# Patient Record
Sex: MALE | Race: WHITE | NOT HISPANIC OR LATINO | Employment: UNEMPLOYED | URBAN - METROPOLITAN AREA
[De-identification: names, ages, dates, MRNs, and addresses within clinical notes are randomized per-mention and may not be internally consistent; named-entity substitution may affect disease eponyms.]

---

## 2019-07-10 ENCOUNTER — HOSPITAL ENCOUNTER (OUTPATIENT)
Dept: OTHER | Facility: HOSPITAL | Age: 59
Discharge: HOME | End: 2019-07-10
Attending: INTERNAL MEDICINE

## 2019-07-12 LAB — HCV AB SER DONR QL: >11 S/CO RATIO (ref 0–0.9)

## 2019-07-22 ENCOUNTER — OFFICE VISIT CONVERTED (OUTPATIENT)
Dept: PODIATRY | Facility: CLINIC | Age: 59
End: 2019-07-22
Attending: PODIATRIST

## 2019-08-06 ENCOUNTER — HOSPITAL ENCOUNTER (OUTPATIENT)
Dept: OTHER | Facility: HOSPITAL | Age: 59
Discharge: HOME | End: 2019-08-06
Attending: NURSE PRACTITIONER

## 2019-08-09 LAB
CONV HEPATITIS C TEST INFO: NORMAL
CONV HEPATITIS C VIRUS RNA (VIRAL LOAD): NORMAL IU/ML
HCV RNA BDNA LOG IU/ML: NORMAL LOG10 IU/ML

## 2019-08-13 ENCOUNTER — OFFICE VISIT CONVERTED (OUTPATIENT)
Dept: SURGERY | Facility: CLINIC | Age: 59
End: 2019-08-13
Attending: UROLOGY

## 2019-09-11 ENCOUNTER — HOSPITAL ENCOUNTER (OUTPATIENT)
Dept: OTHER | Facility: HOSPITAL | Age: 59
Discharge: HOME | End: 2019-09-11
Attending: NURSE PRACTITIONER

## 2019-09-11 LAB
C DIFF TOX B STL QL CT TISS CULT: NEGATIVE
CONV 027 TOXIN: NEGATIVE

## 2019-09-16 LAB
O+P SPEC MICRO: NORMAL
O+P SPEC MICRO: NORMAL

## 2019-10-07 ENCOUNTER — HOSPITAL ENCOUNTER (OUTPATIENT)
Dept: SURGERY | Facility: CLINIC | Age: 59
Discharge: HOME | End: 2019-10-07

## 2019-10-07 LAB — PSA SERPL-MCNC: 5.64 NG/ML (ref 0–4)

## 2019-10-09 ENCOUNTER — HOSPITAL ENCOUNTER (OUTPATIENT)
Dept: SURGERY | Facility: HOSPITAL | Age: 59
Setting detail: HOSPITAL OUTPATIENT SURGERY
Discharge: HOME | End: 2019-10-09
Attending: OPHTHALMOLOGY

## 2019-10-09 LAB — GLUCOSE BLD-MCNC: 193 MG/DL (ref 70–99)

## 2019-10-14 ENCOUNTER — CONVERSION ENCOUNTER (OUTPATIENT)
Dept: SURGERY | Facility: CLINIC | Age: 59
End: 2019-10-14

## 2019-10-14 ENCOUNTER — OFFICE VISIT CONVERTED (OUTPATIENT)
Dept: SURGERY | Facility: CLINIC | Age: 59
End: 2019-10-14
Attending: UROLOGY

## 2019-10-24 ENCOUNTER — HOSPITAL ENCOUNTER (OUTPATIENT)
Dept: PERIOP | Facility: HOSPITAL | Age: 59
Setting detail: HOSPITAL OUTPATIENT SURGERY
Discharge: HOME | End: 2019-10-24

## 2019-10-24 LAB
GLUCOSE BLD-MCNC: 106 MG/DL (ref 70–99)
GLUCOSE BLD-MCNC: 152 MG/DL (ref 70–99)

## 2019-11-08 ENCOUNTER — OFFICE VISIT CONVERTED (OUTPATIENT)
Dept: SURGERY | Facility: CLINIC | Age: 59
End: 2019-11-08
Attending: UROLOGY

## 2019-11-15 ENCOUNTER — OFFICE VISIT CONVERTED (OUTPATIENT)
Dept: SURGERY | Facility: CLINIC | Age: 59
End: 2019-11-15
Attending: UROLOGY

## 2019-11-18 ENCOUNTER — HOSPITAL ENCOUNTER (OUTPATIENT)
Dept: SURGERY | Facility: HOSPITAL | Age: 59
Setting detail: HOSPITAL OUTPATIENT SURGERY
Discharge: HOME | End: 2019-11-18
Attending: OPHTHALMOLOGY

## 2019-11-18 LAB — GLUCOSE BLD-MCNC: 164 MG/DL (ref 70–99)

## 2019-11-22 ENCOUNTER — HOSPITAL ENCOUNTER (OUTPATIENT)
Dept: RADIATION ONCOLOGY | Facility: HOSPITAL | Age: 59
Setting detail: RECURRING SERIES
Discharge: STILL ADMITTED OR ADMINISTRATIVE | End: 2019-11-25
Attending: RADIOLOGY

## 2019-11-27 ENCOUNTER — HOSPITAL ENCOUNTER (OUTPATIENT)
Dept: OTHER | Facility: HOSPITAL | Age: 59
Discharge: HOME | End: 2019-11-27
Attending: INTERNAL MEDICINE

## 2019-11-27 LAB
AMPHETAMINES UR QL SCN: NEGATIVE
BARBITURATES UR QL SCN: NEGATIVE
BENZODIAZ UR QL SCN: NEGATIVE
CONV COCAINE, UR: NEGATIVE
METHADONE UR QL SCN: NEGATIVE
OPIATES TESTED UR SCN: NEGATIVE
OXYCODONE UR QL SCN: NEGATIVE
PCP UR QL: NEGATIVE
THC SERPLBLD CFM-MCNC: NEGATIVE NG/ML

## 2019-12-02 ENCOUNTER — HOSPITAL ENCOUNTER (OUTPATIENT)
Dept: OTHER | Facility: HOSPITAL | Age: 59
Discharge: HOME | End: 2019-12-02
Attending: NURSE PRACTITIONER

## 2019-12-03 ENCOUNTER — HOSPITAL ENCOUNTER (OUTPATIENT)
Dept: RADIATION ONCOLOGY | Facility: HOSPITAL | Age: 59
Setting detail: RECURRING SERIES
Discharge: HOME | End: 2019-12-31
Attending: RADIOLOGY

## 2020-01-07 ENCOUNTER — HOSPITAL ENCOUNTER (OUTPATIENT)
Dept: RADIATION ONCOLOGY | Facility: HOSPITAL | Age: 60
Setting detail: RECURRING SERIES
Discharge: HOME | End: 2020-01-31
Attending: RADIOLOGY

## 2020-01-10 ENCOUNTER — HOSPITAL ENCOUNTER (OUTPATIENT)
Dept: MRI IMAGING | Facility: HOSPITAL | Age: 60
Discharge: HOME | End: 2020-01-10
Attending: RADIOLOGY

## 2020-02-03 ENCOUNTER — HOSPITAL ENCOUNTER (OUTPATIENT)
Dept: RADIATION ONCOLOGY | Facility: HOSPITAL | Age: 60
Setting detail: RECURRING SERIES
Discharge: HOME | End: 2020-05-03
Attending: RADIOLOGY

## 2020-03-26 LAB
ALBUMIN SERPL-MCNC: 4.4 G/DL (ref 3.5–5)
ALBUMIN/GLOB SERPL: 1.3 {RATIO} (ref 1.4–2.6)
ALP SERPL-CCNC: 85 U/L (ref 56–119)
ALT SERPL-CCNC: 47 U/L (ref 10–40)
ANION GAP SERPL CALC-SCNC: 16 MMOL/L (ref 8–19)
AST SERPL-CCNC: 35 U/L (ref 15–50)
BASOPHILS # BLD AUTO: 0.03 10*3/UL (ref 0–0.2)
BASOPHILS NFR BLD AUTO: 0.4 % (ref 0–3)
BILIRUB SERPL-MCNC: 0.48 MG/DL (ref 0.2–1.3)
BUN SERPL-MCNC: 12 MG/DL (ref 5–25)
BUN/CREAT SERPL: 9 {RATIO} (ref 6–20)
CALCIUM SERPL-MCNC: 9.6 MG/DL (ref 8.7–10.4)
CHLORIDE SERPL-SCNC: 101 MMOL/L (ref 99–111)
CONV ABS IMM GRAN: 0.05 10*3/UL (ref 0–0.2)
CONV CO2: 26 MMOL/L (ref 22–32)
CONV IMMATURE GRAN: 0.7 % (ref 0–1.8)
CONV TOTAL PROTEIN: 7.8 G/DL (ref 6.3–8.2)
CREAT UR-MCNC: 1.3 MG/DL (ref 0.7–1.2)
DEPRECATED RDW RBC AUTO: 40.8 FL (ref 35.1–43.9)
EOSINOPHIL # BLD AUTO: 0.12 10*3/UL (ref 0–0.7)
EOSINOPHIL # BLD AUTO: 1.7 % (ref 0–7)
ERYTHROCYTE [DISTWIDTH] IN BLOOD BY AUTOMATED COUNT: 13.2 % (ref 11.6–14.4)
GFR SERPLBLD BASED ON 1.73 SQ M-ARVRAT: 59 ML/MIN/{1.73_M2}
GLOBULIN UR ELPH-MCNC: 3.4 G/DL (ref 2–3.5)
GLUCOSE SERPL-MCNC: 212 MG/DL (ref 70–99)
HCT VFR BLD AUTO: 44.1 % (ref 42–52)
HGB BLD-MCNC: 14.5 G/DL (ref 14–18)
LYMPHOCYTES # BLD AUTO: 1.02 10*3/UL (ref 1–5)
LYMPHOCYTES NFR BLD AUTO: 14.7 % (ref 20–45)
MCH RBC QN AUTO: 28.5 PG (ref 27–31)
MCHC RBC AUTO-ENTMCNC: 32.9 G/DL (ref 33–37)
MCV RBC AUTO: 86.6 FL (ref 80–96)
MONOCYTES # BLD AUTO: 0.64 10*3/UL (ref 0.2–1.2)
MONOCYTES NFR BLD AUTO: 9.2 % (ref 3–10)
NEUTROPHILS # BLD AUTO: 5.06 10*3/UL (ref 2–8)
NEUTROPHILS NFR BLD AUTO: 73.3 % (ref 30–85)
NRBC CBCN: 0 % (ref 0–0.7)
OSMOLALITY SERPL CALC.SUM OF ELEC: 292 MOSM/KG (ref 273–304)
PLATELET # BLD AUTO: 273 10*3/UL (ref 130–400)
PMV BLD AUTO: 9.6 FL (ref 9.4–12.4)
POTASSIUM SERPL-SCNC: 4.6 MMOL/L (ref 3.5–5.3)
RBC # BLD AUTO: 5.09 10*6/UL (ref 4.7–6.1)
SODIUM SERPL-SCNC: 138 MMOL/L (ref 135–147)
TESTOST SERPL-MCNC: 216 NG/DL (ref 193–740)
TSH SERPL-ACNC: 1.46 M[IU]/L (ref 0.27–4.2)
VIT B12 SERPL-MCNC: 682 PG/ML (ref 211–911)
WBC # BLD AUTO: 6.92 10*3/UL (ref 4.8–10.8)

## 2020-03-27 LAB — PSA SERPL-MCNC: 1.16 NG/ML (ref 0–4)

## 2020-03-28 LAB
CONV THYROXINE TOTAL: 8.8 UG/DL (ref 4.5–12)
TESTOSTERONE, FREE: 7 PG/ML (ref 7.2–24)

## 2020-06-17 ENCOUNTER — OFFICE VISIT CONVERTED (OUTPATIENT)
Dept: PODIATRY | Facility: CLINIC | Age: 60
End: 2020-06-17
Attending: PODIATRIST

## 2020-07-14 ENCOUNTER — CONVERSION ENCOUNTER (OUTPATIENT)
Dept: OTHER | Facility: HOSPITAL | Age: 60
End: 2020-07-14

## 2020-08-10 ENCOUNTER — OFFICE VISIT CONVERTED (OUTPATIENT)
Dept: NEUROSURGERY | Facility: CLINIC | Age: 60
End: 2020-08-10
Attending: PHYSICIAN ASSISTANT

## 2020-08-12 ENCOUNTER — OFFICE VISIT CONVERTED (OUTPATIENT)
Dept: RADIATION ONCOLOGY | Facility: HOSPITAL | Age: 60
End: 2020-08-12
Attending: NURSE PRACTITIONER

## 2020-08-12 ENCOUNTER — HOSPITAL ENCOUNTER (OUTPATIENT)
Dept: RADIATION ONCOLOGY | Facility: HOSPITAL | Age: 60
Discharge: HOME | End: 2020-08-12
Attending: NURSE PRACTITIONER

## 2020-08-12 LAB — PSA SERPL-MCNC: 0.71 NG/ML (ref 0–4)

## 2020-08-13 ENCOUNTER — OFFICE VISIT CONVERTED (OUTPATIENT)
Dept: NEUROLOGY | Facility: CLINIC | Age: 60
End: 2020-08-13
Attending: PSYCHIATRY & NEUROLOGY

## 2020-08-13 ENCOUNTER — CONVERSION ENCOUNTER (OUTPATIENT)
Dept: NEUROLOGY | Facility: CLINIC | Age: 60
End: 2020-08-13

## 2020-08-18 ENCOUNTER — HOSPITAL ENCOUNTER (OUTPATIENT)
Dept: PHYSICAL THERAPY | Facility: CLINIC | Age: 60
Setting detail: RECURRING SERIES
Discharge: HOME | End: 2020-09-21
Attending: NEUROLOGICAL SURGERY

## 2020-08-24 ENCOUNTER — HOSPITAL ENCOUNTER (OUTPATIENT)
Dept: PREADMISSION TESTING | Facility: HOSPITAL | Age: 60
Discharge: HOME | End: 2020-08-24
Attending: INTERNAL MEDICINE

## 2020-08-24 LAB — SARS-COV-2 RNA SPEC QL NAA+PROBE: NOT DETECTED

## 2020-08-26 ENCOUNTER — HOSPITAL ENCOUNTER (OUTPATIENT)
Dept: GASTROENTEROLOGY | Facility: HOSPITAL | Age: 60
Setting detail: HOSPITAL OUTPATIENT SURGERY
Discharge: HOME | End: 2020-08-26
Attending: INTERNAL MEDICINE

## 2020-08-26 LAB — GLUCOSE BLD-MCNC: 152 MG/DL (ref 70–99)

## 2020-08-27 ENCOUNTER — HOSPITAL ENCOUNTER (OUTPATIENT)
Dept: GASTROENTEROLOGY | Facility: HOSPITAL | Age: 60
Setting detail: HOSPITAL OUTPATIENT SURGERY
Discharge: HOME | End: 2020-08-27
Attending: INTERNAL MEDICINE

## 2020-08-27 LAB — GLUCOSE BLD-MCNC: 130 MG/DL (ref 70–99)

## 2020-09-02 ENCOUNTER — OFFICE VISIT CONVERTED (OUTPATIENT)
Dept: NEUROSURGERY | Facility: CLINIC | Age: 60
End: 2020-09-02
Attending: PHYSICIAN ASSISTANT

## 2020-09-22 ENCOUNTER — PROCEDURE VISIT CONVERTED (OUTPATIENT)
Dept: PODIATRY | Facility: CLINIC | Age: 60
End: 2020-09-22
Attending: PODIATRIST

## 2020-09-22 ENCOUNTER — CONVERSION ENCOUNTER (OUTPATIENT)
Dept: PODIATRY | Facility: CLINIC | Age: 60
End: 2020-09-22

## 2020-09-30 ENCOUNTER — HOSPITAL ENCOUNTER (OUTPATIENT)
Dept: CARDIOLOGY | Facility: HOSPITAL | Age: 60
Discharge: HOME | End: 2020-09-30
Attending: PHYSICIAN ASSISTANT

## 2020-10-01 ENCOUNTER — HOSPITAL ENCOUNTER (OUTPATIENT)
Dept: GENERAL RADIOLOGY | Facility: HOSPITAL | Age: 60
Discharge: HOME | End: 2020-10-01
Attending: PHYSICIAN ASSISTANT

## 2020-10-05 ENCOUNTER — OFFICE VISIT CONVERTED (OUTPATIENT)
Dept: NEUROSURGERY | Facility: CLINIC | Age: 60
End: 2020-10-05
Attending: PHYSICIAN ASSISTANT

## 2020-10-16 ENCOUNTER — HOSPITAL ENCOUNTER (OUTPATIENT)
Dept: GENERAL RADIOLOGY | Facility: HOSPITAL | Age: 60
Discharge: HOME | End: 2020-10-16
Attending: PHYSICIAN ASSISTANT

## 2020-11-17 ENCOUNTER — HOSPITAL ENCOUNTER (OUTPATIENT)
Dept: RADIATION ONCOLOGY | Facility: HOSPITAL | Age: 60
Setting detail: RECURRING SERIES
Discharge: STILL ADMITTED OR ADMINISTRATIVE | End: 2020-12-30
Attending: RADIOLOGY

## 2020-11-17 LAB — PSA SERPL-MCNC: 0.28 NG/ML (ref 0–4)

## 2020-11-25 ENCOUNTER — OFFICE VISIT CONVERTED (OUTPATIENT)
Dept: NEUROSURGERY | Facility: CLINIC | Age: 60
End: 2020-11-25
Attending: PHYSICIAN ASSISTANT

## 2020-12-15 ENCOUNTER — CONVERSION ENCOUNTER (OUTPATIENT)
Dept: PODIATRY | Facility: CLINIC | Age: 60
End: 2020-12-15

## 2020-12-17 ENCOUNTER — TELEMEDICINE CONVERTED (OUTPATIENT)
Dept: NEUROSURGERY | Facility: CLINIC | Age: 60
End: 2020-12-17
Attending: PHYSICIAN ASSISTANT

## 2021-01-14 ENCOUNTER — OFFICE VISIT CONVERTED (OUTPATIENT)
Dept: NEUROLOGY | Facility: CLINIC | Age: 61
End: 2021-01-14
Attending: PSYCHIATRY & NEUROLOGY

## 2021-02-22 ENCOUNTER — HOSPITAL ENCOUNTER (OUTPATIENT)
Dept: OTHER | Facility: HOSPITAL | Age: 61
Discharge: HOME | End: 2021-02-22
Attending: NURSE PRACTITIONER

## 2021-02-22 LAB — PSA SERPL-MCNC: 0.2 NG/ML (ref 0–4)

## 2021-02-23 ENCOUNTER — HOSPITAL ENCOUNTER (OUTPATIENT)
Dept: RADIATION ONCOLOGY | Facility: HOSPITAL | Age: 61
Discharge: HOME | End: 2021-02-23
Attending: NURSE PRACTITIONER

## 2021-03-09 ENCOUNTER — PROCEDURE VISIT CONVERTED (OUTPATIENT)
Dept: PODIATRY | Facility: CLINIC | Age: 61
End: 2021-03-09
Attending: PODIATRIST

## 2021-03-19 ENCOUNTER — HOSPITAL ENCOUNTER (OUTPATIENT)
Dept: VACCINE CLINIC | Facility: HOSPITAL | Age: 61
Discharge: HOME | End: 2021-03-19
Attending: INTERNAL MEDICINE

## 2021-04-16 ENCOUNTER — HOSPITAL ENCOUNTER (OUTPATIENT)
Dept: VACCINE CLINIC | Facility: HOSPITAL | Age: 61
Discharge: HOME | End: 2021-04-16
Attending: INTERNAL MEDICINE

## 2021-05-10 NOTE — H&P
History and Physical      Patient Name: Wlilard Lange   Patient ID: [id number]   Sex: Male   YOB: 1960    Primary Care Provider: Elizabeth CAUSEY   Referring Provider: Elizabeth CAUSEY    Visit Date: August 10, 2020    Provider: Stacey Smith PA-C   Location: [facility]   Location Address: [address] [place] [address]  [id number]   Location Phone: [phone]          Chief Complaint     Patient is here with complaints of low back pain. MRI at [facility]       History Of Present Illness  The patient is a 60 year old /White male, who presents on referral from Elizabeth CAUSEY, for a neurosurgical evaluation of low back pain, right leg pain, and left leg pain.   The right leg pain involves the right lower leg in a nonspecific distribution. The left leg pain involves the left lower leg in a nonspecific distribution. The pain developed gradually several years ago. Pt notes that his pain has worsened significantly recently in the past few months. It is 8/10 in severity has a pressure-like and a boring quality and radiates into the bilateral posterior leg in a nonspecific distribution. The pain has been constant. The pain tends to be maximal at no specific time, but waxes and wanes in severity throughout the day. The patient states the pain is aggravated by prolonged sitting, prolonged standing, walking long distances, bending, lifting, and driving. It is alleviated by norco.   He denies bowel or bladder dysfunction. The patient's past medical history is notable for prior lumbar spine surgery. He underwent back surgery approximately 4 years ago by Dr. Ford. Pt is unsure of surgery.   RECENT INTERVENTIONS:  He has been previously treated with epidural steroids, pain medication, and Norco, diclofenac. The epidural steroids were ineffective.   INFORMATION REVIEWED:  The following information was reviewed: old imaging studies. Images and reports from an MRI of the  lumbar spine that was obtained 1 year ago revealed degenerative disk disease. The degenerative disc disease is present at multiple levels. There is also foraminal stenosis at L4/5 and L5/S1.      Pt notes that he is unable to do his job because he cannot do heavy lifting. 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 He also complains of neck pain and hand numbness for several years.       Past Medical History  Arthritis; Asthma; Cancer; Degenerative Disc Disease ; Diabetes mellitus type 2, noninsulin dependent; Diabetes type 2, controlled; GERD; Hepatitis C; Hepatitis C; HTN (hypertension); Hypertension, Benign Essential; Leg pain; Lumbago; Lumbago/low back pain; Lumbar disc herniation, L4-5; Lumbar Spinal Stenosis; Muscle cramps; Neuropathy; Prostate cancer         Past Surgical History  Appendectomy; Back surgery; Colonoscopy; Laminectomy; Lithotripsy for kidney stones; Prostate biopsy, transrectal; Tonsilectomy         Medication List  diclofenac sodium 75 mg oral tablet,delayed release (DR/EC); Flomax 0.4 mg oral capsule; Janumet  mg oral tablet; losartan 100 mg oral tablet; Norco 7.5-325 mg oral tablet; NuLYTELY with Flavor Packs 420 gram oral recon soln; simvastatin 10 mg oral tablet         Allergy List  No known history of drug allergy       Allergies Reconciled  Family Medical History  Family history of lung cancer; Family history of heart disease; Family history of diabetes mellitus type I         Social History  Alcohol (Current some day); lives alone; Tobacco (Former); Unemployed;          Review of Systems  · Constitutional  o Denies  o : chills, excessive sweating, fatigue, fever, sycope/passing out, weight gain, weight loss  · Eyes  o Denies  o : changes in vision, blurry vision, double vision  · HENT  o Denies  o : loss of hearing, ringing in the ears, ear aches, sore throat, nasal congestion, sinus pain, nose bleeds, seasonal  allergies  · Cardiovascular  o Denies  o : blood clots, swollen legs, anemia, easy burising or bleeding, transfusions  · Respiratory  o Denies  o : shortness of breath, dry cough, productive cough, pneumonia, COPD  · Gastrointestinal  o Denies  o : difficulty swallowing, reflux  · Genitourinary  o Denies  o : incontinence  · Neurologic  o Denies  o : headache, seizure, stroke, tremor, loss of balance, falls, dizziness/vertigo, difficulty with sleep, numbness/tingling/paresthesia , difficulty with coordination, difficulty with dexterity, weakness  · Musculoskeletal  o Admits  o : joint pain, sciatica, pain radiating in leg, low back pain  o Denies  o : neck stiffness/pain, swollen lymph nodes, muscle aches, weakness, spasms, pain radiating in arm  · Endocrine  o Denies  o : diabetes, thyroid disorder  · Psychiatric  o Denies  o : anxiety, depression      Vitals  Date Time BP Position Site L\R Cuff Size HR RR TEMP (F) WT  HT  BMI kg/m2 BSA m2 O2 Sat        08/10/2020 10:43 AM        97.1 240lbs 0oz 6'   32.55 2.35           Physical Examination  · Constitutional  o Appearance  o : well-nourished, well developed, alert, in no acute distress  · Respiratory  o Respiratory Effort  o : breathing unlabored  · Cardiovascular  o Peripheral Vascular System  o :   § Extremities  § : no edema or cyanosis  · Musculoskeletal  o Spine  o :   § Inspection/Palpation  § : tenderness to palpation present in low back and neck.   o Right Lower Extremity  o :   § Inspection/Palpation  § : no joint or limb tenderness to palpation, no edema present, no ecchymosis  § Joint Stability  § : joint stability within normal limits  § Range of Motion  § : range of motion normal, no joint crepitations present, no pain on motion, Narendra's test negative  o Left Lower Extremity  o :   § Inspection/Palpation  § : no joint or limb tenderness to palpation, no edema present, no ecchymosis  § Joint Stability  § : joint stability within normal  limits  § Range of Motion  § : range of motion normal, no joint crepitations present, no pain on motion, Narendra's test negative  · Skin and Subcutaneous Tissue  o Extremities  o :   § Right Lower Extremity  § : no lesions or areas of discoloration  § Left Lower Extremity  § : no lesions or areas of discoloration  o Back  o : no lesions or areas of discoloration  · Neurologic  o Mental Status Examination  o :   § Orientation  § : alert and oriented to time, person, place and events  o Motor Examination  o :   § RLE Strength  § : strength normal  § RLE Motor Function  § : tone normal, no atrophy, no abnormal movements noted  § LLE Strength  § : strength normal  § LLE Motor Function  § : tone normal, no atrophy, no abnormal movements noted  o Reflexes  o :   § RLE  § : knee and ankle reflexes 2/4, SLR negative  § LLE  § : knee and ankle reflexes 2/4, SLR negative  o Sensation  o :   § Light Touch  § : mild decreased sensation in hands and feet.   o Gait and Station  o :   § Gait Screening  § : slow gait.   · Psychiatric  o Mood and Affect  o : mood normal, affect appropriate          Assessment  · Degeneration of lumbar intervertebral disc     722.52/M51.36  · Lumbago/low back pain     724.3/M54.40  · Paresthesia     782.0/R20.2  arms and legs  · Sciatica     724.3/M54.30  · Cervicalgia     723.1/M54.2    Problems Reconciled  Plan  · Orders  o PHYSICAL THERAPY CONSULTATION (PHYTH) - 724.3/M54.30, 724.3/M54.40, 782.0/R20.2, 723.1/M54.2 - 08/10/2020  o EMG/NCV of Lower Extremities Bilaterally (36693) - 724.3/M54.30, 782.0/R20.2 - 08/10/2020  o EMG/NCV of Upper Extremities Bilaterally (02186) - 782.0/R20.2 - 08/10/2020  · Medications  o Medications have been Reconciled  o Transition of Care or Provider Policy  · Instructions  o Encouraged to follow-up with Primary Care Provider for preventative care.  o The ROS and the PFSH were reviewed at today's visit.  o I have discussed the risks and benefits of surgery versus  physical therapy, epidural steroids, and other conservative forms of treatment.  o Given these options, the patient wishes to exhaust all forms of non-operative management.  o The patient wishes a referral to physical therapy.  o Handouts provided: Non-Surgical Treatments for Back Pain/Degenerative Disc Disease.  o We have discussed the benefits of core strengthening. Patient will work on improving the core muscle strength with low impact activities such as walking, swimming, Pilates, etc.   o Call or return to office if symptoms worsen or persist.  o Will order EMGs of arms and legs and send for PT of neck and lower back. He will return to us in 6 weeks. Will plan to order MRI of lumbar spine and Cspine at next apt.   o Electronically Identified Patient Education Materials Provided Electronically  · Disposition  o Call or Return if symptoms worsen or persist.            Electronically Signed by: Stacey Smith PA-C -Author on August 10, 2020 11:21:00 AM

## 2021-05-10 NOTE — H&P
History and Physical      Patient Name: Willard Lange   Patient ID: [id number]   Sex: Male   YOB: 1960    Primary Care Provider: Elizabeth CAUSEY   Referring Provider: Elizabeth CAUSEY    Visit Date: August 13, 2020    Provider: Kade Cook MD   Location: [facility]   Location Address: [address] [place] [address]  [id number]   Location Phone: [phone]          Chief Complaint     BUE and BLE pain numbness tingling and weakness       History Of Present Illness  Willard Lange is a 60 year old /White male who presents today to [facility] today referred from Elizabeth CAUSEY.      60-year-old man evaluated for nerve conduction EMG study.  He states that he had back surgery about 4 years ago.  He does not know which part of his back was operated on.  He states that he also has neuropathy.  He is complaining of cramping in both of his legs the calf muscles especially at night.  He is taking Lyrica 3 times a day.  He states that he gets numbness in his feet as well.  Is complaining of back pain occasionally radiating to his legs.  Most of the numbness and tingling is in his feet as well as his hands.       Past Medical History  Arthritis; Asthma; Cancer; Degenerative Disc Disease ; Diabetes mellitus type 2, noninsulin dependent; Diabetes type 2, controlled; GERD; Hepatitis C; Hepatitis C; HTN (hypertension); Hypertension, Benign Essential; Leg pain; Lumbago; Lumbago/low back pain; Lumbar disc herniation, L4-5; Lumbar Spinal Stenosis; Muscle cramps; Neuropathy; Prostate cancer         Past Surgical History  Appendectomy; Back surgery; Colonoscopy; Laminectomy; Lithotripsy for kidney stones; Prostate biopsy, transrectal; Tonsilectomy         Medication List  diclofenac sodium 75 mg oral tablet,delayed release (DR/EC); Flomax 0.4 mg oral capsule; Janumet  mg oral tablet; losartan 100 mg oral tablet; Norco 7.5-325 mg oral tablet;  NuLYTELY with Flavor Packs 420 gram oral recon soln; simvastatin 10 mg oral tablet         Allergy List  No known history of drug allergy         Family Medical History  Family history of lung cancer; Family history of heart disease; Family history of diabetes mellitus type I         Social History  Alcohol (Current some day); lives alone; Tobacco (Former); Unemployed;          Review of Systems  · Constitutional  o Denies  o : chills, excessive sweating, fatigue, fever, sycope/passing out, weight gain, weight loss  · Eyes  o Denies  o : changes in vision, blurry vision, double vision  · HENT  o Denies  o : loss of hearing, ringing in the ears, ear aches, sore throat, nasal congestion, sinus pain, nose bleeds, seasonal allergies  · Cardiovascular  o Denies  o : blood clots, swollen legs, anemia, easy burising or bleeding, transfusions  · Respiratory  o Denies  o : shortness of breath, dry cough, productive cough, pneumonia, COPD  · Gastrointestinal  o Denies  o : difficulty swallowing, reflux  · Genitourinary  o Denies  o : incontinence  · Neurologic  o Admits  o : loss of balance, falls, difficulty with sleep, numbness/tingling/paresthesia , weakness  o Denies  o : headache, seizure, stroke, tremor, dizziness/vertigo, difficulty with coordination, difficulty with dexterity  · Musculoskeletal  o Admits  o : neck stiffness/pain, weakness, sciatica, pain radiating in arm, low back pain  o Denies  o : swollen lymph nodes, muscle aches, joint pain, spasms, pain radiating in leg  · Endocrine  o Admits  o : diabetes  o Denies  o : thyroid disorder  · Psychiatric  o Admits  o : anxiety  o Denies  o : depression      Vitals  Date Time BP Position Site L\R Cuff Size HR RR TEMP (F) WT  HT  BMI kg/m2 BSA m2 O2 Sat HC       08/13/2020 03:11 /62 Sitting    72 - R 18  240lbs 0oz 6'   32.55 2.35           Physical Examination     He is alert, fluent, phasic, follows commands well.  There is no weakness proximally of  his upper or lower extremities.  Specifically there is no weakness of the deltoids, biceps, triceps, pronators, supinators extensors the wrist and flexors of the fingers.  There is 4/ 5 strength on the abduction of the fingers including first dorsal interossei muscle as well as abductor pollicis brevis muscles bilaterally.  In the lower extremity there is no weakness proximally but there is 4/5 strength of the dorsiflexion, eversion, inversion.  There is atrophy of leg muscles especially atrophy of the foot muscles.  Reflexes are absent.  Sensation is decreased to pinprick in a stocking distribution all the way to the upper part of his thighs.  He can feel a sensation of pinprick in his hands and arms.    He has difficulty turning around and lying down in the examining table because of his back pain.           Assessment  · Diabetic polyneuropathy       Type 2 diabetes mellitus with diabetic polyneuropathy     250.60/E11.42  · Numbness and tingling       Anesthesia of skin     782.0/R20.0  Paresthesia of skin     782.0/R20.2  The study is abnormal and shows electrophysiologic evidence for severe axonal and demyelinating sensorimotor polyneuropathy.    The EMG study was done on the right lower extremity and shows electrophysiologic evidence for chronic L5/S1 lumbar radiculopathy. There is also mild denervation in the S1 innervated muscles.  · Radiculopathy of lumbar region     724.4/M54.16  He is to follow-up with neurosurgery and obtain an MRI of his lumbar spine.  I told him that he is cramping in his calves probably secondary to chronic radiculopathy and if he is not a surgical candidate he may be referred to pain management to discuss options for management of his pain.      25 minutes was spent for this moderate complexity clinic visit more than half the time was spent face-to-face with the patient for examination, counseling, planning and recommendations.    Problems Reconciled  Plan  · Orders  o Muscle test  done with Nerve test Complete (63366) - 724.4/M54.16, 250.60/E11.42, 782.0/R20.0, 782.0/R20.2 - 08/14/2020  o Nerve conduction studies; 9-10 studies (83571) - 724.4/M54.16, 250.60/E11.42, 782.0/R20.0, 782.0/R20.2 - 08/13/2020  · Medications  o Medications have been Reconciled  o Transition of Care or Provider Policy  · Instructions  o Encouraged to follow-up with Primary Care Provider for preventative care.            Electronically Signed by: Kade Cook MD -Author on August 14, 2020 05:48:49 AM

## 2021-05-13 NOTE — PROGRESS NOTES
Progress Note      Patient Name: Willard Lange   Patient ID: [id number]   Sex: Male   YOB: 1960    Primary Care Provider: Elizabeth CAUSEY   Referring Provider: Elizabeth CAUSEY    Visit Date: September 2, 2020    Provider: Stacey Smith PA-C   Location: [facility]   Location Address: [address] [place] [address]  [id number]   Location Phone: [phone]          Chief Complaint     PT HERE FOR FOLLOW UP AFTER PT       History Of Present Illness     Pt notes that therapy has not been helping with neck symptoms or back symptoms. He notes that he has been having worsening weakness in legs and can specifically see changing in strength with swimming. He also complains of worsening neck pain with weakness in neck. He has numbness in hands with weakness. He also complains of numbness in legs. Pt has been taking diclofenac and Lyrica.       Past Medical History  Disease Name Date Onset Notes   Arthritis --  --    Asthma --  --    Cancer --  --    Degenerative Disc Disease  --  --    Diabetes mellitus type 2, noninsulin dependent --  --    Diabetes type 2, controlled --  --    GERD --  --    Hepatitis C --  --    Hepatitis C --  --    HTN (hypertension) --  --    Hypertension, Benign Essential --  --    Leg pain --  --    Lumbago --  --    Lumbago/low back pain 02/15/2017 --    Lumbar disc herniation, L4-5 02/15/2017 --    Lumbar Spinal Stenosis 02/15/2017 L4/5   Muscle cramps --  --    Neuropathy --  --    Prostate cancer --  --          Past Surgical History  Procedure Name Date Notes   Appendectomy --  --    Back surgery 2017 --    Colonoscopy 2017 Dr. Dorsey-polyps   Laminectomy 2/17/2017 L4-5   Lithotripsy for kidney stones --  --    Prostate biopsy, transrectal --  --    Tonsilectomy --  --          Medication List  Name Date Started Instructions   atorvastatin 20 mg oral tablet  take 1 tablet (20 mg) by oral route once daily at bedtime   diclofenac  sodium 75 mg oral tablet,delayed release (DR/EC)  take 1 tablet (75 mg) by oral route 2 times per day   dicyclomine 20 mg oral tablet  take 1 tablet (20 mg) by oral route 4 times per day   Flomax 0.4 mg oral capsule  take 1 capsule (0.4 mg) by oral route once daily 1/2 hour following the same meal each day   Janumet  mg oral tablet  take 1 tablet by oral route 2 times per day with meals   losartan 100 mg oral tablet  take 1 tablet (100 mg) by oral route once daily   Lyrica 200 mg oral capsule  take 1 capsule (200 mg) by oral route 3 times per day   Ozempic 0.25 mg or 0.5 mg(2 mg/1.5 mL) subcutaneous pen injector  inject 0.2 milliliter (0.25 mg) by subcutaneous route once weekly on the same day of each week, in the abdomen, thighs, or upper arm rotating injection sites   Vitamin D2 1,250 mcg (50,000 unit) oral capsule  take 1 capsule by oral route         Allergy List  Allergen Name Date Reaction Notes   formaldehyde --  --  --          Family Medical History  Disease Name Relative/Age Notes   Family history of lung cancer Sister/   --    Family history of heart disease Father/   --    Family history of diabetes mellitus type I Grandmother (maternal)/   --          Social History  Finding Status Start/Stop Quantity Notes   Alcohol Current some day --/-- --  rarely drinks   lives alone --  --/-- --  --    Tobacco Former --/-- --  former smoker   Unemployed --  --/-- --  --     --  --/-- --  --          Review of Systems  · Constitutional  o Admits  o : weight gain  o Denies  o : chills, excessive sweating, fatigue, fever, sycope/passing out, weight loss  · Eyes  o Denies  o : changes in vision, blurry vision, double vision  · HENT  o Denies  o : loss of hearing, ringing in the ears, ear aches, sore throat, nasal congestion, sinus pain, nose bleeds, seasonal allergies  · Cardiovascular  o Admits  o : swollen legs  o Denies  o : blood clots, anemia, easy burising or bleeding,  transfusions  · Respiratory  o Denies  o : shortness of breath, dry cough, productive cough, pneumonia, COPD  · Gastrointestinal  o Denies  o : difficulty swallowing, reflux  · Genitourinary  o Admits  o : erectile dysfunction  o Denies  o : incontinence  · Neurologic  o Admits  o : loss of balance, difficulty with sleep, numbness/tingling/paresthesia , weakness  o Denies  o : headache, seizure, stroke, tremor, falls, dizziness/vertigo, difficulty with coordination, difficulty with dexterity  · Musculoskeletal  o Admits  o : neck stiffness/pain, sciatica, pain radiating in arm, pain radiating in leg, low back pain  o Denies  o : swollen lymph nodes, muscle aches, joint pain, weakness, spasms  · Endocrine  o Denies  o : diabetes, thyroid disorder  · Psychiatric  o Admits  o : anxiety  o Denies  o : depression      Vitals  Date Time BP Position Site L\R Cuff Size HR RR TEMP (F) WT  HT  BMI kg/m2 BSA m2 O2 Sat        09/02/2020 09:39 AM        97.1 241lbs 9oz 6'   32.76 2.36           Physical Examination  · Constitutional  o Appearance  o : well-nourished, well developed, alert, in no acute distress  · Respiratory  o Respiratory Effort  o : breathing unlabored  · Cardiovascular  o Peripheral Vascular System  o :   § Extremities  § : no edema or cyanosis  · Musculoskeletal  o Spine  o :   § Inspection/Palpation  § : tenderness to palpation present in low back and neck.   o Right Lower Extremity  o :   § Inspection/Palpation  § : no joint or limb tenderness to palpation, no edema present, no ecchymosis  § Joint Stability  § : joint stability within normal limits  § Range of Motion  § : range of motion normal, no joint crepitations present, no pain on motion, Narendra's test negative  o Left Lower Extremity  o :   § Inspection/Palpation  § : no joint or limb tenderness to palpation, no edema present, no ecchymosis  § Joint Stability  § : joint stability within normal limits  § Range of Motion  § : range of motion  normal, no joint crepitations present, no pain on motion, Narendra's test negative  · Skin and Subcutaneous Tissue  o Extremities  o :   § Right Lower Extremity  § : no lesions or areas of discoloration  § Left Lower Extremity  § : no lesions or areas of discoloration  o Back  o : no lesions or areas of discoloration  · Neurologic  o Mental Status Examination  o :   § Orientation  § : alert and oriented to time, person, place and events  o Motor Examination  o :   § RLE Strength  § : strength normal  § RLE Motor Function  § : tone normal, no atrophy, no abnormal movements noted  § LLE Strength  § : strength normal  § LLE Motor Function  § : tone normal, no atrophy, no abnormal movements noted  o Reflexes  o :   § RLE  § : knee and ankle reflexes 2/4, SLR negative  § LLE  § : knee and ankle reflexes 2/4, SLR negative  o Sensation  o :   § Light Touch  § : mild decreased sensation in hands and feet.   o Gait and Station  o :   § Gait Screening  § : slow gait.   · Psychiatric  o Mood and Affect  o : mood normal, affect appropriate          Assessment  · Claudication     443.9/I73.9  · Degeneration of lumbar intervertebral disc     722.52/M51.36  · Lumbago/low back pain     724.3/M54.40  · Paresthesia     782.0/R20.2  arms and legs  · Sciatica     724.3/M54.30  · Cervicalgia     723.1/M54.2  · Cervical radiculopathy     723.4/M54.12    Problems Reconciled  Plan  · Orders  o MRI lumbar spine w/w/o contrst (28330) - 724.3/M54.30, 724.3/M54.40, 782.0/R20.2, 722.52/M51.36 - 09/02/2020   JORGE  o MRI cervical spine w/o contrast (18599) - 723.1/M54.2, 723.4/M54.12, 782.0/R20.2 - 09/02/2020   JORGE  o JONAS (ankle brachial index) (13447) - 443.9/I73.9 - 09/02/2020  · Medications  o Medications have been Reconciled  o Transition of Care or Provider Policy  · Instructions  o Will order MRIs of Lspine and Cspine and will return afterwards. He has failed PT and NSAIDs/Lryica. Pt has high cholesterol with claudication symptoms. Will order  JONAS of legs.   o Electronically Identified Patient Education Materials Provided Electronically  · Disposition  o Call or Return if symptoms worsen or persist.            Electronically Signed by: Stacey Smith PA-C -Author on September 2, 2020 10:37:19 AM

## 2021-05-13 NOTE — PROGRESS NOTES
Progress Note      Patient Name: Willard Lange   Patient ID: [id number]   Sex: Male   YOB: 1960    Primary Care Provider: Elizabeth CAUSEY   Referring Provider: Elizabeth CAUSEY    Visit Date: November 25, 2020    Provider: Michelle Jenkins PA-C   Location: [facility]   Location Address: [address] [place] [address]  [id number]   Location Phone: [phone]          Chief Complaint     Patient is being seen today to discuss MRI Lumbar Spine that he had done at [facility].       History Of Present Illness     Having frequent falls.  He falls a few times a week.  Legs feel heavy and weak.  He has neck pain that is constant but worse neck flexion. He falls forward when he falls.  History of prostate cancer.  Walking distance is declining.   strength diminished and trouble opening jars/soda.       Past Medical History  Arthritis; Asthma; Cancer; Degenerative Disc Disease ; Diabetes mellitus type 2, noninsulin dependent; Diabetes type 2, controlled; GERD; Hepatitis C; Hepatitis C; HTN (hypertension); Hypertension, Benign Essential; Leg pain; Lumbago; Lumbago/low back pain; Lumbar disc herniation, L4-5; Lumbar Spinal Stenosis; Muscle cramps; Neuropathy; Prostate Cancer         Past Surgical History  Appendectomy; Back surgery; Colonoscopy; Laminectomy; Lithotripsy for kidney stones; Prostate biopsy, transrectal; Tonsilectomy         Medication List  atorvastatin 20 mg oral tablet; buspirone 7.5 mg oral tablet; diclofenac sodium 75 mg oral tablet,delayed release (DR/EC); dicyclomine 20 mg oral tablet; Flomax 0.4 mg oral capsule; Janumet  mg oral tablet; losartan 100 mg oral tablet; losartan-hydrochlorothiazide 100-25 mg oral tablet; Lyrica 200 mg oral capsule; metformin 500 mg oral tablet; Ozempic 0.25 mg or 0.5 mg(2 mg/1.5 mL) subcutaneous pen injector; Vitamin D2 1,250 mcg (50,000 unit) oral capsule         Allergy List  formaldehyde       Allergies  Reconciled  Family Medical History  Family history of lung cancer; Family history of heart disease; Family history of diabetes mellitus type I         Social History  Alcohol (Current some day); lives alone; Tobacco (Former); Unemployed;          Review of Systems  · Constitutional  o Admits  o : fatigue  o Denies  o : chills, excessive sweating, fever, sycope/passing out, weight gain, weight loss  · Eyes  o Denies  o : changes in vision, blurry vision, double vision  · HENT  o Denies  o : loss of hearing, ringing in the ears, ear aches, sore throat, nasal congestion, sinus pain, nose bleeds, seasonal allergies  · Cardiovascular  o Admits  o : swollen legs  o Denies  o : blood clots, anemia, easy burising or bleeding, transfusions  · Respiratory  o Admits  o : shortness of breath  o Denies  o : dry cough, productive cough, pneumonia, COPD  · Gastrointestinal  o Denies  o : difficulty swallowing, reflux  · Genitourinary  o Admits  o : erectile dysfunction  o Denies  o : incontinence  · Neurologic  o Admits  o : loss of balance, difficulty with sleep, difficulty with coordination  o Denies  o : headache, seizure, stroke, tremor, falls, dizziness/vertigo, numbness/tingling/paresthesia , difficulty with dexterity, weakness  · Musculoskeletal  o Admits  o : neck stiffness/pain, pain radiating in leg, low back pain  o Denies  o : swollen lymph nodes, muscle aches, joint pain, weakness, spasms, sciatica, pain radiating in arm  · Endocrine  o Admits  o : diabetes  o Denies  o : thyroid disorder  · Psychiatric  o Admits  o : anxiety  o Denies  o : depression  · All Others Negative      Vitals  Date Time BP Position Site L\R Cuff Size HR RR TEMP (F) WT  HT  BMI kg/m2 BSA m2 O2 Sat FR L/min FiO2 HC       11/25/2020 08:51 AM        96.9 239lbs 8oz 6'   32.48 2.35             Physical Examination     Diffuse upper and lower extremity weakness of 4+/5.  DTR are 1/4.  Negative Lawrence's and no clonus. No pronator drift.   Finger to nose is abnormal on the left. Gait is shuffled and slow.           Assessment  · Degeneration of lumbar intervertebral disc     722.52/M51.36  · Lumbago/low back pain     724.3/M54.40  · Paresthesia     782.0/R20.2  arms and legs  · Sciatica     724.3/M54.30  · Spondylolisthesis , lumbar     738.4/M43.16  · Cervicalgia     723.1/M54.2  · Weakness     780.79/R53.1  legs  · Falls frequently     V15.88/R29.6  · History of prostate cancer     V10.46/Z85.46  · Pre-procedural laboratory examinations     V72.63/Z01.812      Plan  · Orders  o MRI cervical spine w/o contrast (29821) - 723.1/M54.2, 780.79/R53.1, V15.88/R29.6 - 11/25/2020   [facility] Open MRI  o MRI brain w/w/o contrast (89077) - V15.88/R29.6, V10.46/Z85.46 - 11/25/2020   [facility]  o BUN (99948) - V72.63/Z01.812 - 11/25/2020  o Creatinine blood (55611) - V72.63/Z01.812 - 11/25/2020  · Medications  o Medications have been Reconciled  o Transition of Care or Provider Policy  · Instructions  o He has lumbar stenosis that is moderate to severe at L4/5 with prior MIL at this level in 2017 but he has frequent falls and cervicalgia with arm and leg weakness so will order MRI cervical spine to evaluate for cervical stenosis. I will order MRI brain due to history of prostate cancer and falls several times per weak. F/U to discuss imaging.             Electronically Signed by: Michelle Jenkins PA-C -Author on November 25, 2020 09:25:08 AM

## 2021-05-13 NOTE — PROGRESS NOTES
Progress Note      Patient Name: Willard Lange   Patient ID: [id number]   Sex: Male   YOB: 1960    Primary Care Provider: Elizabeth CAUSEY   Referring Provider: Elizabeth CAUSEY    Visit Date: September 22, 2020    Provider: Sarkis Ureña DPM   Location: [facility]   Location Address: [address] [place] [address]  [id number]   Location Phone: [phone]          Chief Complaint  · Routine Foot Care Visit  · Diabetic Foot Evaluation      History Of Present Illness  Willard Lange complains of painful, elongated toenails which are thickened, yellowed, chalky, and cause pain with shoe gear and ambulation.   Willard Lange presents to the office today as a Follow-Up for a diabetic foot evaluation.   Patient reports that he is a diabetic currently controlling diabetes with oral medication      New, Established, New Problem:  New problem  Location:  Toenails  Duration:  Greater than five years  Onset:  Gradual  Nature:  sore with palpation.  Stable, worsening, improving:   stable  Aggravating factors:  Pain with shoe gear and ambulation.  Previous Treatment:  unable to trim himself.    Patient denies any fevers, chills, nausea, vomiting, shortness of breathe, nor any other constitutional signs nor symptoms.      New, Established, New Problem: SECOND PROBLEM   Location: bilateral feet  Duration: 2012  Onset: gradual  Nature: NIDDM  Stable, worsening, improving: stable  Aggravating factors:  Previous Treatment: oral medication    Pt states their most recent blood glucose reading was 140.    Patient reports the following medical changes since their last visit:    - recent routine colonscopy       Past Medical History  Arthritis; Asthma; Cancer; Degenerative Disc Disease ; Diabetes mellitus type 2, noninsulin dependent; Diabetes type 2, controlled; GERD; Hepatitis C; Hepatitis C; HTN (hypertension); Hypertension, Benign Essential; Leg pain; Lumbago; Lumbago/low back pain;  Lumbar disc herniation, L4-5; Lumbar Spinal Stenosis; Muscle cramps; Neuropathy; Prostate cancer         Past Surgical History  Appendectomy; Back surgery; Colonoscopy; Laminectomy; Lithotripsy for kidney stones; Prostate biopsy, transrectal; Tonsilectomy         Medication List  atorvastatin 20 mg oral tablet; diclofenac sodium 75 mg oral tablet,delayed release (DR/EC); dicyclomine 20 mg oral tablet; Flomax 0.4 mg oral capsule; Janumet  mg oral tablet; losartan 100 mg oral tablet; Lyrica 200 mg oral capsule; Ozempic 0.25 mg or 0.5 mg(2 mg/1.5 mL) subcutaneous pen injector; Vitamin D2 1,250 mcg (50,000 unit) oral capsule         Allergy List  formaldehyde       Allergies Reconciled  Family Medical History  Family history of lung cancer; Family history of heart disease; Family history of diabetes mellitus type I         Social History  Alcohol (Current some day); lives alone; Tobacco (Former); Unemployed;          Review of Systems  · Constitutional  o Denies  o : fatigue, night sweats  · Eyes  o Denies  o : double vision, blurred vision  · HENT  o Denies  o : vertigo, recent head injury  · Cardiovascular  o Denies  o : chest pain, irregular heart beats  · Respiratory  o Denies  o : shortness of breath, productive cough  · Gastrointestinal  o Denies  o : nausea, vomiting  · Genitourinary  o Denies  o : dysuria, urinary retention  · Integument  o * See HPI  · Neurologic  o Admits  o : tingling or numbness  o Denies  o : altered mental status, seizures  · Musculoskeletal  o Denies  o : joint swelling, limitation of motion  · Endocrine  o Denies  o : cold intolerance, heat intolerance  · Heme-Lymph  o Denies  o : petechiae, lymph node enlargement or tenderness  · Allergic-Immunologic  o Denies  o : frequent illnesses      Vitals  Date Time BP Position Site L\R Cuff Size HR RR TEMP (F) WT  HT  BMI kg/m2 BSA m2 O2 Sat HC       09/22/2020 04:07 /79 Sitting    75 - R  97.8 245lbs 0oz 6'   33.23 2.38 98 %     09/22/2020 04:07 /77 Sitting                     Physical Examination  · Constitutional  o Appearance  o : overweight, well developed, no obvious deformities present, appears to be chronically ill   · Respiratory  o Respiratory Effort  o : No labored breathing. Good respiratory effort.   · Cardiovascular  o Peripheral Vascular System  o :   § Pedal Pulses  § : Pedal Pulses are 2+ and symmetrical  § Extremities  § : There is no edema of the lower extremities  · Musculoskeletal  o Extremeties/Joint  o : Lower extremity muscle strength and range of motion is equal and symmetrical bilaterally. The knees are noted to be in normal alignment. Ankle alignment and range of motion is normal and foot structure is normal.  · Left DM Foot Exam  o Sensation  o : Fairview-Oswaldo 5.07 monofilament absent to all assessed areas. Sharp/dull sensation is absent.  o Visual Inspection  o : Skin is noted to have normal texture and turgor, with no excrescences noted. The toenails are noted to be without disease  o Vascular  o : palpable dorsalis pedis and posterir tibialis pulses present, normal capillary refill  · Right DM Foot Exam  o Sensation  o : Fairview-Oswaldo 5.07 monofilament absent to all assessed areas. Sharp/dull sensation is absent.  o Visual Inspection  o : Skin is noted to have normal texture and turgor, with no excrescences noted. The toenails are noted to be without disease  o Vascular  o : palpable dorsalis pedis and posterir tibialis pulses present, normal capillary refill  · Toes  o Toes: Right Foot  o :   § Toenails  § : Toenails are hypertrophic, mycotic, dystrophic, brittle toenail(s) at nail 1, 2, 3, 4, 5 with onycholysis of the right foot. The 1st, 2nd, 3rd, 4th, 5th toenail(s) on the right have 2 mm in thickness with subungual detritus. There is an incurvated toenail at the distal border of the 1st, 2nd, 3rd, 4th, 5th toe  o Toes: Left Foot  o :   § Toenails  § : There are hypertrophic, mycotic,  dystrophic, brittle toenail(s) at the 1, 2, 3, 4, 5 with onycholysis of the left foot. The 1st, 2nd, 3rd, 4th, 5th toenail(s) on the left have 2 mm in thickness with subungual detritus. There is an incurvated toenail at the distal border of the 1st, 2nd, 3rd, 4th, 5th toe  · Procedures  o Nail Debridement  o : Nail debridement is indicated for the following toenails:, left hallux, left 2nd toe, left 3rd toe, left 4th toe, left 5th toe, right hallux, right 2nd toe, right 3rd toe, right 4th toe, right 5th toe. The nail was debrided of excessive thickness to appropriate levels of comfort and contour using, nail nippers. The procedure was without complications          Assessment  · Diabetes mellitus type 2, noninsulin dependent       Type 2 diabetes mellitus without complications     250.00/E11.9  · Diabetic neuropathy       Type 2 diabetes mellitus with diabetic neuropathy, unspecified     250.60/E11.40  · Foot pain, bilateral       Pain in right foot     729.5/M79.671  Pain in left foot     729.5/M79.672  · Ingrowing nail     703.0/L60.0  · Tinea unguium     110.1/B35.1      Plan  · Orders  o Debridement of six or more nails (26264) - - 09/22/2020  o Diabetic Foot (Motor and Sensory) Exam Completed [facility] (, , 2028F) - - 09/22/2020  · Medications  o Medications have been Reconciled  o Transition of Care or Provider Policy  · Instructions  o Follow Up in 9 weeks  o I have discussed the findings of this evaluation with the patient. The discussion included a complete verbal explanation of any changes in the examination results, diagnosis, and the current treatment plan. A schedule for future care needs was explained. If any questions should arise after returning home, I have encouraged the patient to feel free to contact Dr. Ureña. The patient states understanding and agreement with this plan.   o Pt to monitor for problems and to contact Dr. Ureña for follow-up should such signs occur. Patient states  understanding and agreement with this plan.   o Onychomycosis is present in 2-3% of the population with the most common source of contamination coming from the patient's own skin. Fifteen to 20 percent of people between the ages of 40 and 60 have onychomycosis, 32 percent of 60- to [id number] have nail fungus and approximately 50 percent of those over 70 are afflicted. Seventy-five percent of the people who have this infection exhibit psychosocial concerns. These people face the dilemma of not being able to go to the swimming pool, public shower areas or even wear open-toed sandals. More important is the fact that 48 percent of the people with onychomycosis have pain. The pain is intense enough to have these patients miss 1.8 days of work on average over a six-month period. Traditional topical therapies used alone are generally ineffective for clearing the primary infection, and even oral therapy is associated with a high rate of initial treatment failure or recurrence. In many patients combination oral and topical therapy is the treatment of choice.   o I have recommended debridement of the devitalized or contaminated tissue. Debridement will relieve the pressure of the necrotic presence of on the nail and provides for a better cosmetic appearance. Debulking the nail does help in combination with other treatments in that it can decrease the fungal load of the nail itself.   o Diabetic foot exam performed and documented this date, compliant with CQM required standards. Detail of findings as noted in physical exam.Lower extremity Neuro exam for diabetic patient performed and documented this date, compliant with PQRS required standards. Detail of findings as noted in physical exam.Advised patient importance of good routine lower extremity hygiene. Advised patient importance of evaluating for intact skin and pain free nail borders. Advised patient to use mirror to evaluate plantar/ soles of feet for better  visualization. Advised patient monitor and phone office to be seen if any cracking to skin, open lesions, painful nail borders or if nails become elongated prior to next visit. Advised patient importance of daily cleansing of lower extremities, followed by good skin cream to maintain normal hydration of skin. Also advised patient importance of close daily monitoring of blood sugar. Advised to regulate diet and medications to maintain control of blood sugar in optimal range. Contact primary care provider if difficulties maintaining blood sugar levels.Advised Patient of presence of Diabetes Mellitus condition. Advised Patient risk of progression and worsening or improvement, then return of condition. Will monitor condition for any change in future. Treat with most appropriate treatment pending status of condition.Counseled and advised patient extensively on nature and ramifications of diabetes. Standard instructions given to patient for good diabetic foot care and maintenance. Advised importance of careful monitoring to avoid break down and complications secondary to diabetes. Advised patient importance of strict maintenance of blood sugar control. Advised patient of possible ominous results from neglect of condition,i.e.: amputation/ loss of digits, feet and legs, or even death.Patient states understands counseling, will monitor closely, continue good hygiene and routine diabetic foot care. Patient will contact office is questions or problems.   o Electronically Identified Patient Education Materials Provided Electronically            Electronically Signed by: Sarkis Ureña DPM -Author on September 22, 2020 04:47:27 PM

## 2021-05-13 NOTE — PROGRESS NOTES
Progress Note      Patient Name: Willard Lange   Patient ID: [id number]   Sex: Male   YOB: 1960    Primary Care Provider: Elizabeth CAUSEY   Referring Provider: Elizabeth CAUSEY    Visit Date: October 5, 2020    Provider: Stacey Smith PA-C   Location: [facility]   Location Address: [address] [place] [address]  [id number]   Location Phone: [phone]          Chief Complaint  · Follow-up      History Of Present Illness  The patient is a 60 year old /White male who is in the office for followup appointment. JONAS of legs showed mildly abnormal posterior tibial waveform. Otherwise was normal. Xray of Lspine showed facet arthritis, DDD, and retrolisthesis of L3 on L4. Pt notes that his legs are becoming worse. He is having worsening weakness in bilateral legs. Pt notes that he is having worsening cramping in legs that wakes him up at night. Pt has failed PT for legs and lower back in the past 2 months.       Past Medical History  Arthritis; Asthma; Cancer; Degenerative Disc Disease ; Diabetes mellitus type 2, noninsulin dependent; Diabetes type 2, controlled; GERD; Hepatitis C; Hepatitis C; HTN (hypertension); Hypertension, Benign Essential; Leg pain; Lumbago; Lumbago/low back pain; Lumbar disc herniation, L4-5; Lumbar Spinal Stenosis; Muscle cramps; Neuropathy; Prostate cancer         Past Surgical History  Appendectomy; Back surgery; Colonoscopy; Laminectomy; Lithotripsy for kidney stones; Prostate biopsy, transrectal; Tonsilectomy         Medication List  atorvastatin 20 mg oral tablet; buspirone 7.5 mg oral tablet; diclofenac sodium 75 mg oral tablet,delayed release (DR/EC); dicyclomine 20 mg oral tablet; Flomax 0.4 mg oral capsule; Janumet  mg oral tablet; losartan 100 mg oral tablet; losartan-hydrochlorothiazide 100-25 mg oral tablet; Lyrica 200 mg oral capsule; Ozempic 0.25 mg or 0.5 mg(2 mg/1.5 mL) subcutaneous pen injector;  Vitamin D2 1,250 mcg (50,000 unit) oral capsule         Allergy List  formaldehyde       Allergies Reconciled  Family Medical History  Family history of lung cancer; Family history of heart disease; Family history of diabetes mellitus type I         Social History  Alcohol (Current some day); lives alone; Tobacco (Former); Unemployed;          Review of Systems  · Constitutional  o Admits  o : fatigue  o Denies  o : chills, excessive sweating, fever, sycope/passing out, weight gain, weight loss  · Eyes  o Denies  o : changes in vision, blurry vision, double vision  · HENT  o Denies  o : loss of hearing, ringing in the ears, ear aches, sore throat, nasal congestion, sinus pain, nose bleeds, seasonal allergies  · Cardiovascular  o Admits  o : swollen legs  o Denies  o : blood clots, anemia, easy burising or bleeding, transfusions  · Respiratory  o Denies  o : shortness of breath, dry cough, productive cough, pneumonia, COPD  · Gastrointestinal  o Admits  o : reflux  o Denies  o : difficulty swallowing  · Genitourinary  o Admits  o : erectile dysfunction  o Denies  o : incontinence  · Neurologic  o Admits  o : loss of balance, difficulty with sleep, numbness/tingling/paresthesia , difficulty with coordination, weakness  o Denies  o : headache, seizure, stroke, tremor, falls, dizziness/vertigo, difficulty with dexterity  · Musculoskeletal  o Admits  o : neck stiffness/pain, muscle aches, joint pain, pain radiating in leg, low back pain  o Denies  o : swollen lymph nodes, weakness, spasms, sciatica, pain radiating in arm  · Endocrine  o Admits  o : diabetes  o Denies  o : thyroid disorder  · Psychiatric  o Admits  o : anxiety  o Denies  o : depression      Vitals  Date Time BP Position Site L\R Cuff Size HR RR TEMP (F) WT  HT  BMI kg/m2 BSA m2 O2 Sat FR L/min FiO2        10/05/2020 09:43 AM        97.2 243lbs 2oz 6'   32.97 2.37             Physical Examination  · Constitutional  o Appearance  o :  well-nourished, well developed, alert, in no acute distress  · Respiratory  o Respiratory Effort  o : breathing unlabored  · Cardiovascular  o Peripheral Vascular System  o :   § Extremities  § : no cyanosis, clubbing or edema  · Neurologic  o Mental Status Examination  o :   § Orientation  § : grossly oriented to person, place and time  o Motor Examination  o :   § RLE Strength  § : strength 4/5   § RLE Motor Function  § : tone normal, no atrophy, no abnormal movements noted  § LLE Strength  § : strength 4/5   § LLE Motor Function  § : tone normal, no atrophy, no abnormal movements noted  o Reflexes  o :   § RLE  § : 2/4 knee and ankle reflex  § LLE  § : 2/4 knee and ankle reflex  o Sensation  o :   § Light Touch  § : altered sensation in legs  o Gait and Station  o :   § Gait Screening  § : limping gait.   · Psychiatric  o Mood and Affect  o : mood normal, affect appropriate     Tenderness of Lspine paraspinals. Negative Lawrence's.           Assessment  · Degeneration of lumbar intervertebral disc     722.52/M51.36  · Lumbago/low back pain     724.3/M54.40  · Paresthesia     782.0/R20.2  arms and legs  · Sciatica     724.3/M54.30  · Spondylolisthesis , lumbar     738.4/M43.16  · Cervicalgia     723.1/M54.2  · Weakness     780.79/R53.1  legs      Plan  · Orders  o MRI lumbar spine w/o contrast (47331) - 724.3/M54.30, 724.3/M54.40, 782.0/R20.2, 780.79/R53.1 - 10/05/2020  · Medications  o Medications have been Reconciled  o Transition of Care or Provider Policy  · Instructions  o The ROS and the PFSH were reviewed at today's visit.  o Call or return to office if symptoms worsen or persist.   o Pt is having worsening weakness. Will order MRI of Lspine to further assess. Xray of Lspine showed DDD, facet arthritis, and spondylolisthesis. Will plan to order MRI of neck at next apt. I do not feel that the mild left vascular insufficiency is causing the bilateral leg weakness.   o Electronically Identified Patient  Education Materials Provided Electronically  · Disposition  o Call or Return if symptoms worsen or persist.            Electronically Signed by: Stacey Smith PA-C -Author on October 5, 2020 10:28:21 AM

## 2021-05-13 NOTE — PROGRESS NOTES
Progress Note      Patient Name: Willard Lange   Patient ID: [id number]   Sex: Male   YOB: 1960    Primary Care Provider: Elizabeth CAUSEY   Referring Provider: Elizabeth CAUSEY    Visit Date: June 17, 2020    Provider: Sarkis Ureña DPM   Location: [facility]   Location Address: [address] [place] [address]  [id number]   Location Phone: [phone]          Chief Complaint  · Routine Foot Care Visit  · Diabetic Foot Evaluation      History Of Present Illness  Willard Lange complains of painful, elongated toenails which are thickened, yellowed, chalky, and cause pain with shoe gear and ambulation.   Willard Lange presents to the office today as a Follow-Up for a diabetic foot evaluation.   Patient reports that he is a diabetic currently controlling diabetes with oral medication      New, Established, New Problem:  New problem  Location:  Toenails  Duration:  Greater than five years  Onset:  Gradual  Nature:  sore with palpation.  Stable, worsening, improving:   Worsening  Aggravating factors:  Pain with shoe gear and ambulation.  Previous Treatment:  unable to trim himself.    Patient denies any fevers, chills, nausea, vomiting, shortness of breathe, nor any other constitutional signs nor symptoms.      New, Established, New Problem: SECOND PROBLEM   Location: bilateral feet  Duration: 2012  Onset: gradual  Nature: NIDDM  Stable, worsening, improving: stable  Aggravating factors:  Previous Treatment: oral medication    Pt states their most recent blood glucose reading was 123.    Patient reports the following medical changes since their last visit:    - prostate CA       Past Medical History  Arthritis; Asthma; Diabetes type 2, controlled; GERD; Hepatitis C; HTN (hypertension); Lumbago; Lumbago/low back pain; Lumbar disc herniation, L4-5; Lumbar Spinal Stenosis; Neuropathy; Prostate cancer         Past Surgical History  Appendectomy; Back  surgery; Laminectomy; Lithotripsy for kidney stones; Prostate biopsy, transrectal; Tonsilectomy         Medication List  diclofenac sodium 75 mg oral tablet,delayed release (DR/EC); Flomax 0.4 mg oral capsule; Janumet  mg oral tablet; losartan 100 mg oral tablet; nifedipine 30 mg oral tablet extended release; simvastatin 10 mg oral tablet; Viagra 100 mg oral tablet         Allergy List  No known history of drug allergy       Allergies Reconciled  Family Medical History  Heart Disease; Cancer, Unspecified; Lung cancer; Diabetes         Social History  Alcohol (Never); Tobacco (Former)         Review of Systems  · Constitutional  o Denies  o : fatigue, night sweats  · Eyes  o Denies  o : double vision, blurred vision  · HENT  o Denies  o : vertigo, recent head injury  · Cardiovascular  o Denies  o : chest pain, irregular heart beats  · Respiratory  o Denies  o : shortness of breath, productive cough  · Gastrointestinal  o Denies  o : nausea, vomiting  · Genitourinary  o Denies  o : dysuria, urinary retention  · Integument  o * See HPI  · Neurologic  o Admits  o : tingling or numbness  o Denies  o : altered mental status, seizures  · Musculoskeletal  o Denies  o : joint swelling, limitation of motion  · Endocrine  o Denies  o : cold intolerance, heat intolerance  · Heme-Lymph  o Denies  o : petechiae, lymph node enlargement or tenderness  · Allergic-Immunologic  o Denies  o : frequent illnesses      Vitals  Date Time BP Position Site L\R Cuff Size HR RR TEMP (F) WT  HT  BMI kg/m2 BSA m2 O2 Sat HC       06/17/2020 01:38 /81 Sitting    80 - R  97.6 243lbs 16oz 6'   33.09 2.37 97 %    06/17/2020 01:38 /75 Sitting                     Physical Examination  · Constitutional  o Appearance  o : overweight, well developed, no obvious deformities present, appears to be chronically ill   · Respiratory  o Respiratory Effort  o : No labored breathing. Good respiratory effort.   · Cardiovascular  o Peripheral  Vascular System  o :   § Pedal Pulses  § : Pedal Pulses are 2+ and symmetrical  § Extremities  § : There is no edema of the lower extremities  · Musculoskeletal  o Extremeties/Joint  o : Lower extremity muscle strength and range of motion is equal and symmetrical bilaterally. The knees are noted to be in normal alignment. Ankle alignment and range of motion is normal and foot structure is normal.  · Left DM Foot Exam  o Sensation  o : [place] 5.07 monofilament absent to all assessed areas. Sharp/dull sensation is absent.  o Visual Inspection  o : Skin is noted to have normal texture and turgor, with no excrescences noted. The toenails are noted to be without disease  o Vascular  o : palpable dorsalis pedis and posterir tibialis pulses present, normal capillary refill  · Right DM Foot Exam  o Sensation  o : [place] 5.07 monofilament absent to all assessed areas. Sharp/dull sensation is absent.  o Visual Inspection  o : Skin is noted to have normal texture and turgor, with no excrescences noted. The toenails are noted to be without disease  o Vascular  o : palpable dorsalis pedis and posterir tibialis pulses present, normal capillary refill  · Toes  o Toes: Right Foot  o :   § Toenails  § : Toenails are hypertrophic, mycotic, dystrophic, brittle toenail(s) at nail 1, 2, 3, 4, 5 with onycholysis of the right foot. The 1st, 2nd, 3rd, 4th, 5th toenail(s) on the right have 2 mm in thickness with subungual detritus. There is an incurvated toenail at the distal border of the 1st, 2nd, 3rd, 4th, 5th toe  o Toes: Left Foot  o :   § Toenails  § : There are hypertrophic, mycotic, dystrophic, brittle toenail(s) at the 1, 2, 3, 4, 5 with onycholysis of the left foot. The 1st, 2nd, 3rd, 4th, 5th toenail(s) on the left have 2 mm in thickness with subungual detritus. There is an incurvated toenail at the distal border of the 1st, 2nd, 3rd, 4th, 5th toe  · Procedures  o Nail Debridement  o : Nail debridement is  indicated for the following toenails:, left hallux, left 2nd toe, left 3rd toe, left 4th toe, left 5th toe, right hallux, right 2nd toe, right 3rd toe, right 4th toe, right 5th toe. The nail was debrided of excessive thickness to appropriate levels of comfort and contour using, nail nippers. The procedure was without complications          Assessment  · Diabetes mellitus type 2, noninsulin dependent       Type 2 diabetes mellitus without complications     250.00/E11.9  · Diabetic neuropathy       Type 2 diabetes mellitus with diabetic neuropathy, unspecified     250.60/E11.40  · Foot pain, bilateral       Pain in right foot     729.5/M79.671  Pain in left foot     729.5/M79.672  · Tinea unguium     110.1/B35.1      Plan  · Orders  o Debridement of six or more nails (08120) - - 06/17/2020  o Diabetic Foot (Motor and Sensory) Exam Completed [facility] (, , 2028F) - - 06/17/2020  · Medications  o Medications have been Reconciled  o Transition of Care or Provider Policy  · Instructions  o Follow Up in 9 weeks  o I have discussed the findings of this evaluation with the patient. The discussion included a complete verbal explanation of any changes in the examination results, diagnosis, and the current treatment plan. A schedule for future care needs was explained. If any questions should arise after returning home, I have encouraged the patient to feel free to contact Dr. Ureña. The patient states understanding and agreement with this plan.   o Pt to monitor for problems and to contact Dr. Ureña for follow-up should such signs occur. Patient states understanding and agreement with this plan.   o Onychomycosis is present in 2-3% of the population with the most common source of contamination coming from the patient's own skin. Fifteen to 20 percent of people between the ages of 40 and 60 have onychomycosis, 32 percent of 60- to [id number] have nail fungus and approximately 50 percent of those over 70 are  afflicted. Seventy-five percent of the people who have this infection exhibit psychosocial concerns. These people face the dilemma of not being able to go to the swimming pool, public shower areas or even wear open-toed sandals. More important is the fact that 48 percent of the people with onychomycosis have pain. The pain is intense enough to have these patients miss 1.8 days of work on average over a six-month period. Traditional topical therapies used alone are generally ineffective for clearing the primary infection, and even oral therapy is associated with a high rate of initial treatment failure or recurrence. In many patients combination oral and topical therapy is the treatment of choice.   o I have recommended debridement of the devitalized or contaminated tissue. Debridement will relieve the pressure of the necrotic presence of on the nail and provides for a better cosmetic appearance. Debulking the nail does help in combination with other treatments in that it can decrease the fungal load of the nail itself.   o Diabetic foot exam performed and documented this date, compliant with CQM required standards. Detail of findings as noted in physical exam.Lower extremity Neuro exam for diabetic patient performed and documented this date, compliant with PQRS required standards. Detail of findings as noted in physical exam.Advised patient importance of good routine lower extremity hygiene. Advised patient importance of evaluating for intact skin and pain free nail borders. Advised patient to use mirror to evaluate plantar/ soles of feet for better visualization. Advised patient monitor and phone office to be seen if any cracking to skin, open lesions, painful nail borders or if nails become elongated prior to next visit. Advised patient importance of daily cleansing of lower extremities, followed by good skin cream to maintain normal hydration of skin. Also advised patient importance of close daily monitoring of  blood sugar. Advised to regulate diet and medications to maintain control of blood sugar in optimal range. Contact primary care provider if difficulties maintaining blood sugar levels.Advised Patient of presence of Diabetes Mellitus condition. Advised Patient risk of progression and worsening or improvement, then return of condition. Will monitor condition for any change in future. Treat with most appropriate treatment pending status of condition.Counseled and advised patient extensively on nature and ramifications of diabetes. Standard instructions given to patient for good diabetic foot care and maintenance. Advised importance of careful monitoring to avoid break down and complications secondary to diabetes. Advised patient importance of strict maintenance of blood sugar control. Advised patient of possible ominous results from neglect of condition,i.e.: amputation/ loss of digits, feet and legs, or even death.Patient states understands counseling, will monitor closely, continue good hygiene and routine diabetic foot care. Patient will contact office is questions or problems.   o Electronically Identified Patient Education Materials Provided Electronically  · Disposition  o Call or Return if symptoms worsen or persist.            Electronically Signed by: Sarkis Ureña DPM -Author on June 17, 2020 02:07:31 PM

## 2021-05-14 VITALS — HEIGHT: 72 IN | BODY MASS INDEX: 32.44 KG/M2 | WEIGHT: 239.5 LBS | TEMPERATURE: 96.9 F

## 2021-05-14 VITALS — BODY MASS INDEX: 32.93 KG/M2 | WEIGHT: 243.12 LBS | TEMPERATURE: 97.2 F | HEIGHT: 72 IN

## 2021-05-14 VITALS
HEIGHT: 72 IN | DIASTOLIC BLOOD PRESSURE: 79 MMHG | SYSTOLIC BLOOD PRESSURE: 183 MMHG | TEMPERATURE: 97.8 F | BODY MASS INDEX: 33.18 KG/M2 | HEART RATE: 75 BPM | OXYGEN SATURATION: 98 % | WEIGHT: 245 LBS

## 2021-05-14 VITALS — TEMPERATURE: 97.1 F | BODY MASS INDEX: 32.72 KG/M2 | WEIGHT: 241.56 LBS | HEIGHT: 72 IN

## 2021-05-14 VITALS
OXYGEN SATURATION: 100 % | HEART RATE: 77 BPM | WEIGHT: 246.37 LBS | BODY MASS INDEX: 33.37 KG/M2 | SYSTOLIC BLOOD PRESSURE: 143 MMHG | HEIGHT: 72 IN | DIASTOLIC BLOOD PRESSURE: 79 MMHG | TEMPERATURE: 97.3 F

## 2021-05-14 VITALS
TEMPERATURE: 97.1 F | HEART RATE: 76 BPM | HEIGHT: 72 IN | DIASTOLIC BLOOD PRESSURE: 82 MMHG | OXYGEN SATURATION: 99 % | WEIGHT: 233 LBS | BODY MASS INDEX: 31.56 KG/M2 | SYSTOLIC BLOOD PRESSURE: 179 MMHG

## 2021-05-14 VITALS
BODY MASS INDEX: 32.68 KG/M2 | HEIGHT: 72 IN | HEART RATE: 90 BPM | SYSTOLIC BLOOD PRESSURE: 140 MMHG | WEIGHT: 241.25 LBS | TEMPERATURE: 97.1 F | DIASTOLIC BLOOD PRESSURE: 73 MMHG

## 2021-05-14 NOTE — PROGRESS NOTES
Progress Note      Patient Name: Willard Lange   Patient ID: [id number]   Sex: Male   YOB: 1960    Primary Care Provider: Elizabeth CASUEY   Referring Provider: Elizabeth CAUSEY    Visit Date: December 17, 2020    Provider: Michelle Jenkins PA-C   Location: [facility]   Location Address: [address] [place] [address]  [id number]   Location Phone: [phone]          Chief Complaint     Follow up with MRI Cervical Spine and MRI brain done at [place].       History Of Present Illness  Video Conferencing Visit  Willard Lange is a 60 year old /White male who is presenting for evaluation via video conferencing via zoom. Verbal consent obtained before beginning visit.   The following staff were present during this visit: Michelle Jenkins PA-C      MRI cervical spine showed multilevel degenerative changes no significant cervical stenosis.  MRI brain was abnormal. It showed a 6 mm enhancing lesion likely an extra-axial location adjacent to the undersurface of the right cerebellum.  Could represent a small meningioma.  A small cavernous angioma or other venous malformation could be considered.  There is a venous angioma involving the right medial temporal lobe with venous drainage towards the internal cerebral vein on that side. He continues to have loss of balance and frequent falls. 3 falls since last visit here. He has not seen a neurologist.       Past Medical History  Arthritis; Asthma; Cancer; Degenerative Disc Disease ; Diabetes mellitus type 2, noninsulin dependent; Diabetes type 2, controlled; GERD; Hepatitis C; Hepatitis C; HTN (hypertension); Hypertension, Benign Essential; Leg pain; Lumbago; Lumbago/low back pain; Lumbar disc herniation, L4-5; Lumbar Spinal Stenosis; Muscle cramps; Neuropathy; Prostate cancer         Past Surgical History  Appendectomy; Back surgery; Colonoscopy; Laminectomy; Lithotripsy for kidney stones; Prostate  biopsy, transrectal; Tonsilectomy         Medication List  atorvastatin 20 mg oral tablet; buspirone 7.5 mg oral tablet; diclofenac sodium 75 mg oral tablet,delayed release (DR/EC); dicyclomine 20 mg oral tablet; Flomax 0.4 mg oral capsule; losartan 100 mg oral tablet; losartan-hydrochlorothiazide 100-25 mg oral tablet; Lyrica 200 mg oral capsule; metformin 500 mg oral tablet; Ozempic 0.25 mg or 0.5 mg(2 mg/1.5 mL) subcutaneous pen injector; Vitamin D2 1,250 mcg (50,000 unit) oral capsule         Allergy List  formaldehyde       Allergies Reconciled  Family Medical History  Family history of lung cancer; Family history of heart disease; Family history of diabetes mellitus type I         Social History  Alcohol (Current some day); lives alone; Tobacco (Former); Unemployed;          Review of Systems  · Constitutional  o Denies  o : chills, excessive sweating, fatigue, fever, sycope/passing out, weight gain, weight loss  · Eyes  o Denies  o : changes in vision, blurry vision, double vision  · HENT  o Denies  o : loss of hearing, ringing in the ears, ear aches, sore throat, nasal congestion, sinus pain, nose bleeds, seasonal allergies  · Cardiovascular  o Denies  o : blood clots, swollen legs, anemia, easy burising or bleeding, transfusions  · Respiratory  o Denies  o : shortness of breath, dry cough, productive cough, pneumonia, COPD  · Gastrointestinal  o Denies  o : difficulty swallowing, reflux  · Genitourinary  o Denies  o : incontinence  · Neurologic  o Admits  o : loss of balance and falls  · Musculoskeletal  o Admits  o : neck stiffness/pain  o Denies  o : swollen lymph nodes, muscle aches, joint pain, weakness, spasms, sciatica, pain radiating in arm, pain radiating in leg, low back pain  · Endocrine  o Denies  o : diabetes, thyroid disorder  · Psychiatric  o Denies  o : anxiety, depression  · All Others Negative          Assessment  · Degeneration of lumbar intervertebral  disc     722.52/M51.36  · Lumbago/low back pain     724.3/M54.40  · Paresthesia     782.0/R20.2  arms and legs  · Sciatica     724.3/M54.30  · Spondylolisthesis , lumbar     738.4/M43.16  · Cervicalgia     723.1/M54.2  · Weakness     780.79/R53.1  legs  · Falls frequently     V15.88/R29.6  · History of prostate cancer     V10.46/Z85.46  · Pre-procedural laboratory examinations     V72.63/Z01.812  · Abnormal MRI of head     793.0/R93.0      Plan  · Orders  o MRI brain w/w/o contrast (09113) - 793.0/R93.0 - 06/17/2021  o BUN (17998) - - 12/17/2020  o Creatinine blood (12161) - - 12/17/2020  o NEUROLOGY CONSULTATION. (NEURO) - 780.79/R53.1, V15.88/R29.6, 793.0/R93.0 - 12/17/2020  · Medications  o Medications have been Reconciled  o Transition of Care or Provider Policy  · Instructions  o Will repeat MRI brain in 6 months to ensure stability of the 6 mm area in the cerebellum. I will refer to neurology for frequent falls. I discussed his case with Dr. Ford prior to today's visit.   · Associate Tasks  o Task ID [id number] General Task: needs repeat MRI brain in 6 months and referral to neurology            Electronically Signed by: Michelle Jeknins PA-C -Author on December 17, 2020 08:44:29 AM

## 2021-05-14 NOTE — PROGRESS NOTES
Progress Note      Patient Name: Willard Lange   Patient ID: [id number]   Sex: Male   YOB: 1960    Primary Care Provider: Elizabeth CAUSEY   Referring Provider: Elizabeth CAUSEY    Visit Date: January 14, 2021    Provider: Kade Cook MD   Location: [facility]   Location Address: [address] [place] [address]  [id number]   Location Phone: [phone]          Chief Complaint     New patient here for frequent falls       History Of Present Illness  Willard Lange is a 60 year old /White male who presents today to [facility] today referred from Elizabeth CAUSEY.      60-year-old man evaluated for frequent falls. He states that his legs just buckled up on him and he falls down without much warning. He states that he is much more steady when he is holding onto a grocery cart. He is unable to keep his balance such as being in the shower or being around uneven surfaces. He states that he has to hold onto objects to keep his balance. I saw him in the past for nerve conduction EMG study showing severe axonal and demyelinating sensorimotor polyneuropathy. He has a history of diabetes. He has had an extensive work-up including MRI of the cervical spine, lumbar spine, brain. MRI of the brain shows a 6 mm enhancing lesion likely an extra-axial location adjacent to the undersurface of the right cerebellum. It has benign features. Also noted is a right-sided posterior medial temporal lobe venous angioma.       Past Medical History  Arthritis; Asthma; Cancer; Degenerative Disc Disease ; Diabetes mellitus type 2, noninsulin dependent; Diabetes type 2, controlled; GERD; Hepatitis C; Hepatitis C; HTN (hypertension); Hypertension, Benign Essential; Leg pain; Lumbago; Lumbago/low back pain; Lumbar disc herniation, L4-5; Lumbar Spinal Stenosis; Muscle cramps; Neuropathy; Prostate cancer         Past Surgical History  Appendectomy; Back surgery;  Colonoscopy; Laminectomy; Lithotripsy for kidney stones; Prostate biopsy, transrectal; Tonsilectomy         Medication List  atorvastatin 20 mg oral tablet; buspirone 7.5 mg oral tablet; diclofenac sodium 75 mg oral tablet,delayed release (DR/EC); dicyclomine 20 mg oral tablet; Flomax 0.4 mg oral capsule; hydrocodone-acetaminophen 7.5-325 mg/15 mL oral solution; losartan 100 mg oral tablet; losartan-hydrochlorothiazide 100-25 mg oral tablet; Lyrica 200 mg oral capsule; metformin 500 mg oral tablet; Ozempic 0.25 mg or 0.5 mg(2 mg/1.5 mL) subcutaneous pen injector; Vitamin D2 1,250 mcg (50,000 unit) oral capsule         Allergy List  formaldehyde         Family Medical History  Family history of lung cancer; Family history of heart disease; Family history of diabetes mellitus type I         Social History  Alcohol (Current some day); lives alone; Tobacco (Former); Unemployed;          Review of Systems  · Constitutional  o Admits  o : fatigue  o Denies  o : chills, excessive sweating, fever, sycope/passing out, weight gain, weight loss  · Eyes  o Denies  o : changes in vision, blurred vision, double vision  · HENT  o Denies  o : hearing loss, ringing in the ears, ear aches, sore throat, nasal congestion, sinus pain, nose bleeds, seasonal allergies  · Cardiovascular  o Denies  o : blood clots, swollen legs, anemia, easy burising or bleeding, transfusions  · Respiratory  o Denies  o : shortness of breath, dry cough, productive cough, pneumonia, COPD  · Gastrointestinal  o Denies  o : dysphagia, reflux  · Genitourinary  o Denies  o : incontinence  · Neurologic  o Admits  o : headache, loss of balance, falls, difficulty with sleep, numbness/tingling/paresthesia , difficulty with coordination, difficulty with dexterity, weakness  o Denies  o : seizure, stroke, tremor, dizziness/vertigo  · Musculoskeletal  o Admits  o : muscle aches, weakness  o Denies  o : neck stiffness/pain, swollen lymph nodes, joint pain,  spasms, sciatica, pain radiating in arm, pain radiating in leg, low back pain  · Endocrine  o Admits  o : diabetes  o Denies  o : thyroid disorder  · Psychiatric  o Admits  o : anxiety, depression      Vitals  Date Time BP Position Site L\R Cuff Size HR RR TEMP (F) WT  HT  BMI kg/m2 BSA m2 O2 Sat FR L/min FiO2 HC       01/14/2021 09:44 /73 Sitting    90 - R  97.1 241lbs 4oz 6'   32.72 2.36             Physical Examination     He is alert, fluent, phasic, follows commands well. There is no weakness of the upper or lower extremities on individual cell testing. Reflexes are absent in the biceps, triceps, patellar's and ankles. Station and gait he is able to tiptoe, heel walk and clarisa with me holding onto him otherwise he is unsteady. Is able to get up from a sitting to standing position with any difficulty. He cannot stand up on 1 foot longer than 1 second without holding on.           Assessment  · Diabetic polyneuropathy       Type 2 diabetes mellitus with diabetic polyneuropathy     250.60/E11.42  · Gait instability     781.2/R26.81  I discussed with him that he has gait instability secondary to severe peripheral neuropathy. I discussed with him that he needs to use a Rollator walker or a cane to help him with his ambulation so he does not fall down. He is agreeable to getting this devices and have given him a prescription.    Total time spent with patient according patient care was 40 minutes.  · MRI of brain abnormal     793.0/R90.89  I discussed with him that the MRI of the brain there is abnormal is of no clinical significance. There is a risk for him bleeding with the venous angioma however it is nonsurgical. He can discuss it with Dr. Ford if he wishes.      Plan  · Medications  o Medications have been Reconciled  o Transition of Care or Provider Policy  · Instructions  o Encouraged to follow-up with Primary Care Provider for preventative care.            Electronically Signed by: Kade Cook  MD -Author on January 14, 2021 12:50:33 PM

## 2021-05-14 NOTE — PROGRESS NOTES
Progress Note      Patient Name: Willard Lange   Patient ID: [id number]   Sex: Male   YOB: 1960    Primary Care Provider: Elizabeth CAUSEY   Referring Provider: Elizabeth CAUSEY    Visit Date: March 9, 2021    Provider: Sarkis Ureña DPM   Location: [facility]   Location Address: [address] [place] [address]  [id number]   Location Phone: [phone]          Chief Complaint  · Routine Foot Care Visit  · Diabetic Foot Evaluation      History Of Present Illness  Willard Lange complains of painful, elongated toenails which are thickened, yellowed, chalky, and cause pain with shoe gear and ambulation.   Willard Lange presents to the office today as a Follow-Up for a diabetic foot evaluation.   Patient reports that he is a diabetic currently controlling diabetes with oral medication      New, Established, New Problem:  est  Location:  Toenails  Duration:  Greater than five years  Onset:  Gradual  Nature:  sore with palpation.  Stable, worsening, improving:   stable  Aggravating factors:  Pain with shoe gear and ambulation.  Previous Treatment:  debridement    Patient denies any fevers, chills, nausea, vomiting, shortness of breathe, nor any other constitutional signs nor symptoms.      New, Established, New Problem: SECOND PROBLEM   Location: bilateral feet  Duration: 2012  Onset: gradual  Nature: NIDDM  Stable, worsening, improving: stable  Aggravating factors:  Previous Treatment: oral medication           Past Medical History  Arthritis; Asthma; Cancer; Degenerative Disc Disease ; Diabetes mellitus type 2, noninsulin dependent; Diabetes type 2, controlled; GERD; Hepatitis C; Hepatitis C; HTN (hypertension); Hypertension, Benign Essential; Leg pain; Lumbago; Lumbago/low back pain; Lumbar disc herniation, L4-5; Lumbar Spinal Stenosis; Muscle cramps; Neuropathy; Prostate cancer         Past Surgical History  Appendectomy; Back surgery; Colonoscopy; Laminectomy;  Lithotripsy for kidney stones; Prostate biopsy, transrectal; Tonsilectomy         Medication List  atorvastatin 20 mg oral tablet; buspirone 7.5 mg oral tablet; diclofenac sodium 75 mg oral tablet,delayed release (DR/EC); dicyclomine 20 mg oral tablet; Flomax 0.4 mg oral capsule; hydrocodone-acetaminophen 7.5-325 mg/15 mL oral solution; losartan 100 mg oral tablet; losartan-hydrochlorothiazide 100-25 mg oral tablet; Lyrica 200 mg oral capsule; metformin 500 mg oral tablet; Ozempic 0.25 mg or 0.5 mg(2 mg/1.5 mL) subcutaneous pen injector; Vitamin D2 1,250 mcg (50,000 unit) oral capsule         Allergy List  formaldehyde       Allergies Reconciled  Family Medical History  Family history of lung cancer; Family history of heart disease; Family history of diabetes mellitus type I         Social History  Alcohol (Current some day); lives alone; Tobacco (Former); Unemployed;          Review of Systems  · Constitutional  o Denies  o : fatigue, night sweats  · Eyes  o Denies  o : double vision, blurred vision  · HENT  o Denies  o : vertigo, recent head injury  · Cardiovascular  o Denies  o : chest pain, irregular heart beats  · Respiratory  o Denies  o : shortness of breath, productive cough  · Gastrointestinal  o Denies  o : nausea, vomiting  · Genitourinary  o Denies  o : dysuria, urinary retention  · Integument  o * See HPI  · Neurologic  o Admits  o : tingling or numbness  o Denies  o : altered mental status, seizures  · Musculoskeletal  o Denies  o : joint swelling, limitation of motion  · Endocrine  o Denies  o : cold intolerance, heat intolerance  · Heme-Lymph  o Denies  o : petechiae, lymph node enlargement or tenderness  · Allergic-Immunologic  o Denies  o : frequent illnesses      Vitals  Date Time BP Position Site L\R Cuff Size HR RR TEMP (F) WT  HT  BMI kg/m2 BSA m2 O2 Sat FR L/min FiO2 HC       03/09/2021 02:42 /82 Sitting    76 - R  97.1 232lbs 16oz 6'   31.6 2.32 99 %      03/09/2021 02:42 PM  152/86 Sitting                       Physical Examination  · Constitutional  o Appearance  o : overweight, well developed, no obvious deformities present, appears to be chronically ill   · Respiratory  o Respiratory Effort  o : No labored breathing. Good respiratory effort.   · Cardiovascular  o Peripheral Vascular System  o :   § Pedal Pulses  § : Pedal Pulses are 2+ and symmetrical  § Extremities  § : There is no edema of the lower extremities  · Musculoskeletal  o Extremeties/Joint  o : Lower extremity muscle strength and range of motion is equal and symmetrical bilaterally. The knees are noted to be in normal alignment. Ankle alignment and range of motion is normal and foot structure is normal.  · Left DM Foot Exam  o Sensation  o : Carpenter-Oswaldo 5.07 monofilament absent to all assessed areas. Sharp/dull sensation is absent.  o Visual Inspection  o : Skin is noted to have normal texture and turgor, with no excrescences noted. The toenails are noted to be without disease  o Vascular  o : palpable dorsalis pedis and posterir tibialis pulses present, normal capillary refill  · Right DM Foot Exam  o Sensation  o : Carpenter-Oswaldo 5.07 monofilament absent to all assessed areas. Sharp/dull sensation is absent.  o Visual Inspection  o : Skin is noted to have normal texture and turgor, with no excrescences noted. The toenails are noted to be without disease  o Vascular  o : palpable dorsalis pedis and posterir tibialis pulses present, normal capillary refill  · Toes  o Toes: Right Foot  o :   § Toenails  § : Toenails are hypertrophic, mycotic, dystrophic, brittle toenail(s) at nail 1, 2, 3, 4, 5 with onycholysis of the right foot. The 1st, 2nd, 3rd, 4th, 5th toenail(s) on the right have 2 mm in thickness with subungual detritus. There is an incurvated toenail at the distal border of the 1st, 2nd, 3rd, 4th, 5th toe  o Toes: Left Foot  o :   § Toenails  § : There are hypertrophic, mycotic, dystrophic, brittle  toenail(s) at the 1, 2, 3, 4, 5 with onycholysis of the left foot. The 1st, 2nd, 3rd, 4th, 5th toenail(s) on the left have 2 mm in thickness with subungual detritus. There is an incurvated toenail at the distal border of the 1st, 2nd, 3rd, 4th, 5th toe  · Procedures  o Nail Debridement  o : Nail debridement is indicated for the following toenails:, left hallux, left 2nd toe, left 3rd toe, left 4th toe, left 5th toe, right hallux, right 2nd toe, right 3rd toe, right 4th toe, right 5th toe. The nail was debrided of excessive thickness to appropriate levels of comfort and contour using, nail nippers. The procedure was without complications          Assessment  · Diabetes mellitus type 2, noninsulin dependent       Type 2 diabetes mellitus without complications     250.00/E11.9  · Diabetic neuropathy       Type 2 diabetes mellitus with diabetic neuropathy, unspecified     250.60/E11.40  · Foot pain, bilateral       Pain in right foot     729.5/M79.671  Pain in left foot     729.5/M79.672  · Ingrowing nail     703.0/L60.0  · Tinea unguium     110.1/B35.1      Plan  · Orders  o Debridement of six or more nails (59223) - - 03/09/2021  o Diabetic Foot (Motor and Sensory) Exam Completed [facility] (, , 2028F) - - 03/09/2021  · Medications  o Medications have been Reconciled  o Transition of Care or Provider Policy  · Instructions  o Follow Up in 9 weeks  o I have discussed the findings of this evaluation with the patient. The discussion included a complete verbal explanation of any changes in the examination results, diagnosis, and the current treatment plan. A schedule for future care needs was explained. If any questions should arise after returning home, I have encouraged the patient to feel free to contact Dr. Ureña. The patient states understanding and agreement with this plan.   o Pt to monitor for problems and to contact Dr. Ureña for follow-up should such signs occur. Patient states understanding and  agreement with this plan.   o Onychomycosis is present in 2-3% of the population with the most common source of contamination coming from the patient's own skin. Fifteen to 20 percent of people between the ages of 40 and 60 have onychomycosis, 32 percent of 60- to [id number] have nail fungus and approximately 50 percent of those over 70 are afflicted. Seventy-five percent of the people who have this infection exhibit psychosocial concerns. These people face the dilemma of not being able to go to the swimming pool, public shower areas or even wear open-toed sandals. More important is the fact that 48 percent of the people with onychomycosis have pain. The pain is intense enough to have these patients miss 1.8 days of work on average over a six-month period. Traditional topical therapies used alone are generally ineffective for clearing the primary infection, and even oral therapy is associated with a high rate of initial treatment failure or recurrence. In many patients combination oral and topical therapy is the treatment of choice.   o I have recommended debridement of the devitalized or contaminated tissue. Debridement will relieve the pressure of the necrotic presence of on the nail and provides for a better cosmetic appearance. Debulking the nail does help in combination with other treatments in that it can decrease the fungal load of the nail itself.   o Diabetic foot exam performed and documented this date, compliant with CQM required standards. Detail of findings as noted in physical exam.Lower extremity Neuro exam for diabetic patient performed and documented this date, compliant with PQRS required standards. Detail of findings as noted in physical exam.Advised patient importance of good routine lower extremity hygiene. Advised patient importance of evaluating for intact skin and pain free nail borders. Advised patient to use mirror to evaluate plantar/ soles of feet for better visualization. Advised  patient monitor and phone office to be seen if any cracking to skin, open lesions, painful nail borders or if nails become elongated prior to next visit. Advised patient importance of daily cleansing of lower extremities, followed by good skin cream to maintain normal hydration of skin. Also advised patient importance of close daily monitoring of blood sugar. Advised to regulate diet and medications to maintain control of blood sugar in optimal range. Contact primary care provider if difficulties maintaining blood sugar levels.Advised Patient of presence of Diabetes Mellitus condition. Advised Patient risk of progression and worsening or improvement, then return of condition. Will monitor condition for any change in future. Treat with most appropriate treatment pending status of condition.Counseled and advised patient extensively on nature and ramifications of diabetes. Standard instructions given to patient for good diabetic foot care and maintenance. Advised importance of careful monitoring to avoid break down and complications secondary to diabetes. Advised patient importance of strict maintenance of blood sugar control. Advised patient of possible ominous results from neglect of condition,i.e.: amputation/ loss of digits, feet and legs, or even death.Patient states understands counseling, will monitor closely, continue good hygiene and routine diabetic foot care. Patient will contact office is questions or problems.   o Electronically Identified Patient Education Materials Provided Electronically  · Disposition  o Call or Return if symptoms worsen or persist.            Electronically Signed by: Sarkis Ureña DPM -Author on March 9, 2021 03:11:28 PM

## 2021-05-15 VITALS
HEART RATE: 80 BPM | TEMPERATURE: 97.6 F | OXYGEN SATURATION: 98 % | HEIGHT: 72 IN | DIASTOLIC BLOOD PRESSURE: 70 MMHG | WEIGHT: 241 LBS | SYSTOLIC BLOOD PRESSURE: 158 MMHG | BODY MASS INDEX: 32.64 KG/M2

## 2021-05-15 VITALS — WEIGHT: 239 LBS | HEIGHT: 72 IN | RESPIRATION RATE: 16 BRPM | BODY MASS INDEX: 32.37 KG/M2

## 2021-05-15 VITALS
BODY MASS INDEX: 32.51 KG/M2 | HEART RATE: 72 BPM | DIASTOLIC BLOOD PRESSURE: 62 MMHG | HEIGHT: 72 IN | RESPIRATION RATE: 18 BRPM | SYSTOLIC BLOOD PRESSURE: 148 MMHG | WEIGHT: 240 LBS

## 2021-05-15 VITALS
HEART RATE: 61 BPM | DIASTOLIC BLOOD PRESSURE: 77 MMHG | HEIGHT: 71 IN | WEIGHT: 293 LBS | BODY MASS INDEX: 41.02 KG/M2 | SYSTOLIC BLOOD PRESSURE: 183 MMHG | OXYGEN SATURATION: 98 %

## 2021-05-15 VITALS — HEIGHT: 72 IN | RESPIRATION RATE: 16 BRPM | WEIGHT: 239 LBS | BODY MASS INDEX: 32.37 KG/M2

## 2021-05-15 VITALS — TEMPERATURE: 97.1 F | WEIGHT: 240 LBS | BODY MASS INDEX: 32.51 KG/M2 | HEIGHT: 72 IN

## 2021-05-15 VITALS
DIASTOLIC BLOOD PRESSURE: 81 MMHG | WEIGHT: 244 LBS | BODY MASS INDEX: 33.05 KG/M2 | HEIGHT: 72 IN | TEMPERATURE: 97.6 F | HEART RATE: 80 BPM | SYSTOLIC BLOOD PRESSURE: 169 MMHG | OXYGEN SATURATION: 97 %

## 2021-05-15 VITALS — HEIGHT: 72 IN | WEIGHT: 240 LBS | RESPIRATION RATE: 16 BRPM | BODY MASS INDEX: 32.51 KG/M2

## 2021-05-28 VITALS
RESPIRATION RATE: 24 BRPM | OXYGEN SATURATION: 98 % | SYSTOLIC BLOOD PRESSURE: 145 MMHG | DIASTOLIC BLOOD PRESSURE: 89 MMHG | HEART RATE: 83 BPM | TEMPERATURE: 98.8 F

## 2021-05-28 NOTE — PROGRESS NOTES
Patient: WILLARD LANGE     Acct: [id number]     Report: #[id number]  UNIT #: [id number]     : 1960    Encounter Date:2020  PRIMARY CARE:   ***Signed***  --------------------------------------------------------------------------------------------------------------------  Chief Complaint      PROSTATE CANCER            Referring Provider/Copies To      Primary Care Provider:  ZAY VALLE      Copies To:   WILLARD PICHARDO; Alberto Gan                                        [facility]                                   [address] [place], [place] [id number] [phone]            ,            Dear Dr. Zay Valle,            Willard Lange was seen in our office on    20. The purpose of his visit was    to evaluate for wellness and any ongoing side effects related to his cancer     diagnosis and treatment as well as discuss the plan for recommended follow up     care.            The attached report is a summary of the treatment received by Mr. Lange to date.      You will find the cancer SURVIVORSHIP TREATMENT SUMMARY AND CARE Plan attached     to this communication and it may also be found as part of the electronic medical    records in [facility].   This is in compliance with standards of quality cancer     care outlined by the Commission on Cancer (Zaid), the American Society of     Clinical Oncology (ASCO), and the National Comprehensive Cancer Network (NCCN).            Today, I discussed the treatment summary and plan of care in detail with Willard Lange  and have provided a written copy of the plan.   He  was advised to     continue with regular follow-up care with Zay Valle treating PCP in     accordance with the national guidelines while transitioning back to their PCP     for general healthcare needs.   If you have any  questions regarding your     patient's survivorship care please feel free to contact me.   Thank you for     allowing me to participate in the care of your patient.               Warm Regards,            Kisha Patel, FNP, APRN, OCN      Oncology Survivorship Nurse Practitioner      O: [phone]      F: [phone]            Allergies      Coded Allergies:             FORMALDEHYDE (Verified  Allergy, Unknown, RASH, 11/14/19)            Medications            SITagliptin (Januvia) 100 Mg Tablet      100 MG PO QDAY, #30 TAB 0 Refills         Reported         10/21/19       Semaglutide (Ozempic) 0.25 Mg/0.2 Ml Pen.injctr      0.5 MG SUBQ Q7DAY, PEN.INJCTR         Reported         10/9/19       Simvastatin (Simvastatin*) 10 Mg Tablet      10 MG PO HS, #30 TAB 0 Refills         Reported         10/9/19       Tamsulosin HCL (Tamsulosin HCL) 0.4 Mg Capsule      0.4 MG PO QDAY, #30 CAP 0 Refills         Reported         10/9/19       Diclofenac Sodium (Diclofenac Sodium) 75 Mg Tablet.dr      75 MG PO BID, #60 TAB 0 Refills         Reported         10/23/17       HYDROcodone-Acetaminophen 7.5-325 Mg (HYDROcodone-Acetaminophen 7.5-325 Mg) 1     Each Tablet      1 TAB PO Q4H PRN for BREAKTHROUGH PAIN, TAB         Reported         10/23/17       Omeprazole (Omeprazole*) 40 Mg Capsule      40 MG PO QDAY, #30 CAP 0 Refills         Reported         2/16/17       Pregabalin (Lyrica *) 200 Mg Cap      200 MG PO TID, #90 CAP         Reported         2/16/17       Losartan Potassium (Losartan*) 100 Mg Tablet      100 MG PO QDAY, #30 TAB 0 Refills         Reported         2/16/17       Mometasone/Formoterol (Dulera 200 Mcg/5 Mcg) 13 Gm Hfa.aer.ad      2 PUFFS INH RTBID, #1 MDI 0 Refills         Reported         2/16/17      Medications Reviewed:  No Changes made to meds            PAST, FAMILY   Past Medical History      Diabetes Type 2, Hepatitis (history of Hep C), High Cholesterol (being treated),    Hypertension (being  treated), Mental Disease (anxiety), Short of Air (asthma,     with activity controlled w/ meds)      Hematology/oncology:  REPORTS HX OF: Prostate cancer            Past Surgical History      REPORTS HX OF: Cataract extraction (2019), Appendectomy (chilhood), Spinal     surgery, Other Past Surgical Hx (tonsillectomy)            Social History      Lives independently:  Yes      Occupation:  unemployed            Tobacco Use      Tobacco status:  Former smoker (quit in 2005)            Alcohol Use      Alcohol intake:  occasionally            Substance Use      Substance use:  Denies use            HISTORY OF PRESENT ILLNESS      Interim History      Treatment History Summary      Mr. Lange is a pleasant 60-year-old male with history of prostate cancer.  The     following summarizes his treatment history.            On September 8, 2017, PSA level was 3.19.            On July 11, 2019, PSA level was 6.6.  He was subsequently sent to Dr. Nieves for     evaluation.            On October 9, 2019, his PSA was obtained once more and the level was 5.4.  He     was scheduled for prostate biopsy.            On October 24, 2019, he underwent a prostate biopsy per Dr. Nieves.  Pathology     ([id number]) indicated adenocarcinoma, [place]ill 3+3 = 6 in 9 of 12 cores.      Pathological staging cT1c.            He was referred on 11/22/2019 to radiation oncology, Dr. Willard Buchanan.  He had    several lower urinary tract symptoms with an AUA score of 27.  He was placed on     Flomax at that time.            On December 3, 2019 he returned for reevaluation of his lower urinary tract     symptoms.  His AUA score was down to 3.            On January 22, 2022 February 28, 2020 he underwent external beam radiotherapy     via IMRT.  He received 28 of 28 fractions. (7,000 cGy).             On March 26, 2020, one month post radiation, his PSA was 1.16.            Due to COVID he missed several appointments.            REVIEW OF SYSTEMS       General:  Complains of: Night sweats (at times w/ worry), Weight gain (10 pounds    in 4 months)      Eyes:  Complains of: Corrective lenses (reading glasses); Denies: Eye     irritation, Eye pain      Ears, nose, mouth, throat:  Denies: Headache, Seizures, Visual Changes      Cardiovascular:  Complains of: Swollen ankles/legs (bilateral)      Respiratory:  Denies: Shortness of Air, Coughing blood      Gastrointestinal:  Complains of: Frequent heartburn (takes omeprazole), Diarrhea    (2-3 times a day)      Kidney/Bladder:  Complains of: Frequent Urination, Decreased Urine Stream (at     times)      Musculoskeletal:  Complains of: Leg Cramps; Denies: Painful Joints, Muscle wea    kness      Skin:  DENIES: Bruises Easily, Hair changes      Neurological:  Denies: Fainting, Seizures      Psychiatric:  Complains of: Anxiety, AAO X 3      Endocrine:  Denies DiabetesOsteoporosisEndocrine Other      Hematologic/lymphatic:  Denies: Bruising, Bleeding, Recurrent infections            VITAL SIGNS AND SCORES      Vitals      Temperature 98.8 F / 37.11 C      Pulse 83      Respirations 24      Blood Pressure 145/89 Sitting, Left Arm      Pulse Oximetry 98%            Pain Score      Experiencing any pain?:  Yes      Pain Scale Used:  Numerical      Pain Intensity:  8      Pain Comment:        chronic back pain            Fatigue Score      Experiencing any fatigue?:  Yes      Fatigue (0-10 scale):  5            EXAM      General Appearance:  Alert, Oriented X3, Cooperative, No acute distress      Eyes:  Anicteric Sclerae, Moist Conjunctiva, PERRLA      HEENT:  Orophraynx clear      Neck:  Supple, Full ROM, No Carotid Bruits      Respiratory:  CTAB      Abdomen:  Bowel Sounds, Soft;          No Tenderness      Cardio:  RRR, No Murmur, No, Peripheral Edema, Normal PMI      Skin:  Normal Texture and Turgor      Psychiatric:  Appropriate Affect, Intact Judgement, AAO x3      Neuro:  Cranial Ner II-XII Intact       Muscularskeletal:  Normal Gait and Station, Full ROM of extremeties      Extremities:  Normal Gait and station      Lymphatic:  No Cervical, No Infraclavicular, No Supraclavicular            PREVENTION      Hx Influenza Vaccination:  Yes      Date Influenza Vaccine Given:  Sep 1, 2019      2 or More Falls in Past Year?:  Yes (fell in shower)      Hx Pneumococcal Vaccination:  Yes      Chart initiated by      MANNY Richard RN            IMPRESSION/PLAN      Impression      1. Survivorship Appointment      2.  Mr. Lange is a 60-year-old male with cT1c adenocarcinoma the prostate,     [place] 3+3 = 6, prior PSA 5.64, status post external beam radiotherapy with     post PSA 1.16.      3. Prostate cancer - PSA obtained today, will call with results.            Plan - Survivorship Discussion      Survivorship Discussion      SURVEILLANCE:      -Survivorship care plan and treatment history will be provided to the patient.     Summary will be sent to each provider.             -Will continue active surveillance as he is a low risk prostate cancer with life    expectancy greater than 10 years. History and physical and PSA will be obtained     every 3 months for 12 months. After the first year to 18 months, PSA will be     obtained no greater than 6 months per NCCN guidelines unless indicated.  If PSA     rises this may prompt prostate biospies. Evidence of progression will prompt     conversion to potentially curative treatment. Further imaging as needed.             MONITORING FOR LATE EFFECTS:      -Long term effects of radiation to prostate include potential for rectal inflamm    ation or ulcer of rectum. The risk of long term complications after radiation     therapy is low, less than 5 percent.  These may include proctitis, cystitis,     urinary or rectal bleeding, narrowing of the rectum or urethra, chronic diarrhea    or urinary frequency or urgency, or development of an ulcer in the rectum.  All     can be treated.  He  does describe diarrhea, with stool incontinence with very     loose stool watery consistency.  He is to have colonoscopy at end of month.      Will await results.             -Fatigue: Has resolved. Back to baseline.             -Urological: Denies dysuira, urgency, hesitance, frequency and dribbling.      Initially nocturia up to 3-4 x per night, now maybe 1 time at night.             -Erectile Dysfunction: Similar to surgery, damage to the blood vessels and     nerves after radiation therapy can result in decreased erectile function over     time.  In general, radiation therapy has less of an impact on erectile function     in the first 5 to 10 years after treatment compared to surgery and approximately    70% of men who have baseline erectile function before treatment will keep     erectile function after treatment.  However, radiation therapy has a slower     delay in erectile function decline than surgery, 15 years after treatment, the     rates are similar to those who underwent surgery. Ms. Lange had ED predating XRT.     He has used Cialis and Viagra prior and has gone to [facility].  Nothing     has worked per Mr. Lange.  He does also have diabetes which complicates his ED.            RISK REDUCTION:      --Lifestyle risk assessment: Discussed with patient cardiovascular disease     continues to be number one cause of mortality.  Recommended blood pressure and     lipid monitoring.  Recommended a plant-based diet with fruits and vegetables,     whole grains and legumes rich in fiber (25 g daily) and nutrients.  Limit r    efined grains and sugary foods, proteins high in saturated and avoidance of     processed meats.  Encourage fats rich in Omega 3s and limiting Omega 6s. He does    endorse that he could eat better. We walks 10,000 steps per day.              -Quit smoking 20 years prior.             -Cancer screening: Colonoscopy performed in 2018, will have repeat at end of     month.                -Vaccinations: Reports unsure if up to date on Tdap..  He has not had herpes     zoster vaccination.  Influenza vaccine in fall. Recommended pneumovax and     prevnar.  Encourage discussion with PCP.            -Alcohol Use: Denies.            -Falls: Denies mobility problems or falls.  Discussed safety proofing her home,     good lighting, remove loose rugs, hand rails on steps and tub.             PSYCHOSOCIAL:      -Discussed Distress thermometer (score 7).  Stressors revolve around losing job,    finanical difficulty.  Encouraged her to call Shirley Mathews LCSW if any arise.     Her phone number and contact information will be in packet.              -PITER (score 12). Recommended counseling.  He declined.  Encouraged discussion     with PCP.             -Depression (PHQ-9 - 8). Will discuss with PCP.             -Social support discussed and he has a strong family and friend support network.     Information given for local mental health providers and [employer].             -Does not have a living will. Packet from [employer] given to     patient.  Also can be located at [email or web address].            Mgmt of Disease and Treatment      Recommend:  Weight Bearing Exercises, Proof home, Removal of  loose rugs, Good     lighting, Handle bars in tub            Advanced Care Plan Discussion      Time Spent      11:15-12:10            Electronically signed by LAYNE BRIAN onc  08/12/2020 16:14       Disclaimer: Converted document may not contain table formatting or lab diagrams. Please see [employer] for the authenticated document.

## 2021-06-01 ENCOUNTER — PROCEDURE VISIT CONVERTED (OUTPATIENT)
Dept: PODIATRY | Facility: CLINIC | Age: 61
End: 2021-06-01
Attending: PODIATRIST

## 2021-06-26 ENCOUNTER — TRANSCRIBE ORDERS (OUTPATIENT)
Dept: MRI IMAGING | Facility: HOSPITAL | Age: 61
End: 2021-06-26

## 2021-06-26 DIAGNOSIS — M54.40 LOW BACK PAIN WITH SCIATICA, SCIATICA LATERALITY UNSPECIFIED, UNSPECIFIED BACK PAIN LATERALITY, UNSPECIFIED CHRONICITY: ICD-10-CM

## 2021-06-26 DIAGNOSIS — M51.36 DEGENERATION OF LUMBAR INTERVERTEBRAL DISC: Primary | ICD-10-CM

## 2021-06-26 DIAGNOSIS — M54.30 SCIATICA, UNSPECIFIED LATERALITY: ICD-10-CM

## 2021-06-26 DIAGNOSIS — R20.2 PARESTHESIA: ICD-10-CM

## 2021-07-15 VITALS
HEIGHT: 72 IN | TEMPERATURE: 96.9 F | SYSTOLIC BLOOD PRESSURE: 140 MMHG | HEART RATE: 73 BPM | OXYGEN SATURATION: 98 % | BODY MASS INDEX: 33.05 KG/M2 | WEIGHT: 244 LBS | DIASTOLIC BLOOD PRESSURE: 70 MMHG

## 2021-07-23 ENCOUNTER — HOSPITAL ENCOUNTER (OUTPATIENT)
Dept: MRI IMAGING | Facility: HOSPITAL | Age: 61
Discharge: HOME | End: 2021-07-23
Admitting: PHYSICIAN ASSISTANT

## 2021-07-23 DIAGNOSIS — M54.40 LOW BACK PAIN WITH SCIATICA, SCIATICA LATERALITY UNSPECIFIED, UNSPECIFIED BACK PAIN LATERALITY, UNSPECIFIED CHRONICITY: ICD-10-CM

## 2021-07-23 DIAGNOSIS — M51.36 DEGENERATION OF LUMBAR INTERVERTEBRAL DISC: ICD-10-CM

## 2021-07-23 DIAGNOSIS — M54.30 SCIATICA, UNSPECIFIED LATERALITY: ICD-10-CM

## 2021-07-23 DIAGNOSIS — R20.2 PARESTHESIA: ICD-10-CM

## 2021-07-23 LAB — CREAT BLDA-MCNC: 0.9 MG/DL

## 2021-07-23 PROCEDURE — 0 GADOBENATE DIMEGLUMINE 529 MG/ML SOLUTION: Performed by: PHYSICIAN ASSISTANT

## 2021-07-23 PROCEDURE — 70553 MRI BRAIN STEM W/O & W/DYE: CPT

## 2021-07-23 PROCEDURE — A9577 INJ MULTIHANCE: HCPCS | Performed by: PHYSICIAN ASSISTANT

## 2021-07-23 PROCEDURE — 82565 ASSAY OF CREATININE: CPT

## 2021-07-23 RX ADMIN — GADOBENATE DIMEGLUMINE 20 ML: 529 INJECTION, SOLUTION INTRAVENOUS at 17:13

## 2021-08-09 ENCOUNTER — TELEPHONE (OUTPATIENT)
Dept: NEUROSURGERY | Facility: CLINIC | Age: 61
End: 2021-08-09

## 2021-08-09 NOTE — TELEPHONE ENCOUNTER
Pt called wanting to know the results of his recent MRI.     Pt also wanted to know if he needed a F/U apt with Neurosurgery?    In Michelle's last office note it stated he needed to be seen by neurology.  Please advise

## 2021-08-11 NOTE — TELEPHONE ENCOUNTER
This patient was previously seen by Michelle, who ordered this MRI Brain for evaluation of meningioma. See below.

## 2021-08-13 ENCOUNTER — TELEPHONE (OUTPATIENT)
Dept: NEUROSURGERY | Facility: CLINIC | Age: 61
End: 2021-08-13

## 2021-08-13 NOTE — TELEPHONE ENCOUNTER
Patient left voicemail stating no one has called him regarding MRI results. I left patient voicemail yesterday, 8-12-21 to notify of results. Attempted to call patient back today, again no answer, and left patient another detailed voicemail to notify.

## 2021-08-24 ENCOUNTER — APPOINTMENT (OUTPATIENT)
Dept: RADIATION ONCOLOGY | Facility: HOSPITAL | Age: 61
End: 2021-08-24

## 2021-08-24 ENCOUNTER — OFFICE VISIT (OUTPATIENT)
Dept: PODIATRY | Facility: CLINIC | Age: 61
End: 2021-08-24

## 2021-08-24 VITALS
TEMPERATURE: 96.9 F | BODY MASS INDEX: 33.46 KG/M2 | WEIGHT: 247 LBS | HEIGHT: 72 IN | HEART RATE: 75 BPM | DIASTOLIC BLOOD PRESSURE: 79 MMHG | SYSTOLIC BLOOD PRESSURE: 155 MMHG | OXYGEN SATURATION: 96 %

## 2021-08-24 DIAGNOSIS — B35.1 ONYCHOMYCOSIS: ICD-10-CM

## 2021-08-24 DIAGNOSIS — M79.672 FOOT PAIN, BILATERAL: Primary | ICD-10-CM

## 2021-08-24 DIAGNOSIS — M20.42 HAMMER TOES OF BOTH FEET: ICD-10-CM

## 2021-08-24 DIAGNOSIS — G62.9 NEUROPATHY: ICD-10-CM

## 2021-08-24 DIAGNOSIS — M79.671 FOOT PAIN, BILATERAL: Primary | ICD-10-CM

## 2021-08-24 DIAGNOSIS — E11.9 NON-INSULIN DEPENDENT TYPE 2 DIABETES MELLITUS (HCC): ICD-10-CM

## 2021-08-24 DIAGNOSIS — E11.8 DIABETIC FOOT (HCC): ICD-10-CM

## 2021-08-24 DIAGNOSIS — M20.41 HAMMER TOES OF BOTH FEET: ICD-10-CM

## 2021-08-24 DIAGNOSIS — L60.0 ONYCHOCRYPTOSIS: ICD-10-CM

## 2021-08-24 PROCEDURE — G8404 LOW EXTEMITY NEUR EXAM DOCUM: HCPCS | Performed by: PODIATRIST

## 2021-08-24 PROCEDURE — 11721 DEBRIDE NAIL 6 OR MORE: CPT | Performed by: PODIATRIST

## 2021-08-24 RX ORDER — LOSARTAN POTASSIUM AND HYDROCHLOROTHIAZIDE 25; 100 MG/1; MG/1
TABLET ORAL
COMMUNITY

## 2021-08-24 RX ORDER — DULAGLUTIDE 1.5 MG/.5ML
1.5 INJECTION, SOLUTION SUBCUTANEOUS
COMMUNITY
Start: 2021-08-04

## 2021-08-24 RX ORDER — HYDROCODONE BITARTRATE AND ACETAMINOPHEN 7.5; 325 MG/1; MG/1
1 TABLET ORAL EVERY 8 HOURS PRN
COMMUNITY
Start: 2021-08-01

## 2021-08-24 RX ORDER — OMEPRAZOLE 40 MG/1
40 CAPSULE, DELAYED RELEASE ORAL DAILY
COMMUNITY
Start: 2021-07-22

## 2021-08-24 RX ORDER — LANOLIN ALCOHOL/MO/W.PET/CERES
1000 CREAM (GRAM) TOPICAL DAILY
COMMUNITY
Start: 2021-07-24

## 2021-08-24 RX ORDER — GLIPIZIDE 5 MG/1
5 TABLET, FILM COATED, EXTENDED RELEASE ORAL DAILY
COMMUNITY
Start: 2021-06-07

## 2021-08-24 RX ORDER — BUSPIRONE HYDROCHLORIDE 7.5 MG/1
7.5 TABLET ORAL 2 TIMES DAILY
COMMUNITY
Start: 2021-08-18

## 2021-08-24 RX ORDER — ERTUGLIFLOZIN 5 MG/1
5 TABLET, FILM COATED ORAL EVERY MORNING
COMMUNITY
Start: 2021-06-07

## 2021-08-24 RX ORDER — TAMSULOSIN HYDROCHLORIDE 0.4 MG/1
1 CAPSULE ORAL DAILY
COMMUNITY
Start: 2021-07-22 | End: 2022-11-18 | Stop reason: DRUGHIGH

## 2021-08-24 RX ORDER — INDOMETHACIN 25 MG/1
CAPSULE ORAL
COMMUNITY
Start: 2021-08-18 | End: 2021-11-16 | Stop reason: ALTCHOICE

## 2021-08-24 RX ORDER — ERGOCALCIFEROL 1.25 MG/1
50000 CAPSULE ORAL WEEKLY
COMMUNITY
Start: 2021-08-18

## 2021-08-24 RX ORDER — PREGABALIN 200 MG/1
CAPSULE ORAL
COMMUNITY
Start: 2021-08-01

## 2021-08-24 RX ORDER — MONTELUKAST SODIUM 4 MG/1
TABLET, CHEWABLE ORAL
COMMUNITY
Start: 2021-06-07 | End: 2021-08-25

## 2021-08-24 RX ORDER — ATORVASTATIN CALCIUM 20 MG/1
20 TABLET, FILM COATED ORAL
COMMUNITY
Start: 2021-06-07 | End: 2022-09-15 | Stop reason: SDUPTHER

## 2021-08-24 RX ORDER — LORATADINE 10 MG/1
TABLET ORAL
COMMUNITY

## 2021-08-24 RX ORDER — DICLOFENAC SODIUM 75 MG/1
TABLET, DELAYED RELEASE ORAL
COMMUNITY
End: 2021-08-25

## 2021-08-24 RX ORDER — DICYCLOMINE HCL 20 MG
TABLET ORAL
COMMUNITY
Start: 2021-08-04

## 2021-08-24 NOTE — PROGRESS NOTES
[facility] - PODIATRY    Today's Date: 08/24/21    Patient Name: Willard Lange  MRN: [id number]  CSN: [id number]  PCP: Elizabeth Valle APRN  Referring Provider: No ref. provider found    SUBJECTIVE     Chief Complaint   Patient presents with   • Left Foot - Nail Problem   • Right Foot - Nail Problem     HPI: Willard Lange, a 61 y.o.male, comes to clinic.    New, Established, New Problem:  established     Location:  Toenails    Duration:   Greater than five years    Onset:  Gradual    Nature:  sore with palpation.    Stable, worsening, improving:   Stable    Aggravating factors:  Pain with shoe gear and ambulation.    Previous Treatment:  debridement  __________________________________    New, Established, New Problem:  Established    Location:  bilateral feet    Duration:    Onset:  gradual    Nature:   NIDDM     Stable, worsening, improving:  stable    Patient reported last blood glucose: 103  __________________________________    Patient reports the following medical changes since their last visit: None.    No other pedal complaints at this time.    Patient denies any fevers, chills, nausea, vomiting, shortness of breathe, nor any other constitutional signs nor symptoms.       Last PCP Visit:  Elizabeth Valle APRN, mid August 2021    Past Medical History:   Diagnosis Date   • Arthritis    • Asthma    • Benign essential hypertension    • Brain tumor (CMS/HCC) 01/2021   • Cancer (CMS/[facility])    • DDD (degenerative disc disease), lumbar    • Diabetes mellitus type 2, noninsulin dependent (CMS/[facility])    • Diabetes type 2, controlled (CMS/[facility])    • GERD (gastroesophageal reflux disease)    • Hepatitis C    • HTN (hypertension)    • Leg pain    • Lumbago 02/15/2017    with low back pain   • Lumbar disc herniation 02/15/2017    L4-4   • Lumbar spinal stenosis 02/15/2017    L4/5   • Muscle cramps    • Neuropathy    • Prostate cancer (CMS/HCC)      Past Surgical History:   Procedure Laterality Date    • APPENDECTOMY     • BACK SURGERY  2017   • COLONOSCOPY  2017    Dr. Dorsey-Polyps   • CYSTOSCOPY W/ LASER LITHOTRIPSY      for kidney stones   • LAMINECTOMY  2017    L4-5   • PROSTATE BIOPSY      transrectal   • TONSILLECTOMY       Family History   Problem Relation Age of Onset   • Heart disease Father    • Lung cancer Sister    • Diabetes type I Maternal Grandmother      Social History     Socioeconomic History   • Marital status:      Spouse name: Not on file   • Number of children: Not on file   • Years of education: Not on file   • Highest education level: Not on file   Tobacco Use   • Smoking status: Former Smoker     Quit date:      Years since quittin.6   • Smokeless tobacco: Never Used   Vaping Use   • Vaping Use: Never used   Substance and Sexual Activity   • Alcohol use: Yes     Comment: rarely   • Drug use: Never   • Sexual activity: Defer     Allergies   Allergen Reactions   • Formaldehyde Rash     Current Outpatient Medications   Medication Sig Dispense Refill   • Advair Diskus 250-50 MCG/DOSE DISKUS Inhale 1 puff 2 (Two) Times a Day.     • atorvastatin (LIPITOR) 20 MG tablet Take 20 mg by mouth every night at bedtime.     • busPIRone (BUSPAR) 7.5 MG tablet Take 7.5 mg by mouth 2 (Two) Times a Day.     • colestipol (COLESTID) 1 g tablet TAKE 2 TABLETS BY MOUTH EVERY DAY FOR DIARRHEA     • diclofenac (VOLTAREN) 75 MG EC tablet diclofenac sodium 75 mg oral tablet,delayed release (DR/EC) take 1 tablet (75 mg) by oral route 2 times per day   Active     • dicyclomine (BENTYL) 20 MG tablet TAKE 1 TABLET BY MOUTH 4 TIMES DAILY AS NEEDED FOR GI UPSET     • glipizide (GLUCOTROL XL) 5 MG ER tablet Take 5 mg by mouth Daily.     • HYDROcodone-acetaminophen (NORCO) 7.5-325 MG per tablet Take 1 tablet by mouth Every 8 (Eight) Hours As Needed.     • indomethacin (INDOCIN) 25 MG capsule TAKE 1 CAPSULE BY MOUTH TWICE DAILY AS NEEDED FOR ARTHRITIS PAIN     • loratadine (CLARITIN) 10 MG tablet  loratadine 10 mg oral tablet take 1 tablet (10 mg) by oral route once daily   Suspended     • losartan-hydrochlorothiazide (HYZAAR) 100-25 MG per tablet losartan-hydrochlorothiazide 100-25 mg oral tablet take 1 tablet by oral route once daily   Active     • omeprazole (priLOSEC) 40 MG capsule Take 40 mg by mouth Daily.     • pregabalin (LYRICA) 200 MG capsule TAKE 1 CAPSULE BY MOUTH THREE TIMES DAILY AS DIRECTED     • Steglatro 5 MG tablet Take 5 mg by mouth Every Morning.     • tamsulosin (FLOMAX) 0.4 MG capsule 24 hr capsule Take 1 capsule by mouth Daily.     • Trulicity 1.5 MG/0.5ML solution pen-injector Inject 1.5 mg under the skin into the appropriate area as directed Every 7 (Seven) Days.     • vitamin B-12 (CYANOCOBALAMIN) 1000 MCG tablet Take 1,000 mcg by mouth Daily.     • vitamin D (ERGOCALCIFEROL) 1.25 MG (96894 UT) capsule capsule Take 50,000 Units by mouth 1 (One) Time Per Week.       No current facility-administered medications for this visit.     Review of Systems   Constitutional: Negative.    Skin:        Painful toenails   Neurological: Positive for numbness.   All other systems reviewed and are negative.      OBJECTIVE     Vitals:    08/24/21 1338   BP: 155/79   Pulse: 75   Temp: 96.9 °F (36.1 °C)   SpO2: 96%       No results found for: HGBA1C    Lab Results   Component Value Date    CALCIUM 9.6 03/26/2020     03/26/2020    K 4.6 03/26/2020    CO2 26 03/26/2020     03/26/2020    BUN 12 03/26/2020    CREATININE 0.90 07/23/2021    BCR 9 03/26/2020    ANIONGAP 16 03/26/2020       Patient seen in no apparent distress.      PHYSICAL EXAM:     Foot/Ankle Exam:       General:   Diabetic Foot Exam Performed    Appearance: obesity    Orientation: AAOx3    Affect: appropriate    Gait: unimpaired    Shoe Gear:  Casual shoes    VASCULAR      Right Foot Vascularity   Normal vascular exam    Dorsalis pedis:  2+  Posterior tibial:  2+  Skin Temperature: warm    Edema Grading:  None  CFT:  < 3  seconds  Pedal Hair Growth:  Present  Varicosities: none       Left Foot Vascularity   Normal vascular exam    Dorsalis pedis:  2+  Posterior tibial:  2+  Skin Temperature: warm    Edema Grading:  None  CFT:  < 3 seconds  Pedal Hair Growth:  Present  Varicosities: none        NEUROLOGIC     Right Foot Neurologic   Light touch sensation:  Absent  Vibratory sensation:  Absent  Hot/Cold sensation: absent    Protective Sensation using Colp-Oswaldo Monofilament:  0     Left Foot Neurologic   Light touch sensation:  Absent  Vibratory sensation:  Absent  Hot/cold sensation: absent    Protective Sensation using Colp-Oswaldo Monofilament:  0     MUSCULOSKELETAL      Right Foot Musculoskeletal   Hammertoe:  Second toe, third toe, fourth toe and fifth toe     Left Foot Musculoskeletal   Hammertoe:  Second toe, third toe, fourth toe and fifth toe     MUSCLE STRENGTH     Right Foot Muscle Strength   Normal strength    Foot dorsiflexion:  5  Foot plantar flexion:  5  Foot inversion:  5  Foot eversion:  5     Left Foot Muscle Strength   Normal strength    Foot dorsiflexion:  5  Foot plantar flexion:  5  Foot inversion:  5  Foot eversion:  5     RANGE OF MOTION      Right Foot Range of Motion   Foot and ankle ROM within normal limits       Left Foot Range of Motion   Foot and ankle ROM within normal limits       DERMATOLOGIC     Right Foot Dermatologic   Skin: skin intact    Nails: onychomycosis, abnormally thick, subungual debris and dystrophic nails    Nails comment:  Toenails 1, 2, 3, 4, and 5     Left Foot Dermatologic   Skin: skin intact    Nails: onychomycosis, abnormally thick, subungual debris, dystrophic nails and ingrown toenail    Nails comment:  Toenails 1, 2, 3, 4, and 5      Diabetic Foot Exam Performed    ASSESSMENT/PLAN     Diagnoses and all orders for this visit:    1. Foot pain, bilateral (Primary)    2. Onychomycosis    3. Onychocryptosis    4. Diabetic foot (CMS/[facility])    5. Non-insulin dependent type 2  diabetes mellitus (CMS/[facility])    6. Hammer toes of both feet    7. Neuropathy        Comprehensive lower extremity examination and evaluation was performed.    Discussed findings and treatment plan including risks, benefits, and treatment options with patient in detail. Patient agreed with treatment plan.    Toenails 1 through 5 bilaterally were debrided in thickness and length and then smoothed with a Dremel Tool.  Tolerated the procedure well without complications.    Diabetic foot exam performed and documented this date, compliant with CQM required standards. Detail of findings as noted in physical exam.  Lower extremity Neurologic exam for diabetic patient performed and documented this date, compliant with PQRS required standards. Detail of findings as noted in physical exam.  Advised patient importance of good routine lower extremity hygiene. Advised patient importance of evaluating for intact skin and pain free nail borders.  Advised patient to use mirror to evaluate plantar/ soles of feet for better visualization. Advised patient monitor and phone office to be seen if any cracking to skin, open lesions, painful nail borders or if nails become elongated prior to next visit. Advised patient importance of daily cleansing of lower extremities, followed by good skin cream to maintain normal hydration of skin. Also advised patient importance of close daily monitoring of blood sugar. Advised to regulate diet and medications to maintain control of blood sugar in optimal range. Contact primary care provider if difficulties maintaining blood sugar levels.  Advised Patient of presence of Diabetes Mellitus condition.  Advised Patient risk of progression and worsening or improvement, then return of condition.  Will monitor condition for any change in future. Treat with most appropriate treatment pending status of condition.  Counseled and advised patient extensively on nature and ramifications of diabetes. Standard  instructions given to patient for good diabetic foot care and maintenance. Advised importance of careful monitoring to avoid break down and complications secondary to diabetes. Advised patient importance of strict maintenance of blood sugar control. Advised patient of possible ominous results from neglect of condition, i.e.: amputation/ loss of digits, feet and legs, or even death.  Patient states understands counseling, will monitor closely, continue good hygiene and routine diabetic foot care. Patient will contact office is questions or problems.      An After Visit Summary was printed and given to the patient at discharge, including (if requested) any available informative/educational handouts regarding diagnosis, treatment, or medications. All questions were answered to patient/family satisfaction. Should symptoms fail to improve or worsen they agree to call or return to clinic or to go to the Emergency Department. Discussed the importance of following up with any needed screening tests/labs/specialist appointments and any requested follow-up recommended by me today. Importance of maintaining follow-up discussed and patient accepts that missed appointments can delay diagnosis and potentially lead to worsening of conditions.    Return in about 9 weeks (around 10/26/2021) for Toenail Care., or sooner if acute issues arise.    This document has been electronically signed by Sarkis Ureña DPM on August 24, 2021 14:05 EDT

## 2021-08-25 ENCOUNTER — OFFICE VISIT (OUTPATIENT)
Dept: RADIATION ONCOLOGY | Facility: HOSPITAL | Age: 61
End: 2021-08-25

## 2021-08-25 VITALS
HEART RATE: 77 BPM | BODY MASS INDEX: 33.77 KG/M2 | HEIGHT: 72 IN | SYSTOLIC BLOOD PRESSURE: 142 MMHG | TEMPERATURE: 97.6 F | DIASTOLIC BLOOD PRESSURE: 65 MMHG | OXYGEN SATURATION: 96 % | RESPIRATION RATE: 16 BRPM | WEIGHT: 249.34 LBS

## 2021-08-25 DIAGNOSIS — C61 PROSTATE CANCER (HCC): Primary | ICD-10-CM

## 2021-08-25 PROBLEM — C80.1 CANCER: Status: ACTIVE | Noted: 2021-08-25

## 2021-08-25 PROBLEM — E11.9 DIABETES TYPE 2, CONTROLLED (HCC): Status: ACTIVE | Noted: 2021-08-25

## 2021-08-25 PROBLEM — R25.2 MUSCLE CRAMPS: Status: ACTIVE | Noted: 2021-08-25

## 2021-08-25 PROBLEM — M79.606 LEG PAIN: Status: ACTIVE | Noted: 2021-08-25

## 2021-08-25 PROBLEM — C80.1 CANCER: Status: RESOLVED | Noted: 2021-08-25 | Resolved: 2021-08-25

## 2021-08-25 PROBLEM — G62.9 NEUROPATHY: Status: ACTIVE | Noted: 2021-08-25

## 2021-08-25 PROBLEM — M54.30 SCIATICA: Status: ACTIVE | Noted: 2017-02-15

## 2021-08-25 PROBLEM — M51.26 DISPLACEMENT OF LUMBAR INTERVERTEBRAL DISC WITHOUT MYELOPATHY: Status: ACTIVE | Noted: 2017-02-15

## 2021-08-25 PROBLEM — J45.909 ASTHMA: Status: ACTIVE | Noted: 2021-08-25

## 2021-08-25 PROBLEM — M54.50 LOW BACK PAIN: Status: ACTIVE | Noted: 2021-08-25

## 2021-08-25 PROBLEM — I10 HTN (HYPERTENSION): Status: ACTIVE | Noted: 2021-08-25

## 2021-08-25 PROBLEM — E11.9 DIABETES MELLITUS: Status: ACTIVE | Noted: 2021-08-25

## 2021-08-25 PROBLEM — M48.061 LUMBAR SPINAL STENOSIS: Status: ACTIVE | Noted: 2017-02-15

## 2021-08-25 PROBLEM — M19.90 ARTHRITIS: Status: ACTIVE | Noted: 2021-08-25

## 2021-08-25 PROBLEM — B19.20 HEPATITIS C: Status: ACTIVE | Noted: 2021-08-25

## 2021-08-25 PROBLEM — K21.9 ESOPHAGEAL REFLUX: Status: ACTIVE | Noted: 2021-08-25

## 2021-08-25 LAB — PSA SERPL-MCNC: 0.18 NG/ML (ref 0–4)

## 2021-08-25 PROCEDURE — 36415 COLL VENOUS BLD VENIPUNCTURE: CPT | Performed by: NURSE PRACTITIONER

## 2021-08-25 PROCEDURE — 99212 OFFICE O/P EST SF 10 MIN: CPT | Performed by: NURSE PRACTITIONER

## 2021-08-25 PROCEDURE — 84153 ASSAY OF PSA TOTAL: CPT | Performed by: NURSE PRACTITIONER

## 2021-08-25 NOTE — PROGRESS NOTES
Follow Up Office Visit      Encounter Date: 08/25/2021   Patient Name: Willard Lange  YOB: 1960   Medical Record Number: [id number]   Primary Diagnosis: Prostate cancer (CMS/HCC) [C61]   Cancer Staging: cT1c    Completion Date:  1/22/20 - 2/28/20 28 fractions 7000 cGy    Chief Complaint:    Chief Complaint   Patient presents with   • Follow-up   • Prostate Cancer       History of Present Illness: Willard Lange is a 61 y.o. male who is here today to be seen for follow up of Radiation Therapy. Presents for routine follow up.  He has urgency and one episode of urination during the night.  He denies bowel habit changes, hematuria, dysuria and bone pain. Recently with MRI of brain and with a 6 mm meningioma inferiorly right cerebellum.  Followed by neurosurgeon.        Subjective      Review of Systems: Review of Systems   Constitutional: Positive for fatigue.   Respiratory: Negative for cough and shortness of breath.    Gastrointestinal: Positive for diarrhea (intermittent). Negative for constipation and nausea.   Genitourinary: Positive for urgency. Negative for difficulty urinating, dysuria and frequency.        Noc x 1     Neurological: Positive for headaches (occasional). Negative for dizziness and light-headedness.   Psychiatric/Behavioral: Positive for sleep disturbance.       The following portions of the patient's history were reviewed and updated as appropriate: allergies, current medications, past family history, past medical history, past social history, past surgical history and problem list.    Medications:     Current Outpatient Medications:   •  atorvastatin (LIPITOR) 20 MG tablet, Take 20 mg by mouth every night at bedtime., Disp: , Rfl:   •  busPIRone (BUSPAR) 7.5 MG tablet, Take 7.5 mg by mouth 2 (Two) Times a Day., Disp: , Rfl:   •  dicyclomine (BENTYL) 20 MG tablet, TAKE 1 TABLET BY MOUTH 4 TIMES DAILY AS NEEDED FOR GI UPSET, Disp: , Rfl:   •  glipizide (GLUCOTROL XL) 5 MG ER  "tablet, Take 5 mg by mouth Daily., Disp: , Rfl:   •  HYDROcodone-acetaminophen (NORCO) 7.5-325 MG per tablet, Take 1 tablet by mouth Every 8 (Eight) Hours As Needed., Disp: , Rfl:   •  indomethacin (INDOCIN) 25 MG capsule, TAKE 1 CAPSULE BY MOUTH TWICE DAILY AS NEEDED FOR ARTHRITIS PAIN, Disp: , Rfl:   •  losartan-hydrochlorothiazide (HYZAAR) 100-25 MG per tablet, losartan-hydrochlorothiazide 100-25 mg oral tablet take 1 tablet by oral route once daily   Active, Disp: , Rfl:   •  omeprazole (priLOSEC) 40 MG capsule, Take 40 mg by mouth Daily., Disp: , Rfl:   •  pregabalin (LYRICA) 200 MG capsule, TAKE 1 CAPSULE BY MOUTH THREE TIMES DAILY AS DIRECTED, Disp: , Rfl:   •  Steglatro 5 MG tablet, Take 5 mg by mouth Every Morning., Disp: , Rfl:   •  tamsulosin (FLOMAX) 0.4 MG capsule 24 hr capsule, Take 1 capsule by mouth Daily., Disp: , Rfl:   •  Trulicity 1.5 MG/0.5ML solution pen-injector, Inject 1.5 mg under the skin into the appropriate area as directed Every 7 (Seven) Days., Disp: , Rfl:   •  vitamin B-12 (CYANOCOBALAMIN) 1000 MCG tablet, Take 1,000 mcg by mouth Daily., Disp: , Rfl:   •  vitamin D (ERGOCALCIFEROL) 1.25 MG (89204 UT) capsule capsule, Take 50,000 Units by mouth 1 (One) Time Per Week., Disp: , Rfl:   •  Advair Diskus 250-50 MCG/DOSE DISKUS, Inhale 1 puff 2 (Two) Times a Day., Disp: , Rfl:   •  loratadine (CLARITIN) 10 MG tablet, loratadine 10 mg oral tablet take 1 tablet (10 mg) by oral route once daily   Suspended, Disp: , Rfl:     Allergies:   Allergies   Allergen Reactions   • Formaldehyde Rash       ECOG (1) Restricted in physically strenuous activity, ambulatory and able to do work of light nature    Objective     Physical Exam:   Vital Signs:   Vitals:    08/25/21 0906   BP: 142/65   Pulse: 77   Resp: 16   Temp: 97.6 °F (36.4 °C)   TempSrc: Temporal   SpO2: 96%   Weight: 113 kg (249 lb 5.4 oz)   Height: 182.9 cm (72\")   PainSc:   8     Body mass index is 33.82 kg/m².     Physical Exam  Vitals and " nursing note reviewed.   Constitutional:       General: He is not in acute distress.     Appearance: Normal appearance.   HENT:      Head: Normocephalic.      Mouth/Throat:      Mouth: Mucous membranes are moist.      Pharynx: Oropharynx is clear.   Eyes:      Extraocular Movements: Extraocular movements intact.      Conjunctiva/sclera: Conjunctivae normal.      Pupils: Pupils are equal, round, and reactive to light.   Cardiovascular:      Rate and Rhythm: Normal rate and regular rhythm.      Heart sounds: Normal heart sounds.   Pulmonary:      Effort: Pulmonary effort is normal.      Breath sounds: Normal breath sounds.   Musculoskeletal:         General: Normal range of motion.      Cervical back: Normal range of motion and neck supple.   Skin:     General: Skin is warm and dry.   Neurological:      General: No focal deficit present.      Mental Status: He is alert and oriented to person, place, and time.   Psychiatric:         Mood and Affect: Mood normal.         Radiographs: No radiology results for the last 90 days.   Pathology:   Labs:     Assessment / Plan      Impressions: Willard Lange is a pleasant 61 y.o. male with cT1c prostate cancer, [place] 3+3=6, ipsa 5.64.  Status post external beam radiotherapy completing 2/28/2020, 28 fractions, 7000 cGy.     Assessment/Plan:   Diagnoses and all orders for this visit:    1. Prostate cancer (CMS/[facility]) (Primary)  -     PSA Diagnostic  -     PSA DIAGNOSTIC; Future        Follow Up:   1. PSA today, and will call with results.   2. Follow up in 6 months with PSA prior.   Return in about 6 months (around 2/25/2022) for Office Visit.      Sincerely,        MARIUM Martin  [facility] Radiation Oncology   This document has been signed by MARIUM Alvarado on August 25, 2021 09:28 EDT    Chronic systolic CHF (congestive heart failure), NYHA class 2

## 2021-09-21 ENCOUNTER — OFFICE VISIT (OUTPATIENT)
Dept: NEUROSURGERY | Facility: CLINIC | Age: 61
End: 2021-09-21

## 2021-09-21 VITALS
WEIGHT: 255 LBS | HEIGHT: 72 IN | SYSTOLIC BLOOD PRESSURE: 162 MMHG | HEART RATE: 107 BPM | BODY MASS INDEX: 34.54 KG/M2 | DIASTOLIC BLOOD PRESSURE: 98 MMHG

## 2021-09-21 DIAGNOSIS — M48.061 SPINAL STENOSIS, LUMBAR REGION, WITHOUT NEUROGENIC CLAUDICATION: ICD-10-CM

## 2021-09-21 DIAGNOSIS — M47.812 CERVICAL SPONDYLOSIS WITHOUT MYELOPATHY: Primary | ICD-10-CM

## 2021-09-21 DIAGNOSIS — M47.817 SPONDYLOSIS OF LUMBOSACRAL REGION WITHOUT MYELOPATHY OR RADICULOPATHY: ICD-10-CM

## 2021-09-21 PROCEDURE — 99213 OFFICE O/P EST LOW 20 MIN: CPT | Performed by: NURSE PRACTITIONER

## 2021-09-21 NOTE — PROGRESS NOTES
Chief Complaint  Back Pain and Neck Pain    Subjective          Willard Lange who is a 61 y.o. year old male who presents to [facility] for follow up of his neck and low back pain.    Pt last seen in our office in December of 2020 by GHADA Steele. He was referred to neurology for abnormal MRI Brain and frequent falls. Dr. Cook saw him in January of this year and felt that small meningioma of the brain was a benign finding. He diagnosed patient with a diabetic peripheral polyneuropathy, which he attributed to his imbalance and frequent falls.     He is followed by pain management, primarily for his low back pain. He has received several injections, including TFESI and LESB. These provide modest benefit. Has also had MBB without significant benefit.     Pt with chronic neck pain as well denies any radiating pain into the arms. MRI Cervical spine showed degenerative changes without high grade canal or foraminal stenosis. He has not had any interventions for his neck.      Interval History per GHADA Steele 12/17/2020  Video Conferencing Visit  Willard Lange is a 60 year old /White male who is presenting for evaluation via video conferencing via zoom. Verbal consent obtained before beginning visit.   The following staff were present during this visit: Michelle Jenkins PA-C       MRI cervical spine showed multilevel degenerative changes no significant cervical stenosis.  MRI brain was abnormal. It showed a 6 mm enhancing lesion likely an extra-axial location adjacent to the undersurface of the right cerebellum.  Could represent a small meningioma.  A small cavernous angioma or other venous malformation could be considered.  There is a venous angioma involving the right medial temporal lobe with venous drainage towards the internal cerebral vein on that side. He continues to have loss of balance and frequent falls. 3 falls since last visit here. He  "has not seen a neurologist.       Recent Interventions: See above      Review of Systems   Musculoskeletal: Positive for arthralgias, back pain, neck pain and neck stiffness.   Neurological: Positive for numbness.   All other systems reviewed and are negative.       Objective   Vital Signs:   /98   Pulse 107   Ht 182.9 cm (72\")   Wt 116 kg (255 lb)   BMI 34.58 kg/m²       Physical Exam  Vitals reviewed.   Constitutional:       Appearance: Normal appearance.   Musculoskeletal:      Cervical back: Tenderness present. Decreased range of motion.   Neurological:      Mental Status: He is alert and oriented to person, place, and time.      Gait: Gait is intact.      Deep Tendon Reflexes: Strength normal.      Reflex Scores:       Tricep reflexes are 1+ on the right side and 1+ on the left side.       Bicep reflexes are 1+ on the right side and 1+ on the left side.       Brachioradialis reflexes are 1+ on the right side and 1+ on the left side.       Patellar reflexes are 1+ on the right side and 1+ on the left side.       Achilles reflexes are 1+ on the right side and 1+ on the left side.       Neurologic Exam     Mental Status   Oriented to person, place, and time.   Level of consciousness: alert    Motor Exam   Muscle bulk: normal  Overall muscle tone: normal    Strength   Strength 5/5 throughout.     Sensory Exam   Light touch normal.     Gait, Coordination, and Reflexes     Gait  Gait: normal    Reflexes   Right brachioradialis: 1+  Left brachioradialis: 1+  Right biceps: 1+  Left biceps: 1+  Right triceps: 1+  Left triceps: 1+  Right patellar: 1+  Left patellar: 1+  Right achilles: 1+  Left achilles: 1+  Right Lawrence: absent  Left Lawrence: absent       Result Review :                 Assessment and Plan    Diagnoses and all orders for this visit:    1. Cervical spondylosis without myelopathy (Primary)  -     Ambulatory Referral to Physical Therapy Evaluate and treat; Heat; Moist heat; Cross Fiber; " Stretching (Traction), ROM, Strengthening    2. Spondylosis of lumbosacral region without myelopathy or radiculopathy  -     Ambulatory Referral to Physical Therapy Evaluate and treat; Heat; Moist heat; Cross Fiber; Stretching (Traction), ROM, Strengthening    3. Spinal stenosis, lumbar region, without neurogenic claudication  -     Ambulatory Referral to Physical Therapy Evaluate and treat; Heat; Moist heat; Cross Fiber; Stretching (Traction), ROM, Strengthening    Pt with chronic neck and low back pain. No surgical recommendations at this time. Will refer to physical therapy. He can follow up on an as needed basis.       Follow Up   Return if symptoms worsen or fail to improve.  Patient was given instructions and counseling regarding his condition or for health maintenance advice.     -Physical therapy for neck and low back pain  -Follow up as needed

## 2021-09-30 ENCOUNTER — TREATMENT (OUTPATIENT)
Dept: PHYSICAL THERAPY | Facility: CLINIC | Age: 61
End: 2021-09-30

## 2021-09-30 DIAGNOSIS — M47.817 SPONDYLOSIS OF LUMBOSACRAL REGION WITHOUT MYELOPATHY OR RADICULOPATHY: ICD-10-CM

## 2021-09-30 DIAGNOSIS — M47.812 CERVICAL SPONDYLOSIS: Primary | ICD-10-CM

## 2021-09-30 DIAGNOSIS — M48.061 SPINAL STENOSIS OF LUMBAR REGION, UNSPECIFIED WHETHER NEUROGENIC CLAUDICATION PRESENT: ICD-10-CM

## 2021-09-30 PROCEDURE — 97110 THERAPEUTIC EXERCISES: CPT | Performed by: PHYSICAL THERAPIST

## 2021-09-30 PROCEDURE — 97162 PT EVAL MOD COMPLEX 30 MIN: CPT | Performed by: PHYSICAL THERAPIST

## 2021-10-04 ENCOUNTER — TREATMENT (OUTPATIENT)
Dept: PHYSICAL THERAPY | Facility: CLINIC | Age: 61
End: 2021-10-04

## 2021-10-04 DIAGNOSIS — M47.817 SPONDYLOSIS OF LUMBOSACRAL REGION WITHOUT MYELOPATHY OR RADICULOPATHY: ICD-10-CM

## 2021-10-04 DIAGNOSIS — M48.061 SPINAL STENOSIS OF LUMBAR REGION, UNSPECIFIED WHETHER NEUROGENIC CLAUDICATION PRESENT: ICD-10-CM

## 2021-10-04 DIAGNOSIS — M47.812 CERVICAL SPONDYLOSIS: Primary | ICD-10-CM

## 2021-10-04 PROCEDURE — 97140 MANUAL THERAPY 1/> REGIONS: CPT | Performed by: PHYSICAL THERAPIST

## 2021-10-04 PROCEDURE — 97110 THERAPEUTIC EXERCISES: CPT | Performed by: PHYSICAL THERAPIST

## 2021-10-04 NOTE — PROGRESS NOTES
"Physical Therapy Daily Treatment Note      Patient: Willard Lange   : 1960  Referring practitioner: Em Parra*  Date of Initial Visit: Type: THERAPY  Noted: 2021  Today's Date: 10/4/2021  Patient seen for 2 sessions           Subjective  Willard Lange reports:  His pain is bad as start of care. \"usiually 8/10 but with the muscle spasms and cramping it is 10/10. Had a video call with the doctor this morning and she is calling me in muscle relaxrs. Setting me up for CT scan for the hernia and checking nerves in my feet/legs. Already set up for CT scan.\"    Objective   See Exercise, Manual, and Modality Logs for complete treatment.       Assessment/Plan  Pt remarked that his pain reduced to 8/10 after treatment today. He was noted to stand more erect following session also. This is his second visit. Skilled care remains needed to address deficits outlined in evaluation.  Visit Diagnoses:    ICD-10-CM ICD-9-CM   1. Cervical spondylosis  M47.812 721.0   2. Spondylosis of lumbosacral region without myelopathy or radiculopathy  M47.817 721.3   3. Spinal stenosis of lumbar region, unspecified whether neurogenic claudication present  M48.061 724.02       Progress per Plan of Care and Progress strengthening /stabilization /functional activity           Timed:  Manual Therapy:    14     mins  01771;  Therapeutic Exercise:    15     mins  78173;     Neuromuscular Felisa:        mins  88674;    Therapeutic Activity:          mins  41517;     Gait Training:           mins  95073;     Ultrasound:          mins  14217;    Electrical Stimulation:         mins  96202 ( );  Aquatics  __   mins   87937    Untimed:  Electrical Stimulation:         mins  52581 ( );  Mechanical Traction:         mins  08081;     Timed Treatment:   29   mins   Total Treatment:     29   mins  Sakshi Bowers PTA  Physical Therapist            "

## 2021-10-13 ENCOUNTER — TREATMENT (OUTPATIENT)
Dept: PHYSICAL THERAPY | Facility: CLINIC | Age: 61
End: 2021-10-13

## 2021-10-13 DIAGNOSIS — M47.812 CERVICAL SPONDYLOSIS: Primary | ICD-10-CM

## 2021-10-13 DIAGNOSIS — M47.817 SPONDYLOSIS OF LUMBOSACRAL REGION WITHOUT MYELOPATHY OR RADICULOPATHY: ICD-10-CM

## 2021-10-13 DIAGNOSIS — M48.061 SPINAL STENOSIS OF LUMBAR REGION, UNSPECIFIED WHETHER NEUROGENIC CLAUDICATION PRESENT: ICD-10-CM

## 2021-10-13 PROCEDURE — 97110 THERAPEUTIC EXERCISES: CPT | Performed by: PHYSICAL THERAPIST

## 2021-10-13 PROCEDURE — 97140 MANUAL THERAPY 1/> REGIONS: CPT | Performed by: PHYSICAL THERAPIST

## 2021-10-13 NOTE — PROGRESS NOTES
Physical Therapy Daily Progress Note        Patient: Willard Lange   : 1960  Diagnosis/ICD-10 Code:  Cervical spondylosis [M47.812]  Referring practitioner: Em Parra*  Date of Initial Visit: Type: THERAPY  Noted: 2021  Today's Date: 10/13/2021  Patient seen for 3 sessions             Subjective   Willard Lange reports: pain in low back, usually vacuuming bothers him the most.     Objective   Increased decreased with MFR to R QL.     See Exercise, Manual, and Modality Logs for complete treatment.       Assessment/Plan  Willard still experiencing increased low back  pain, especially when vacuuming. Pt had some increased discomfort with MFR to R paraspinals. Pt would benefit from skilled PT to address Range of Motion  and Strength deficits, pain management and any concerns with ADLs.     Progress per Plan of Care           Timed:  Manual Therapy:    20     mins  99628;  Therapeutic Exercise:    10     mins  36471;     Neuromuscular Felisa:        mins  00437;    Therapeutic Activity:          mins  57153;     Gait Training:           mins  21185;    Aquatic Therapy:          mins  45001;       Untimed:  Electrical Stimulation:         mins  32656 ( );  Mechanical Traction:         mins  92368;       Timed Treatment:   30   mins   Total Treatment:     30   mins        Dona Edwards PTA  Physical Therapist Assistant

## 2021-10-20 ENCOUNTER — TREATMENT (OUTPATIENT)
Dept: PHYSICAL THERAPY | Facility: CLINIC | Age: 61
End: 2021-10-20

## 2021-10-20 DIAGNOSIS — M47.817 SPONDYLOSIS OF LUMBOSACRAL REGION WITHOUT MYELOPATHY OR RADICULOPATHY: Primary | ICD-10-CM

## 2021-10-20 DIAGNOSIS — M47.812 CERVICAL SPONDYLOSIS: ICD-10-CM

## 2021-10-20 DIAGNOSIS — M48.061 SPINAL STENOSIS OF LUMBAR REGION, UNSPECIFIED WHETHER NEUROGENIC CLAUDICATION PRESENT: ICD-10-CM

## 2021-10-20 PROCEDURE — 97110 THERAPEUTIC EXERCISES: CPT | Performed by: PHYSICAL THERAPIST

## 2021-10-20 PROCEDURE — 97140 MANUAL THERAPY 1/> REGIONS: CPT | Performed by: PHYSICAL THERAPIST

## 2021-10-20 NOTE — PROGRESS NOTES
Re-Assessment / Re-Certification      Patient: Willard Lange   : 1960  Diagnosis/ICD-10 Code:  Spondylosis of lumbosacral region without myelopathy or radiculopathy [M47.817]  Referring practitioner: Em Parra*  Date of Initial Visit: Type: THERAPY  Noted: 2021  Today's Date: 10/20/2021  Patient seen for 4 sessions      Subjective:   Subjective Questionnaire: Oswestry: 64%  Clinical Progress: improved  Home Program Compliance: Yes  Treatment has included: therapeutic exercise and manual therapy    Subjective Evaluation    History of Present Illness  Mechanism of injury: Pt reporting he is still having a lot of pain and tightness in his lower back and feels it running down his right leg in particular. He is interested in doing aquatic therapy and says he had a doctor several years ago recommend this for him.     Pain  Current pain ratin  Location: lower back, right hip       Objective:       Strength/Myotome Testing     Left Shoulder      Planes of Motion   Flexion: 4-   Extension: 4-   Abduction: 3+     Right Shoulder      Planes of Motion   Flexion: 4-   Extension: 4-   Abduction: 3+      Left Hip   Planes of Motion   Flexion: 4-  Extension: 3+  Abduction: 3+  Adduction: 4-     Right Hip   Planes of Motion   Flexion: 4-  Extension: 3+  Abduction: 3+  Adduction: 4-     Left Knee   Flexion: 3+  Extension: 4-     Right Knee   Flexion: 3+  Extension: 4-      Palpation:   Muscle spasm and tenderness noted right side lumbar paraspinals         See Exercise, Manual, and Modality Logs for complete treatment.     Assessment & Plan     Assessment  Impairments: abnormal muscle firing, abnormal or restricted ROM, activity intolerance, impaired physical strength and pain with function  Assessment details: The patient presents to physical therapy with complaints of low back pain and neck pain. He is also very limited with sensation, diagnosed with diabetic neuropathy. Patient has been slow to  progress secondary to the nature of his chronic conditions and limited visits to therapy at this time. Upon further discussion with the patient and assessment of symptoms, planning to move appointments to aquatic therapy environment for strengthening and functional ROM progression. The patient presents with associated upper and lower extremity weakness, lumbar and cervical stiffness, and functional deficits (OSWESTRY). The patient would benefit from skilled PT intervention to address the above mentioned functional limitations.     Prognosis: good  Functional Limitations: carrying objects, lifting, walking, uncomfortable because of pain, sitting and standing  Goals  Plan Goals: LOW BACK PROBLEMS:    1. The patient complains of low back pain.  LTG 1: 12 weeks:  The patient will report a pain rating of 5/10 or better in order to improve  tolerance to activities of daily living and improve sleep quality.  STATUS:  Not met  STG 1a: 6 weeks:  The patient will report a pain rating of 6/10 or better.  STATUS:  Not met  TREATMENT:  Therapeutic exercises, manual therapy, aquatic therapy, home exercise   instruction, and modalities as needed for pain to include:  electrical stimulation, moist heat, ice,   ultrasound, and diathermy.      2. The patient demonstrates weakness of the bilateral hip/knees.  LTG 2: 12 weeks:  The patient will demonstrate 4+ /5 strength for hip flexion, abduction,  and extension, knee flexion/extension in order to improve hip stability.  STATUS:  Not met  STG 2a: 6 weeks:  The patient will demonstrate 4 /5 strength for hip flexion, abduction,  and extension.  STATUS:  Progressing  TREATMENT: Therapeutic exercises, manual therapy, aquatic therapy, home exercise instruction,  and modalities as needed for pain to include:  electrical stimulation, moist heat, ice, ultrasound, and   diathermy.          3. Mobility: Walking/Moving Around Functional Limitation    LTG 3: 12 weeks:  The patient will  demonstrate 20-39 % limitation by achieving a score of 10-19 on the MANUEL.  STATUS:  Not met  STG 3 a: 6 weeks:  The patient will demonstrate 50 % limitation by achieving a score of 25 on the MANUEL.    STATUS:  Not met  TREATMENT:  Manual therapy, therapeutic exercise, home exercise instruction, and modalities as needed to include: moist heat, electrical stimulation, and ultrasound.       CERVICAL PROBLEMS    1. The patient has limited ROM.    LTG 1: 12 weeks:  The patient will demonstrate 65° of bilateral cervical spine rotation, in order to adequately monitor blind spots while driving and improve ability to perform activities of daily living.    STATUS:  Not met  STG 1a: 6 weeks:  The patient will demonstrate 55° of bilateral cervical spine rotation in order to adequately monitor blind spots while driving and improve ability to perform activities of daily living.       STATUS:  Not met   TREATMENT:  Manual therapy, therapeutic exercise, home exercise instruction, cervical traction, and modalities as needed to include: moist heat, electrical stimulation, and ultrasound.     2. The patient has complaints of pain.   LTG 2: 12 weeks:  The patient will report 6/10 pain in order to more easily tolerate activities of daily living and improve sleep quality.    STATUS:  Not met   STG 2a: 6 weeks:  The patient will report 5/10 pain.    STATUS:  Not met   TREATMENT: Manual therapy, therapeutic exercise, home exercise instruction, cervical traction, and modalities as needed to include: moist heat, electrical stimulation, and ultrasound.          Plan  Therapy options: will be seen for skilled physical therapy services  Planned modality interventions: TENS, cryotherapy, thermotherapy (hydrocollator packs) and traction  Planned therapy interventions: manual therapy, stretching, strengthening, therapeutic activities, neuromuscular re-education, home exercise program, joint mobilization, functional ROM exercises, soft tissue  mobilization, spinal/joint mobilization, flexibility and gait training  Other planned therapy interventions: Aquatic therapy  Frequency: 3x week  Duration in weeks: 12  Treatment plan discussed with: patient      Progress toward previous goals: Partially Met        Recommendations: Continue as planned  Timeframe: 3 months  Prognosis to achieve goals: good    PT Signature: Miguel Edwards PT    Electronically signed 10/20/2021    [place] License: PT - [id number]     Based upon review of the patient's progress and continued therapy plan, it is my medical opinion that Willard Lange should continue physical therapy treatment at [facility]  [address] [place] [address] [phone].    Signature: __________________________________  Em Kaplan APRN    Initial Certification  Certification Period: 10/20/2021 thru 2022  I certify that the therapy services are furnished while this patient is under my care.  The services outlined above are required by this patient, and will be reviewed every 90 days.      Please sign and return via fax to [phone]. Thank you, [facility].    Manual Therapy:    10     mins  53432;  Therapeutic Exercise:    15     mins  91465;     Neuromuscular Felisa:    0    mins  30249;    Therapeutic Activity:     0     mins  08511;     Gait Trainin     mins  07734;     Ultrasound:     0     mins  26619;    Electrical Stimulation:    0     mins  72018 ( );  Dry Needling     0     mins self-pay  Moist Heat     0     mins No charge  Aquatic Therapy    0     mins  14766  Re-Eval                           0    mins  98488    Timed Treatment:   25   mins   Total Treatment:     25   mins

## 2021-10-29 ENCOUNTER — TREATMENT (OUTPATIENT)
Dept: PHYSICAL THERAPY | Facility: CLINIC | Age: 61
End: 2021-10-29

## 2021-10-29 DIAGNOSIS — M47.817 SPONDYLOSIS OF LUMBOSACRAL REGION WITHOUT MYELOPATHY OR RADICULOPATHY: Primary | ICD-10-CM

## 2021-10-29 DIAGNOSIS — M48.061 SPINAL STENOSIS OF LUMBAR REGION, UNSPECIFIED WHETHER NEUROGENIC CLAUDICATION PRESENT: ICD-10-CM

## 2021-10-29 DIAGNOSIS — M47.812 CERVICAL SPONDYLOSIS: ICD-10-CM

## 2021-10-29 PROCEDURE — 97113 AQUATIC THERAPY/EXERCISES: CPT | Performed by: PHYSICAL THERAPIST

## 2021-10-29 NOTE — PROGRESS NOTES
Physical Therapy Daily Progress Note    Patient: Willard Lange   : 1960  Diagnosis/ICD-10 Code:  Spondylosis of lumbosacral region without myelopathy or radiculopathy [M47.817]  Referring practitioner: Em Parra*  Date of Initial Visit: Type: THERAPY  Noted: 2021  Today's Date: 10/29/2021  Patient seen for 5 sessions           Subjective Evaluation    History of Present Illness    Subjective comment: Pt reporting he had a spell with low blood pressure yesterday, but feeling better today. He reports he notified his doctor.Pain  Current pain ratin           Objective   See Exercise, Manual, and Modality Logs for complete treatment.       Assessment & Plan     Assessment  Assessment details: Pt tolerated today's session well, assessed blood pressure prior to exercise and found to be 145/70mmHg. Continued with therapy without issue. Pt would benefit from continued skilled therapy to address deficits. Progress per plan of care.                 Manual Therapy:    0     mins  40917;  Therapeutic Exercise:    0     mins  66060;     Neuromuscular Felisa:    0    mins  96861;    Therapeutic Activity:     0     mins  96326;     Gait Trainin     mins  88236;     Ultrasound:     0     mins  15273;    Electrical Stimulation:    0     mins  79465 ( );  Dry Needling     0     mins self-pay;  Aquatic Therapy    24     mins  07557;  Mechanical Traction    0     mins  73982  Moist Heat     0     mins  No charge    Timed Treatment:   24   mins   Total Treatment:     24   mins    Miguel Edwards PT  Physical Therapist    Electronically signed 10/29/2021    [place] License: PT - [id number]

## 2021-11-01 ENCOUNTER — TREATMENT (OUTPATIENT)
Dept: PHYSICAL THERAPY | Facility: CLINIC | Age: 61
End: 2021-11-01

## 2021-11-01 DIAGNOSIS — M48.061 SPINAL STENOSIS OF LUMBAR REGION, UNSPECIFIED WHETHER NEUROGENIC CLAUDICATION PRESENT: ICD-10-CM

## 2021-11-01 DIAGNOSIS — M47.812 CERVICAL SPONDYLOSIS: ICD-10-CM

## 2021-11-01 DIAGNOSIS — M47.817 SPONDYLOSIS OF LUMBOSACRAL REGION WITHOUT MYELOPATHY OR RADICULOPATHY: Primary | ICD-10-CM

## 2021-11-01 PROCEDURE — 97113 AQUATIC THERAPY/EXERCISES: CPT | Performed by: PHYSICAL THERAPIST

## 2021-11-01 NOTE — PROGRESS NOTES
"Physical Therapy Daily Treatment Note      Patient: Willard Lange   : 1960  Referring practitioner: Em Parra*  Date of Initial Visit: Type: THERAPY  Noted: 2021  Today's Date: 2021  Patient seen for 6 sessions           Subjective  Willard Lange reports: pain at 8/10 at arrival. \"No problem with blood pressure.\"  Willard related the pool is helping him, helps hold me up and I can do more.\"   \"The doctor told me I need pool therapy.\"    Objective   See Exercise, Manual, and Modality Logs for complete treatment.       Assessment/Plan   Willard noted that when he is in the pool he is not thinking about his pain. Aquatics remains needed to allow for advancement in flexibility, strength, and postural awareness to aid pain control.  Visit Diagnoses:    ICD-10-CM ICD-9-CM   1. Spondylosis of lumbosacral region without myelopathy or radiculopathy  M47.817 721.3   2. Spinal stenosis of lumbar region, unspecified whether neurogenic claudication present  M48.061 724.02   3. Cervical spondylosis  M47.812 721.0       Progress per Plan of Care and Progress strengthening /stabilization /functional activity           Timed:  Manual Therapy:        mins  69910;  Therapeutic Exercise:         mins  77597;     Neuromuscular Felisa:        mins  86956;    Therapeutic Activity:          mins  91882;     Gait Training:           mins  08269;     Ultrasound:          mins  31364;    Electrical Stimulation:         mins  25977 ( );  Aquatics  _26_   mins   60353    Untimed:  Electrical Stimulation:         mins  73538 ( );  Mechanical Traction:         mins  21596;     Timed Treatment:   26   mins   Total Treatment:     26   mins    Electronically Signed:  Sakshi Bowers PTA  Physical Therapist Assistant    [place] PTA license [id number]            "

## 2021-11-04 ENCOUNTER — TREATMENT (OUTPATIENT)
Dept: PHYSICAL THERAPY | Facility: CLINIC | Age: 61
End: 2021-11-04

## 2021-11-04 DIAGNOSIS — M47.817 SPONDYLOSIS OF LUMBOSACRAL REGION WITHOUT MYELOPATHY OR RADICULOPATHY: Primary | ICD-10-CM

## 2021-11-04 DIAGNOSIS — M47.812 CERVICAL SPONDYLOSIS: ICD-10-CM

## 2021-11-04 DIAGNOSIS — M48.061 SPINAL STENOSIS OF LUMBAR REGION, UNSPECIFIED WHETHER NEUROGENIC CLAUDICATION PRESENT: ICD-10-CM

## 2021-11-04 PROCEDURE — 97113 AQUATIC THERAPY/EXERCISES: CPT | Performed by: PHYSICAL THERAPIST

## 2021-11-04 NOTE — PROGRESS NOTES
Physical Therapy Daily Progress Note    Patient: Willard Lange   : 1960  Diagnosis/ICD-10 Code:  Spondylosis of lumbosacral region without myelopathy or radiculopathy [M47.817]  Referring practitioner: Em Parra*  Date of Initial Visit: Type: THERAPY  Noted: 2021  Today's Date: 2021  Patient seen for 7 sessions           Subjective Evaluation    History of Present Illness    Subjective comment: Pt reporting his blood pressure has been better under control, spoke with his doctor about it and made changes. Pain  Current pain rating: 3           Objective   See Exercise, Manual, and Modality Logs for complete treatment.       Assessment & Plan     Assessment  Assessment details: Pt tolerated today's session well. Pt would benefit from continued skilled therapy to address deficits. Progress per plan of care.                 Manual Therapy:    0     mins  34560;  Therapeutic Exercise:    0     mins  79691;     Neuromuscular Felisa:    0    mins  13571;    Therapeutic Activity:     0     mins  97246;     Gait Trainin     mins  84853;     Ultrasound:     0     mins  69622;    Electrical Stimulation:    0     mins  45788 ( );  Dry Needling     0     mins self-pay;  Aquatic Therapy    25     mins  17131;  Mechanical Traction    0     mins  47681  Moist Heat     0     mins  No charge    Timed Treatment:   25   mins   Total Treatment:     25   mins    Miguel Edwards PT  Physical Therapist    Electronically signed 2021    [place] License: PT - [id number]

## 2021-11-10 ENCOUNTER — TREATMENT (OUTPATIENT)
Dept: PHYSICAL THERAPY | Facility: CLINIC | Age: 61
End: 2021-11-10

## 2021-11-10 DIAGNOSIS — M47.817 SPONDYLOSIS OF LUMBOSACRAL REGION WITHOUT MYELOPATHY OR RADICULOPATHY: Primary | ICD-10-CM

## 2021-11-10 DIAGNOSIS — M47.812 CERVICAL SPONDYLOSIS: ICD-10-CM

## 2021-11-10 DIAGNOSIS — M48.061 SPINAL STENOSIS OF LUMBAR REGION, UNSPECIFIED WHETHER NEUROGENIC CLAUDICATION PRESENT: ICD-10-CM

## 2021-11-10 PROCEDURE — 97113 AQUATIC THERAPY/EXERCISES: CPT | Performed by: PHYSICAL THERAPIST

## 2021-11-10 NOTE — PROGRESS NOTES
Physical Therapy Daily Progress Note    Patient: Willard Lange   : 1960  Diagnosis/ICD-10 Code:  Spondylosis of lumbosacral region without myelopathy or radiculopathy [M47.817]  Referring practitioner: Em Parra*  Date of Initial Visit: Type: THERAPY  Noted: 2021  Today's Date: 11/10/2021  Patient seen for 8 sessions           Subjective Evaluation    History of Present Illness    Subjective comment: Pt reporting he is feeling very stiff today.Pain  Current pain ratin           Objective   See Exercise, Manual, and Modality Logs for complete treatment.       Assessment & Plan     Assessment  Assessment details: Pt tolerated today's session well, continued with endurance and strengthening in the aquatic environment. Pt would benefit from continued skilled therapy to address deficits. Progress per plan of care.                 Manual Therapy:    0     mins  86585;  Therapeutic Exercise:    0     mins  19733;     Neuromuscular Felisa:    0    mins  87895;    Therapeutic Activity:     0     mins  72607;     Gait Trainin     mins  51681;     Ultrasound:     0     mins  89117;    Electrical Stimulation:    0     mins  50869 ( );  Dry Needling     0     mins self-pay;  Aquatic Therapy    25     mins  57151;  Mechanical Traction    0     mins  38348  Moist Heat     0     mins  No charge    Timed Treatment:   25   mins   Total Treatment:     25   mins    Miguel Edwards PT  Physical Therapist    Electronically signed 11/10/2021    [place] License: PT - [id number]

## 2021-11-12 ENCOUNTER — TELEPHONE (OUTPATIENT)
Dept: PHYSICAL THERAPY | Facility: CLINIC | Age: 61
End: 2021-11-12

## 2021-11-15 ENCOUNTER — TREATMENT (OUTPATIENT)
Dept: PHYSICAL THERAPY | Facility: CLINIC | Age: 61
End: 2021-11-15

## 2021-11-15 DIAGNOSIS — M47.817 SPONDYLOSIS OF LUMBOSACRAL REGION WITHOUT MYELOPATHY OR RADICULOPATHY: Primary | ICD-10-CM

## 2021-11-15 DIAGNOSIS — M47.812 CERVICAL SPONDYLOSIS: ICD-10-CM

## 2021-11-15 DIAGNOSIS — M48.061 SPINAL STENOSIS OF LUMBAR REGION, UNSPECIFIED WHETHER NEUROGENIC CLAUDICATION PRESENT: ICD-10-CM

## 2021-11-15 PROCEDURE — 97113 AQUATIC THERAPY/EXERCISES: CPT | Performed by: PHYSICAL THERAPIST

## 2021-11-15 NOTE — PROGRESS NOTES
Physical Therapy Daily Progress Note    Patient: Willard Lange   : 1960  Diagnosis/ICD-10 Code:  Spondylosis of lumbosacral region without myelopathy or radiculopathy [M47.817]  Referring practitioner: Em Parra*  Date of Initial Visit: Type: THERAPY  Noted: 2021  Today's Date: 11/15/2021  Patient seen for 9 sessions           Subjective Evaluation    History of Present Illness    Subjective comment: Pt reporting he is feeling the aquatic therapy is improving his endurance so far. Although, still having pain in his legs, though a lot is from neuropathy.Pain  Current pain ratin           Objective   See Exercise, Manual, and Modality Logs for complete treatment.       Assessment & Plan     Assessment  Assessment details: Pt tolerated today's session well. Pt would benefit from continued skilled therapy to address deficits. Progress per plan of care. Continued progression of endurance, activity tolerance, and strengthening in the aquatic environment.                 Manual Therapy:    0     mins  91575;  Therapeutic Exercise:    0     mins  47936;     Neuromuscular Felisa:    0    mins  48523;    Therapeutic Activity:     0     mins  47322;     Gait Trainin     mins  06464;     Ultrasound:     0     mins  48071;    Electrical Stimulation:    0     mins  45231 ( );  Dry Needling     0     mins self-pay;  Aquatic Therapy    25     mins  36802;  Mechanical Traction    0     mins  23584  Moist Heat     0     mins  No charge    Timed Treatment:   25   mins   Total Treatment:     25   mins    Miguel Edwards PT  Physical Therapist    Electronically signed 11/15/2021    [place] License: PT - [id number]

## 2021-11-16 ENCOUNTER — OFFICE VISIT (OUTPATIENT)
Dept: PODIATRY | Facility: CLINIC | Age: 61
End: 2021-11-16

## 2021-11-16 VITALS
BODY MASS INDEX: 34.54 KG/M2 | OXYGEN SATURATION: 97 % | TEMPERATURE: 96.7 F | HEIGHT: 72 IN | HEART RATE: 87 BPM | WEIGHT: 255 LBS | SYSTOLIC BLOOD PRESSURE: 153 MMHG | DIASTOLIC BLOOD PRESSURE: 81 MMHG

## 2021-11-16 DIAGNOSIS — L60.0 ONYCHOCRYPTOSIS: ICD-10-CM

## 2021-11-16 DIAGNOSIS — G62.9 NEUROPATHY: ICD-10-CM

## 2021-11-16 DIAGNOSIS — M20.42 HAMMER TOES OF BOTH FEET: ICD-10-CM

## 2021-11-16 DIAGNOSIS — M20.41 HAMMER TOES OF BOTH FEET: ICD-10-CM

## 2021-11-16 DIAGNOSIS — E11.9 NON-INSULIN DEPENDENT TYPE 2 DIABETES MELLITUS (HCC): ICD-10-CM

## 2021-11-16 DIAGNOSIS — M79.672 FOOT PAIN, BILATERAL: Primary | ICD-10-CM

## 2021-11-16 DIAGNOSIS — B35.1 ONYCHOMYCOSIS: ICD-10-CM

## 2021-11-16 DIAGNOSIS — E11.8 DIABETIC FOOT (HCC): ICD-10-CM

## 2021-11-16 DIAGNOSIS — M79.671 FOOT PAIN, BILATERAL: Primary | ICD-10-CM

## 2021-11-16 DIAGNOSIS — I73.9 PVD (PERIPHERAL VASCULAR DISEASE) (HCC): ICD-10-CM

## 2021-11-16 PROCEDURE — 11721 DEBRIDE NAIL 6 OR MORE: CPT | Performed by: PODIATRIST

## 2021-11-16 PROCEDURE — G8404 LOW EXTEMITY NEUR EXAM DOCUM: HCPCS | Performed by: PODIATRIST

## 2021-11-16 PROCEDURE — 99213 OFFICE O/P EST LOW 20 MIN: CPT | Performed by: PODIATRIST

## 2021-11-16 RX ORDER — INDOMETHACIN 50 MG/1
50 CAPSULE ORAL 2 TIMES DAILY WITH MEALS
COMMUNITY
Start: 2021-11-04

## 2021-11-16 RX ORDER — BLOOD-GLUCOSE METER
KIT MISCELLANEOUS
COMMUNITY
Start: 2021-11-04

## 2021-11-16 RX ORDER — BACLOFEN 10 MG/1
TABLET ORAL
COMMUNITY
Start: 2021-10-05

## 2021-11-16 RX ORDER — LANCETS 28 GAUGE
EACH MISCELLANEOUS
COMMUNITY
Start: 2021-11-04

## 2021-11-16 RX ORDER — AMITRIPTYLINE HYDROCHLORIDE 25 MG/1
25 TABLET, FILM COATED ORAL
COMMUNITY
Start: 2021-10-05

## 2021-11-16 NOTE — PROGRESS NOTES
[facility] - PODIATRY    Today's Date: 11/16/21    Patient Name: Willard Lange  MRN: [id number]  CSN: [id number]  PCP: Elizabeth Valle APRN  Referring Provider: No ref. provider found    SUBJECTIVE     Chief Complaint   Patient presents with   • Left Foot - Nail Problem   • Right Foot - Nail Problem     HPI: Willard Lange, a 61 y.o.male, comes to clinic.    New, Established, New Problem:  established     Location:  Toenails    Duration:   Greater than five years    Onset:  Gradual    Nature:  sore with palpation.    Stable, worsening, improving:   Stable    Aggravating factors:  Pain with shoe gear and ambulation.    Previous Treatment:  debridement  __________________________________    New, Established, New Problem:  Established    Location:  bilateral feet    Duration:    Onset:  gradual    Nature:   NIDDM     Stable, worsening, improving:  stable    Patient reported last blood glucose: 119  __________________________________    Patient reports the following medical changes since their last visit: Recent lower extremity vascular studies which he states showed PVD.    No other pedal complaints at this time.    Patient denies any fevers, chills, nausea, vomiting, shortness of breathe, nor any other constitutional signs nor symptoms.       Last PCP Visit:  Elizabeth Valle APRN, 1 November 2021    Past Medical History:   Diagnosis Date   • Arthritis    • Asthma    • Benign essential hypertension    • Brain tumor (HCC) 01/2021   • Cancer (HCC)    • DDD (degenerative disc disease), lumbar    • Diabetes mellitus type 2, noninsulin dependent (HCC)    • Diabetes type 2, controlled (HCC)    • GERD (gastroesophageal reflux disease)    • Hepatitis C    • HTN (hypertension)    • Leg pain    • Lumbago 02/15/2017    with low back pain   • Lumbar disc herniation 02/15/2017    L4-4   • Lumbar spinal stenosis 02/15/2017    L4/5   • Muscle cramps    • Neuropathy    • Prostate cancer (HCC)      Past  Surgical History:   Procedure Laterality Date   • APPENDECTOMY     • BACK SURGERY  2017   • COLONOSCOPY  2017    Dr. Dorsey-Polyps   • CYSTOSCOPY W/ LASER LITHOTRIPSY      for kidney stones   • LAMINECTOMY  2017    L4-5   • PROSTATE BIOPSY      transrectal   • TONSILLECTOMY       Family History   Problem Relation Age of Onset   • Heart disease Father    • Lung cancer Sister    • Diabetes type I Maternal Grandmother      Social History     Socioeconomic History   • Marital status:    Tobacco Use   • Smoking status: Former Smoker     Start date: 1976     Quit date:      Years since quittin.8   • Smokeless tobacco: Never Used   Vaping Use   • Vaping Use: Never used   Substance and Sexual Activity   • Alcohol use: Yes     Comment: rarely   • Drug use: Never   • Sexual activity: Defer     Allergies   Allergen Reactions   • Formaldehyde Rash     Current Outpatient Medications   Medication Sig Dispense Refill   • Advair Diskus 250-50 MCG/DOSE DISKUS Inhale 1 puff 2 (Two) Times a Day.     • amitriptyline (ELAVIL) 25 MG tablet Take 25 mg by mouth every night at bedtime.     • atorvastatin (LIPITOR) 20 MG tablet Take 20 mg by mouth every night at bedtime.     • baclofen (LIORESAL) 10 MG tablet TAKE 1 TABLET BY MOUTH THREE TIMES DAILY AS NEEDED FOR MUSCLE CRAMPS     • busPIRone (BUSPAR) 7.5 MG tablet Take 7.5 mg by mouth 2 (Two) Times a Day.     • dicyclomine (BENTYL) 20 MG tablet TAKE 1 TABLET BY MOUTH 4 TIMES DAILY AS NEEDED FOR GI UPSET     • FREESTYLE LITE test strip      • glipizide (GLUCOTROL XL) 5 MG ER tablet Take 5 mg by mouth Daily.     • HYDROcodone-acetaminophen (NORCO) 7.5-325 MG per tablet Take 1 tablet by mouth Every 8 (Eight) Hours As Needed.     • indomethacin (INDOCIN) 50 MG capsule Take 50 mg by mouth 2 (Two) Times a Day With Meals. As needed     • Lancets (freestyle) lancets      • loratadine (CLARITIN) 10 MG tablet loratadine 10 mg oral tablet take 1 tablet (10 mg) by oral  route once daily   Suspended     • losartan-hydrochlorothiazide (HYZAAR) 100-25 MG per tablet losartan-hydrochlorothiazide 100-25 mg oral tablet take 1 tablet by oral route once daily   Active     • omeprazole (priLOSEC) 40 MG capsule Take 40 mg by mouth Daily.     • pregabalin (LYRICA) 200 MG capsule TAKE 1 CAPSULE BY MOUTH THREE TIMES DAILY AS DIRECTED     • Steglatro 5 MG tablet Take 5 mg by mouth Every Morning.     • tamsulosin (FLOMAX) 0.4 MG capsule 24 hr capsule Take 1 capsule by mouth Daily.     • Trulicity 1.5 MG/0.5ML solution pen-injector Inject 1.5 mg under the skin into the appropriate area as directed Every 7 (Seven) Days.     • vitamin B-12 (CYANOCOBALAMIN) 1000 MCG tablet Take 1,000 mcg by mouth Daily.     • vitamin D (ERGOCALCIFEROL) 1.25 MG (01219 UT) capsule capsule Take 50,000 Units by mouth 1 (One) Time Per Week.       No current facility-administered medications for this visit.     Review of Systems   Constitutional: Negative.    Skin:        Painful toenails   Neurological: Positive for numbness.   All other systems reviewed and are negative.      OBJECTIVE     Vitals:    11/16/21 1347   BP: 153/81   Pulse: 87   Temp: 96.7 °F (35.9 °C)   SpO2: 97%       WBC   Date Value Ref Range Status   03/26/2020 6.92 4.80 - 10.80 10*3/uL Final     RBC   Date Value Ref Range Status   03/26/2020 5.09 4.70 - 6.10 10*6/uL Final     Hemoglobin   Date Value Ref Range Status   03/26/2020 14.5 14.0 - 18.0 g/dL Final     Hematocrit   Date Value Ref Range Status   03/26/2020 44.1 42.0 - 52.0 % Final     MCV   Date Value Ref Range Status   03/26/2020 86.6 80.0 - 96.0 fL Final     MCH   Date Value Ref Range Status   03/26/2020 28.5 27.0 - 31.0 pg Final     MCHC   Date Value Ref Range Status   03/26/2020 32.9 (L) 33.0 - 37.0 Final     RDW   Date Value Ref Range Status   03/26/2020 13.2 11.6 - 14.4 % Final     RDW-SD   Date Value Ref Range Status   03/26/2020 40.8 35.1 - 43.9 fL Final     MPV   Date Value Ref Range  Status   03/26/2020 9.6 9.4 - 12.4 fL Final     Platelets   Date Value Ref Range Status   03/26/2020 273 130 - 400 10*3/uL Final     Neutrophil Rel %   Date Value Ref Range Status   03/26/2020 73.3 30.0 - 85.0 % Final     Lymphocyte Rel %   Date Value Ref Range Status   03/26/2020 14.7 (L) 20.0 - 45.0 % Final     Monocyte Rel %   Date Value Ref Range Status   03/26/2020 9.2 3.0 - 10.0 % Final     Eosinophil Rel %   Date Value Ref Range Status   03/26/2020 1.7 0.0 - 7.0 % Final     Basophil Rel %   Date Value Ref Range Status   03/26/2020 0.4 0.0 - 3.0 % Final     Neutrophils Absolute   Date Value Ref Range Status   03/26/2020 5.06 2.00 - 8.00 10*3/uL Final     Lymphocytes Absolute   Date Value Ref Range Status   03/26/2020 1.02 1.00 - 5.00 10*3/uL Final     Monocytes Absolute   Date Value Ref Range Status   03/26/2020 0.64 0.20 - 1.20 10*3/uL Final     Eosinophils Absolute   Date Value Ref Range Status   03/26/2020 0.12 0.00 - 0.70 10*3/uL Final     Basophils Absolute   Date Value Ref Range Status   03/26/2020 0.03 0.00 - 0.20 10*3/uL Final     NRBC   Date Value Ref Range Status   03/26/2020 0.00 0.00 - 0.70 % Final         Lab Results   Component Value Date    GLUCOSE 212 (H) 03/26/2020    CALCIUM 9.6 03/26/2020     03/26/2020    K 4.6 03/26/2020    CO2 26 03/26/2020     03/26/2020    BUN 12 03/26/2020    CREATININE 0.90 07/23/2021    BCR 9 03/26/2020    ANIONGAP 16 03/26/2020       Patient seen in no apparent distress.      PHYSICAL EXAM:     Foot/Ankle Exam:       General:   Appearance: obesity    Orientation: AAOx3    Affect: appropriate    Gait: unimpaired    Shoe Gear:  Casual shoes    VASCULAR      Right Foot Vascularity   Normal vascular exam    Dorsalis pedis:  Absent  Posterior tibial:  Absent  Skin Temperature: warm    Edema Grading:  None  CFT:  4  Pedal Hair Growth:  Absent  Varicosities: none       Left Foot Vascularity   Normal vascular exam    Dorsalis pedis:  Absent  Posterior tibial:   Absent  Skin Temperature: warm    Edema Grading:  None  CFT:  4  Pedal Hair Growth:  Absent  Varicosities: none        NEUROLOGIC     Right Foot Neurologic   Light touch sensation:  Absent  Vibratory sensation:  Absent  Hot/Cold sensation: absent    Protective Sensation using [place] Monofilament:  0     Left Foot Neurologic   Light touch sensation:  Absent  Vibratory sensation:  Absent  Hot/cold sensation: absent    Protective Sensation using [place] Monofilament:  0     MUSCULOSKELETAL      Right Foot Musculoskeletal   Hammertoe:  Second toe, third toe, fourth toe and fifth toe     Left Foot Musculoskeletal   Hammertoe:  Second toe, third toe, fourth toe and fifth toe     MUSCLE STRENGTH     Right Foot Muscle Strength   Normal strength    Foot dorsiflexion:  5  Foot plantar flexion:  5  Foot inversion:  5  Foot eversion:  5     Left Foot Muscle Strength   Normal strength    Foot dorsiflexion:  5  Foot plantar flexion:  5  Foot inversion:  5  Foot eversion:  5     RANGE OF MOTION      Right Foot Range of Motion   Foot and ankle ROM within normal limits       Left Foot Range of Motion   Foot and ankle ROM within normal limits       DERMATOLOGIC     Right Foot Dermatologic   Skin: skin intact    Nails: onychomycosis, abnormally thick, subungual debris and dystrophic nails    Nails comment:  Toenails 1, 2, 3, 4, and 5     Left Foot Dermatologic   Skin: skin intact    Nails: onychomycosis, abnormally thick, subungual debris, dystrophic nails and ingrown toenail    Nails comment:  Toenails 1, 2, 3, 4, and 5      Diabetic Foot Exam Performed    ASSESSMENT/PLAN     Diagnoses and all orders for this visit:    1. Foot pain, bilateral (Primary)    2. Onychomycosis    3. Onychocryptosis    4. Diabetic foot ([facility])    5. Non-insulin dependent type 2 diabetes mellitus ([facility])    6. Neuropathy    7. Hammer toes of both feet    8. PVD (peripheral vascular disease) ([facility])        Comprehensive lower extremity  examination and evaluation was performed.    Discussed findings and treatment plan including risks, benefits, and treatment options with patient in detail. Patient agreed with treatment plan.    Toenails 1 through 5 bilaterally were debrided in thickness and length and then smoothed with a Dremel Tool.  Tolerated the procedure well without complications.    Prescription written for diabetic shoes with custom inserts.    Diabetic foot exam performed and documented this date, compliant with CQM required standards. Detail of findings as noted in physical exam.  Lower extremity Neurologic exam for diabetic patient performed and documented this date, compliant with PQRS required standards. Detail of findings as noted in physical exam.  Advised patient importance of good routine lower extremity hygiene. Advised patient importance of evaluating for intact skin and pain free nail borders.  Advised patient to use mirror to evaluate plantar/ soles of feet for better visualization. Advised patient monitor and phone office to be seen if any cracking to skin, open lesions, painful nail borders or if nails become elongated prior to next visit. Advised patient importance of daily cleansing of lower extremities, followed by good skin cream to maintain normal hydration of skin. Also advised patient importance of close daily monitoring of blood sugar. Advised to regulate diet and medications to maintain control of blood sugar in optimal range. Contact primary care provider if difficulties maintaining blood sugar levels.  Advised Patient of presence of Diabetes Mellitus condition.  Advised Patient risk of progression and worsening or improvement, then return of condition.  Will monitor condition for any change in future. Treat with most appropriate treatment pending status of condition.  Counseled and advised patient extensively on nature and ramifications of diabetes. Standard instructions given to patient for good diabetic foot care  and maintenance. Advised importance of careful monitoring to avoid break down and complications secondary to diabetes. Advised patient importance of strict maintenance of blood sugar control. Advised patient of possible ominous results from neglect of condition, i.e.: amputation/ loss of digits, feet and legs, or even death.  Patient states understands counseling, will monitor closely, continue good hygiene and routine diabetic foot care. Patient will contact office is questions or problems.      An After Visit Summary was printed and given to the patient at discharge, including (if requested) any available informative/educational handouts regarding diagnosis, treatment, or medications. All questions were answered to patient/family satisfaction. Should symptoms fail to improve or worsen they agree to call or return to clinic or to go to the Emergency Department. Discussed the importance of following up with any needed screening tests/labs/specialist appointments and any requested follow-up recommended by me today. Importance of maintaining follow-up discussed and patient accepts that missed appointments can delay diagnosis and potentially lead to worsening of conditions.    Return in about 9 weeks (around 1/18/2022) for Toenail Care., or sooner if acute issues arise.    This document has been electronically signed by Sarkis Ureña DPM on November 16, 2021 14:15 EST

## 2021-11-18 ENCOUNTER — TREATMENT (OUTPATIENT)
Dept: PHYSICAL THERAPY | Facility: CLINIC | Age: 61
End: 2021-11-18

## 2021-11-18 DIAGNOSIS — M48.061 SPINAL STENOSIS OF LUMBAR REGION, UNSPECIFIED WHETHER NEUROGENIC CLAUDICATION PRESENT: ICD-10-CM

## 2021-11-18 DIAGNOSIS — M47.812 CERVICAL SPONDYLOSIS: ICD-10-CM

## 2021-11-18 DIAGNOSIS — M47.817 SPONDYLOSIS OF LUMBOSACRAL REGION WITHOUT MYELOPATHY OR RADICULOPATHY: Primary | ICD-10-CM

## 2021-11-18 PROCEDURE — 97113 AQUATIC THERAPY/EXERCISES: CPT | Performed by: PHYSICAL THERAPIST

## 2021-11-18 NOTE — PROGRESS NOTES
Physical Therapy Daily Progress Note    Patient: Willard Lange   : 1960  Diagnosis/ICD-10 Code:  Spondylosis of lumbosacral region without myelopathy or radiculopathy [M47.817]  Referring practitioner: Em Parra*  Date of Initial Visit: Type: THERAPY  Noted: 2021  Today's Date: 2021  Patient seen for 10 sessions           Subjective Evaluation    History of Present Illness    Subjective comment: Pt reporting he is stiff again today, but feels better with exercising in therapy.       Objective   See Exercise, Manual, and Modality Logs for complete treatment.       Assessment & Plan     Assessment  Assessment details: Pt tolerated today's session well. Pt would benefit from continued skilled therapy to address deficits. Progress per plan of care.                 Manual Therapy:    0     mins  55232;  Therapeutic Exercise:    0     mins  39507;     Neuromuscular Felisa:    0    mins  04835;    Therapeutic Activity:     0     mins  18484;     Gait Trainin     mins  83640;     Ultrasound:     0     mins  76726;    Electrical Stimulation:    0     mins  80815 ( );  Dry Needling     0     mins self-pay;  Aquatic Therapy    24     mins  70132;  Mechanical Traction    0     mins  01574  Moist Heat     0     mins  No charge    Timed Treatment:   24   mins   Total Treatment:     24   mins    Miguel Edwards PT  Physical Therapist    Electronically signed 2021    [place] License: PT - [id number]

## 2021-11-22 ENCOUNTER — TREATMENT (OUTPATIENT)
Dept: PHYSICAL THERAPY | Facility: CLINIC | Age: 61
End: 2021-11-22

## 2021-11-22 DIAGNOSIS — M47.817 SPONDYLOSIS OF LUMBOSACRAL REGION WITHOUT MYELOPATHY OR RADICULOPATHY: Primary | ICD-10-CM

## 2021-11-22 DIAGNOSIS — M48.061 SPINAL STENOSIS OF LUMBAR REGION, UNSPECIFIED WHETHER NEUROGENIC CLAUDICATION PRESENT: ICD-10-CM

## 2021-11-22 DIAGNOSIS — M47.812 CERVICAL SPONDYLOSIS: ICD-10-CM

## 2021-11-22 PROCEDURE — 97113 AQUATIC THERAPY/EXERCISES: CPT | Performed by: PHYSICAL THERAPIST

## 2021-11-22 NOTE — PROGRESS NOTES
Progress Assessment        Patient: Willard Lange   : 1960  Diagnosis/ICD-10 Code:  Spondylosis of lumbosacral region without myelopathy or radiculopathy [M47.817]  Referring practitioner: Em Parra*  Date of Initial Visit: Type: THERAPY  Noted: 2021  Today's Date: 2021  Patient seen for 11 sessions      Subjective:     Subjective Questionnaire: Oswestry: 52%  Clinical Progress: improved  Home Program Compliance: Yes  Treatment has included: aquatic therapy    Subjective Evaluation    History of Present Illness    Subjective comment: Pt reporting he feels the aquatic therapy has helped him, he feels he can exercise in the pool without hurting himself. Pain  Current pain ratin         Objective   Strength/Myotome Testing     Left Shoulder      Planes of Motion   Flexion: 4   Extension: 4-   Abduction: 4-     Right Shoulder      Planes of Motion   Flexion: 4   Extension: 4-   Abduction: 4-      Left Hip   Planes of Motion   Flexion: 4  Extension: 4-  Abduction: 4-  Adduction: 4-     Right Hip   Planes of Motion   Flexion: 4  Extension: 4-  Abduction: 4-  Adduction: 4+     Left Knee   Flexion: 4-  Extension: 4     Right Knee   Flexion: 4-  Extension: 4        Assessment & Plan     Assessment  Impairments: abnormal muscle firing, abnormal or restricted ROM, activity intolerance, impaired physical strength and pain with function  Assessment details: Pt has met his short term pain goals with aquatic therapy treatment thus far. He is still having pain and dysfunction with his daily activities. His strength has also improved, although hasn't met these goals as of yet. He is expected to continue to progress with aquatic therapy treatments. The patient presents with associated upper and lower extremity weakness, lumbar and cervical stiffness, and functional deficits (OSWESTRY). The patient would benefit from skilled PT intervention to address the above mentioned functional limitations.      Prognosis: good  Functional Limitations: carrying objects, lifting, walking, uncomfortable because of pain, sitting and standing  Goals  Plan Goals: LOW BACK PROBLEMS:    1. The patient complains of low back pain.  LTG 1: 12 weeks:  The patient will report a pain rating of 5/10 or better in order to improve  tolerance to activities of daily living and improve sleep quality.  STATUS:  Not met  STG 1a: 6 weeks:  The patient will report a pain rating of 6/10 or better.  STATUS:  MET  TREATMENT:  Therapeutic exercises, manual therapy, aquatic therapy, home exercise   instruction, and modalities as needed for pain to include:  electrical stimulation, moist heat, ice,   ultrasound, and diathermy.      2. The patient demonstrates weakness of the bilateral hip/knees.  LTG 2: 12 weeks:  The patient will demonstrate 4+ /5 strength for hip flexion, abduction,  and extension, knee flexion/extension in order to improve hip stability.  STATUS:  Not met  STG 2a: 6 weeks:  The patient will demonstrate 4 /5 strength for hip flexion, abduction,  and extension.  STATUS:  Progressing  TREATMENT: Therapeutic exercises, manual therapy, aquatic therapy, home exercise instruction,  and modalities as needed for pain to include:  electrical stimulation, moist heat, ice, ultrasound, and   diathermy.          3. Mobility: Walking/Moving Around Functional Limitation    LTG 3: 12 weeks:  The patient will demonstrate 20-39 % limitation by achieving a score of 10-19 on the MANUEL.  STATUS:  Not met  STG 3 a: 6 weeks:  The patient will demonstrate 50 % limitation by achieving a score of 25 on the MANUEL.    STATUS:  Not met, progressing  TREATMENT:  Manual therapy, therapeutic exercise, home exercise instruction, and modalities as needed to include: moist heat, electrical stimulation, and ultrasound.       CERVICAL PROBLEMS    1. The patient has limited ROM.    LTG 1: 12 weeks:  The patient will demonstrate 65° of bilateral cervical spine rotation, in  order to adequately monitor blind spots while driving and improve ability to perform activities of daily living.    STATUS:  Not met  STG 1a: 6 weeks:  The patient will demonstrate 55° of bilateral cervical spine rotation in order to adequately monitor blind spots while driving and improve ability to perform activities of daily living.       STATUS:  Not met   TREATMENT:  Manual therapy, therapeutic exercise, home exercise instruction, cervical traction, and modalities as needed to include: moist heat, electrical stimulation, and ultrasound.     2. The patient has complaints of pain.   LTG 2: 12 weeks:  The patient will report 4/10 pain in order to more easily tolerate activities of daily living and improve sleep quality.    STATUS:  Not met   STG 2a: 6 weeks:  The patient will report 5/10 pain.    STATUS:  MET   TREATMENT: Manual therapy, therapeutic exercise, home exercise instruction, cervical traction, and modalities as needed to include: moist heat, electrical stimulation, and ultrasound.          Plan  Therapy options: will be seen for skilled physical therapy services  Planned modality interventions: TENS, cryotherapy, thermotherapy (hydrocollator packs) and traction  Planned therapy interventions: manual therapy, stretching, strengthening, therapeutic activities, neuromuscular re-education, home exercise program, joint mobilization, functional ROM exercises, soft tissue mobilization, spinal/joint mobilization, flexibility and gait training  Other planned therapy interventions: Aquatic therapy  Frequency: 3x week  Duration in weeks: 12  Treatment plan discussed with: patient      Progress toward previous goals: Partially Met    See Exercise, Manual, and Modality Logs for complete treatment.         Recommendations: Continue as planned  Timeframe: 1 month  Prognosis to achieve goals: good    PT Signature: Miguel Edwards PT    Electronically signed 11/22/2021    [place] License: PT - [id number]     Based upon review  of the patient's progress and continued therapy plan, it is my medical opinion that Willard Lange should continue physical therapy treatment at [facility]  [address] [place] [address] [phone].      Timed:         Manual Therapy:    0     mins  02047;     Therapeutic Exercise:    0     mins  66161;     Neuromuscular Felisa:    0    mins  44370;    Therapeutic Activity:     0     mins  09748;     Gait Trainin     mins  12562;     Ultrasound:     0     mins  00201;    Ionto                               0    mins   45951  Self Care                       0     mins   19571  Aquatic                          25     mins 29442            Timed Treatment:   25   mins   Total Treatment:     25   mins      I certify that the therapy services are furnished while this patient is under my care.  The services outlined above are required by this patient, and will be reviewed every 90 days.

## 2021-11-24 ENCOUNTER — TREATMENT (OUTPATIENT)
Dept: PHYSICAL THERAPY | Facility: CLINIC | Age: 61
End: 2021-11-24

## 2021-11-24 DIAGNOSIS — M47.817 SPONDYLOSIS OF LUMBOSACRAL REGION WITHOUT MYELOPATHY OR RADICULOPATHY: Primary | ICD-10-CM

## 2021-11-24 DIAGNOSIS — M48.061 SPINAL STENOSIS OF LUMBAR REGION, UNSPECIFIED WHETHER NEUROGENIC CLAUDICATION PRESENT: ICD-10-CM

## 2021-11-24 DIAGNOSIS — M47.812 CERVICAL SPONDYLOSIS: ICD-10-CM

## 2021-11-24 PROCEDURE — 97113 AQUATIC THERAPY/EXERCISES: CPT | Performed by: PHYSICAL THERAPIST

## 2021-11-24 NOTE — PROGRESS NOTES
Physical Therapy Daily Progress Note    Patient: Willard Lange   : 1960  Diagnosis/ICD-10 Code:  Spondylosis of lumbosacral region without myelopathy or radiculopathy [M47.817]  Referring practitioner: Em Parra*  Date of Initial Visit: Type: THERAPY  Noted: 2021  Today's Date: 2021  Patient seen for 12 sessions           Subjective Evaluation    History of Present Illness    Subjective comment: Pt reporting he is feeling a little better today. Pain  Current pain ratin           Objective   See Exercise, Manual, and Modality Logs for complete treatment.       Assessment & Plan     Assessment    Assessment details: Pt tolerated today's session well. Pt would benefit from continued skilled therapy to address deficits. Progress per plan of care.                 Manual Therapy:    0     mins  74538;  Therapeutic Exercise:    0     mins  53497;     Neuromuscular Felisa:    0    mins  83601;    Therapeutic Activity:     0     mins  13715;     Gait Trainin     mins  28388;     Ultrasound:     0     mins  04658;    Electrical Stimulation:    0     mins  53529 ( );  Dry Needling     0     mins self-pay;  Aquatic Therapy    25     mins  41930;  Mechanical Traction    0     mins  67515  Moist Heat     0     mins  No charge    Timed Treatment:   25   mins   Total Treatment:     25   mins    Miguel Edwards PT  Physical Therapist    Electronically signed 2021    [place] License: PT - [id number]

## 2021-11-30 ENCOUNTER — TREATMENT (OUTPATIENT)
Dept: PHYSICAL THERAPY | Facility: CLINIC | Age: 61
End: 2021-11-30

## 2021-11-30 DIAGNOSIS — M48.061 SPINAL STENOSIS OF LUMBAR REGION, UNSPECIFIED WHETHER NEUROGENIC CLAUDICATION PRESENT: ICD-10-CM

## 2021-11-30 DIAGNOSIS — M47.812 CERVICAL SPONDYLOSIS: ICD-10-CM

## 2021-11-30 DIAGNOSIS — M47.817 SPONDYLOSIS OF LUMBOSACRAL REGION WITHOUT MYELOPATHY OR RADICULOPATHY: Primary | ICD-10-CM

## 2021-11-30 PROCEDURE — 97113 AQUATIC THERAPY/EXERCISES: CPT | Performed by: PHYSICAL THERAPIST

## 2021-11-30 NOTE — PROGRESS NOTES
Physical Therapy Daily Progress Note    Patient: Willard Lange   : 1960  Diagnosis/ICD-10 Code:  Spondylosis of lumbosacral region without myelopathy or radiculopathy [M47.817]  Referring practitioner: No ref. provider found  Date of Initial Visit: Type: THERAPY  Noted: 2021  Today's Date: 2021  Patient seen for 13 sessions           Subjective Evaluation    History of Present Illness    Subjective comment: Pt reporting he is more fatigued than sore today.       Objective   See Exercise, Manual, and Modality Logs for complete treatment.       Assessment & Plan     Assessment    Assessment details: Pt tolerated today's session well, continues to improve with tolerance to activities in the aquatic environment. Pt would benefit from continued skilled therapy to address deficits. Progress per plan of care.                 Manual Therapy:    0     mins  86528;  Therapeutic Exercise:    0     mins  67073;     Neuromuscular Felisa:    0    mins  15372;    Therapeutic Activity:     0     mins  57515;     Gait Trainin     mins  40712;     Ultrasound:     0     mins  11887;    Electrical Stimulation:    0     mins  67611 ( );  Dry Needling     0     mins self-pay;  Aquatic Therapy    27     mins  95703;  Mechanical Traction    0     mins  04063  Moist Heat     0     mins  No charge    Timed Treatment:   27   mins   Total Treatment:     27   mins    Miguel Edwards PT  Physical Therapist    Electronically signed 2021    [place] License: PT - [id number]

## 2021-12-08 ENCOUNTER — TREATMENT (OUTPATIENT)
Dept: PHYSICAL THERAPY | Facility: CLINIC | Age: 61
End: 2021-12-08

## 2021-12-08 DIAGNOSIS — M47.812 CERVICAL SPONDYLOSIS: ICD-10-CM

## 2021-12-08 DIAGNOSIS — M48.061 SPINAL STENOSIS OF LUMBAR REGION, UNSPECIFIED WHETHER NEUROGENIC CLAUDICATION PRESENT: ICD-10-CM

## 2021-12-08 DIAGNOSIS — M47.817 SPONDYLOSIS OF LUMBOSACRAL REGION WITHOUT MYELOPATHY OR RADICULOPATHY: Primary | ICD-10-CM

## 2021-12-08 PROCEDURE — 97113 AQUATIC THERAPY/EXERCISES: CPT | Performed by: PHYSICAL THERAPIST

## 2021-12-08 NOTE — PROGRESS NOTES
Physical Therapy Daily Progress Note    Patient: Willard Lange   : 1960  Diagnosis/ICD-10 Code:  Spondylosis of lumbosacral region without myelopathy or radiculopathy [M47.817]  Referring practitioner: Em Parra*  Date of Initial Visit: Type: THERAPY  Noted: 2021  Today's Date: 2021  Patient seen for 14 sessions           Subjective Evaluation    History of Present Illness    Subjective comment: Pt reporting he is having less stiffness today. Pain  Current pain rating: 3           Objective   See Exercise, Manual, and Modality Logs for complete treatment.       Assessment & Plan     Assessment    Assessment details: Pt tolerated today's session well. Pt would benefit from continued skilled therapy to address deficits. Progress per plan of care.                 Manual Therapy:    0     mins  21560;  Therapeutic Exercise:    0     mins  15027;     Neuromuscular Felisa:    0    mins  49636;    Therapeutic Activity:     0     mins  89284;     Gait Trainin     mins  95791;     Ultrasound:     0     mins  33450;    Electrical Stimulation:    0     mins  91612 ( );  Dry Needling     0     mins self-pay;  Aquatic Therapy    26     mins  31369;  Mechanical Traction    0     mins  68581  Moist Heat     0     mins  No charge    Timed Treatment:   26   mins   Total Treatment:     26   mins    Miguel Edwards PT  Physical Therapist    Electronically signed 2021    [place] License: PT - [id number]

## 2021-12-09 ENCOUNTER — OFFICE VISIT (OUTPATIENT)
Dept: VASCULAR SURGERY | Facility: HOSPITAL | Age: 61
End: 2021-12-09

## 2021-12-09 ENCOUNTER — TELEPHONE (OUTPATIENT)
Dept: NEUROSURGERY | Facility: CLINIC | Age: 61
End: 2021-12-09

## 2021-12-09 VITALS
OXYGEN SATURATION: 96 % | HEART RATE: 79 BPM | DIASTOLIC BLOOD PRESSURE: 100 MMHG | SYSTOLIC BLOOD PRESSURE: 168 MMHG | TEMPERATURE: 98.5 F

## 2021-12-09 DIAGNOSIS — M79.671 FOOT PAIN, BILATERAL: ICD-10-CM

## 2021-12-09 DIAGNOSIS — I65.23 BILATERAL CAROTID ARTERY STENOSIS: Primary | ICD-10-CM

## 2021-12-09 DIAGNOSIS — I73.9 PVD (PERIPHERAL VASCULAR DISEASE) (HCC): ICD-10-CM

## 2021-12-09 DIAGNOSIS — M79.672 FOOT PAIN, BILATERAL: ICD-10-CM

## 2021-12-09 PROCEDURE — 99204 OFFICE O/P NEW MOD 45 MIN: CPT | Performed by: SURGERY

## 2021-12-09 PROCEDURE — G0463 HOSPITAL OUTPT CLINIC VISIT: HCPCS | Performed by: SURGERY

## 2021-12-09 NOTE — PROGRESS NOTES
[facility]   HISTORY AND PHYSICAL    Patient Name: Willard Lange  : 1960  MRN: [id number]  Primary Care Physician:  Elizabeth Valle APRN      Subjective   Subjective     Chief Complaint: Pain in feet    HPI:    Willard Lange is a 61 y.o. male presents with complaints of pain in the bilateral feet.  Pain has been going on for several years but has been getting worse over the last several months.  He also has numbness in his feet.  The pain involves the midfoot to the toes.  It is present at rest and with walking.  Patient states that his feet feel like cinderblocks when he walks.  Patient had lumbar disc surgery several years ago.  He has no pain in his legs.  No calf claudication.  No open wounds on his feet.  He currently does not smoke.        Personal History     Past Medical History:   Diagnosis Date   • Arthritis    • Asthma    • Benign essential hypertension    • Brain tumor (HCC) 2021   • Cancer (HCC)    • DDD (degenerative disc disease), lumbar    • Diabetes mellitus type 2, noninsulin dependent (HCC)    • Diabetes type 2, controlled (HCC)    • GERD (gastroesophageal reflux disease)    • Hepatitis C    • HTN (hypertension)    • Leg pain    • Lumbago 02/15/2017    with low back pain   • Lumbar disc herniation 02/15/2017    L4-4   • Lumbar spinal stenosis 02/15/2017    L4/5   • Muscle cramps    • Neuropathy    • Prostate cancer (HCC)        Past Surgical History:   Procedure Laterality Date   • APPENDECTOMY     • BACK SURGERY     • COLONOSCOPY      Dr. Dorsey-Polyps   • CYSTOSCOPY W/ LASER LITHOTRIPSY      for kidney stones   • LAMINECTOMY  2017    L4-5   • PROSTATE BIOPSY      transrectal   • TONSILLECTOMY         Family History: family history includes Diabetes type I in his maternal grandmother; Heart disease in his father; Lung cancer in his sister. Otherwise pertinent FHx was reviewed and not pertinent to current issue.    Social History:  reports that he quit  smoking about 16 years ago. He started smoking about 45 years ago. He has never used smokeless tobacco. He reports current alcohol use. He reports that he does not use drugs.    Home Medications:  Current Outpatient Medications on File Prior to Visit   Medication Sig   • Advair Diskus 250-50 MCG/DOSE DISKUS Inhale 1 puff 2 (Two) Times a Day.   • amitriptyline (ELAVIL) 25 MG tablet Take 25 mg by mouth every night at bedtime.   • atorvastatin (LIPITOR) 20 MG tablet Take 20 mg by mouth every night at bedtime.   • baclofen (LIORESAL) 10 MG tablet TAKE 1 TABLET BY MOUTH THREE TIMES DAILY AS NEEDED FOR MUSCLE CRAMPS   • busPIRone (BUSPAR) 7.5 MG tablet Take 7.5 mg by mouth 2 (Two) Times a Day.   • dicyclomine (BENTYL) 20 MG tablet TAKE 1 TABLET BY MOUTH 4 TIMES DAILY AS NEEDED FOR GI UPSET   • FREESTYLE LITE test strip    • glipizide (GLUCOTROL XL) 5 MG ER tablet Take 5 mg by mouth Daily.   • HYDROcodone-acetaminophen (NORCO) 7.5-325 MG per tablet Take 1 tablet by mouth Every 8 (Eight) Hours As Needed.   • indomethacin (INDOCIN) 50 MG capsule Take 50 mg by mouth 2 (Two) Times a Day With Meals. As needed   • Lancets (freestyle) lancets    • loratadine (CLARITIN) 10 MG tablet loratadine 10 mg oral tablet take 1 tablet (10 mg) by oral route once daily   Suspended   • losartan-hydrochlorothiazide (HYZAAR) 100-25 MG per tablet losartan-hydrochlorothiazide 100-25 mg oral tablet take 1 tablet by oral route once daily   Active   • omeprazole (priLOSEC) 40 MG capsule Take 40 mg by mouth Daily.   • pregabalin (LYRICA) 200 MG capsule TAKE 1 CAPSULE BY MOUTH THREE TIMES DAILY AS DIRECTED   • Steglatro 5 MG tablet Take 5 mg by mouth Every Morning.   • tamsulosin (FLOMAX) 0.4 MG capsule 24 hr capsule Take 1 capsule by mouth Daily.   • Trulicity 1.5 MG/0.5ML solution pen-injector Inject 1.5 mg under the skin into the appropriate area as directed Every 7 (Seven) Days.   • vitamin B-12 (CYANOCOBALAMIN) 1000 MCG tablet Take 1,000 mcg by  mouth Daily.   • vitamin D (ERGOCALCIFEROL) 1.25 MG (54534 UT) capsule capsule Take 50,000 Units by mouth 1 (One) Time Per Week.     No current facility-administered medications on file prior to visit.          Allergies:  Allergies   Allergen Reactions   • Formaldehyde Rash       Objective   Objective     Vitals:   Temp:  [98.5 °F (36.9 °C)] 98.5 °F (36.9 °C)  Heart Rate:  [79] 79  BP: (168)/(100) 168/100    Physical Exam  Constitutional:       Appearance: He is obese.   HENT:      Head: Normocephalic and atraumatic.   Eyes:      Extraocular Movements: Extraocular movements intact.      Pupils: Pupils are equal, round, and reactive to light.   Cardiovascular:      Rate and Rhythm: Normal rate and regular rhythm.      Pulses:           Femoral pulses are 2+ on the right side and 2+ on the left side.       Popliteal pulses are 2+ on the right side and 2+ on the left side.        Dorsalis pedis pulses are 1+ on the right side and 2+ on the left side.        Posterior tibial pulses are 1+ on the right side and 0 on the left side.   Pulmonary:      Effort: Pulmonary effort is normal.      Breath sounds: Normal breath sounds.   Abdominal:      General: Abdomen is flat. Bowel sounds are normal.      Palpations: Abdomen is soft.   Musculoskeletal:      Cervical back: Normal range of motion and neck supple.   Skin:     General: Skin is warm and dry.   Neurological:      General: No focal deficit present.      Mental Status: He is alert and oriented to person, place, and time.            Result Review    Most notable findings include: CT angiogram shows diffuse calcifications of the bilateral lower extremities.  However, the iliofemoral and femoral-popliteal vessels are patent with no significant stenosis.  Carotid ultrasound shows 50 to 69% stenosis of the internal carotid arteries bilaterally    Assessment/Plan   Assessment / Plan     Brief Patient Summary:  Willard Lange is a 61 y.o. male who has bilateral foot  pain    Diagnoses and all orders for this visit:    1. Bilateral carotid artery stenosis (Primary)  -     Duplex Carotid Ultrasound CAR; Future    2. Foot pain, bilateral    3. PVD (peripheral vascular disease) (HCC)  -     Doppler Arterial Multi Level Lower Extremity - Bilateral CAR; Future         Plan:   I reviewed the CT scan.  There is mild disease bilaterally.  This is certainly not the cause of the pain in his feet.  He has no claudication symptoms or rest pain symptoms.  Pain could be due to microvascular arterial disease in the foot or lumbar spine disease.  No vascular intervention is needed at this time.  Patient also has mild to moderate carotid stenosis bilaterally.  No intervention is needed at this time for this.  We will have him follow-up in 1 year with a carotid ultrasound.    Thank you for allowing me to see your patient.        Electronically signed by Lilian Bell MD, MD, 12/09/21, 1:48 PM EST.

## 2021-12-10 ENCOUNTER — TELEPHONE (OUTPATIENT)
Dept: PHYSICAL THERAPY | Facility: CLINIC | Age: 61
End: 2021-12-10

## 2021-12-13 ENCOUNTER — TREATMENT (OUTPATIENT)
Dept: PHYSICAL THERAPY | Facility: CLINIC | Age: 61
End: 2021-12-13

## 2021-12-13 DIAGNOSIS — M47.817 SPONDYLOSIS OF LUMBOSACRAL REGION WITHOUT MYELOPATHY OR RADICULOPATHY: Primary | ICD-10-CM

## 2021-12-13 DIAGNOSIS — M47.812 CERVICAL SPONDYLOSIS: ICD-10-CM

## 2021-12-13 DIAGNOSIS — M48.061 SPINAL STENOSIS OF LUMBAR REGION, UNSPECIFIED WHETHER NEUROGENIC CLAUDICATION PRESENT: ICD-10-CM

## 2021-12-13 PROCEDURE — 97113 AQUATIC THERAPY/EXERCISES: CPT | Performed by: PHYSICAL THERAPIST

## 2021-12-13 NOTE — PROGRESS NOTES
Physical Therapy Daily Progress Note    Patient: Willard Lange   : 1960  Diagnosis/ICD-10 Code:  Spondylosis of lumbosacral region without myelopathy or radiculopathy [M47.817]  Referring practitioner: Em Parra*  Date of Initial Visit: Type: THERAPY  Noted: 2021  Today's Date: 2021  Patient seen for 15 sessions           Subjective Evaluation    History of Present Illness    Subjective comment: Pt reporting stiffness today. Pain  Current pain rating: 3           Objective   See Exercise, Manual, and Modality Logs for complete treatment.       Assessment & Plan     Assessment    Assessment details: Pt tolerated today's session well. Pt would benefit from continued skilled therapy to address deficits. Progress per plan of care.                 Manual Therapy:    0     mins  57964;  Therapeutic Exercise:    0     mins  40054;     Neuromuscular Felisa:    0    mins  34531;    Therapeutic Activity:     0     mins  08871;     Gait Trainin     mins  80268;     Ultrasound:     0     mins  22598;    Electrical Stimulation:    0     mins  20423 ( );  Dry Needling     0     mins self-pay;  Aquatic Therapy    26     mins  09303;  Mechanical Traction    0     mins  75313  Moist Heat     0     mins  No charge    Timed Treatment:   26   mins   Total Treatment:     26   mins    Miguel Edwards PT  Physical Therapist    Electronically signed 2021    [place] License: PT - [id number]

## 2021-12-20 ENCOUNTER — TREATMENT (OUTPATIENT)
Dept: PHYSICAL THERAPY | Facility: CLINIC | Age: 61
End: 2021-12-20

## 2021-12-20 DIAGNOSIS — M48.061 SPINAL STENOSIS OF LUMBAR REGION, UNSPECIFIED WHETHER NEUROGENIC CLAUDICATION PRESENT: ICD-10-CM

## 2021-12-20 DIAGNOSIS — M47.812 CERVICAL SPONDYLOSIS: ICD-10-CM

## 2021-12-20 DIAGNOSIS — M47.817 SPONDYLOSIS OF LUMBOSACRAL REGION WITHOUT MYELOPATHY OR RADICULOPATHY: Primary | ICD-10-CM

## 2021-12-20 PROCEDURE — 97113 AQUATIC THERAPY/EXERCISES: CPT | Performed by: PHYSICAL THERAPIST

## 2021-12-20 NOTE — PROGRESS NOTES
Physical Therapy Daily Progress Note    Patient: Willard Lange   : 1960  Diagnosis/ICD-10 Code:  Spondylosis of lumbosacral region without myelopathy or radiculopathy [M47.817]  Referring practitioner: Em Parra*  Date of Initial Visit: Type: THERAPY  Noted: 2021  Today's Date: 2021  Patient seen for 16 sessions           Subjective Evaluation    History of Present Illness    Subjective comment: Pt reporting he is very stiff today, just seems to hurt all over. Pain  Current pain ratin           Objective   See Exercise, Manual, and Modality Logs for complete treatment.       Assessment & Plan     Assessment    Assessment details: Pt tolerated today's session well, reporting feeling better as he was moving more in the pool. Pt would benefit from continued skilled therapy to address deficits. Progress per plan of care. Plan for progress note next visit.                 Manual Therapy:    0     mins  78817;  Therapeutic Exercise:    0     mins  93055;     Neuromuscular Felisa:    0    mins  68098;    Therapeutic Activity:     0     mins  77678;     Gait Trainin     mins  35310;     Ultrasound:     0     mins  56346;    Electrical Stimulation:    0     mins  67819 ( );  Dry Needling     0     mins self-pay;  Aquatic Therapy    26     mins  19402;  Mechanical Traction    0     mins  94574  Moist Heat     0     mins  No charge    Timed Treatment:   26   mins   Total Treatment:     26   mins    Miguel Edwards PT  Physical Therapist    Electronically signed 2021    [place] License: PT - [id number]

## 2021-12-22 ENCOUNTER — TREATMENT (OUTPATIENT)
Dept: PHYSICAL THERAPY | Facility: CLINIC | Age: 61
End: 2021-12-22

## 2021-12-22 DIAGNOSIS — M47.812 CERVICAL SPONDYLOSIS: ICD-10-CM

## 2021-12-22 DIAGNOSIS — M47.817 SPONDYLOSIS OF LUMBOSACRAL REGION WITHOUT MYELOPATHY OR RADICULOPATHY: Primary | ICD-10-CM

## 2021-12-22 DIAGNOSIS — M48.061 SPINAL STENOSIS OF LUMBAR REGION, UNSPECIFIED WHETHER NEUROGENIC CLAUDICATION PRESENT: ICD-10-CM

## 2021-12-22 PROCEDURE — 97110 THERAPEUTIC EXERCISES: CPT | Performed by: PHYSICAL THERAPIST

## 2021-12-22 NOTE — PROGRESS NOTES
Progress Assessment        Patient: Willard Lange   : 1960  Diagnosis/ICD-10 Code:  Spondylosis of lumbosacral region without myelopathy or radiculopathy [M47.817]  Referring practitioner: Em Parra*  Date of Initial Visit: Type: THERAPY  Noted: 2021  Today's Date: 2021  Patient seen for 17 sessions      Subjective:     Subjective Questionnaire: Oswestry: 29/45 = 64%  Clinical Progress: improved  Home Program Compliance: Yes  Treatment has included: therapeutic exercise and aquatic therapy    Subjective Evaluation    History of Present Illness  Mechanism of injury: Pt reporting he feels therapy has been very helpful for his stiffness and pain, he feels relief for about two hours post therapy. He is unsure how much long term benefit he has been having. He is planning to contact his physician after finishing his last scheduled therapy appts for a follow up to determine progress vs. Need for further medical evaluation.    Pain  Current pain ratin         Objective   Strength/Myotome Testing     Left Shoulder      Planes of Motion   Flexion: 4   Extension: 4  Abduction: 4    Right Shoulder      Planes of Motion   Flexion: 4+  Extension: 4  Abduction: 4     Left Hip   Planes of Motion   Flexion: 4+  Extension: 4-  Abduction: 4  Adduction: 4     Right Hip   Planes of Motion   Flexion: 4+  Extension: 4  Abduction: 4  Adduction: 4+     Left Knee   Flexion: 4  Extension: 4     Right Knee   Flexion: 4  Extension: 4          Assessment & Plan     Assessment  Impairments: abnormal muscle firing, abnormal or restricted ROM, activity intolerance, impaired physical strength and pain with function  Functional Limitations: carrying objects, lifting, walking, uncomfortable because of pain, sitting and standing  Assessment details: Pt has met his short term goal for hip strength, however, still weaker with functional movements. He has not made much in the way of progress with his cervical problems  at this time. Addressed HEP today and will progress as appropriate. He is expected to continue to progress with aquatic therapy treatments. The patient presents with associated upper and lower extremity weakness, lumbar and cervical stiffness, and functional deficits (OSWESTRY). The patient would benefit from skilled PT intervention to address the above mentioned functional limitations.     Prognosis: good    Goals  Plan Goals: LOW BACK PROBLEMS:    1. The patient complains of low back pain.  LTG 1: 12 weeks:  The patient will report a pain rating of 5/10 or better in order to improve  tolerance to activities of daily living and improve sleep quality.  STATUS:  Not met  STG 1a: 6 weeks:  The patient will report a pain rating of 6/10 or better.  STATUS:  MET  TREATMENT:  Therapeutic exercises, manual therapy, aquatic therapy, home exercise   instruction, and modalities as needed for pain to include:  electrical stimulation, moist heat, ice,   ultrasound, and diathermy.      2. The patient demonstrates weakness of the bilateral hip/knees.  LTG 2: 12 weeks:  The patient will demonstrate 4+ /5 strength for hip flexion, abduction,  and extension, knee flexion/extension in order to improve hip stability.  STATUS:  Not met  STG 2a: 6 weeks:  The patient will demonstrate 4 /5 strength for hip flexion, abduction,  and extension.  STATUS:  MET  TREATMENT: Therapeutic exercises, manual therapy, aquatic therapy, home exercise instruction,  and modalities as needed for pain to include:  electrical stimulation, moist heat, ice, ultrasound, and   diathermy.          3. Mobility: Walking/Moving Around Functional Limitation    LTG 3: 12 weeks:  The patient will demonstrate 20-39 % limitation by achieving a score of 10-19 on the MANUEL.  STATUS:  Not met  STG 3 a: 6 weeks:  The patient will demonstrate 50 % limitation by achieving a score of 25 on the MANUEL.    STATUS:  Not met, progressing  TREATMENT:  Manual therapy, therapeutic  exercise, home exercise instruction, and modalities as needed to include: moist heat, electrical stimulation, and ultrasound.       CERVICAL PROBLEMS    1. The patient has limited ROM.    LTG 1: 12 weeks:  The patient will demonstrate 65° of bilateral cervical spine rotation, in order to adequately monitor blind spots while driving and improve ability to perform activities of daily living.    STATUS:  Not met  STG 1a: 6 weeks:  The patient will demonstrate 55° of bilateral cervical spine rotation in order to adequately monitor blind spots while driving and improve ability to perform activities of daily living.       STATUS:  Not met   TREATMENT:  Manual therapy, therapeutic exercise, home exercise instruction, cervical traction, and modalities as needed to include: moist heat, electrical stimulation, and ultrasound.     2. The patient has complaints of pain.   LTG 2: 12 weeks:  The patient will report 4/10 pain in order to more easily tolerate activities of daily living and improve sleep quality.    STATUS:  Not met   STG 2a: 6 weeks:  The patient will report 5/10 pain.    STATUS:  MET   TREATMENT: Manual therapy, therapeutic exercise, home exercise instruction, cervical traction, and modalities as needed to include: moist heat, electrical stimulation, and ultrasound.          Plan  Therapy options: will be seen for skilled therapy services  Planned modality interventions: TENS, cryotherapy, thermotherapy (hydrocollator packs) and traction  Planned therapy interventions: manual therapy, stretching, strengthening, therapeutic activities, neuromuscular re-education, home exercise program, joint mobilization, functional ROM exercises, soft tissue mobilization, spinal/joint mobilization, flexibility and gait training  Other planned therapy interventions: Aquatic therapy  Frequency: 3x week  Duration in weeks: 12  Treatment plan discussed with: patient      Progress toward previous goals: Partially Met    See Exercise,  Manual, and Modality Logs for complete treatment.         Recommendations: Continue as planned  Timeframe: 1 month  Prognosis to achieve goals: good    PT Signature: Miguel Edwards, PT    Electronically signed 2021    [place] License: PT - [id number]     Based upon review of the patient's progress and continued therapy plan, it is my medical opinion that Willard Lange should continue physical therapy treatment at [facility]  [address]  [place] [address] [phone].      Timed:         Manual Therapy:    0     mins  23692;     Therapeutic Exercise:    25     mins  93862;     Neuromuscular Felisa:    0    mins  01613;    Therapeutic Activity:     0     mins  06795;     Gait Trainin     mins  17352;     Ultrasound:     0     mins  57445;    Ionto                               0    mins   85916  Self Care                       0     mins   80924  Aquatic                          0     mins 59189        Timed Treatment:   25   mins   Total Treatment:     25   mins      I certify that the therapy services are furnished while this patient is under my care.  The services outlined above are required by this patient, and will be reviewed every 90 days.

## 2021-12-29 ENCOUNTER — TREATMENT (OUTPATIENT)
Dept: PHYSICAL THERAPY | Facility: CLINIC | Age: 61
End: 2021-12-29

## 2021-12-29 DIAGNOSIS — M47.817 SPONDYLOSIS OF LUMBOSACRAL REGION WITHOUT MYELOPATHY OR RADICULOPATHY: Primary | ICD-10-CM

## 2021-12-29 DIAGNOSIS — M48.061 SPINAL STENOSIS OF LUMBAR REGION, UNSPECIFIED WHETHER NEUROGENIC CLAUDICATION PRESENT: ICD-10-CM

## 2021-12-29 DIAGNOSIS — M47.812 CERVICAL SPONDYLOSIS: ICD-10-CM

## 2021-12-29 PROCEDURE — 97113 AQUATIC THERAPY/EXERCISES: CPT | Performed by: PHYSICAL THERAPIST

## 2021-12-29 NOTE — PROGRESS NOTES
Physical Therapy Daily Progress Note    Patient: Willard Lange   : 1960  Diagnosis/ICD-10 Code:  Spondylosis of lumbosacral region without myelopathy or radiculopathy [M47.817]  Referring practitioner: Em Parra*  Date of Initial Visit: Type: THERAPY  Noted: 2021  Today's Date: 2021  Patient seen for 18 sessions           Subjective Evaluation    History of Present Illness    Subjective comment: Pt reporting he is still very stiff. Pain  Current pain ratin           Objective   See Exercise, Manual, and Modality Logs for complete treatment.       Assessment & Plan     Assessment    Assessment details: Pt tolerated today's session well. Pt would benefit from continued skilled therapy to address deficits. Progress per plan of care.                 Manual Therapy:    0     mins  01171;  Therapeutic Exercise:    0     mins  51151;     Neuromuscular Felisa:    0    mins  13394;    Therapeutic Activity:     0     mins  19113;     Gait Trainin     mins  14909;     Ultrasound:     0     mins  68362;    Electrical Stimulation:    0     mins  63354 ( );  Dry Needling     0     mins self-pay;  Aquatic Therapy    25     mins  84845;  Mechanical Traction    0     mins  37843  Moist Heat     0     mins  No charge    Timed Treatment:   25   mins   Total Treatment:     25   mins    Miguel Edwards PT  Physical Therapist    Electronically signed 2021    [place] License: PT - [id number]

## 2022-01-04 ENCOUNTER — TREATMENT (OUTPATIENT)
Dept: PHYSICAL THERAPY | Facility: CLINIC | Age: 62
End: 2022-01-04

## 2022-01-04 DIAGNOSIS — M47.812 CERVICAL SPONDYLOSIS: ICD-10-CM

## 2022-01-04 DIAGNOSIS — M47.817 SPONDYLOSIS OF LUMBOSACRAL REGION WITHOUT MYELOPATHY OR RADICULOPATHY: Primary | ICD-10-CM

## 2022-01-04 DIAGNOSIS — M48.061 SPINAL STENOSIS OF LUMBAR REGION, UNSPECIFIED WHETHER NEUROGENIC CLAUDICATION PRESENT: ICD-10-CM

## 2022-01-04 PROCEDURE — 97113 AQUATIC THERAPY/EXERCISES: CPT | Performed by: PHYSICAL THERAPIST

## 2022-01-17 ENCOUNTER — OFFICE VISIT (OUTPATIENT)
Dept: NEUROSURGERY | Facility: CLINIC | Age: 62
End: 2022-01-17

## 2022-01-17 VITALS — BODY MASS INDEX: 35.35 KG/M2 | WEIGHT: 261 LBS | HEIGHT: 72 IN

## 2022-01-17 DIAGNOSIS — M48.062 SPINAL STENOSIS, LUMBAR REGION, WITH NEUROGENIC CLAUDICATION: Primary | ICD-10-CM

## 2022-01-17 DIAGNOSIS — M47.27 OSTEOARTHRITIS OF SPINE WITH RADICULOPATHY, LUMBOSACRAL REGION: ICD-10-CM

## 2022-01-17 DIAGNOSIS — Z98.890 HISTORY OF LUMBAR LAMINECTOMY: ICD-10-CM

## 2022-01-17 PROCEDURE — 99214 OFFICE O/P EST MOD 30 MIN: CPT | Performed by: NURSE PRACTITIONER

## 2022-01-17 NOTE — PROGRESS NOTES
Chief Complaint  Neck Pain    Subjective          Willard Lange who is a 61 y.o. year old male who presents to [facility]ERY for follow up of his low back pain.     Pt is concerned regarding worsening low back pain with leg pain and weakness. At his last visit in September he was referred to physical therapy. He has been participating in aqua therapy which provides him temporary relief. Unfortunately his pain and weakness returns. Pain is primarily in the low back, with radiating pain in an L5 distribution bilaterally worse on the left. With prolonged standing and walking he will have weakness in the legs. Forward flexion at the waist improves his symptoms. He is followed by pain management, taking Norco 7.5 mg which provides modest benefit. He has received LESB in the past. Failed lumbar MBB.    Prior lumbar spine surgery with Dr. Ford around 6 years ago.     He also has a diabetic peripheral polyneuropathy. Has concerns of numbness and tingling in the feet and ankles, radiating up into the calves. He reports his feet feel heavy, like cement. He will have muscle cramping which wakes him up at night. He is taking Lyrica 200 mg TID which provides modest benefit.       Interval History 9/21/21  Willard Lange who is a 61 y.o. year old male who presents to [facility]UR[facility]ERY for follow up of his neck and low back pain.     Pt last seen in our office in December of 2020 by GHADA Steele. He was referred to neurology for abnormal MRI Brain and frequent falls. Dr. Cook saw him in January of this year and felt that small meningioma of the brain was a benign finding. He diagnosed patient with a diabetic peripheral polyneuropathy, which he attributed to his imbalance and frequent falls.      He is followed by pain management, primarily for his low back pain. He has received several injections, including TFESI and LESB. These provide  "modest benefit. Has also had MBB without significant benefit.      Pt with chronic neck pain as well denies any radiating pain into the arms. MRI Cervical spine showed degenerative changes without high grade canal or foraminal stenosis. He has not had any interventions for his neck.    Recent Interventions: See above      Review of Systems   Musculoskeletal: Positive for arthralgias, back pain and gait problem.   Neurological: Positive for weakness and numbness.   All other systems reviewed and are negative.       Objective   Vital Signs:   Ht 182.9 cm (72\")   Wt 118 kg (261 lb)   BMI 35.40 kg/m²       Physical Exam  Vitals reviewed.   Constitutional:       Appearance: Normal appearance.   Musculoskeletal:      Lumbar back: Tenderness present. Positive left straight leg raise test. Negative right straight leg raise test.   Neurological:      Mental Status: He is alert and oriented to person, place, and time.      Deep Tendon Reflexes:      Reflex Scores:       Patellar reflexes are 2+ on the right side and 0 on the left side.       Achilles reflexes are 0 on the right side and 0 on the left side.       Neurologic Exam     Mental Status   Oriented to person, place, and time.   Level of consciousness: alert    Motor Exam   Muscle bulk: normal  Overall muscle tone: normal    Strength   Strength 5/5 except as noted.   Right peroneal: 4/5  Left peroneal: 4/5  Right gastroc: 4/5  Left gastroc: 4/5    Sensory Exam   Right leg light touch: decreased from knee  Left leg light touch: decreased from knee    Gait, Coordination, and Reflexes     Gait  Gait: wide-based    Reflexes   Right patellar: 2+  Left patellar: 0  Right achilles: 0  Left achilles: 0       Result Review :       Data reviewed: Radiologic studies MRI Lumbar Spine October 2020 wo contrast revealing multilevel degenerative changes with severe canal stenosis at L4/5.           Assessment and Plan    Diagnoses and all orders for this visit:    1. Spinal " stenosis, lumbar region, with neurogenic claudication (Primary)  -     Cancel: MRI Lumbar Spine With & Without Contrast; Future  -     MRI Lumbar Spine With & Without Contrast; Future    2. Osteoarthritis of spine with radiculopathy, lumbosacral region  -     Cancel: MRI Lumbar Spine With & Without Contrast; Future  -     MRI Lumbar Spine With & Without Contrast; Future    3. History of lumbar laminectomy  -     Cancel: MRI Lumbar Spine With & Without Contrast; Future  -     MRI Lumbar Spine With & Without Contrast; Future    Pt with chronic low back pain and prior lumbar spine surgery, presenting with concerns of worsening pain into the legs and weakness consistent with neurogenic claudication. He has failed conservative therapy, most recently physical therapy as well as pain management for medication and interventional management. Will order MRI Lumbar Spine w/wo to evaluate for surgical consideration. He will follow up in 6 weeks to review imaging.       Follow Up   Return in about 6 weeks (around 2/28/2022).  Patient was given instructions and counseling regarding his condition or for health maintenance advice.     -MRI Lumbar Spine w/wo  -Follow up in 6 weeks on Tuesday/Thursday

## 2022-02-08 ENCOUNTER — OFFICE VISIT (OUTPATIENT)
Dept: PODIATRY | Facility: CLINIC | Age: 62
End: 2022-02-08

## 2022-02-08 VITALS
BODY MASS INDEX: 35.51 KG/M2 | DIASTOLIC BLOOD PRESSURE: 74 MMHG | TEMPERATURE: 96.9 F | WEIGHT: 262.2 LBS | HEIGHT: 72 IN | HEART RATE: 82 BPM | SYSTOLIC BLOOD PRESSURE: 153 MMHG | OXYGEN SATURATION: 96 %

## 2022-02-08 DIAGNOSIS — L60.0 ONYCHOCRYPTOSIS: ICD-10-CM

## 2022-02-08 DIAGNOSIS — G62.9 NEUROPATHY: ICD-10-CM

## 2022-02-08 DIAGNOSIS — B35.1 ONYCHOMYCOSIS: ICD-10-CM

## 2022-02-08 DIAGNOSIS — E11.9 NON-INSULIN DEPENDENT TYPE 2 DIABETES MELLITUS: ICD-10-CM

## 2022-02-08 DIAGNOSIS — E11.8 DIABETIC FOOT: ICD-10-CM

## 2022-02-08 DIAGNOSIS — I73.9 PVD (PERIPHERAL VASCULAR DISEASE): ICD-10-CM

## 2022-02-08 DIAGNOSIS — M79.672 FOOT PAIN, BILATERAL: Primary | ICD-10-CM

## 2022-02-08 DIAGNOSIS — M20.41 HAMMER TOES OF BOTH FEET: ICD-10-CM

## 2022-02-08 DIAGNOSIS — M79.671 FOOT PAIN, BILATERAL: Primary | ICD-10-CM

## 2022-02-08 DIAGNOSIS — M20.42 HAMMER TOES OF BOTH FEET: ICD-10-CM

## 2022-02-08 PROCEDURE — 11721 DEBRIDE NAIL 6 OR MORE: CPT | Performed by: PODIATRIST

## 2022-02-08 PROCEDURE — G8404 LOW EXTEMITY NEUR EXAM DOCUM: HCPCS | Performed by: PODIATRIST

## 2022-02-08 NOTE — PROGRESS NOTES
[facility] - PODIATRY    Today's Date: 02/08/22    Patient Name: Willard Lange  MRN: [id number]  CSN: [id number]  PCP: Elizabeth Vlale APRN, Last PCP Visit: 29 December 2021  Referring Provider: No ref. provider found    SUBJECTIVE     Chief Complaint   Patient presents with   • Left Foot - Nail Problem   • Right Foot - Nail Problem     HPI: Willard Lange, a 61 y.o.male, comes to clinic.    New, Established, New Problem:  established     Location:  Toenails    Duration:   Greater than five years    Onset:  Gradual    Nature:  sore with palpation.    Stable, worsening, improving:   Stable    Aggravating factors:  Pain with shoe gear and ambulation.    Previous Treatment:  debridement    Patient reported last blood glucose: 130  __________________________________    Patient reports the following medical changes since their last visit: Flu vaccination, Covid booster    No other pedal complaints at this time.    Patient denies any fevers, chills, nausea, vomiting, shortness of breathe, nor any other constitutional signs nor symptoms.         Past Medical History:   Diagnosis Date   • Arthritis    • Asthma    • Benign essential hypertension    • Brain tumor (HCC) 01/2021   • Cancer (HCC)    • DDD (degenerative disc disease), lumbar    • Diabetes mellitus type 2, noninsulin dependent (HCC)    • Diabetes type 2, controlled (HCC)    • GERD (gastroesophageal reflux disease)    • Hepatitis C    • HTN (hypertension)    • Leg pain    • Lumbago 02/15/2017    with low back pain   • Lumbar disc herniation 02/15/2017    L4-4   • Lumbar spinal stenosis 02/15/2017    L4/5   • Muscle cramps    • Neuropathy    • Prostate cancer (HCC)      Past Surgical History:   Procedure Laterality Date   • APPENDECTOMY     • BACK SURGERY  2017   • COLONOSCOPY  2017    Dr. Dorsey-Polyps   • CYSTOSCOPY W/ LASER LITHOTRIPSY      for kidney stones   • LAMINECTOMY  02/17/2017    L4-5   • PROSTATE BIOPSY      transrectal   •  TONSILLECTOMY       Family History   Problem Relation Age of Onset   • Heart disease Father    • Lung cancer Sister    • Diabetes type I Maternal Grandmother      Social History     Socioeconomic History   • Marital status:    Tobacco Use   • Smoking status: Former Smoker     Start date: 1976     Quit date:      Years since quittin.1   • Smokeless tobacco: Never Used   Vaping Use   • Vaping Use: Never used   Substance and Sexual Activity   • Alcohol use: Yes     Comment: rarely   • Drug use: Never   • Sexual activity: Defer     Allergies   Allergen Reactions   • Formaldehyde Rash     Current Outpatient Medications   Medication Sig Dispense Refill   • Advair Diskus 250-50 MCG/DOSE DISKUS Inhale 1 puff 2 (Two) Times a Day.     • amitriptyline (ELAVIL) 25 MG tablet Take 25 mg by mouth every night at bedtime.     • atorvastatin (LIPITOR) 20 MG tablet Take 20 mg by mouth every night at bedtime.     • baclofen (LIORESAL) 10 MG tablet TAKE 1 TABLET BY MOUTH THREE TIMES DAILY AS NEEDED FOR MUSCLE CRAMPS     • busPIRone (BUSPAR) 7.5 MG tablet Take 7.5 mg by mouth 2 (Two) Times a Day.     • dicyclomine (BENTYL) 20 MG tablet TAKE 1 TABLET BY MOUTH 4 TIMES DAILY AS NEEDED FOR GI UPSET     • FREESTYLE LITE test strip      • glipizide (GLUCOTROL XL) 5 MG ER tablet Take 5 mg by mouth Daily.     • glucose blood test strip FreeStyle Lite Strips   USE TO TEST BLOOD SUGAR FOUR TIMES DAILY AND AS NEEDED     • HYDROcodone-acetaminophen (NORCO) 7.5-325 MG per tablet Take 1 tablet by mouth Every 8 (Eight) Hours As Needed.     • indomethacin (INDOCIN) 50 MG capsule Take 50 mg by mouth 2 (Two) Times a Day With Meals. As needed     • Lancets (freestyle) lancets      • loratadine (CLARITIN) 10 MG tablet loratadine 10 mg oral tablet take 1 tablet (10 mg) by oral route once daily   Suspended     • losartan-hydrochlorothiazide (HYZAAR) 100-25 MG per tablet losartan-hydrochlorothiazide 100-25 mg oral tablet take 1 tablet by  oral route once daily   Active     • omeprazole (priLOSEC) 40 MG capsule Take 40 mg by mouth Daily.     • pregabalin (LYRICA) 200 MG capsule TAKE 1 CAPSULE BY MOUTH THREE TIMES DAILY AS DIRECTED     • Steglatro 5 MG tablet Take 5 mg by mouth Every Morning.     • tamsulosin (FLOMAX) 0.4 MG capsule 24 hr capsule Take 1 capsule by mouth Daily.     • Trulicity 1.5 MG/0.5ML solution pen-injector Inject 1.5 mg under the skin into the appropriate area as directed Every 7 (Seven) Days.     • vitamin B-12 (CYANOCOBALAMIN) 1000 MCG tablet Take 1,000 mcg by mouth Daily.     • vitamin D (ERGOCALCIFEROL) 1.25 MG (54692 UT) capsule capsule Take 50,000 Units by mouth 1 (One) Time Per Week.       No current facility-administered medications for this visit.     Review of Systems   Constitutional: Negative.    Skin:        Painful toenails   Neurological: Positive for numbness.   All other systems reviewed and are negative.      OBJECTIVE     Vitals:    02/08/22 1318   BP: 153/74   Pulse: 82   Temp: 96.9 °F (36.1 °C)   SpO2: 96%       WBC   Date Value Ref Range Status   03/26/2020 6.92 4.80 - 10.80 10*3/uL Final     RBC   Date Value Ref Range Status   03/26/2020 5.09 4.70 - 6.10 10*6/uL Final     Hemoglobin   Date Value Ref Range Status   03/26/2020 14.5 14.0 - 18.0 g/dL Final     Hematocrit   Date Value Ref Range Status   03/26/2020 44.1 42.0 - 52.0 % Final     MCV   Date Value Ref Range Status   03/26/2020 86.6 80.0 - 96.0 fL Final     MCH   Date Value Ref Range Status   03/26/2020 28.5 27.0 - 31.0 pg Final     MCHC   Date Value Ref Range Status   03/26/2020 32.9 (L) 33.0 - 37.0 Final     RDW   Date Value Ref Range Status   03/26/2020 13.2 11.6 - 14.4 % Final     RDW-SD   Date Value Ref Range Status   03/26/2020 40.8 35.1 - 43.9 fL Final     MPV   Date Value Ref Range Status   03/26/2020 9.6 9.4 - 12.4 fL Final     Platelets   Date Value Ref Range Status   03/26/2020 273 130 - 400 10*3/uL Final     Neutrophil Rel %   Date Value Ref  Range Status   03/26/2020 73.3 30.0 - 85.0 % Final     Lymphocyte Rel %   Date Value Ref Range Status   03/26/2020 14.7 (L) 20.0 - 45.0 % Final     Monocyte Rel %   Date Value Ref Range Status   03/26/2020 9.2 3.0 - 10.0 % Final     Eosinophil Rel %   Date Value Ref Range Status   03/26/2020 1.7 0.0 - 7.0 % Final     Basophil Rel %   Date Value Ref Range Status   03/26/2020 0.4 0.0 - 3.0 % Final     Neutrophils Absolute   Date Value Ref Range Status   03/26/2020 5.06 2.00 - 8.00 10*3/uL Final     Lymphocytes Absolute   Date Value Ref Range Status   03/26/2020 1.02 1.00 - 5.00 10*3/uL Final     Monocytes Absolute   Date Value Ref Range Status   03/26/2020 0.64 0.20 - 1.20 10*3/uL Final     Eosinophils Absolute   Date Value Ref Range Status   03/26/2020 0.12 0.00 - 0.70 10*3/uL Final     Basophils Absolute   Date Value Ref Range Status   03/26/2020 0.03 0.00 - 0.20 10*3/uL Final     NRBC   Date Value Ref Range Status   03/26/2020 0.00 0.00 - 0.70 % Final         Lab Results   Component Value Date    GLUCOSE 212 (H) 03/26/2020    CALCIUM 9.6 03/26/2020     03/26/2020    K 4.6 03/26/2020    CO2 26 03/26/2020     03/26/2020    BUN 12 03/26/2020    CREATININE 0.90 07/23/2021    BCR 9 03/26/2020    ANIONGAP 16 03/26/2020       Patient seen in no apparent distress.      PHYSICAL EXAM:     Foot/Ankle Exam:       General:   Appearance: obesity    Orientation: AAOx3    Affect: appropriate    Gait: unimpaired    Shoe Gear:  Casual shoes    VASCULAR      Right Foot Vascularity   Normal vascular exam    Dorsalis pedis:  Absent  Posterior tibial:  Absent  Skin Temperature: warm    Edema Grading:  None  CFT:  4  Pedal Hair Growth:  Absent  Varicosities: none       Left Foot Vascularity   Normal vascular exam    Dorsalis pedis:  Absent  Posterior tibial:  Absent  Skin Temperature: warm    Edema Grading:  None  CFT:  4  Pedal Hair Growth:  Absent  Varicosities: none        NEUROLOGIC     Right Foot Neurologic   Light  touch sensation:  Absent  Vibratory sensation:  Absent  Hot/Cold sensation: absent    Protective Sensation using [place] Monofilament:  0     Left Foot Neurologic   Light touch sensation:  Absent  Vibratory sensation:  Absent  Hot/cold sensation: absent    Protective Sensation using [place] Monofilament:  0     MUSCULOSKELETAL      Right Foot Musculoskeletal   Hammertoe:  Second toe, third toe, fourth toe and fifth toe     Left Foot Musculoskeletal   Hammertoe:  Second toe, third toe, fourth toe and fifth toe     MUSCLE STRENGTH     Right Foot Muscle Strength   Normal strength    Foot dorsiflexion:  5  Foot plantar flexion:  5  Foot inversion:  5  Foot eversion:  5     Left Foot Muscle Strength   Normal strength    Foot dorsiflexion:  5  Foot plantar flexion:  5  Foot inversion:  5  Foot eversion:  5     RANGE OF MOTION      Right Foot Range of Motion   Foot and ankle ROM within normal limits       Left Foot Range of Motion   Foot and ankle ROM within normal limits       DERMATOLOGIC     Right Foot Dermatologic   Skin: skin intact    Nails: onychomycosis, abnormally thick, subungual debris and dystrophic nails    Nails comment:  Toenails 1, 2, 3, 4, and 5     Left Foot Dermatologic   Skin: skin intact    Nails: onychomycosis, abnormally thick, subungual debris, dystrophic nails and ingrown toenail    Nails comment:  Toenails 1, 2, 3, 4, and 5      Diabetic Foot Exam Performed    ASSESSMENT/PLAN     Diagnoses and all orders for this visit:    1. Foot pain, bilateral (Primary)    2. Onychomycosis    3. Onychocryptosis    4. Diabetic foot (HCC)    5. Non-insulin dependent type 2 diabetes mellitus (HCC)    6. Neuropathy    7. Hammer toes of both feet    8. PVD (peripheral vascular disease) ([facility])        Comprehensive lower extremity examination and evaluation was performed.    Discussed findings and treatment plan including risks, benefits, and treatment options with patient in detail. Patient agreed  with treatment plan.    Toenails 1 through 5 bilaterally were debrided in thickness and length and then smoothed with a Dremel Tool.  Tolerated the procedure well without complications.    Diabetic foot exam performed and documented this date, compliant with CQM required standards. Detail of findings as noted in physical exam.  Lower extremity Neurologic exam for diabetic patient performed and documented this date, compliant with PQRS required standards. Detail of findings as noted in physical exam.  Advised patient importance of good routine lower extremity hygiene. Advised patient importance of evaluating for intact skin and pain free nail borders.  Advised patient to use mirror to evaluate plantar/ soles of feet for better visualization. Advised patient monitor and phone office to be seen if any cracking to skin, open lesions, painful nail borders or if nails become elongated prior to next visit. Advised patient importance of daily cleansing of lower extremities, followed by good skin cream to maintain normal hydration of skin. Also advised patient importance of close daily monitoring of blood sugar. Advised to regulate diet and medications to maintain control of blood sugar in optimal range. Contact primary care provider if difficulties maintaining blood sugar levels.  Advised Patient of presence of Diabetes Mellitus condition.  Advised Patient risk of progression and worsening or improvement, then return of condition.  Will monitor condition for any change in future. Treat with most appropriate treatment pending status of condition.  Counseled and advised patient extensively on nature and ramifications of diabetes. Standard instructions given to patient for good diabetic foot care and maintenance. Advised importance of careful monitoring to avoid break down and complications secondary to diabetes. Advised patient importance of strict maintenance of blood sugar control. Advised patient of possible ominous  results from neglect of condition, i.e.: amputation/ loss of digits, feet and legs, or even death.  Patient states understands counseling, will monitor closely, continue good hygiene and routine diabetic foot care. Patient will contact office is questions or problems.      An After Visit Summary was printed and given to the patient at discharge, including (if requested) any available informative/educational handouts regarding diagnosis, treatment, or medications. All questions were answered to patient/family satisfaction. Should symptoms fail to improve or worsen they agree to call or return to clinic or to go to the Emergency Department. Discussed the importance of following up with any needed screening tests/labs/specialist appointments and any requested follow-up recommended by me today. Importance of maintaining follow-up discussed and patient accepts that missed appointments can delay diagnosis and potentially lead to worsening of conditions.    Return in about 9 weeks (around 4/12/2022) for Toenail Care., or sooner if acute issues arise.    This document has been electronically signed by Sarkis Ureña DPM on February 8, 2022 13:37 EST

## 2022-02-15 ENCOUNTER — OFFICE VISIT (OUTPATIENT)
Dept: RADIATION ONCOLOGY | Facility: HOSPITAL | Age: 62
End: 2022-02-15

## 2022-02-15 VITALS
TEMPERATURE: 97.8 F | RESPIRATION RATE: 16 BRPM | OXYGEN SATURATION: 96 % | WEIGHT: 262.13 LBS | HEART RATE: 76 BPM | BODY MASS INDEX: 35.55 KG/M2 | DIASTOLIC BLOOD PRESSURE: 84 MMHG | SYSTOLIC BLOOD PRESSURE: 152 MMHG

## 2022-02-15 DIAGNOSIS — C61 PROSTATE CANCER: Primary | ICD-10-CM

## 2022-02-15 PROBLEM — J84.10 PULMONARY FIBROSIS: Status: ACTIVE | Noted: 2022-02-15

## 2022-02-15 PROBLEM — I73.9 CLAUDICATION: Status: ACTIVE | Noted: 2022-02-15

## 2022-02-15 PROBLEM — Z85.46 HISTORY OF MALIGNANT NEOPLASM OF PROSTATE: Status: ACTIVE | Noted: 2022-02-15

## 2022-02-15 PROBLEM — Z79.899 OTHER LONG TERM (CURRENT) DRUG THERAPY: Status: ACTIVE | Noted: 2022-02-15

## 2022-02-15 PROBLEM — I25.10 ATHEROSCLEROTIC HEART DISEASE OF NATIVE CORONARY ARTERY WITHOUT ANGINA PECTORIS: Status: ACTIVE | Noted: 2022-02-15

## 2022-02-15 PROBLEM — C41.3: Status: ACTIVE | Noted: 2022-02-15

## 2022-02-15 PROBLEM — Z87.891 EX-SMOKER: Status: ACTIVE | Noted: 2022-02-15

## 2022-02-15 PROBLEM — J30.1 ALLERGIC RHINITIS DUE TO POLLEN: Status: ACTIVE | Noted: 2022-02-15

## 2022-02-15 PROBLEM — G62.9 POLYNEUROPATHY: Status: ACTIVE | Noted: 2022-02-15

## 2022-02-15 PROBLEM — F32.A DEPRESSION: Status: ACTIVE | Noted: 2022-02-15

## 2022-02-15 PROBLEM — R60.9 EDEMA: Status: ACTIVE | Noted: 2022-02-15

## 2022-02-15 PROBLEM — M10.9 GOUT: Status: ACTIVE | Noted: 2022-02-15

## 2022-02-15 PROBLEM — IMO0002 HERNIATION OF INTERVERTEBRAL DISC: Status: ACTIVE | Noted: 2017-09-25

## 2022-02-15 PROBLEM — R97.20 ELEVATED PSA: Status: ACTIVE | Noted: 2022-02-15

## 2022-02-15 PROBLEM — IMO0002 DEGENERATION OF INTERVERTEBRAL DISC: Status: ACTIVE | Noted: 2022-02-15

## 2022-02-15 PROBLEM — J45.30 MILD PERSISTENT ASTHMA WITHOUT COMPLICATION: Status: ACTIVE | Noted: 2022-02-15

## 2022-02-15 PROBLEM — E53.8 VITAMIN B12 DEFICIENCY (NON ANEMIC): Status: ACTIVE | Noted: 2022-02-15

## 2022-02-15 PROBLEM — N52.9 MALE ERECTILE DISORDER: Status: ACTIVE | Noted: 2022-02-15

## 2022-02-15 PROBLEM — E11.65 HYPERGLYCEMIA DUE TO TYPE 2 DIABETES MELLITUS: Status: ACTIVE | Noted: 2022-02-15

## 2022-02-15 PROBLEM — E78.5 HYPERLIPIDEMIA: Status: ACTIVE | Noted: 2022-02-15

## 2022-02-15 PROBLEM — E55.9 VITAMIN D DEFICIENCY: Status: ACTIVE | Noted: 2022-02-15

## 2022-02-15 PROBLEM — F45.42 PAIN DISORDER ASSOCIATED WITH PSYCHOLOGICAL FACTORS: Status: ACTIVE | Noted: 2022-02-15

## 2022-02-15 LAB — PSA SERPL-MCNC: 0.09 NG/ML (ref 0–4)

## 2022-02-15 PROCEDURE — G0463 HOSPITAL OUTPT CLINIC VISIT: HCPCS | Performed by: RADIOLOGY

## 2022-02-15 PROCEDURE — 36415 COLL VENOUS BLD VENIPUNCTURE: CPT | Performed by: RADIOLOGY

## 2022-02-15 PROCEDURE — 99213 OFFICE O/P EST LOW 20 MIN: CPT | Performed by: RADIOLOGY

## 2022-02-15 PROCEDURE — 84153 ASSAY OF PSA TOTAL: CPT | Performed by: NURSE PRACTITIONER

## 2022-02-15 NOTE — PROGRESS NOTES
Follow Up Office Visit      Encounter Date: 02/15/2022   Patient Name: Willard Lange  YOB: 1960   Medical Record Number: [id number]   Primary Diagnosis: Prostate cancer (HCC) [C61]       Completion Date:  1/22/20 - 2/28/20 28 fractions of 2.5Gy 7000 cGy    Chief Complaint:    Chief Complaint   Patient presents with   • Follow-up   • Prostate Cancer       History of Present Illness: Willard Lange returns for routine scheduled follow-up.  He reports feeling well overall but does have some progressive urgency and occasional small-volume incontinence.  He denies dysuria, hematuria or significant nocturia.  He has nocturia x1.  He does have some incomplete emptying.  He does take Flomax daily.    Subjective      Review of Systems: Review of Systems   Constitutional: Positive for fatigue.   Respiratory: Negative for cough and shortness of breath.    Gastrointestinal: Positive for anal bleeding, blood in stool, constipation (intermittent) and diarrhea (intermittent). Negative for nausea.   Genitourinary: Positive for difficulty urinating (weak-intermittent stream), frequency and urgency. Negative for dysuria.        Intermittent incontinence  noc x 1-2   Neurological: Negative for dizziness and headaches.   Psychiatric/Behavioral: Positive for sleep disturbance (insomnia).       The following portions of the patient's history were reviewed and updated as appropriate: allergies, current medications, past family history, past medical history, past social history, past surgical history and problem list.    Medications:     Current Outpatient Medications:   •  Advair Diskus 250-50 MCG/DOSE DISKUS, Inhale 1 puff 2 (Two) Times a Day., Disp: , Rfl:   •  atorvastatin (LIPITOR) 20 MG tablet, Take 20 mg by mouth every night at bedtime., Disp: , Rfl:   •  baclofen (LIORESAL) 10 MG tablet, TAKE 1 TABLET BY MOUTH THREE TIMES DAILY AS NEEDED FOR MUSCLE CRAMPS, Disp: , Rfl:   •  busPIRone (BUSPAR) 7.5 MG tablet, Take  7.5 mg by mouth 2 (Two) Times a Day., Disp: , Rfl:   •  dicyclomine (BENTYL) 20 MG tablet, TAKE 1 TABLET BY MOUTH 4 TIMES DAILY AS NEEDED FOR GI UPSET, Disp: , Rfl:   •  FREESTYLE LITE test strip, , Disp: , Rfl:   •  glipizide (GLUCOTROL XL) 5 MG ER tablet, Take 5 mg by mouth Daily., Disp: , Rfl:   •  glucose blood test strip, FreeStyle Lite Strips  USE TO TEST BLOOD SUGAR FOUR TIMES DAILY AND AS NEEDED, Disp: , Rfl:   •  HYDROcodone-acetaminophen (NORCO) 7.5-325 MG per tablet, Take 1 tablet by mouth Every 8 (Eight) Hours As Needed., Disp: , Rfl:   •  indomethacin (INDOCIN) 50 MG capsule, Take 50 mg by mouth 2 (Two) Times a Day With Meals. As needed, Disp: , Rfl:   •  Lancets (freestyle) lancets, , Disp: , Rfl:   •  loratadine (CLARITIN) 10 MG tablet, loratadine 10 mg oral tablet take 1 tablet (10 mg) by oral route once daily   Suspended, Disp: , Rfl:   •  losartan-hydrochlorothiazide (HYZAAR) 100-25 MG per tablet, losartan-hydrochlorothiazide 100-25 mg oral tablet take 1 tablet by oral route once daily   Active, Disp: , Rfl:   •  omeprazole (priLOSEC) 40 MG capsule, Take 40 mg by mouth Daily., Disp: , Rfl:   •  pregabalin (LYRICA) 200 MG capsule, TAKE 1 CAPSULE BY MOUTH THREE TIMES DAILY AS DIRECTED, Disp: , Rfl:   •  tamsulosin (FLOMAX) 0.4 MG capsule 24 hr capsule, Take 1 capsule by mouth Daily., Disp: , Rfl:   •  Trulicity 1.5 MG/0.5ML solution pen-injector, Inject 1.5 mg under the skin into the appropriate area as directed Every 7 (Seven) Days., Disp: , Rfl:   •  vitamin B-12 (CYANOCOBALAMIN) 1000 MCG tablet, Take 1,000 mcg by mouth Daily., Disp: , Rfl:   •  vitamin D (ERGOCALCIFEROL) 1.25 MG (04327 UT) capsule capsule, Take 50,000 Units by mouth 1 (One) Time Per Week., Disp: , Rfl:   •  amitriptyline (ELAVIL) 25 MG tablet, Take 25 mg by mouth every night at bedtime., Disp: , Rfl:   •  Steglatro 5 MG tablet, Take 5 mg by mouth Every Morning., Disp: , Rfl:     Allergies:   Allergies   Allergen Reactions   •  Formaldehyde Rash       ECOG: (0) Fully active, able to carry on all predisease performance without restriction  Quality of Life: 100 - Full Activity     Objective     Physical Exam:   Vital Signs:   Vitals:    02/15/22 1309   BP: 152/84   Pulse: 76   Resp: 16   Temp: 97.8 °F (36.6 °C)   TempSrc: Temporal   SpO2: 96%   Weight: 119 kg (262 lb 2 oz)   PainSc: 10-Worst pain ever     Body mass index is 35.55 kg/m².     Physical Exam  Constitutional:       General: He is not in acute distress.     Appearance: Normal appearance. He is not ill-appearing, toxic-appearing or diaphoretic.   HENT:      Head: Normocephalic and atraumatic.   Pulmonary:      Effort: Pulmonary effort is normal. No respiratory distress.   Abdominal:      General: There is no distension.      Palpations: Abdomen is soft.   Skin:     General: Skin is warm and dry.   Neurological:      General: No focal deficit present.      Mental Status: He is alert and oriented to person, place, and time.      Cranial Nerves: No cranial nerve deficit.      Gait: Gait normal.   Psychiatric:         Mood and Affect: Mood normal.         Behavior: Behavior normal.         Judgment: Judgment normal.           Labs:   PSA on 8/25/2021 was 0.178 ng/mL  Assessment / Plan          Assessment/Plan:   Willard Lange is a pleasant 61 y.o. male with cT1c prostate cancer, [place] 3+3=6, ipsa 5.64.  Status post external beam radiotherapy completing 70 Gy in 28 daily fractions of 2.5 Gray on 2/28/2020.  He is doing well overall but does have some chronic lower urinary tract outlet obstruction symptoms.  AUA-SS at time of consultation was 27.  AUA-SS today is 23.     I have ordered a repeat PSA for today and I will call Mr. Lange with the results.  I recommended increasing tamsulosin to twice daily for the purpose of improving his urinary symptoms.  He will return for follow-up in 6 months.        Willard Buchanan MD  Radiation Oncology  [facility]    This document has been  signed by Willard Buchanan MD on February 15, 2022 13:49 EST

## 2022-02-25 ENCOUNTER — APPOINTMENT (OUTPATIENT)
Dept: RADIATION ONCOLOGY | Facility: HOSPITAL | Age: 62
End: 2022-02-25

## 2022-03-01 ENCOUNTER — OFFICE VISIT (OUTPATIENT)
Dept: NEUROSURGERY | Facility: CLINIC | Age: 62
End: 2022-03-01

## 2022-03-01 VITALS — HEIGHT: 72 IN | WEIGHT: 257 LBS | BODY MASS INDEX: 34.81 KG/M2

## 2022-03-01 DIAGNOSIS — M47.27 OSTEOARTHRITIS OF SPINE WITH RADICULOPATHY, LUMBOSACRAL REGION: Primary | ICD-10-CM

## 2022-03-01 DIAGNOSIS — Z98.890 HISTORY OF LUMBAR LAMINECTOMY: ICD-10-CM

## 2022-03-01 PROCEDURE — 99214 OFFICE O/P EST MOD 30 MIN: CPT | Performed by: NURSE PRACTITIONER

## 2022-03-01 NOTE — PROGRESS NOTES
Chief Complaint  Back Pain    Subjective          Willard Lange who is a 61 y.o. year old male who presents to [facility] for follow up of low back and leg pain with weakness. MRI Lumbar Spine at [facility].     MRI Lumbar Spine at [facility] revealing multilevel spondylosis, most significant at L4/5 with severe loss of disc height and face arthropathy resulting in severe lateral recess and foraminal narrowing.     Pt's pain is primarily in the low back, with aching pain into the thighs. Has concerns of weakness in the legs with prolonged standing and walking. Has numbness and paresthesias distal to the knees bilaterally, with muscle cramping in the calves. He has a diabetic peripheral polyneuropathy.       Interval History 1/17/22  Willard Lange who is a 61 y.o. year old male who presents to [facility]UR[facility]ERY for follow up of his low back pain.      Pt is concerned regarding worsening low back pain with leg pain and weakness. At his last visit in September he was referred to physical therapy. He has been participating in aqua therapy which provides him temporary relief. Unfortunately his pain and weakness returns. Pain is primarily in the low back, with radiating pain in an L5 distribution bilaterally worse on the left. With prolonged standing and walking he will have weakness in the legs. Forward flexion at the waist improves his symptoms. He is followed by pain management, taking Norco 7.5 mg which provides modest benefit. He has received LESB in the past. Failed lumbar MBB.     Prior lumbar spine surgery with Dr. Ford around 6 years ago.      He also has a diabetic peripheral polyneuropathy. Has concerns of numbness and tingling in the feet and ankles, radiating up into the calves. He reports his feet feel heavy, like cement. He will have muscle cramping which wakes him up at night. He is taking Lyrica 200 mg TID  "which provides modest benefit.        Recent Interventions: Lyrica, LESB, failed lumbar MBB, prior MIL L4/5 with Dr. Ford      Review of Systems   Musculoskeletal: Positive for arthralgias, back pain and gait problem.   Neurological: Positive for weakness and numbness.   All other systems reviewed and are negative.       Objective   Vital Signs:   Ht 182.9 cm (72\")   Wt 117 kg (257 lb)   BMI 34.86 kg/m²       Physical Exam  Vitals reviewed.   Constitutional:       Appearance: Normal appearance.   Musculoskeletal:      Lumbar back: Tenderness present. Positive left straight leg raise test. Negative right straight leg raise test.   Neurological:      Mental Status: He is alert and oriented to person, place, and time.      Deep Tendon Reflexes:      Reflex Scores:       Patellar reflexes are 2+ on the right side and 0 on the left side.       Achilles reflexes are 0 on the right side and 0 on the left side.       Neurologic Exam     Mental Status   Oriented to person, place, and time.   Level of consciousness: alert    Motor Exam   Muscle bulk: normal  Overall muscle tone: normal    Strength   Strength 5/5 except as noted.   Right peroneal: 4/5  Left peroneal: 4/5  Right gastroc: 4/5  Left gastroc: 4/5    Sensory Exam   Right leg light touch: decreased from knee  Left leg light touch: decreased from knee    Gait, Coordination, and Reflexes     Gait  Gait: wide-based    Reflexes   Right patellar: 2+  Left patellar: 0  Right achilles: 0  Left achilles: 0       Result Review :       Data reviewed: Radiologic studies MRI Lumbar Spine at [facility] on 1/24/22 reviewed. Multilevel degenerative changes. At L4/5 there is disc degeneration with loss of dist height. A posterior disc bulge combined with facet arthropathy resulting in mild to moderate spinal stenosis with severe left and moderately severe right foraminal narrowing, with moderately severe bilateral recess stenosis. This is the most notable finding.     "      Assessment and Plan    Diagnoses and all orders for this visit:    1. Osteoarthritis of spine with radiculopathy, lumbosacral region (Primary)    2. History of lumbar laminectomy    Pt presenting with worsening low back pain. He has failed conservative therapies, including LESB, lumbar MBB, PT, medication management. We repeated his MRI Lumbar Spine, revealing multilevel spondylosis. Dr. Ford discussed with patient that surgical intervention would likely not improve back pain. He is recommending spinal cord stimulator. Pt is followed by pain management. He can follow up in our clinic as needed.       Follow Up   Return if symptoms worsen or fail to improve.  Patient was given instructions and counseling regarding his condition or for health maintenance advice.

## 2022-04-13 ENCOUNTER — OFFICE VISIT (OUTPATIENT)
Dept: PODIATRY | Facility: CLINIC | Age: 62
End: 2022-04-13

## 2022-04-13 VITALS
SYSTOLIC BLOOD PRESSURE: 155 MMHG | TEMPERATURE: 96.4 F | WEIGHT: 257 LBS | BODY MASS INDEX: 34.81 KG/M2 | OXYGEN SATURATION: 95 % | HEART RATE: 80 BPM | DIASTOLIC BLOOD PRESSURE: 83 MMHG | HEIGHT: 72 IN

## 2022-04-13 DIAGNOSIS — G62.9 NEUROPATHY: ICD-10-CM

## 2022-04-13 DIAGNOSIS — E11.8 DIABETIC FOOT: ICD-10-CM

## 2022-04-13 DIAGNOSIS — B35.1 ONYCHOMYCOSIS: ICD-10-CM

## 2022-04-13 DIAGNOSIS — L60.0 ONYCHOCRYPTOSIS: ICD-10-CM

## 2022-04-13 DIAGNOSIS — E11.9 NON-INSULIN DEPENDENT TYPE 2 DIABETES MELLITUS: ICD-10-CM

## 2022-04-13 DIAGNOSIS — M79.672 FOOT PAIN, BILATERAL: ICD-10-CM

## 2022-04-13 DIAGNOSIS — M20.42 HAMMER TOES OF BOTH FEET: Primary | ICD-10-CM

## 2022-04-13 DIAGNOSIS — I73.9 PVD (PERIPHERAL VASCULAR DISEASE): ICD-10-CM

## 2022-04-13 DIAGNOSIS — M79.671 FOOT PAIN, BILATERAL: ICD-10-CM

## 2022-04-13 DIAGNOSIS — M20.41 HAMMER TOES OF BOTH FEET: Primary | ICD-10-CM

## 2022-04-13 PROCEDURE — 11721 DEBRIDE NAIL 6 OR MORE: CPT | Performed by: PODIATRIST

## 2022-04-13 PROCEDURE — G8404 LOW EXTEMITY NEUR EXAM DOCUM: HCPCS | Performed by: PODIATRIST

## 2022-04-13 RX ORDER — TRAZODONE HYDROCHLORIDE 50 MG/1
TABLET ORAL
COMMUNITY
Start: 2022-03-02

## 2022-04-13 NOTE — PROGRESS NOTES
[facility] - PODIATRY    Today's Date: 04/13/22    Patient Name: Willard Lange  MRN: [id number]  CSN: [id number]  PCP: Elizabeth Valle APRN, Last PCP Visit: 1 April 2022  Referring Provider: No ref. provider found    SUBJECTIVE     Chief Complaint   Patient presents with   • Left Foot - Follow-up, Nail Problem   • Right Foot - Follow-up, Nail Problem     HPI: Willard Lange, a 61 y.o.male, comes to clinic.    New, Established, New Problem:  established     Location:  Toenails    Duration:   Greater than five years    Onset:  Gradual    Nature:  sore with palpation.    Stable, worsening, improving:   Stable    Aggravating factors:  Pain with shoe gear and ambulation.    Previous Treatment:  debridement    Patient reported last blood glucose: 95  __________________________________    Patient reports the following medical changes since their last visit: Upcoming lumbar pain stimulator implant surgery    Patient denies any fevers, chills, nausea, vomiting, shortness of breathe, nor any other constitutional signs nor symptoms.         Past Medical History:   Diagnosis Date   • Arthritis    • Asthma    • Benign essential hypertension    • Brain tumor (HCC) 01/2021   • Cancer (HCC)    • DDD (degenerative disc disease), lumbar    • Diabetes mellitus type 2, noninsulin dependent (HCC)    • Diabetes type 2, controlled (HCC)    • GERD (gastroesophageal reflux disease)    • Hepatitis C    • HTN (hypertension)    • Leg pain    • Lumbago 02/15/2017    with low back pain   • Lumbar disc herniation 02/15/2017    L4-4   • Lumbar spinal stenosis 02/15/2017    L4/5   • Muscle cramps    • Neuropathy    • Prostate cancer (HCC)      Past Surgical History:   Procedure Laterality Date   • APPENDECTOMY     • BACK SURGERY  2017   • COLONOSCOPY  2017    Dr. Dorsey-Polyps   • CYSTOSCOPY W/ LASER LITHOTRIPSY      for kidney stones   • LAMINECTOMY  02/17/2017    L4-5   • PROSTATE BIOPSY      transrectal   • TONSILLECTOMY        Family History   Problem Relation Age of Onset   • Heart disease Father    • Lung cancer Sister    • Diabetes type I Maternal Grandmother      Social History     Socioeconomic History   • Marital status:    Tobacco Use   • Smoking status: Former Smoker     Start date: 1976     Quit date:      Years since quittin.2   • Smokeless tobacco: Never Used   Vaping Use   • Vaping Use: Never used   Substance and Sexual Activity   • Alcohol use: Yes     Comment: rarely   • Drug use: Never   • Sexual activity: Defer     Allergies   Allergen Reactions   • Formaldehyde Rash     Current Outpatient Medications   Medication Sig Dispense Refill   • Advair Diskus 250-50 MCG/DOSE DISKUS Inhale 1 puff 2 (Two) Times a Day.     • amitriptyline (ELAVIL) 25 MG tablet Take 25 mg by mouth every night at bedtime.     • atorvastatin (LIPITOR) 20 MG tablet Take 20 mg by mouth every night at bedtime.     • baclofen (LIORESAL) 10 MG tablet TAKE 1 TABLET BY MOUTH THREE TIMES DAILY AS NEEDED FOR MUSCLE CRAMPS     • busPIRone (BUSPAR) 7.5 MG tablet Take 7.5 mg by mouth 2 (Two) Times a Day.     • dicyclomine (BENTYL) 20 MG tablet TAKE 1 TABLET BY MOUTH 4 TIMES DAILY AS NEEDED FOR GI UPSET     • FREESTYLE LITE test strip      • glipizide (GLUCOTROL XL) 5 MG ER tablet Take 5 mg by mouth Daily.     • glucose blood test strip FreeStyle Lite Strips   USE TO TEST BLOOD SUGAR FOUR TIMES DAILY AND AS NEEDED     • HYDROcodone-acetaminophen (NORCO) 7.5-325 MG per tablet Take 1 tablet by mouth Every 8 (Eight) Hours As Needed.     • indomethacin (INDOCIN) 50 MG capsule Take 50 mg by mouth 2 (Two) Times a Day With Meals. As needed     • Lancets (freestyle) lancets      • loratadine (CLARITIN) 10 MG tablet loratadine 10 mg oral tablet take 1 tablet (10 mg) by oral route once daily   Suspended     • losartan-hydrochlorothiazide (HYZAAR) 100-25 MG per tablet losartan-hydrochlorothiazide 100-25 mg oral tablet take 1 tablet by oral route once  daily   Active     • omeprazole (priLOSEC) 40 MG capsule Take 40 mg by mouth Daily.     • pregabalin (LYRICA) 200 MG capsule TAKE 1 CAPSULE BY MOUTH THREE TIMES DAILY AS DIRECTED     • Steglatro 5 MG tablet Take 5 mg by mouth Every Morning.     • tamsulosin (FLOMAX) 0.4 MG capsule 24 hr capsule Take 1 capsule by mouth Daily.     • traZODone (DESYREL) 50 MG tablet TAKE 1 TABLET BY MOUTH AT BEDTIME FOR INSOMNIA     • Trulicity 1.5 MG/0.5ML solution pen-injector Inject 1.5 mg under the skin into the appropriate area as directed Every 7 (Seven) Days.     • vitamin B-12 (CYANOCOBALAMIN) 1000 MCG tablet Take 1,000 mcg by mouth Daily.     • vitamin D (ERGOCALCIFEROL) 1.25 MG (38021 UT) capsule capsule Take 50,000 Units by mouth 1 (One) Time Per Week.       No current facility-administered medications for this visit.     Review of Systems   Constitutional: Negative.    Skin:        Painful toenails   Neurological: Positive for numbness.   All other systems reviewed and are negative.      OBJECTIVE     Vitals:    04/13/22 1340   BP: 155/83   Pulse: 80   Temp: 96.4 °F (35.8 °C)   SpO2: 95%       WBC   Date Value Ref Range Status   03/26/2020 6.92 4.80 - 10.80 10*3/uL Final     RBC   Date Value Ref Range Status   03/26/2020 5.09 4.70 - 6.10 10*6/uL Final     Hemoglobin   Date Value Ref Range Status   03/26/2020 14.5 14.0 - 18.0 g/dL Final     Hematocrit   Date Value Ref Range Status   03/26/2020 44.1 42.0 - 52.0 % Final     MCV   Date Value Ref Range Status   03/26/2020 86.6 80.0 - 96.0 fL Final     MCH   Date Value Ref Range Status   03/26/2020 28.5 27.0 - 31.0 pg Final     MCHC   Date Value Ref Range Status   03/26/2020 32.9 (L) 33.0 - 37.0 Final     RDW   Date Value Ref Range Status   03/26/2020 13.2 11.6 - 14.4 % Final     RDW-SD   Date Value Ref Range Status   03/26/2020 40.8 35.1 - 43.9 fL Final     MPV   Date Value Ref Range Status   03/26/2020 9.6 9.4 - 12.4 fL Final     Platelets   Date Value Ref Range Status    03/26/2020 273 130 - 400 10*3/uL Final     Neutrophil Rel %   Date Value Ref Range Status   03/26/2020 73.3 30.0 - 85.0 % Final     Lymphocyte Rel %   Date Value Ref Range Status   03/26/2020 14.7 (L) 20.0 - 45.0 % Final     Monocyte Rel %   Date Value Ref Range Status   03/26/2020 9.2 3.0 - 10.0 % Final     Eosinophil Rel %   Date Value Ref Range Status   03/26/2020 1.7 0.0 - 7.0 % Final     Basophil Rel %   Date Value Ref Range Status   03/26/2020 0.4 0.0 - 3.0 % Final     Neutrophils Absolute   Date Value Ref Range Status   03/26/2020 5.06 2.00 - 8.00 10*3/uL Final     Lymphocytes Absolute   Date Value Ref Range Status   03/26/2020 1.02 1.00 - 5.00 10*3/uL Final     Monocytes Absolute   Date Value Ref Range Status   03/26/2020 0.64 0.20 - 1.20 10*3/uL Final     Eosinophils Absolute   Date Value Ref Range Status   03/26/2020 0.12 0.00 - 0.70 10*3/uL Final     Basophils Absolute   Date Value Ref Range Status   03/26/2020 0.03 0.00 - 0.20 10*3/uL Final     NRBC   Date Value Ref Range Status   03/26/2020 0.00 0.00 - 0.70 % Final         Lab Results   Component Value Date    GLUCOSE 212 (H) 03/26/2020    CALCIUM 9.6 03/26/2020     03/26/2020    K 4.6 03/26/2020    CO2 26 03/26/2020     03/26/2020    BUN 12 03/26/2020    CREATININE 0.90 07/23/2021    BCR 9 03/26/2020    ANIONGAP 16 03/26/2020       Patient seen in no apparent distress.      PHYSICAL EXAM:     Foot/Ankle Exam:       General:   Appearance: obesity    Orientation: AAOx3    Affect: appropriate    Gait: unimpaired    Shoe Gear:  Casual shoes    VASCULAR      Right Foot Vascularity   Normal vascular exam    Dorsalis pedis:  Absent  Posterior tibial:  Absent  Skin Temperature: warm    Edema Grading:  None  CFT:  4  Pedal Hair Growth:  Absent  Varicosities: none       Left Foot Vascularity   Normal vascular exam    Dorsalis pedis:  Absent  Posterior tibial:  Absent  Skin Temperature: warm    Edema Grading:  None  CFT:  4  Pedal Hair Growth:   Absent  Varicosities: none        NEUROLOGIC     Right Foot Neurologic   Light touch sensation:  Absent  Vibratory sensation:  Absent  Hot/Cold sensation: absent    Protective Sensation using Emmett-Oswaldo Monofilament:  0     Left Foot Neurologic   Light touch sensation:  Absent  Vibratory sensation:  Absent  Hot/cold sensation: absent    Protective Sensation using Emmett-Oswaldo Monofilament:  0     MUSCULOSKELETAL      Right Foot Musculoskeletal   Hammertoe:  Second toe, third toe, fourth toe and fifth toe     Left Foot Musculoskeletal   Hammertoe:  Second toe, third toe, fourth toe and fifth toe     MUSCLE STRENGTH     Right Foot Muscle Strength   Normal strength    Foot dorsiflexion:  5  Foot plantar flexion:  5  Foot inversion:  5  Foot eversion:  5     Left Foot Muscle Strength   Normal strength    Foot dorsiflexion:  5  Foot plantar flexion:  5  Foot inversion:  5  Foot eversion:  5     RANGE OF MOTION      Right Foot Range of Motion   Foot and ankle ROM within normal limits       Left Foot Range of Motion   Foot and ankle ROM within normal limits       DERMATOLOGIC     Right Foot Dermatologic   Skin: skin intact    Nails: onychomycosis, abnormally thick, subungual debris and dystrophic nails    Nails comment:  Toenails 1, 2, 3, 4, and 5     Left Foot Dermatologic   Skin: skin intact    Nails: onychomycosis, abnormally thick, subungual debris, dystrophic nails and ingrown toenail    Nails comment:  Toenails 1, 2, 3, 4, and 5      Diabetic Foot Exam Performed    ASSESSMENT/PLAN     Diagnoses and all orders for this visit:    1. Hammer toes of both feet (Primary)    2. Non-insulin dependent type 2 diabetes mellitus (HCC)    3. Onychocryptosis    4. Foot pain, bilateral    5. PVD (peripheral vascular disease) ([facility])    6. Neuropathy    7. Diabetic foot (HCC)    8. Onychomycosis        Comprehensive lower extremity examination and evaluation was performed.    Discussed findings and treatment plan including  risks, benefits, and treatment options with patient in detail. Patient agreed with treatment plan.    Toenails 1 through 5 bilaterally were debrided in thickness and length and then smoothed with a Dremel Tool.  Tolerated the procedure well without complications.    Diabetic foot exam performed and documented this date, compliant with CQM required standards. Detail of findings as noted in physical exam.  Lower extremity Neurologic exam for diabetic patient performed and documented this date, compliant with PQRS required standards. Detail of findings as noted in physical exam.  Advised patient importance of good routine lower extremity hygiene. Advised patient importance of evaluating for intact skin and pain free nail borders.  Advised patient to use mirror to evaluate plantar/ soles of feet for better visualization. Advised patient monitor and phone office to be seen if any cracking to skin, open lesions, painful nail borders or if nails become elongated prior to next visit. Advised patient importance of daily cleansing of lower extremities, followed by good skin cream to maintain normal hydration of skin. Also advised patient importance of close daily monitoring of blood sugar. Advised to regulate diet and medications to maintain control of blood sugar in optimal range. Contact primary care provider if difficulties maintaining blood sugar levels.  Advised Patient of presence of Diabetes Mellitus condition.  Advised Patient risk of progression and worsening or improvement, then return of condition.  Will monitor condition for any change in future. Treat with most appropriate treatment pending status of condition.  Counseled and advised patient extensively on nature and ramifications of diabetes. Standard instructions given to patient for good diabetic foot care and maintenance. Advised importance of careful monitoring to avoid break down and complications secondary to diabetes. Advised patient importance of strict  maintenance of blood sugar control. Advised patient of possible ominous results from neglect of condition, i.e.: amputation/ loss of digits, feet and legs, or even death.  Patient states understands counseling, will monitor closely, continue good hygiene and routine diabetic foot care. Patient will contact office is questions or problems.      An After Visit Summary was printed and given to the patient at discharge, including (if requested) any available informative/educational handouts regarding diagnosis, treatment, or medications. All questions were answered to patient/family satisfaction. Should symptoms fail to improve or worsen they agree to call or return to clinic or to go to the Emergency Department. Discussed the importance of following up with any needed screening tests/labs/specialist appointments and any requested follow-up recommended by me today. Importance of maintaining follow-up discussed and patient accepts that missed appointments can delay diagnosis and potentially lead to worsening of conditions.    Return in about 9 weeks (around 6/15/2022) for Toenail Care., or sooner if acute issues arise.    This document has been electronically signed by Sarkis Ureña DPM on April 13, 2022 14:17 EDT

## 2022-05-05 ENCOUNTER — DOCUMENTATION (OUTPATIENT)
Dept: PHYSICAL THERAPY | Facility: CLINIC | Age: 62
End: 2022-05-05

## 2022-05-05 DIAGNOSIS — M48.061 SPINAL STENOSIS OF LUMBAR REGION, UNSPECIFIED WHETHER NEUROGENIC CLAUDICATION PRESENT: ICD-10-CM

## 2022-05-05 DIAGNOSIS — M47.812 CERVICAL SPONDYLOSIS: ICD-10-CM

## 2022-05-05 DIAGNOSIS — M47.817 SPONDYLOSIS OF LUMBOSACRAL REGION WITHOUT MYELOPATHY OR RADICULOPATHY: Primary | ICD-10-CM

## 2022-05-05 NOTE — PROGRESS NOTES
Discharge Summary  Discharge Summary from Physical Therapy Report          Goals: Partially Met    Discharge Plan: Continue with current home exercise program as instructed    Comments Pt seeking further medical evaluation and care.     1. The patient complains of low back pain.  LTG 1: 12 weeks:  The patient will report a pain rating of 5/10 or better in order to improve  tolerance to activities of daily living and improve sleep quality.  STATUS:  Not met  STG 1a: 6 weeks:  The patient will report a pain rating of 6/10 or better.  STATUS:  MET  TREATMENT:  Therapeutic exercises, manual therapy, aquatic therapy, home exercise   instruction, and modalities as needed for pain to include:  electrical stimulation, moist heat, ice,   ultrasound, and diathermy.      2. The patient demonstrates weakness of the bilateral hip/knees.  LTG 2: 12 weeks:  The patient will demonstrate 4+ /5 strength for hip flexion, abduction,  and extension, knee flexion/extension in order to improve hip stability.  STATUS:  Not met  STG 2a: 6 weeks:  The patient will demonstrate 4 /5 strength for hip flexion, abduction,  and extension.  STATUS:  MET  TREATMENT: Therapeutic exercises, manual therapy, aquatic therapy, home exercise instruction,  and modalities as needed for pain to include:  electrical stimulation, moist heat, ice, ultrasound, and   diathermy.          3. Mobility: Walking/Moving Around Functional Limitation                   LTG 3: 12 weeks:  The patient will demonstrate 20-39 % limitation by achieving a score of 10-19 on the MANUEL.  STATUS:  Not met  STG 3 a: 6 weeks:  The patient will demonstrate 50 % limitation by achieving a score of 25 on the MANUEL.    STATUS:  Not met, progressing  TREATMENT:  Manual therapy, therapeutic exercise, home exercise instruction, and modalities as needed to include: moist heat, electrical stimulation, and ultrasound.       CERVICAL PROBLEMS    1. The patient has limited ROM.                        LTG 1: 12 weeks:  The patient will demonstrate 65° of bilateral cervical spine rotation, in order to adequately monitor blind spots while driving and improve ability to perform activities of daily living.                          STATUS:  Not met  STG 1a: 6 weeks:  The patient will demonstrate 55° of bilateral cervical spine rotation in order to adequately monitor blind spots while driving and improve ability to perform activities of daily living.                                      STATUS:  Not met              TREATMENT:  Manual therapy, therapeutic exercise, home exercise instruction, cervical traction, and modalities as needed to include: moist heat, electrical stimulation, and ultrasound.                2. The patient has complaints of pain.              LTG 2: 12 weeks:  The patient will report 4/10 pain in order to more easily tolerate activities of daily living and improve sleep quality.                          STATUS:  Not met              STG 2a: 6 weeks:  The patient will report 5/10 pain.                          STATUS:  MET              TREATMENT: Manual therapy, therapeutic exercise, home exercise instruction, cervical traction, and modalities as needed to include: moist heat, electrical stimulation, and ultrasound.        Miguel Edwards PT  Physical Therapist    Electronically signed 5/5/2022    [place] License: PT - [id number]

## 2022-07-05 ENCOUNTER — TELEPHONE (OUTPATIENT)
Dept: NEUROSURGERY | Facility: CLINIC | Age: 62
End: 2022-07-05

## 2022-07-05 NOTE — TELEPHONE ENCOUNTER
Willard stated that he is going to be filling out for disability for his back and wanted to make sure that 's office notes will be included. I told him that once we get the paperwork we will give him a call and then there would be a $25 charge to complete form, all office notes that apply will be added.

## 2022-07-06 ENCOUNTER — OFFICE VISIT (OUTPATIENT)
Dept: PODIATRY | Facility: CLINIC | Age: 62
End: 2022-07-06

## 2022-07-06 VITALS
BODY MASS INDEX: 33.05 KG/M2 | OXYGEN SATURATION: 97 % | HEART RATE: 88 BPM | DIASTOLIC BLOOD PRESSURE: 73 MMHG | SYSTOLIC BLOOD PRESSURE: 144 MMHG | TEMPERATURE: 94.9 F | HEIGHT: 72 IN | WEIGHT: 244 LBS

## 2022-07-06 DIAGNOSIS — B35.1 ONYCHOMYCOSIS: ICD-10-CM

## 2022-07-06 DIAGNOSIS — E11.9 NON-INSULIN DEPENDENT TYPE 2 DIABETES MELLITUS: ICD-10-CM

## 2022-07-06 DIAGNOSIS — M79.672 FOOT PAIN, BILATERAL: ICD-10-CM

## 2022-07-06 DIAGNOSIS — E11.8 DIABETIC FOOT: ICD-10-CM

## 2022-07-06 DIAGNOSIS — M79.671 FOOT PAIN, BILATERAL: ICD-10-CM

## 2022-07-06 DIAGNOSIS — G62.9 NEUROPATHY: ICD-10-CM

## 2022-07-06 DIAGNOSIS — L60.0 ONYCHOCRYPTOSIS: ICD-10-CM

## 2022-07-06 DIAGNOSIS — M20.42 HAMMER TOES OF BOTH FEET: Primary | ICD-10-CM

## 2022-07-06 DIAGNOSIS — M20.41 HAMMER TOES OF BOTH FEET: Primary | ICD-10-CM

## 2022-07-06 DIAGNOSIS — I73.9 PVD (PERIPHERAL VASCULAR DISEASE): ICD-10-CM

## 2022-07-06 PROCEDURE — 11721 DEBRIDE NAIL 6 OR MORE: CPT | Performed by: PODIATRIST

## 2022-07-06 PROCEDURE — G8404 LOW EXTEMITY NEUR EXAM DOCUM: HCPCS | Performed by: PODIATRIST

## 2022-07-06 RX ORDER — LOSARTAN POTASSIUM AND HYDROCHLOROTHIAZIDE 12.5; 1 MG/1; MG/1
TABLET ORAL
COMMUNITY
End: 2022-07-06 | Stop reason: ALTCHOICE

## 2022-07-06 RX ORDER — CHLORHEXIDINE GLUCONATE 213 G/1000ML
SOLUTION TOPICAL
COMMUNITY

## 2022-07-06 RX ORDER — TRAZODONE HYDROCHLORIDE 100 MG/1
TABLET ORAL
COMMUNITY
Start: 2022-06-27 | End: 2022-09-15 | Stop reason: ALTCHOICE

## 2022-07-06 RX ORDER — MONTELUKAST SODIUM 4 MG/1
TABLET, CHEWABLE ORAL
COMMUNITY
Start: 2022-05-02

## 2022-07-06 RX ORDER — ATORVASTATIN CALCIUM 20 MG/1
20 TABLET, FILM COATED ORAL
COMMUNITY
Start: 2022-03-30

## 2022-07-06 NOTE — PROGRESS NOTES
[facility]    Today's Date: 07/06/22    Patient Name: Willard Lange  MRN: [id number]  CSN: [id number]  PCP: Elizabeth Valle APRN, Last PCP Visit: 6/28/2022  Referring Provider: No ref. provider found    SUBJECTIVE     Chief Complaint   Patient presents with   • Left Foot - Nail Problem   • Right Foot - Nail Problem     HPI: Willard Lange, a 62 y.o.male, comes to clinic.    New, Established, New Problem:  established     Location:  Toenails    Duration:   Greater than five years    Onset:  Gradual    Nature:  sore with palpation.    Stable, worsening, improving:   Stable    Aggravating factors:  Pain with shoe gear and ambulation.    Previous Treatment:  debridement    Patient states their most recent HgB A1C was 6.7.  __________________________________    Patient reports the following medical changes since their last visit: Patient states he is scheduled for lumbar pain stimulator implant surgery soon.    Patient denies any fevers, chills, nausea, vomiting, shortness of breathe, nor any other constitutional signs nor symptoms.         Past Medical History:   Diagnosis Date   • Arthritis    • Asthma    • Benign essential hypertension    • Brain tumor (HCC) 01/2021   • Cancer (HCC)    • DDD (degenerative disc disease), lumbar    • Diabetes mellitus type 2, noninsulin dependent (HCC)    • Diabetes type 2, controlled (HCC)    • GERD (gastroesophageal reflux disease)    • Hepatitis C    • HTN (hypertension)    • Leg pain    • Lumbago 02/15/2017    with low back pain   • Lumbar disc herniation 02/15/2017    L4-4   • Lumbar spinal stenosis 02/15/2017    L4/5   • Muscle cramps    • Neuropathy    • Prostate cancer (HCC)      Past Surgical History:   Procedure Laterality Date   • APPENDECTOMY     • BACK SURGERY  2017   • COLONOSCOPY  2017    Dr. Dorsey-Polyps   • CYSTOSCOPY W/ LASER LITHOTRIPSY      for kidney stones   • LAMINECTOMY  02/17/2017    L4-5   • PROSTATE BIOPSY      transrectal    • TONSILLECTOMY       Family History   Problem Relation Age of Onset   • Heart disease Father    • Lung cancer Sister    • Diabetes type I Maternal Grandmother      Social History     Socioeconomic History   • Marital status:    Tobacco Use   • Smoking status: Former Smoker     Start date: 1976     Quit date:      Years since quittin.5   • Smokeless tobacco: Never Used   Vaping Use   • Vaping Use: Never used   Substance and Sexual Activity   • Alcohol use: Yes     Comment: rarely   • Drug use: Never   • Sexual activity: Defer     Allergies   Allergen Reactions   • Formaldehyde Rash     Current Outpatient Medications   Medication Sig Dispense Refill   • Advair Diskus 250-50 MCG/DOSE DISKUS Inhale 1 puff 2 (Two) Times a Day.     • amitriptyline (ELAVIL) 25 MG tablet Take 25 mg by mouth every night at bedtime.     • atorvastatin (LIPITOR) 20 MG tablet Take 20 mg by mouth every night at bedtime.     • atorvastatin (LIPITOR) 20 MG tablet Take 20 mg by mouth.     • baclofen (LIORESAL) 10 MG tablet TAKE 1 TABLET BY MOUTH THREE TIMES DAILY AS NEEDED FOR MUSCLE CRAMPS     • busPIRone (BUSPAR) 7.5 MG tablet Take 7.5 mg by mouth 2 (Two) Times a Day.     • chlorhexidine (Hibiclens) 4 % external liquid Hibiclens 4 % topical liquid   Wash back and gluteal region with Hibiclens soap the night before and morning of procedure     • colestipol (COLESTID) 1 g tablet TAKE 2 TABLETS BY MOUTH EVERY DAY FOR DIARRHEA     • dicyclomine (BENTYL) 20 MG tablet TAKE 1 TABLET BY MOUTH 4 TIMES DAILY AS NEEDED FOR GI UPSET     • FREESTYLE LITE test strip      • glipizide (GLUCOTROL XL) 5 MG ER tablet Take 5 mg by mouth Daily.     • glucose blood test strip FreeStyle Lite Strips   USE TO TEST BLOOD SUGAR FOUR TIMES DAILY AND AS NEEDED     • HYDROcodone-acetaminophen (NORCO) 7.5-325 MG per tablet Take 1 tablet by mouth Every 8 (Eight) Hours As Needed.     • indomethacin (INDOCIN) 50 MG capsule Take 50 mg by mouth 2 (Two) Times  a Day With Meals. As needed     • Lancets (freestyle) lancets      • loratadine (CLARITIN) 10 MG tablet loratadine 10 mg oral tablet take 1 tablet (10 mg) by oral route once daily   Suspended     • losartan-hydrochlorothiazide (HYZAAR) 100-25 MG per tablet losartan-hydrochlorothiazide 100-25 mg oral tablet take 1 tablet by oral route once daily   Active     • omeprazole (priLOSEC) 40 MG capsule Take 40 mg by mouth Daily.     • pregabalin (LYRICA) 200 MG capsule TAKE 1 CAPSULE BY MOUTH THREE TIMES DAILY AS DIRECTED     • Steglatro 5 MG tablet Take 5 mg by mouth Every Morning.     • tamsulosin (FLOMAX) 0.4 MG capsule 24 hr capsule Take 1 capsule by mouth Daily.     • traZODone (DESYREL) 100 MG tablet      • traZODone (DESYREL) 50 MG tablet TAKE 1 TABLET BY MOUTH AT BEDTIME FOR INSOMNIA     • Trulicity 1.5 MG/0.5ML solution pen-injector Inject 1.5 mg under the skin into the appropriate area as directed Every 7 (Seven) Days.     • vitamin B-12 (CYANOCOBALAMIN) 1000 MCG tablet Take 1,000 mcg by mouth Daily.     • vitamin D (ERGOCALCIFEROL) 1.25 MG (64642 UT) capsule capsule Take 50,000 Units by mouth 1 (One) Time Per Week.       No current facility-administered medications for this visit.     Review of Systems   Constitutional: Negative.    Skin:        Painful toenails   Neurological: Positive for numbness.   All other systems reviewed and are negative.      OBJECTIVE     Vitals:    07/06/22 1608   BP: 144/73   Pulse: 88   Temp: 94.9 °F (34.9 °C)   SpO2: 97%       WBC   Date Value Ref Range Status   03/26/2020 6.92 4.80 - 10.80 10*3/uL Final     RBC   Date Value Ref Range Status   03/26/2020 5.09 4.70 - 6.10 10*6/uL Final     Hemoglobin   Date Value Ref Range Status   03/26/2020 14.5 14.0 - 18.0 g/dL Final     Hematocrit   Date Value Ref Range Status   03/26/2020 44.1 42.0 - 52.0 % Final     MCV   Date Value Ref Range Status   03/26/2020 86.6 80.0 - 96.0 fL Final     MCH   Date Value Ref Range Status   03/26/2020 28.5  27.0 - 31.0 pg Final     MCHC   Date Value Ref Range Status   03/26/2020 32.9 (L) 33.0 - 37.0 Final     RDW   Date Value Ref Range Status   03/26/2020 13.2 11.6 - 14.4 % Final     RDW-SD   Date Value Ref Range Status   03/26/2020 40.8 35.1 - 43.9 fL Final     MPV   Date Value Ref Range Status   03/26/2020 9.6 9.4 - 12.4 fL Final     Platelets   Date Value Ref Range Status   03/26/2020 273 130 - 400 10*3/uL Final     Neutrophil Rel %   Date Value Ref Range Status   03/26/2020 73.3 30.0 - 85.0 % Final     Lymphocyte Rel %   Date Value Ref Range Status   03/26/2020 14.7 (L) 20.0 - 45.0 % Final     Monocyte Rel %   Date Value Ref Range Status   03/26/2020 9.2 3.0 - 10.0 % Final     Eosinophil Rel %   Date Value Ref Range Status   03/26/2020 1.7 0.0 - 7.0 % Final     Basophil Rel %   Date Value Ref Range Status   03/26/2020 0.4 0.0 - 3.0 % Final     Neutrophils Absolute   Date Value Ref Range Status   03/26/2020 5.06 2.00 - 8.00 10*3/uL Final     Lymphocytes Absolute   Date Value Ref Range Status   03/26/2020 1.02 1.00 - 5.00 10*3/uL Final     Monocytes Absolute   Date Value Ref Range Status   03/26/2020 0.64 0.20 - 1.20 10*3/uL Final     Eosinophils Absolute   Date Value Ref Range Status   03/26/2020 0.12 0.00 - 0.70 10*3/uL Final     Basophils Absolute   Date Value Ref Range Status   03/26/2020 0.03 0.00 - 0.20 10*3/uL Final     NRBC   Date Value Ref Range Status   03/26/2020 0.00 0.00 - 0.70 % Final         Lab Results   Component Value Date    GLUCOSE 212 (H) 03/26/2020    CALCIUM 9.6 03/26/2020     03/26/2020    K 4.6 03/26/2020    CO2 26 03/26/2020     03/26/2020    BUN 12 03/26/2020    CREATININE 0.90 07/23/2021    BCR 9 03/26/2020    ANIONGAP 16 03/26/2020       Patient seen in no apparent distress.      PHYSICAL EXAM:     Foot/Ankle Exam:       General:   Appearance: obesity    Orientation: AAOx3    Affect: appropriate    Gait: unimpaired    Shoe Gear:  Casual shoes    VASCULAR      Right Foot  Vascularity   Normal vascular exam    Dorsalis pedis:  Absent  Posterior tibial:  Absent  Skin Temperature: warm    Edema Grading:  None  CFT:  4  Pedal Hair Growth:  Absent  Varicosities: none       Left Foot Vascularity   Normal vascular exam    Dorsalis pedis:  Absent  Posterior tibial:  Absent  Skin Temperature: warm    Edema Grading:  None  CFT:  4  Pedal Hair Growth:  Absent  Varicosities: none        NEUROLOGIC     Right Foot Neurologic   Light touch sensation:  Absent  Vibratory sensation:  Absent  Hot/Cold sensation: absent    Protective Sensation using [place] Monofilament:  0     Left Foot Neurologic   Light touch sensation:  Absent  Vibratory sensation:  Absent  Hot/cold sensation: absent    Protective Sensation using [place] Monofilament:  0     MUSCULOSKELETAL      Right Foot Musculoskeletal   Hammertoe:  Second toe, third toe, fourth toe and fifth toe     Left Foot Musculoskeletal   Hammertoe:  Second toe, third toe, fourth toe and fifth toe     MUSCLE STRENGTH     Right Foot Muscle Strength   Foot dorsiflexion:  4+  Foot plantar flexion:  4+  Foot inversion:  4+  Foot eversion:  4+     Left Foot Muscle Strength   Foot dorsiflexion:  4+  Foot plantar flexion:  4+  Foot inversion:  4+  Foot eversion:  4+     RANGE OF MOTION      Right Foot Range of Motion   Foot and ankle ROM within normal limits       Left Foot Range of Motion   Foot and ankle ROM within normal limits       DERMATOLOGIC     Right Foot Dermatologic   Skin: skin intact    Nails: onychomycosis, abnormally thick, subungual debris and dystrophic nails    Nails comment:  Toenails 1, 2, 3, 4, and 5     Left Foot Dermatologic   Skin: skin intact    Nails: onychomycosis, abnormally thick, subungual debris, dystrophic nails and ingrown toenail    Nails comment:  Toenails 1, 2, 3, 4, and 5      Diabetic Foot Exam Performed    ASSESSMENT/PLAN     Diagnoses and all orders for this visit:    1. Hammer toes of both feet  (Primary)    2. Onychocryptosis    3. PVD (peripheral vascular disease) ([facility])    4. Diabetic foot ([facility])    5. Onychomycosis    6. Neuropathy    7. Foot pain, bilateral    8. Non-insulin dependent type 2 diabetes mellitus ([facility])        Comprehensive lower extremity examination and evaluation was performed.    Discussed findings and treatment plan including risks, benefits, and treatment options with patient in detail. Patient agreed with treatment plan.    Toenails 1 through 5 bilaterally were debrided in thickness and length and then smoothed with a Dremel Tool.  Tolerated the procedure well without complications.    Diabetic foot exam performed and documented this date, compliant with CQM required standards. Detail of findings as noted in physical exam.  Lower extremity Neurologic exam for diabetic patient performed and documented this date, compliant with PQRS required standards. Detail of findings as noted in physical exam.  Advised patient importance of good routine lower extremity hygiene. Advised patient importance of evaluating for intact skin and pain free nail borders.  Advised patient to use mirror to evaluate plantar/ soles of feet for better visualization. Advised patient monitor and phone office to be seen if any cracking to skin, open lesions, painful nail borders or if nails become elongated prior to next visit. Advised patient importance of daily cleansing of lower extremities, followed by good skin cream to maintain normal hydration of skin. Also advised patient importance of close daily monitoring of blood sugar. Advised to regulate diet and medications to maintain control of blood sugar in optimal range. Contact primary care provider if difficulties maintaining blood sugar levels.  Advised Patient of presence of Diabetes Mellitus condition.  Advised Patient risk of progression and worsening or improvement, then return of condition.  Will monitor condition for any change in future. Treat with most  appropriate treatment pending status of condition.  Counseled and advised patient extensively on nature and ramifications of diabetes. Standard instructions given to patient for good diabetic foot care and maintenance. Advised importance of careful monitoring to avoid break down and complications secondary to diabetes. Advised patient importance of strict maintenance of blood sugar control. Advised patient of possible ominous results from neglect of condition, i.e.: amputation/ loss of digits, feet and legs, or even death.  Patient states understands counseling, will monitor closely, continue good hygiene and routine diabetic foot care. Patient will contact office is questions or problems.      An After Visit Summary was printed and given to the patient at discharge, including (if requested) any available informative/educational handouts regarding diagnosis, treatment, or medications. All questions were answered to patient/family satisfaction. Should symptoms fail to improve or worsen they agree to call or return to clinic or to go to the Emergency Department. Discussed the importance of following up with any needed screening tests/labs/specialist appointments and any requested follow-up recommended by me today. Importance of maintaining follow-up discussed and patient accepts that missed appointments can delay diagnosis and potentially lead to worsening of conditions.    Return in about 9 weeks (around 9/7/2022) for Toenail Care., or sooner if acute issues arise.    This document has been electronically signed by Sarkis Ureña DPM on July 6, 2022 16:39 EDT

## 2022-07-27 ENCOUNTER — TELEPHONE (OUTPATIENT)
Dept: NEUROSURGERY | Facility: CLINIC | Age: 62
End: 2022-07-27

## 2022-07-27 NOTE — TELEPHONE ENCOUNTER
PATIENT DROPPED OFF PAPERWORK TO BE COMPLETED.  HE PAID HIS MONEY. HE NEEDS TO TURN IN THE PAPERWORK BY THE END OF THE WEEK.  PLEASE CALL PATIENT WITH STATUS    [phone]

## 2022-08-08 DIAGNOSIS — C61 PROSTATE CANCER: Primary | ICD-10-CM

## 2022-08-15 ENCOUNTER — APPOINTMENT (OUTPATIENT)
Dept: RADIATION ONCOLOGY | Facility: HOSPITAL | Age: 62
End: 2022-08-15

## 2022-09-15 ENCOUNTER — OFFICE VISIT (OUTPATIENT)
Dept: PODIATRY | Facility: CLINIC | Age: 62
End: 2022-09-15

## 2022-09-15 VITALS
SYSTOLIC BLOOD PRESSURE: 181 MMHG | TEMPERATURE: 97.3 F | HEART RATE: 73 BPM | BODY MASS INDEX: 34.27 KG/M2 | HEIGHT: 72 IN | DIASTOLIC BLOOD PRESSURE: 8 MMHG | OXYGEN SATURATION: 100 % | WEIGHT: 253 LBS

## 2022-09-15 DIAGNOSIS — I73.9 PVD (PERIPHERAL VASCULAR DISEASE): ICD-10-CM

## 2022-09-15 DIAGNOSIS — L60.0 ONYCHOCRYPTOSIS: ICD-10-CM

## 2022-09-15 DIAGNOSIS — E11.8 DIABETIC FOOT: ICD-10-CM

## 2022-09-15 DIAGNOSIS — G62.9 NEUROPATHY: ICD-10-CM

## 2022-09-15 DIAGNOSIS — E11.9 NON-INSULIN DEPENDENT TYPE 2 DIABETES MELLITUS: ICD-10-CM

## 2022-09-15 DIAGNOSIS — B35.1 ONYCHOMYCOSIS: ICD-10-CM

## 2022-09-15 DIAGNOSIS — M79.671 FOOT PAIN, BILATERAL: ICD-10-CM

## 2022-09-15 DIAGNOSIS — M79.672 FOOT PAIN, BILATERAL: ICD-10-CM

## 2022-09-15 DIAGNOSIS — M20.42 HAMMER TOES OF BOTH FEET: Primary | ICD-10-CM

## 2022-09-15 DIAGNOSIS — M20.41 HAMMER TOES OF BOTH FEET: Primary | ICD-10-CM

## 2022-09-15 PROCEDURE — 11721 DEBRIDE NAIL 6 OR MORE: CPT | Performed by: PODIATRIST

## 2022-09-15 PROCEDURE — G8404 LOW EXTEMITY NEUR EXAM DOCUM: HCPCS | Performed by: PODIATRIST

## 2022-09-15 RX ORDER — HYDROXYZINE HYDROCHLORIDE 25 MG/1
TABLET, FILM COATED ORAL
COMMUNITY
Start: 2022-08-24 | End: 2022-11-17

## 2022-09-15 RX ORDER — PREDNISONE 20 MG/1
TABLET ORAL
COMMUNITY
Start: 2022-09-09 | End: 2022-09-15

## 2022-09-15 RX ORDER — FAMOTIDINE 20 MG/1
20 TABLET, FILM COATED ORAL
COMMUNITY
Start: 2022-09-09

## 2022-09-15 RX ORDER — PAROXETINE 10 MG/1
10 TABLET, FILM COATED ORAL DAILY
COMMUNITY
Start: 2022-09-09

## 2022-09-15 NOTE — PROGRESS NOTES
[facility] - PODIATRY    Today's Date: 09/15/22    Patient Name: Willard Lange  MRN: [id number]  CSN: [id number]  PCP: Elizabeth Valle APRN, Last PCP Visit: 9/1/2022  Referring Provider: No ref. provider found    SUBJECTIVE     Chief Complaint   Patient presents with   • Left Foot - Nail Problem   • Right Foot - Nail Problem     HPI: Willard Lange, a 62 y.o.male, comes to clinic.    New, Established, New Problem:  established     Location:  Toenails    Duration:   Greater than five years    Onset:  Gradual    Nature:  sore with palpation.    Stable, worsening, improving:   Stable    Aggravating factors:  Pain with shoe gear and ambulation.    Previous Treatment:  debridement    Patient states their most recent blood glucose reading was 114.    __________________________________    Patient reports the following medical changes since their last visit: Patient states he had to be rescheduled for lumbar pain stimulator implant surgery.    Patient denies any fevers, chills, nausea, vomiting, shortness of breathe, nor any other constitutional signs nor symptoms.         Past Medical History:   Diagnosis Date   • Arthritis    • Asthma    • Benign essential hypertension    • Brain tumor (HCC) 01/2021   • Cancer (HCC)    • DDD (degenerative disc disease), lumbar    • Diabetes mellitus type 2, noninsulin dependent (HCC)    • Diabetes type 2, controlled (HCC)    • GERD (gastroesophageal reflux disease)    • Hepatitis C    • HTN (hypertension)    • Leg pain    • Lumbago 02/15/2017    with low back pain   • Lumbar disc herniation 02/15/2017    L4-4   • Lumbar spinal stenosis 02/15/2017    L4/5   • Muscle cramps    • Neuropathy    • Prostate cancer (HCC)      Past Surgical History:   Procedure Laterality Date   • APPENDECTOMY     • BACK SURGERY  2017   • COLONOSCOPY  2017    Dr. Dorsey-Polyps   • CYSTOSCOPY W/ LASER LITHOTRIPSY      for kidney stones   • LAMINECTOMY  02/17/2017    L4-5   • PROSTATE  BIOPSY      transrectal   • TONSILLECTOMY       Family History   Problem Relation Age of Onset   • Heart disease Father    • Lung cancer Sister    • Diabetes type I Maternal Grandmother      Social History     Socioeconomic History   • Marital status:    Tobacco Use   • Smoking status: Former Smoker     Start date: 1976     Quit date:      Years since quittin.7   • Smokeless tobacco: Never Used   Vaping Use   • Vaping Use: Never used   Substance and Sexual Activity   • Alcohol use: Yes     Comment: rarely   • Drug use: Never   • Sexual activity: Defer     Allergies   Allergen Reactions   • Formaldehyde Rash     Current Outpatient Medications   Medication Sig Dispense Refill   • Advair Diskus 250-50 MCG/DOSE DISKUS Inhale 1 puff 2 (Two) Times a Day.     • amitriptyline (ELAVIL) 25 MG tablet Take 25 mg by mouth every night at bedtime.     • atorvastatin (LIPITOR) 20 MG tablet Take 20 mg by mouth.     • baclofen (LIORESAL) 10 MG tablet TAKE 1 TABLET BY MOUTH THREE TIMES DAILY AS NEEDED FOR MUSCLE CRAMPS     • busPIRone (BUSPAR) 7.5 MG tablet Take 7.5 mg by mouth 2 (Two) Times a Day.     • chlorhexidine (Hibiclens) 4 % external liquid Hibiclens 4 % topical liquid   Wash back and gluteal region with Hibiclens soap the night before and morning of procedure     • colestipol (COLESTID) 1 g tablet TAKE 2 TABLETS BY MOUTH EVERY DAY FOR DIARRHEA     • dicyclomine (BENTYL) 20 MG tablet TAKE 1 TABLET BY MOUTH 4 TIMES DAILY AS NEEDED FOR GI UPSET     • famotidine (PEPCID) 20 MG tablet Take 20 mg by mouth every night at bedtime.     • FREESTYLE LITE test strip      • glipizide (GLUCOTROL XL) 5 MG ER tablet Take 5 mg by mouth Daily.     • glucose blood test strip FreeStyle Lite Strips   USE TO TEST BLOOD SUGAR FOUR TIMES DAILY AND AS NEEDED     • HYDROcodone-acetaminophen (NORCO) 7.5-325 MG per tablet Take 1 tablet by mouth Every 8 (Eight) Hours As Needed.     • hydrOXYzine (ATARAX) 25 MG tablet TAKE 1 TABLET  BY MOUTH FOUR TIMES DAILY AS NEEDED FOR ITCHING     • indomethacin (INDOCIN) 50 MG capsule Take 50 mg by mouth 2 (Two) Times a Day With Meals. As needed     • Lancets (freestyle) lancets      • loratadine (CLARITIN) 10 MG tablet loratadine 10 mg oral tablet take 1 tablet (10 mg) by oral route once daily   Suspended     • losartan-hydrochlorothiazide (HYZAAR) 100-25 MG per tablet losartan-hydrochlorothiazide 100-25 mg oral tablet take 1 tablet by oral route once daily   Active     • omeprazole (priLOSEC) 40 MG capsule Take 40 mg by mouth Daily.     • PARoxetine (PAXIL) 10 MG tablet Take 10 mg by mouth Daily.     • pregabalin (LYRICA) 200 MG capsule TAKE 1 CAPSULE BY MOUTH THREE TIMES DAILY AS DIRECTED     • Steglatro 5 MG tablet Take 5 mg by mouth Every Morning.     • tamsulosin (FLOMAX) 0.4 MG capsule 24 hr capsule Take 1 capsule by mouth Daily.     • traZODone (DESYREL) 50 MG tablet TAKE 1 TABLET BY MOUTH AT BEDTIME FOR INSOMNIA     • Trulicity 1.5 MG/0.5ML solution pen-injector Inject 1.5 mg under the skin into the appropriate area as directed Every 7 (Seven) Days.     • vitamin B-12 (CYANOCOBALAMIN) 1000 MCG tablet Take 1,000 mcg by mouth Daily.     • vitamin D (ERGOCALCIFEROL) 1.25 MG (84980 UT) capsule capsule Take 50,000 Units by mouth 1 (One) Time Per Week.     • predniSONE (DELTASONE) 20 MG tablet TAKE 1 TABLET BY MOUTH EVERY DAY FOR 7 DAYS     • traZODone (DESYREL) 100 MG tablet        No current facility-administered medications for this visit.     Review of Systems   Constitutional: Negative.    Skin:        Painful toenails   Neurological: Positive for numbness.   All other systems reviewed and are negative.      OBJECTIVE     Vitals:    09/15/22 0926   BP: (!) 181/8   Pulse: 73   Temp: 97.3 °F (36.3 °C)   SpO2: 100%       WBC   Date Value Ref Range Status   03/26/2020 6.92 4.80 - 10.80 10*3/uL Final     RBC   Date Value Ref Range Status   03/26/2020 5.09 4.70 - 6.10 10*6/uL Final     Hemoglobin   Date  Value Ref Range Status   03/26/2020 14.5 14.0 - 18.0 g/dL Final     Hematocrit   Date Value Ref Range Status   03/26/2020 44.1 42.0 - 52.0 % Final     MCV   Date Value Ref Range Status   03/26/2020 86.6 80.0 - 96.0 fL Final     MCH   Date Value Ref Range Status   03/26/2020 28.5 27.0 - 31.0 pg Final     MCHC   Date Value Ref Range Status   03/26/2020 32.9 (L) 33.0 - 37.0 Final     RDW   Date Value Ref Range Status   03/26/2020 13.2 11.6 - 14.4 % Final     RDW-SD   Date Value Ref Range Status   03/26/2020 40.8 35.1 - 43.9 fL Final     MPV   Date Value Ref Range Status   03/26/2020 9.6 9.4 - 12.4 fL Final     Platelets   Date Value Ref Range Status   03/26/2020 273 130 - 400 10*3/uL Final     Neutrophil Rel %   Date Value Ref Range Status   03/26/2020 73.3 30.0 - 85.0 % Final     Lymphocyte Rel %   Date Value Ref Range Status   03/26/2020 14.7 (L) 20.0 - 45.0 % Final     Monocyte Rel %   Date Value Ref Range Status   03/26/2020 9.2 3.0 - 10.0 % Final     Eosinophil Rel %   Date Value Ref Range Status   03/26/2020 1.7 0.0 - 7.0 % Final     Basophil Rel %   Date Value Ref Range Status   03/26/2020 0.4 0.0 - 3.0 % Final     Neutrophils Absolute   Date Value Ref Range Status   03/26/2020 5.06 2.00 - 8.00 10*3/uL Final     Lymphocytes Absolute   Date Value Ref Range Status   03/26/2020 1.02 1.00 - 5.00 10*3/uL Final     Monocytes Absolute   Date Value Ref Range Status   03/26/2020 0.64 0.20 - 1.20 10*3/uL Final     Eosinophils Absolute   Date Value Ref Range Status   03/26/2020 0.12 0.00 - 0.70 10*3/uL Final     Basophils Absolute   Date Value Ref Range Status   03/26/2020 0.03 0.00 - 0.20 10*3/uL Final     NRBC   Date Value Ref Range Status   03/26/2020 0.00 0.00 - 0.70 % Final         Lab Results   Component Value Date    GLUCOSE 212 (H) 03/26/2020    CALCIUM 9.6 03/26/2020     03/26/2020    K 4.6 03/26/2020    CO2 26 03/26/2020     03/26/2020    BUN 12 03/26/2020    CREATININE 0.90 07/23/2021    BCR 9  03/26/2020    ANIONGAP 16 03/26/2020       Patient seen in no apparent distress.      PHYSICAL EXAM:     Foot/Ankle Exam:       General:   Diabetic Foot Exam Performed    Appearance: obesity    Orientation: AAOx3    Affect: appropriate    Gait: unimpaired    Shoe Gear:  Casual shoes    VASCULAR      Right Foot Vascularity   Normal vascular exam    Dorsalis pedis:  Absent  Posterior tibial:  Absent  Skin Temperature: warm    Edema Grading:  None  CFT:  4  Pedal Hair Growth:  Absent  Varicosities: none       Left Foot Vascularity   Normal vascular exam    Dorsalis pedis:  Absent  Posterior tibial:  Absent  Skin Temperature: warm    Edema Grading:  None  CFT:  4  Pedal Hair Growth:  Absent  Varicosities: none        NEUROLOGIC     Right Foot Neurologic   Light touch sensation:  Absent  Vibratory sensation:  Absent  Hot/Cold sensation: absent    Protective Sensation using Byers-Oswaldo Monofilament:  0     Left Foot Neurologic   Light touch sensation:  Absent  Vibratory sensation:  Absent  Hot/cold sensation: absent    Protective Sensation using Byers-Oswaldo Monofilament:  0     MUSCULOSKELETAL      Right Foot Musculoskeletal   Hammertoe:  Second toe, third toe, fourth toe and fifth toe     Left Foot Musculoskeletal   Hammertoe:  Second toe, third toe, fourth toe and fifth toe     MUSCLE STRENGTH     Right Foot Muscle Strength   Foot dorsiflexion:  4  Foot plantar flexion:  4  Foot inversion:  4  Foot eversion:  4     Left Foot Muscle Strength   Foot dorsiflexion:  4  Foot plantar flexion:  4  Foot inversion:  4  Foot eversion:  4     RANGE OF MOTION      Right Foot Range of Motion   Foot and ankle ROM within normal limits       Left Foot Range of Motion   Foot and ankle ROM within normal limits       DERMATOLOGIC     Right Foot Dermatologic   Skin: skin intact    Nails: onychomycosis, abnormally thick, subungual debris and dystrophic nails    Nails comment:  Toenails 1, 2, 3, 4, and 5     Left Foot Dermatologic    Skin: skin intact    Nails: onychomycosis, abnormally thick, subungual debris, dystrophic nails and ingrown toenail    Nails comment:  Toenails 1, 2, 3, 4, and 5      Diabetic Foot Exam Performed    ASSESSMENT/PLAN     Diagnoses and all orders for this visit:    1. Hammer toes of both feet (Primary)    2. Diabetic foot (HCC)    3. Foot pain, bilateral    4. Onychocryptosis    5. Onychomycosis    6. Non-insulin dependent type 2 diabetes mellitus ([facility])    7. PVD (peripheral vascular disease) ([facility])    8. Neuropathy        Comprehensive lower extremity examination and evaluation was performed.    Discussed findings and treatment plan including risks, benefits, and treatment options with patient in detail. Patient agreed with treatment plan.    Toenails 1 through 5 bilaterally were debrided in thickness and length and then smoothed with a Dremel Tool.  Tolerated the procedure well without complications.    Diabetic foot exam performed and documented this date, compliant with CQM required standards. Detail of findings as noted in physical exam.  Lower extremity Neurologic exam for diabetic patient performed and documented this date, compliant with PQRS required standards. Detail of findings as noted in physical exam.  Advised patient importance of good routine lower extremity hygiene. Advised patient importance of evaluating for intact skin and pain free nail borders.  Advised patient to use mirror to evaluate plantar/ soles of feet for better visualization. Advised patient monitor and phone office to be seen if any cracking to skin, open lesions, painful nail borders or if nails become elongated prior to next visit. Advised patient importance of daily cleansing of lower extremities, followed by good skin cream to maintain normal hydration of skin. Also advised patient importance of close daily monitoring of blood sugar. Advised to regulate diet and medications to maintain control of blood sugar in optimal range.  Contact primary care provider if difficulties maintaining blood sugar levels.  Advised Patient of presence of Diabetes Mellitus condition.  Advised Patient risk of progression and worsening or improvement, then return of condition.  Will monitor condition for any change in future. Treat with most appropriate treatment pending status of condition.  Counseled and advised patient extensively on nature and ramifications of diabetes. Standard instructions given to patient for good diabetic foot care and maintenance. Advised importance of careful monitoring to avoid break down and complications secondary to diabetes. Advised patient importance of strict maintenance of blood sugar control. Advised patient of possible ominous results from neglect of condition, i.e.: amputation/ loss of digits, feet and legs, or even death.  Patient states understands counseling, will monitor closely, continue good hygiene and routine diabetic foot care. Patient will contact office is questions or problems.      An After Visit Summary was printed and given to the patient at discharge, including (if requested) any available informative/educational handouts regarding diagnosis, treatment, or medications. All questions were answered to patient/family satisfaction. Should symptoms fail to improve or worsen they agree to call or return to clinic or to go to the Emergency Department. Discussed the importance of following up with any needed screening tests/labs/specialist appointments and any requested follow-up recommended by me today. Importance of maintaining follow-up discussed and patient accepts that missed appointments can delay diagnosis and potentially lead to worsening of conditions.    Return in about 9 weeks (around 11/17/2022) for Toenail Care., or sooner if acute issues arise.    This document has been electronically signed by Sarkis Ureña DPM on September 15, 2022 09:50 EDT

## 2022-11-01 ENCOUNTER — TELEPHONE (OUTPATIENT)
Dept: VASCULAR SURGERY | Facility: HOSPITAL | Age: 62
End: 2022-11-01

## 2022-11-01 NOTE — TELEPHONE ENCOUNTER
L/M FOR PT TO CALL AND MAKE APPT FOR ANNUAL ARTERIAL AND CAROTID US AND VISIT, PLEASE TRANSFER DIRECTLY TO OFFICE TO SCHEDULE.

## 2022-11-08 ENCOUNTER — TRANSCRIBE ORDERS (OUTPATIENT)
Dept: VASCULAR SURGERY | Facility: HOSPITAL | Age: 62
End: 2022-11-08

## 2022-11-08 DIAGNOSIS — I73.9 PVD (PERIPHERAL VASCULAR DISEASE): Primary | ICD-10-CM

## 2022-11-08 DIAGNOSIS — I65.23 BILATERAL CAROTID ARTERY STENOSIS: ICD-10-CM

## 2022-11-08 NOTE — TELEPHONE ENCOUNTER
LEFT MESSAGE FOR PT TO CALL OUR OFFICE TO SCHEDULE HIS YEARLY F/U WITH ARTERIAL TEST AND CAROTID U/S. SEND TO OUR OFFICE TO SCHEDULE.

## 2022-11-15 NOTE — PROGRESS NOTES
Follow Up Office Visit      Encounter Date: 11/17/2022   Patient Name: Willard Lange  YOB: 1960   Medical Record Number: [id number]   Primary Diagnosis: Personal history of prostate cancer [Z85.46]     Radiation Completion Date:  2/28/2020    Chief Complaint:    Chief Complaint   Patient presents with   • Follow-up   • Prostate Cancer       Oncology/Hematology History   Prostate cancer (HCC)   10/24/2019 Cancer Staged    Staging form: Prostate, AJCC 8th Edition  - Clinical stage from 10/24/2019: Stage I (cT1c, cN0, cM0, PSA: 5.6, Grade Group: 1) - Signed by Shi Cortez APRN on 11/17/2022 1/22/2020 - 8/28/2020 Radiation    Radiation OncologyTreatment Course:  Willard Lange received 7000 cGy in 28 fractions to prostate and seminal vesicles.      8/25/2021 Initial Diagnosis    Prostate cancer (HCC)         History of Present Illness: Willard Lange is a 62 y.o. male who returns to [facility] for routine follow-up. He reports feeling well overall but he does continue to experience urgency, occasional small-volume incontinence and some post-void dribbling to which he feels as if he is not fully emptying his bladder. He denies nocturia, dysuria, or hematuria. He does take Flomax 0.4 mg once a day. At last visit Dr. Buchanan had recommended increasing to twice a day but patient has not tried the increased dose. He also reports impotence and is interested in exploring possible treatments for this. He previously tried penile injections which were beneficial but he is interested in discussing other options that may be available. He is not currently followed by urology.     Subjective      Review of Systems: Review of Systems   Constitutional: Positive for fatigue (3/10). Negative for appetite change.   Eyes: Negative.  Negative for visual disturbance.   Respiratory: Negative.  Negative for cough and shortness of breath.    Cardiovascular: Negative.    Gastrointestinal: Positive for  diarrhea (on medication ) and nausea. Negative for blood in stool and constipation.   Genitourinary: Positive for urgency. Negative for difficulty urinating, dysuria and frequency.        Dribble afterwards doesn't feel like he's emptying.  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   Skin: Negative.  Negative for rash.   Neurological: Positive for headaches (occasional ). Negative for dizziness.   Psychiatric/Behavioral: Positive for sleep disturbance (trouble sleeping at night ).       The following portions of the patient's history were reviewed and updated as appropriate: allergies, current medications, past family history, past medical history, past social history, past surgical history and problem list.    Medications:     Current Outpatient Medications:   •  Advair Diskus 250-50 MCG/DOSE DISKUS, Inhale 1 puff 2 (Two) Times a Day., Disp: , Rfl:   •  amitriptyline (ELAVIL) 25 MG tablet, Take 25 mg by mouth every night at bedtime., Disp: , Rfl:   •  atorvastatin (LIPITOR) 20 MG tablet, Take 20 mg by mouth., Disp: , Rfl:   •  baclofen (LIORESAL) 10 MG tablet, TAKE 1 TABLET BY MOUTH THREE TIMES DAILY AS NEEDED FOR MUSCLE CRAMPS, Disp: , Rfl:   •  busPIRone (BUSPAR) 7.5 MG tablet, Take 7.5 mg by mouth 2 (Two) Times a Day., Disp: , Rfl:   •  famotidine (PEPCID) 20 MG tablet, Take 20 mg by mouth every night at bedtime., Disp: , Rfl:   •  glipizide (GLUCOTROL XL) 5 MG ER tablet, Take 5 mg by mouth Daily., Disp: , Rfl:   •  HYDROcodone-acetaminophen (NORCO) 7.5-325 MG per tablet, Take 1 tablet by mouth Every 8 (Eight) Hours As Needed., Disp: , Rfl:   •  loratadine (CLARITIN) 10 MG tablet, loratadine 10 mg oral tablet take 1 tablet (10 mg) by oral route once daily   Suspended, Disp: , Rfl:   •  losartan-hydrochlorothiazide (HYZAAR) 100-25 MG per tablet, losartan-hydrochlorothiazide 100-25 mg oral tablet take 1 tablet by oral route once daily   Active, Disp: ,  Rfl:   •  omeprazole (priLOSEC) 40 MG capsule, Take 40 mg by mouth Daily., Disp: , Rfl:   •  pregabalin (LYRICA) 200 MG capsule, TAKE 1 CAPSULE BY MOUTH THREE TIMES DAILY AS DIRECTED, Disp: , Rfl:   •  Steglatro 5 MG tablet, Take 5 mg by mouth Every Morning., Disp: , Rfl:   •  traZODone (DESYREL) 50 MG tablet, TAKE 1 TABLET BY MOUTH AT BEDTIME FOR INSOMNIA, Disp: , Rfl:   •  Trulicity 1.5 MG/0.5ML solution pen-injector, Inject 1.5 mg under the skin into the appropriate area as directed Every 7 (Seven) Days., Disp: , Rfl:   •  vitamin B-12 (CYANOCOBALAMIN) 1000 MCG tablet, Take 1,000 mcg by mouth Daily., Disp: , Rfl:   •  vitamin D (ERGOCALCIFEROL) 1.25 MG (54355 UT) capsule capsule, Take 50,000 Units by mouth 1 (One) Time Per Week., Disp: , Rfl:   •  Blood Glucose Monitoring Suppl (FreeStyle Lite) w/Device kit, See Admin Instructions., Disp: , Rfl:   •  chlorhexidine (Hibiclens) 4 % external liquid, Hibiclens 4 % topical liquid  Wash back and gluteal region with Hibiclens soap the night before and morning of procedure, Disp: , Rfl:   •  colestipol (COLESTID) 1 g tablet, TAKE 2 TABLETS BY MOUTH EVERY DAY FOR DIARRHEA, Disp: , Rfl:   •  dicyclomine (BENTYL) 20 MG tablet, TAKE 1 TABLET BY MOUTH 4 TIMES DAILY AS NEEDED FOR GI UPSET, Disp: , Rfl:   •  FREESTYLE LITE test strip, , Disp: , Rfl:   •  glucose blood test strip, FreeStyle Lite Strips  USE TO TEST BLOOD SUGAR FOUR TIMES DAILY AND AS NEEDED, Disp: , Rfl:   •  indomethacin (INDOCIN) 50 MG capsule, Take 50 mg by mouth 2 (Two) Times a Day With Meals. As needed, Disp: , Rfl:   •  Lancets (freestyle) lancets, , Disp: , Rfl:   •  PARoxetine (PAXIL) 10 MG tablet, Take 10 mg by mouth Daily., Disp: , Rfl:   •  tamsulosin (FLOMAX) 0.4 MG capsule 24 hr capsule, Take 1 capsule by mouth 2 (Two) Times a Day., Disp: 60 capsule, Rfl: 1    Allergies:   Allergies   Allergen Reactions   • Formaldehyde Rash       Measures:  Pain: (on a scale of 0-10)   Pain Score    11/17/22 6022    PainSc: 0-No pain       Quality of Life: 100 - Full Activity   ECOG: (0) Fully active, able to carry on all predisease performance without restriction      Objective     Physical Exam:   Vital Signs:   Vitals:    11/17/22 1317   BP: 171/84   Pulse: 106   Resp: 16   Temp: 98.1 °F (36.7 °C)   TempSrc: Temporal   SpO2: 97%   Weight: 116 kg (255 lb 4.7 oz)   PainSc: 0-No pain     Body mass index is 34.62 kg/m².   Wt Readings from Last 3 Encounters:   11/17/22 116 kg (255 lb 4.7 oz)   09/15/22 115 kg (253 lb)   07/06/22 111 kg (244 lb)       Physical Exam  Vitals reviewed.   Constitutional:       General: He is not in acute distress.     Appearance: Normal appearance. He is normal weight. He is not ill-appearing.   HENT:      Head: Normocephalic and atraumatic.      Mouth/Throat:      Mouth: Mucous membranes are moist.      Pharynx: Oropharynx is clear. No oropharyngeal exudate or posterior oropharyngeal erythema.   Eyes:      Conjunctiva/sclera: Conjunctivae normal.      Pupils: Pupils are equal, round, and reactive to light.   Cardiovascular:      Rate and Rhythm: Normal rate and regular rhythm.      Pulses: Normal pulses.      Heart sounds: Normal heart sounds.   Pulmonary:      Effort: Pulmonary effort is normal. No respiratory distress.      Breath sounds: Normal breath sounds.   Musculoskeletal:         General: Normal range of motion.      Cervical back: Normal range of motion.   Skin:     General: Skin is warm and dry.   Neurological:      General: No focal deficit present.      Mental Status: He is alert and oriented to person, place, and time. Mental status is at baseline.   Psychiatric:         Mood and Affect: Mood normal.         Behavior: Behavior normal.       Result Review: I personally reviewed the following data. The pertinent findings are as above in the HPI.  -- SCANNED PATHOLOGY (10/25/2019)    Imaging: No radiology results for the last 90 days.     Labs:    PSA   Date Value Ref Range Status    11/17/2022 0.062 0.000 - 4.000 ng/mL Final   02/15/2022 0.086 0.000 - 4.000 ng/mL Final   08/25/2021 0.178 0.000 - 4.000 ng/mL Final   02/22/2021 0.20 0.00 - 4.00 ng/mL Final   11/17/2020 0.28 0.00 - 4.00 ng/mL Final   08/12/2020 0.71 0.00 - 4.00 ng/mL Final   03/26/2020 1.16 0.00 - 4.00 ng/mL Final   10/07/2019 5.64 (H) 0.00 - 4.00 ng/mL Final       Assessment / Plan      Impression: Willard Lange is a pleasant 62 y.o. male with cT1c prostate cancer, [place] 3+3=6, ipsa 5.64.  Status post external beam radiotherapy, completed on 2/28/2020. He is doing reasonably well overall but does have some erectile dysfunction and chronic lower urinary tract outlet obstruction symptoms. At last visit, it was recommended he increase Flomax to twice daily for the purpose of improving his urinary symptoms, but Mr. Lange has not tried the increased dose.  AUA-SS at time of consultation was 27.  AUA-SS today is 24.     Assessment/Plan:   Diagnoses and all orders for this visit:    1. Personal history of prostate cancer (Primary)  -     PSA Diagnostic; Future  -     PSA Diagnostic  -     Ambulatory Referral to Urology  -     PSA Diagnostic; Future  -     tamsulosin (FLOMAX) 0.4 MG capsule 24 hr capsule; Take 1 capsule by mouth 2 (Two) Times a Day.  Dispense: 60 capsule; Refill: 1    2. Personal history of radiation therapy    3. Encounter for follow-up surveillance of prostate cancer  -     PSA Diagnostic; Future  -     PSA Diagnostic  -     PSA Diagnostic; Future    4. Erectile dysfunction, unspecified erectile dysfunction type  -     Ambulatory Referral to Urology    5. Impotence  -     Ambulatory Referral to Urology    6. Lower urinary tract symptoms (LUTS)  -     Ambulatory Referral to Urology  -     tamsulosin (FLOMAX) 0.4 MG capsule 24 hr capsule; Take 1 capsule by mouth 2 (Two) Times a Day.  Dispense: 60 capsule; Refill: 1       Follow Up:   1.  Awaiting results of PSA obtained today. I will call him tomorrow with these  results.   2.  Return for follow-up with repeat PSA in 6 months.   3.  Mr. Lange has been taking Flomax 0.4 mg daily. Recommended he increase Flomax to twice a day to see if his urinary symptoms improve. Prescription for Flomax 0.4 mg twice a day (total 0.8 mg/daily) sent to his pharmacy.   4.  Will refer to Urology/Dr. Gan to discuss potential treatment options for erectile dysfunction. If Mr. Lnage does not derive benefit from increasing Flomax, I would appreciate Urology input on medication management for his LUTS symptoms.     Return in about 6 months (around 5/17/2023) for Office Visit.  Willard Lange was encouraged to contact me in the interim with any questions or concerns regarding his care.    ADDENDUM on 11/18/2022: I spoke to Mr. Lange via phone call, notifying him that his PSA is 0.06 ng/mL.     MARIUM Singleton  Radiation Oncology  [facility]    This document has been signed by MARIUM Wu on November 18, 2022 08:15 EST

## 2022-11-17 ENCOUNTER — OFFICE VISIT (OUTPATIENT)
Dept: RADIATION ONCOLOGY | Facility: HOSPITAL | Age: 62
End: 2022-11-17

## 2022-11-17 VITALS
DIASTOLIC BLOOD PRESSURE: 84 MMHG | TEMPERATURE: 98.1 F | HEART RATE: 106 BPM | SYSTOLIC BLOOD PRESSURE: 171 MMHG | WEIGHT: 255.29 LBS | OXYGEN SATURATION: 97 % | BODY MASS INDEX: 34.62 KG/M2 | RESPIRATION RATE: 16 BRPM

## 2022-11-17 DIAGNOSIS — Z85.46 ENCOUNTER FOR FOLLOW-UP SURVEILLANCE OF PROSTATE CANCER: ICD-10-CM

## 2022-11-17 DIAGNOSIS — Z85.46 PERSONAL HISTORY OF PROSTATE CANCER: Primary | ICD-10-CM

## 2022-11-17 DIAGNOSIS — R39.9 LOWER URINARY TRACT SYMPTOMS (LUTS): ICD-10-CM

## 2022-11-17 DIAGNOSIS — N52.9 ERECTILE DYSFUNCTION, UNSPECIFIED ERECTILE DYSFUNCTION TYPE: ICD-10-CM

## 2022-11-17 DIAGNOSIS — N52.9 IMPOTENCE: ICD-10-CM

## 2022-11-17 DIAGNOSIS — Z92.3 PERSONAL HISTORY OF RADIATION THERAPY: ICD-10-CM

## 2022-11-17 DIAGNOSIS — Z08 ENCOUNTER FOR FOLLOW-UP SURVEILLANCE OF PROSTATE CANCER: ICD-10-CM

## 2022-11-17 PROBLEM — M10.9 GOUT: Status: ACTIVE | Noted: 2022-02-03

## 2022-11-17 PROBLEM — E11.69 HYPERLIPIDEMIA ASSOCIATED WITH TYPE 2 DIABETES MELLITUS: Status: ACTIVE | Noted: 2022-02-15

## 2022-11-17 PROBLEM — E11.69 ERECTILE DYSFUNCTION ASSOCIATED WITH TYPE 2 DIABETES MELLITUS: Status: ACTIVE | Noted: 2022-02-15

## 2022-11-17 PROBLEM — F33.1 MODERATE RECURRENT MAJOR DEPRESSION: Status: ACTIVE | Noted: 2022-04-26

## 2022-11-17 PROBLEM — N52.1 ERECTILE DYSFUNCTION ASSOCIATED WITH TYPE 2 DIABETES MELLITUS (HCC): Status: ACTIVE | Noted: 2022-02-15

## 2022-11-17 PROBLEM — J84.10 PULMONARY FIBROSIS: Status: ACTIVE | Noted: 2022-02-03

## 2022-11-17 PROBLEM — M51.379 DDD (DEGENERATIVE DISC DISEASE), LUMBOSACRAL: Status: ACTIVE | Noted: 2022-02-03

## 2022-11-17 PROBLEM — M54.16 LUMBAR RADICULOPATHY: Status: ACTIVE | Noted: 2017-02-15

## 2022-11-17 PROBLEM — F41.1 GENERALIZED ANXIETY DISORDER: Status: ACTIVE | Noted: 2022-04-26

## 2022-11-17 PROBLEM — I11.9 HYPERTENSIVE HEART DISEASE WITHOUT HEART FAILURE: Status: ACTIVE | Noted: 2022-04-26

## 2022-11-17 PROBLEM — T81.9XXA COMPLICATION OF SURGICAL PROCEDURE: Status: ACTIVE | Noted: 2022-08-04

## 2022-11-17 PROBLEM — G47.00 INSOMNIA: Status: ACTIVE | Noted: 2022-04-26

## 2022-11-17 PROBLEM — E11.42 DIABETIC POLYNEUROPATHY ASSOCIATED WITH TYPE 2 DIABETES MELLITUS: Status: ACTIVE | Noted: 2022-04-26

## 2022-11-17 PROBLEM — Z00.00 ROUTINE HEALTH MAINTENANCE: Status: ACTIVE | Noted: 2022-04-26

## 2022-11-17 PROBLEM — M51.37 DDD (DEGENERATIVE DISC DISEASE), LUMBOSACRAL: Status: ACTIVE | Noted: 2022-02-03

## 2022-11-17 LAB — PSA SERPL-MCNC: 0.06 NG/ML (ref 0–4)

## 2022-11-17 PROCEDURE — G0463 HOSPITAL OUTPT CLINIC VISIT: HCPCS | Performed by: NURSE PRACTITIONER

## 2022-11-17 PROCEDURE — 84153 ASSAY OF PSA TOTAL: CPT | Performed by: NURSE PRACTITIONER

## 2022-11-17 PROCEDURE — 36415 COLL VENOUS BLD VENIPUNCTURE: CPT | Performed by: NURSE PRACTITIONER

## 2022-11-17 PROCEDURE — 99214 OFFICE O/P EST MOD 30 MIN: CPT | Performed by: NURSE PRACTITIONER

## 2022-11-17 RX ORDER — BLOOD-GLUCOSE METER
KIT MISCELLANEOUS SEE ADMIN INSTRUCTIONS
COMMUNITY
Start: 2022-10-24

## 2022-11-17 RX ORDER — HYDROCODONE BITARTRATE AND ACETAMINOPHEN 10; 325 MG/1; MG/1
TABLET ORAL
COMMUNITY
Start: 2022-10-18 | End: 2022-11-17

## 2022-11-18 RX ORDER — TAMSULOSIN HYDROCHLORIDE 0.4 MG/1
1 CAPSULE ORAL 2 TIMES DAILY
Qty: 60 CAPSULE | Refills: 1 | Status: SHIPPED | OUTPATIENT
Start: 2022-11-18 | End: 2022-12-21 | Stop reason: SDUPTHER

## 2022-12-08 ENCOUNTER — TELEPHONE (OUTPATIENT)
Dept: VASCULAR SURGERY | Facility: HOSPITAL | Age: 62
End: 2022-12-08

## 2022-12-21 DIAGNOSIS — R39.9 LOWER URINARY TRACT SYMPTOMS (LUTS): ICD-10-CM

## 2022-12-21 DIAGNOSIS — Z85.46 PERSONAL HISTORY OF PROSTATE CANCER: ICD-10-CM

## 2022-12-21 RX ORDER — TAMSULOSIN HYDROCHLORIDE 0.4 MG/1
1 CAPSULE ORAL 2 TIMES DAILY
Qty: 60 CAPSULE | Refills: 1 | Status: SHIPPED | OUTPATIENT
Start: 2022-12-21

## 2023-01-04 ENCOUNTER — TELEPHONE (OUTPATIENT)
Dept: VASCULAR SURGERY | Facility: HOSPITAL | Age: 63
End: 2023-01-04
Payer: MEDICARE

## 2023-01-04 NOTE — TELEPHONE ENCOUNTER
LEFT MESSAGE FOR PT TO CALL AND RESCHEDULE HIS TESTING AND APPT WITH LISSETTE CAUSEY. SEND TO OUR OFFICE TO RESCHEDULE.

## 2023-01-05 ENCOUNTER — TELEPHONE (OUTPATIENT)
Dept: VASCULAR SURGERY | Facility: HOSPITAL | Age: 63
End: 2023-01-05
Payer: MEDICARE

## 2023-01-12 ENCOUNTER — OFFICE VISIT (OUTPATIENT)
Dept: PODIATRY | Facility: CLINIC | Age: 63
End: 2023-01-12
Payer: MEDICARE

## 2023-01-12 VITALS
DIASTOLIC BLOOD PRESSURE: 76 MMHG | HEIGHT: 72 IN | OXYGEN SATURATION: 100 % | HEART RATE: 79 BPM | BODY MASS INDEX: 32.37 KG/M2 | WEIGHT: 239 LBS | SYSTOLIC BLOOD PRESSURE: 122 MMHG | TEMPERATURE: 97.1 F

## 2023-01-12 DIAGNOSIS — M79.671 FOOT PAIN, BILATERAL: ICD-10-CM

## 2023-01-12 DIAGNOSIS — M20.42 HAMMER TOES OF BOTH FEET: ICD-10-CM

## 2023-01-12 DIAGNOSIS — E11.9 NON-INSULIN DEPENDENT TYPE 2 DIABETES MELLITUS: ICD-10-CM

## 2023-01-12 DIAGNOSIS — M79.672 FOOT PAIN, BILATERAL: ICD-10-CM

## 2023-01-12 DIAGNOSIS — L60.0 ONYCHOCRYPTOSIS: ICD-10-CM

## 2023-01-12 DIAGNOSIS — E11.8 DIABETIC FOOT: ICD-10-CM

## 2023-01-12 DIAGNOSIS — M20.41 HAMMER TOES OF BOTH FEET: ICD-10-CM

## 2023-01-12 DIAGNOSIS — G62.9 NEUROPATHY: ICD-10-CM

## 2023-01-12 DIAGNOSIS — B35.1 ONYCHOMYCOSIS: ICD-10-CM

## 2023-01-12 DIAGNOSIS — I73.9 PVD (PERIPHERAL VASCULAR DISEASE): Primary | ICD-10-CM

## 2023-01-12 PROCEDURE — G8404 LOW EXTEMITY NEUR EXAM DOCUM: HCPCS | Performed by: PODIATRIST

## 2023-01-12 PROCEDURE — 11721 DEBRIDE NAIL 6 OR MORE: CPT | Performed by: PODIATRIST

## 2023-01-12 RX ORDER — ALBUTEROL SULFATE 90 UG/1
2 AEROSOL, METERED RESPIRATORY (INHALATION) EVERY 6 HOURS PRN
COMMUNITY
Start: 2022-12-16

## 2023-01-12 RX ORDER — OXYCODONE HYDROCHLORIDE AND ACETAMINOPHEN 5; 325 MG/1; MG/1
1 TABLET ORAL EVERY 8 HOURS PRN
COMMUNITY
Start: 2022-12-16 | End: 2023-01-12 | Stop reason: ALTCHOICE

## 2023-01-12 NOTE — PROGRESS NOTES
[facility] - PODIATRY    Today's Date: 01/12/23    Patient Name: Willard Lange  MRN: [id number]  CSN: [id number]  PCP: Elizabeth Valle APRN, Last PCP Visit: 12/27/2022  Referring Provider: No ref. provider found    SUBJECTIVE     Chief Complaint   Patient presents with   • Left Foot - Follow-up, Nail Problem   • Right Foot - Follow-up, Nail Problem     HPI: Willard Lange, a 62 y.o.male, comes to clinic.    New, Established, New Problem:  established     Location:  Toenails    Duration:   Greater than five years    Onset:  Gradual    Nature:  sore with palpation.    Stable, worsening, improving:   Stable    Aggravating factors:  Pain with shoe gear and ambulation.    Previous Treatment:  debridement    Patient states their most recent HgB A1C was 6.7.   __________________________________    Patient reports the following medical changes since their last visit: started using a cane    Patient denies any fevers, chills, nausea, vomiting, shortness of breathe, nor any other constitutional signs nor symptoms.         Past Medical History:   Diagnosis Date   • Arthritis    • Asthma    • Benign essential hypertension    • Brain tumor (HCC) 01/2021   • Cancer (HCC)    • DDD (degenerative disc disease), lumbar    • Diabetes mellitus type 2, noninsulin dependent (HCC)    • Diabetes type 2, controlled (HCC)    • GERD (gastroesophageal reflux disease)    • Hepatitis C    • HTN (hypertension)    • Leg pain    • Lumbago 02/15/2017    with low back pain   • Lumbar disc herniation 02/15/2017    L4-4   • Lumbar spinal stenosis 02/15/2017    L4/5   • Muscle cramps    • Neuropathy    • Prostate cancer (HCC)      Past Surgical History:   Procedure Laterality Date   • APPENDECTOMY     • BACK SURGERY  2017   • COLONOSCOPY  2017    Dr. Dorsey-Polyps   • CYSTOSCOPY W/ LASER LITHOTRIPSY      for kidney stones   • LAMINECTOMY  02/17/2017    L4-5   • PROSTATE BIOPSY      transrectal   • TONSILLECTOMY       Family  History   Problem Relation Age of Onset   • Heart disease Father    • Lung cancer Sister    • Diabetes type I Maternal Grandmother      Social History     Socioeconomic History   • Marital status:    Tobacco Use   • Smoking status: Former     Types: Cigarettes     Start date: 1976     Quit date:      Years since quittin.0   • Smokeless tobacco: Never   Vaping Use   • Vaping Use: Never used   Substance and Sexual Activity   • Alcohol use: Yes     Comment: rarely   • Drug use: Never   • Sexual activity: Defer     Allergies   Allergen Reactions   • Formaldehyde Rash     Current Outpatient Medications   Medication Sig Dispense Refill   • Advair Diskus 250-50 MCG/DOSE DISKUS Inhale 1 puff 2 (Two) Times a Day.     • albuterol sulfate  (90 Base) MCG/ACT inhaler Inhale 2 puffs Every 6 (Six) Hours As Needed.     • amitriptyline (ELAVIL) 25 MG tablet Take 25 mg by mouth every night at bedtime.     • atorvastatin (LIPITOR) 20 MG tablet Take 20 mg by mouth.     • baclofen (LIORESAL) 10 MG tablet TAKE 1 TABLET BY MOUTH THREE TIMES DAILY AS NEEDED FOR MUSCLE CRAMPS     • Blood Glucose Monitoring Suppl (FreeStyle Lite) w/Device kit See Admin Instructions.     • busPIRone (BUSPAR) 7.5 MG tablet Take 7.5 mg by mouth 2 (Two) Times a Day.     • chlorhexidine (Hibiclens) 4 % external liquid Hibiclens 4 % topical liquid   Wash back and gluteal region with Hibiclens soap the night before and morning of procedure     • colestipol (COLESTID) 1 g tablet TAKE 2 TABLETS BY MOUTH EVERY DAY FOR DIARRHEA     • dicyclomine (BENTYL) 20 MG tablet TAKE 1 TABLET BY MOUTH 4 TIMES DAILY AS NEEDED FOR GI UPSET     • famotidine (PEPCID) 20 MG tablet Take 20 mg by mouth every night at bedtime.     • FREESTYLE LITE test strip      • glipizide (GLUCOTROL XL) 5 MG ER tablet Take 5 mg by mouth Daily.     • glucose blood test strip FreeStyle Lite Strips   USE TO TEST BLOOD SUGAR FOUR TIMES DAILY AND AS NEEDED     •  HYDROcodone-acetaminophen (NORCO) 7.5-325 MG per tablet Take 1 tablet by mouth Every 8 (Eight) Hours As Needed.     • indomethacin (INDOCIN) 50 MG capsule Take 50 mg by mouth 2 (Two) Times a Day With Meals. As needed     • Lancets (freestyle) lancets      • loratadine (CLARITIN) 10 MG tablet loratadine 10 mg oral tablet take 1 tablet (10 mg) by oral route once daily   Suspended     • losartan-hydrochlorothiazide (HYZAAR) 100-25 MG per tablet losartan-hydrochlorothiazide 100-25 mg oral tablet take 1 tablet by oral route once daily   Active     • omeprazole (priLOSEC) 40 MG capsule Take 40 mg by mouth Daily.     • PARoxetine (PAXIL) 10 MG tablet Take 10 mg by mouth Daily.     • pregabalin (LYRICA) 200 MG capsule TAKE 1 CAPSULE BY MOUTH THREE TIMES DAILY AS DIRECTED     • Steglatro 5 MG tablet Take 5 mg by mouth Every Morning.     • tamsulosin (FLOMAX) 0.4 MG capsule 24 hr capsule Take 1 capsule by mouth 2 (Two) Times a Day. 60 capsule 1   • traZODone (DESYREL) 50 MG tablet TAKE 1 TABLET BY MOUTH AT BEDTIME FOR INSOMNIA     • Trulicity 1.5 MG/0.5ML solution pen-injector Inject 1.5 mg under the skin into the appropriate area as directed Every 7 (Seven) Days.     • vitamin B-12 (CYANOCOBALAMIN) 1000 MCG tablet Take 1,000 mcg by mouth Daily.     • vitamin D (ERGOCALCIFEROL) 1.25 MG (27181 UT) capsule capsule Take 50,000 Units by mouth 1 (One) Time Per Week.       No current facility-administered medications for this visit.     Review of Systems   Constitutional: Negative.    Skin:        Painful toenails   Neurological: Positive for numbness.   All other systems reviewed and are negative.      OBJECTIVE     Vitals:    01/12/23 1350   BP: 122/76   Pulse: 79   Temp: 97.1 °F (36.2 °C)   SpO2: 100%       WBC   Date Value Ref Range Status   03/26/2020 6.92 4.80 - 10.80 10*3/uL Final     RBC   Date Value Ref Range Status   03/26/2020 5.09 4.70 - 6.10 10*6/uL Final     Hemoglobin   Date Value Ref Range Status   03/26/2020 14.5  14.0 - 18.0 g/dL Final     Hematocrit   Date Value Ref Range Status   03/26/2020 44.1 42.0 - 52.0 % Final     MCV   Date Value Ref Range Status   03/26/2020 86.6 80.0 - 96.0 fL Final     MCH   Date Value Ref Range Status   03/26/2020 28.5 27.0 - 31.0 pg Final     MCHC   Date Value Ref Range Status   03/26/2020 32.9 (L) 33.0 - 37.0 Final     RDW   Date Value Ref Range Status   03/26/2020 13.2 11.6 - 14.4 % Final     RDW-SD   Date Value Ref Range Status   03/26/2020 40.8 35.1 - 43.9 fL Final     MPV   Date Value Ref Range Status   03/26/2020 9.6 9.4 - 12.4 fL Final     Platelets   Date Value Ref Range Status   03/26/2020 273 130 - 400 10*3/uL Final     Neutrophil Rel %   Date Value Ref Range Status   03/26/2020 73.3 30.0 - 85.0 % Final     Lymphocyte Rel %   Date Value Ref Range Status   03/26/2020 14.7 (L) 20.0 - 45.0 % Final     Monocyte Rel %   Date Value Ref Range Status   03/26/2020 9.2 3.0 - 10.0 % Final     Eosinophil Rel %   Date Value Ref Range Status   03/26/2020 1.7 0.0 - 7.0 % Final     Basophil Rel %   Date Value Ref Range Status   03/26/2020 0.4 0.0 - 3.0 % Final     Neutrophils Absolute   Date Value Ref Range Status   03/26/2020 5.06 2.00 - 8.00 10*3/uL Final     Lymphocytes Absolute   Date Value Ref Range Status   03/26/2020 1.02 1.00 - 5.00 10*3/uL Final     Monocytes Absolute   Date Value Ref Range Status   03/26/2020 0.64 0.20 - 1.20 10*3/uL Final     Eosinophils Absolute   Date Value Ref Range Status   03/26/2020 0.12 0.00 - 0.70 10*3/uL Final     Basophils Absolute   Date Value Ref Range Status   03/26/2020 0.03 0.00 - 0.20 10*3/uL Final     NRBC   Date Value Ref Range Status   03/26/2020 0.00 0.00 - 0.70 % Final         Lab Results   Component Value Date    GLUCOSE 212 (H) 03/26/2020    CALCIUM 9.6 03/26/2020     03/26/2020    K 4.6 03/26/2020    CO2 26 03/26/2020     03/26/2020    BUN 12 03/26/2020    CREATININE 0.90 07/23/2021    BCR 9 03/26/2020    ANIONGAP 16 03/26/2020        Patient seen in no apparent distress.      PHYSICAL EXAM:     Foot/Ankle Exam:       General:   Diabetic Foot Exam Performed    Appearance: obesity    Orientation: AAOx3    Affect: appropriate    Gait: unimpaired    Shoe Gear:  Casual shoes    VASCULAR      Right Foot Vascularity   Normal vascular exam    Dorsalis pedis:  Absent  Posterior tibial:  Absent  Skin Temperature: warm    Edema Grading:  None  CFT:  4  Pedal Hair Growth:  Absent  Varicosities: none       Left Foot Vascularity   Normal vascular exam    Dorsalis pedis:  Absent  Posterior tibial:  Absent  Skin Temperature: warm    Edema Grading:  None  CFT:  4  Pedal Hair Growth:  Absent  Varicosities: none        NEUROLOGIC     Right Foot Neurologic   Light touch sensation:  Absent  Vibratory sensation:  Absent  Hot/Cold sensation: absent    Protective Sensation using Weston-Oswaldo Monofilament:  0     Left Foot Neurologic   Light touch sensation:  Absent  Vibratory sensation:  Absent  Hot/cold sensation: absent    Protective Sensation using Weston-Oswaldo Monofilament:  0     MUSCULOSKELETAL      Right Foot Musculoskeletal   Hammertoe:  Second toe, third toe, fourth toe and fifth toe     Left Foot Musculoskeletal   Hammertoe:  Second toe, third toe, fourth toe and fifth toe     MUSCLE STRENGTH     Right Foot Muscle Strength   Foot dorsiflexion:  4  Foot plantar flexion:  4  Foot inversion:  4  Foot eversion:  4     Left Foot Muscle Strength   Foot dorsiflexion:  4  Foot plantar flexion:  4  Foot inversion:  4  Foot eversion:  4     RANGE OF MOTION      Right Foot Range of Motion   Foot and ankle ROM within normal limits       Left Foot Range of Motion   Foot and ankle ROM within normal limits       DERMATOLOGIC     Right Foot Dermatologic   Skin: skin intact    Nails: onychomycosis, abnormally thick, subungual debris and dystrophic nails    Nails comment:  Toenails 1, 2, 3, 4, and 5     Left Foot Dermatologic   Skin: skin intact    Nails:  onychomycosis, abnormally thick, subungual debris, dystrophic nails and ingrown toenail    Nails comment:  Toenails 1, 2, 3, 4, and 5      Diabetic Foot Exam Performed    ASSESSMENT/PLAN     Diagnoses and all orders for this visit:    1. PVD (peripheral vascular disease) ([facility]) (Primary)    2. Onychocryptosis    3. Hammer toes of both feet    4. Diabetic foot ([facility])    5. Onychomycosis    6. Neuropathy    7. Foot pain, bilateral    8. Non-insulin dependent type 2 diabetes mellitus (HCC)        Comprehensive lower extremity examination and evaluation was performed.    Discussed findings and treatment plan including risks, benefits, and treatment options with patient in detail. Patient agreed with treatment plan.    Toenails 1 through 5 bilaterally were debrided in thickness and length and then smoothed with a Dremel Tool.  Tolerated the procedure well without complications.    Diabetic foot exam performed and documented this date, compliant with CQM required standards. Detail of findings as noted in physical exam.  Lower extremity Neurologic exam for diabetic patient performed and documented this date, compliant with PQRS required standards. Detail of findings as noted in physical exam.  Advised patient importance of good routine lower extremity hygiene. Advised patient importance of evaluating for intact skin and pain free nail borders.  Advised patient to use mirror to evaluate plantar/ soles of feet for better visualization. Advised patient monitor and phone office to be seen if any cracking to skin, open lesions, painful nail borders or if nails become elongated prior to next visit. Advised patient importance of daily cleansing of lower extremities, followed by good skin cream to maintain normal hydration of skin. Also advised patient importance of close daily monitoring of blood sugar. Advised to regulate diet and medications to maintain control of blood sugar in optimal range. Contact primary care provider if  difficulties maintaining blood sugar levels.  Advised Patient of presence of Diabetes Mellitus condition.  Advised Patient risk of progression and worsening or improvement, then return of condition.  Will monitor condition for any change in future. Treat with most appropriate treatment pending status of condition.  Counseled and advised patient extensively on nature and ramifications of diabetes. Standard instructions given to patient for good diabetic foot care and maintenance. Advised importance of careful monitoring to avoid break down and complications secondary to diabetes. Advised patient importance of strict maintenance of blood sugar control. Advised patient of possible ominous results from neglect of condition, i.e.: amputation/ loss of digits, feet and legs, or even death.  Patient states understands counseling, will monitor closely, continue good hygiene and routine diabetic foot care. Patient will contact office is questions or problems.      An After Visit Summary was printed and given to the patient at discharge, including (if requested) any available informative/educational handouts regarding diagnosis, treatment, or medications. All questions were answered to patient/family satisfaction. Should symptoms fail to improve or worsen they agree to call or return to clinic or to go to the Emergency Department. Discussed the importance of following up with any needed screening tests/labs/specialist appointments and any requested follow-up recommended by me today. Importance of maintaining follow-up discussed and patient accepts that missed appointments can delay diagnosis and potentially lead to worsening of conditions.    Return in about 9 weeks (around 3/16/2023) for Toenail Care., or sooner if acute issues arise.    This document has been electronically signed by Sarkis Ureña DPM on January 12, 2023 14:11 EST

## 2023-01-26 ENCOUNTER — OFFICE VISIT (OUTPATIENT)
Dept: VASCULAR SURGERY | Facility: HOSPITAL | Age: 63
End: 2023-01-26
Payer: MEDICARE

## 2023-01-26 ENCOUNTER — HOSPITAL ENCOUNTER (OUTPATIENT)
Dept: CARDIOLOGY | Facility: HOSPITAL | Age: 63
Discharge: HOME | End: 2023-01-26
Payer: MEDICARE

## 2023-01-26 VITALS
SYSTOLIC BLOOD PRESSURE: 122 MMHG | OXYGEN SATURATION: 98 % | RESPIRATION RATE: 18 BRPM | DIASTOLIC BLOOD PRESSURE: 65 MMHG | TEMPERATURE: 97.3 F | HEART RATE: 79 BPM

## 2023-01-26 DIAGNOSIS — I10 ESSENTIAL HYPERTENSION: ICD-10-CM

## 2023-01-26 DIAGNOSIS — I65.23 BILATERAL CAROTID ARTERY STENOSIS: Primary | ICD-10-CM

## 2023-01-26 DIAGNOSIS — E11.42 DIABETIC PERIPHERAL NEUROPATHY: ICD-10-CM

## 2023-01-26 DIAGNOSIS — I73.9 PVD (PERIPHERAL VASCULAR DISEASE): ICD-10-CM

## 2023-01-26 DIAGNOSIS — M79.605 PAIN AND SWELLING OF LOWER EXTREMITY, LEFT: ICD-10-CM

## 2023-01-26 DIAGNOSIS — M79.604 PAIN AND SWELLING OF LOWER EXTREMITY, RIGHT: ICD-10-CM

## 2023-01-26 DIAGNOSIS — M79.89 PAIN AND SWELLING OF LOWER EXTREMITY, LEFT: ICD-10-CM

## 2023-01-26 DIAGNOSIS — M79.89 PAIN AND SWELLING OF LOWER EXTREMITY, RIGHT: ICD-10-CM

## 2023-01-26 DIAGNOSIS — I65.23 BILATERAL CAROTID ARTERY STENOSIS: ICD-10-CM

## 2023-01-26 LAB
BH CV LOWER ARTERIAL LEFT ABI RATIO: 1.31
BH CV LOWER ARTERIAL LEFT CALF RATIO: 1.26
BH CV LOWER ARTERIAL LEFT DORSALIS PEDIS SYS MAX: 156
BH CV LOWER ARTERIAL LEFT GREAT TOE SYS MAX: 115
BH CV LOWER ARTERIAL LEFT LOW THIGH RATIO: NORMAL
BH CV LOWER ARTERIAL LEFT LOW THIGH SYS MAX: NORMAL
BH CV LOWER ARTERIAL LEFT POPLITEAL SYS MAX: 171
BH CV LOWER ARTERIAL LEFT POST TIBIAL SYS MAX: 178
BH CV LOWER ARTERIAL LEFT TBI RATIO: 0.85
BH CV LOWER ARTERIAL RIGHT ABI RATIO: 1.28
BH CV LOWER ARTERIAL RIGHT CALF RATIO: NORMAL
BH CV LOWER ARTERIAL RIGHT DORSALIS PEDIS SYS MAX: 174
BH CV LOWER ARTERIAL RIGHT GREAT TOE SYS MAX: 156
BH CV LOWER ARTERIAL RIGHT POPLITEAL SYS MAX: NORMAL
BH CV LOWER ARTERIAL RIGHT POST TIBIAL SYS MAX: NORMAL
BH CV LOWER ARTERIAL RIGHT TBI RATIO: 1.15
BH CV XLRA MEAS LEFT CAROTID BULB EDV: 29 CM/SEC
BH CV XLRA MEAS LEFT CAROTID BULB PSV: 106 CM/SEC
BH CV XLRA MEAS LEFT DIST CCA EDV: 25 CM/SEC
BH CV XLRA MEAS LEFT DIST CCA PSV: 128 CM/SEC
BH CV XLRA MEAS LEFT DIST ICA EDV: 27 CM/SEC
BH CV XLRA MEAS LEFT DIST ICA PSV: 90 CM/SEC
BH CV XLRA MEAS LEFT MID ICA EDV: 29 CM/SEC
BH CV XLRA MEAS LEFT MID ICA PSV: 138 CM/SEC
BH CV XLRA MEAS LEFT PROX CCA EDV: 19 CM/SEC
BH CV XLRA MEAS LEFT PROX CCA PSV: 91 CM/SEC
BH CV XLRA MEAS LEFT PROX ECA EDV: 11 CM/SEC
BH CV XLRA MEAS LEFT PROX ECA PSV: 143 CM/SEC
BH CV XLRA MEAS LEFT PROX ICA EDV: 33 CM/SEC
BH CV XLRA MEAS LEFT PROX ICA PSV: 108 CM/SEC
BH CV XLRA MEAS LEFT VERTEBRAL A EDV: 10 CM/SEC
BH CV XLRA MEAS LEFT VERTEBRAL A PSV: 46 CM/SEC
BH CV XLRA MEAS RIGHT CAROTID BULB EDV: 19 CM/SEC
BH CV XLRA MEAS RIGHT CAROTID BULB PSV: 71 CM/SEC
BH CV XLRA MEAS RIGHT DIST CCA EDV: 25 CM/SEC
BH CV XLRA MEAS RIGHT DIST CCA PSV: 114 CM/SEC
BH CV XLRA MEAS RIGHT DIST ICA EDV: 21 CM/SEC
BH CV XLRA MEAS RIGHT DIST ICA PSV: 56 CM/SEC
BH CV XLRA MEAS RIGHT MID ICA EDV: 19 CM/SEC
BH CV XLRA MEAS RIGHT MID ICA PSV: 47 CM/SEC
BH CV XLRA MEAS RIGHT PROX CCA EDV: 15 CM/SEC
BH CV XLRA MEAS RIGHT PROX CCA PSV: 72 CM/SEC
BH CV XLRA MEAS RIGHT PROX ECA EDV: 16 CM/SEC
BH CV XLRA MEAS RIGHT PROX ECA PSV: 100 CM/SEC
BH CV XLRA MEAS RIGHT PROX ICA EDV: 23 CM/SEC
BH CV XLRA MEAS RIGHT PROX ICA PSV: 69 CM/SEC
BH CV XLRA MEAS RIGHT VERTEBRAL A EDV: 11 CM/SEC
BH CV XLRA MEAS RIGHT VERTEBRAL A PSV: 35 CM/SEC
LEFT ARM BP: NORMAL MMHG
MAXIMAL PREDICTED HEART RATE: 158 BPM
MAXIMAL PREDICTED HEART RATE: 158 BPM
RIGHT ARM BP: NORMAL MMHG
STRESS TARGET HR: 134 BPM
STRESS TARGET HR: 134 BPM
UPPER ARTERIAL LEFT ARM BRACHIAL SYS MAX: 122 MMHG
UPPER ARTERIAL RIGHT ARM BRACHIAL SYS MAX: 136 MMHG

## 2023-01-26 PROCEDURE — 93923 UPR/LXTR ART STDY 3+ LVLS: CPT | Performed by: SURGERY

## 2023-01-26 PROCEDURE — 93880 EXTRACRANIAL BILAT STUDY: CPT

## 2023-01-26 PROCEDURE — 93923 UPR/LXTR ART STDY 3+ LVLS: CPT

## 2023-01-26 PROCEDURE — 93880 EXTRACRANIAL BILAT STUDY: CPT | Performed by: SURGERY

## 2023-01-26 PROCEDURE — 99214 OFFICE O/P EST MOD 30 MIN: CPT | Performed by: SURGERY

## 2023-01-26 RX ORDER — ASPIRIN 81 MG/1
81 TABLET ORAL DAILY
Qty: 30 TABLET | Refills: 11 | Status: SHIPPED | OUTPATIENT
Start: 2023-01-26 | End: 2024-01-26

## 2023-01-26 NOTE — PROGRESS NOTES
[facility] Vascular Surgery Office Follow Up Note     Date of Encounter: 01/26/2023     MRN Number: [id number]  Name: Willard Lange  Phone Number: [phone]     Referred By: Lilain Bell MD  PCP: Josselyn Damon APRN    Chief Complaint:    Chief Complaint   Patient presents with   • Carotid Artery Disease     Patient is here as an annual follow up with carotid artery studies.        Subjective      History of Present Illness:    Willard Lange is a 62 y.o. male who presents for follow-up of PAD and carotid artery stenosis.  Patient was previously evaluated with mild to moderate carotid artery stenosis.  He denies any symptoms of TIA, completed stroke or amaurosis fugax.  He denies any claudication symptoms at the present time but reports bilateral lower extremity swelling in the last few weeks.  He denies any trauma or to the lower extremities or any history of DVTs.  He is a known diabetic with an A1c of 7 recently.  He uses insulin.  He is on a statin but no aspirin.  He is a non-smoker.  He denies any history of CHF.  He reports bilateral feet numbness.  His blood pressure is well controlled.  He denies chest pain or shortness of breath.    Review of Systems:  Systems reviewed and negative except as noted in the above HPI.     I have reviewed the following portions of the patient's history: allergies, current medications, past family history, past medical history, past social history, past surgical history and problem list and confirm it's accurate.    Allergies:  Allergies   Allergen Reactions   • Formaldehyde Rash       Medications:      Current Outpatient Medications:   •  Advair Diskus 250-50 MCG/DOSE DISKUS, Inhale 1 puff 2 (Two) Times a Day., Disp: , Rfl:   •  albuterol sulfate  (90 Base) MCG/ACT inhaler, Inhale 2 puffs Every 6 (Six) Hours As Needed., Disp: , Rfl:   •  amitriptyline (ELAVIL) 25 MG tablet, Take 25 mg by mouth every night at bedtime., Disp: , Rfl:   •   atorvastatin (LIPITOR) 20 MG tablet, Take 20 mg by mouth., Disp: , Rfl:   •  baclofen (LIORESAL) 10 MG tablet, TAKE 1 TABLET BY MOUTH THREE TIMES DAILY AS NEEDED FOR MUSCLE CRAMPS, Disp: , Rfl:   •  Blood Glucose Monitoring Suppl (FreeStyle Lite) w/Device kit, See Admin Instructions., Disp: , Rfl:   •  busPIRone (BUSPAR) 7.5 MG tablet, Take 7.5 mg by mouth 2 (Two) Times a Day., Disp: , Rfl:   •  chlorhexidine (Hibiclens) 4 % external liquid, Hibiclens 4 % topical liquid  Wash back and gluteal region with Hibiclens soap the night before and morning of procedure, Disp: , Rfl:   •  colestipol (COLESTID) 1 g tablet, TAKE 2 TABLETS BY MOUTH EVERY DAY FOR DIARRHEA, Disp: , Rfl:   •  dicyclomine (BENTYL) 20 MG tablet, TAKE 1 TABLET BY MOUTH 4 TIMES DAILY AS NEEDED FOR GI UPSET, Disp: , Rfl:   •  famotidine (PEPCID) 20 MG tablet, Take 20 mg by mouth every night at bedtime., Disp: , Rfl:   •  FREESTYLE LITE test strip, , Disp: , Rfl:   •  glipizide (GLUCOTROL XL) 5 MG ER tablet, Take 5 mg by mouth Daily., Disp: , Rfl:   •  glucose blood test strip, FreeStyle Lite Strips  USE TO TEST BLOOD SUGAR FOUR TIMES DAILY AND AS NEEDED, Disp: , Rfl:   •  HYDROcodone-acetaminophen (NORCO) 7.5-325 MG per tablet, Take 1 tablet by mouth Every 8 (Eight) Hours As Needed., Disp: , Rfl:   •  indomethacin (INDOCIN) 50 MG capsule, Take 50 mg by mouth 2 (Two) Times a Day With Meals. As needed, Disp: , Rfl:   •  Lancets (freestyle) lancets, , Disp: , Rfl:   •  loratadine (CLARITIN) 10 MG tablet, loratadine 10 mg oral tablet take 1 tablet (10 mg) by oral route once daily   Suspended, Disp: , Rfl:   •  losartan-hydrochlorothiazide (HYZAAR) 100-25 MG per tablet, losartan-hydrochlorothiazide 100-25 mg oral tablet take 1 tablet by oral route once daily   Active, Disp: , Rfl:   •  omeprazole (priLOSEC) 40 MG capsule, Take 40 mg by mouth Daily., Disp: , Rfl:   •  PARoxetine (PAXIL) 10 MG tablet, Take 10 mg by mouth Daily., Disp: , Rfl:   •  pregabalin  (LYRICA) 200 MG capsule, TAKE 1 CAPSULE BY MOUTH THREE TIMES DAILY AS DIRECTED, Disp: , Rfl:   •  Steglatro 5 MG tablet, Take 5 mg by mouth Every Morning., Disp: , Rfl:   •  tamsulosin (FLOMAX) 0.4 MG capsule 24 hr capsule, Take 1 capsule by mouth 2 (Two) Times a Day., Disp: 60 capsule, Rfl: 1  •  traZODone (DESYREL) 50 MG tablet, TAKE 1 TABLET BY MOUTH AT BEDTIME FOR INSOMNIA, Disp: , Rfl:   •  Trulicity 1.5 MG/0.5ML solution pen-injector, Inject 1.5 mg under the skin into the appropriate area as directed Every 7 (Seven) Days., Disp: , Rfl:   •  vitamin B-12 (CYANOCOBALAMIN) 1000 MCG tablet, Take 1,000 mcg by mouth Daily., Disp: , Rfl:   •  vitamin D (ERGOCALCIFEROL) 1.25 MG (18287 UT) capsule capsule, Take 50,000 Units by mouth 1 (One) Time Per Week., Disp: , Rfl:   •  aspirin 81 MG EC tablet, Take 1 tablet by mouth Daily., Disp: 30 tablet, Rfl: 11    History:   Past Medical History:   Diagnosis Date   • Arthritis    • Asthma    • Benign essential hypertension    • Brain tumor (HCC) 01/2021   • Cancer (HCC)    • DDD (degenerative disc disease), lumbar    • Diabetes mellitus type 2, noninsulin dependent (HCC)    • Diabetes type 2, controlled (HCC)    • GERD (gastroesophageal reflux disease)    • Hepatitis C    • HTN (hypertension)    • Leg pain    • Lumbago 02/15/2017    with low back pain   • Lumbar disc herniation 02/15/2017    L4-4   • Lumbar spinal stenosis 02/15/2017    L4/5   • Muscle cramps    • Neuropathy    • Prostate cancer (HCC)        Past Surgical History:   Procedure Laterality Date   • APPENDECTOMY     • BACK SURGERY  2017   • COLONOSCOPY  2017    Dr. Dorsey-Polyps   • CYSTOSCOPY W/ LASER LITHOTRIPSY      for kidney stones   • LAMINECTOMY  02/17/2017    L4-5   • PROSTATE BIOPSY      transrectal   • TONSILLECTOMY         Social History     Socioeconomic History   • Marital status:    Tobacco Use   • Smoking status: Former     Types: Cigarettes     Start date: 8/25/1976     Quit date: 2005      Years since quittin.0   • Smokeless tobacco: Never   Vaping Use   • Vaping Use: Never used   Substance and Sexual Activity   • Alcohol use: Yes     Comment: rarely   • Drug use: Never   • Sexual activity: Defer        Family History   Problem Relation Age of Onset   • Heart disease Father    • Lung cancer Sister    • Diabetes type I Maternal Grandmother        Objective   Physical Exam:  Vitals:    23 1124 23 1125   BP: 136/80 122/65   BP Location: Right arm Left arm   Patient Position: Sitting Sitting   Cuff Size: Large Adult Large Adult   Pulse: 79    Resp: 18    Temp: 97.3 °F (36.3 °C)    TempSrc: Temporal    SpO2: 98%       There is no height or weight on file to calculate BMI.    Physical Exam  Physical Exam  Constitutional:       Appearance: Normal appearance.   HENT:      Head: Normocephalic.   Cardiovascular:      Rate and Rhythm: Normal rate.      Pulses: Normal pulses.      Comments: Nonpalpable pedal pulses most likely related to swelling of the feet.  Pulmonary:      Effort: Pulmonary effort is normal.   Musculoskeletal:         General: Normal range of motion.      Cervical back: Normal range of motion.   Skin:     General: Skin is warm and dry.      Capillary Refill: Capillary refill takes less than 2 seconds.      Comments: No foot ulcers or wounds.  Neurological:      General: No focal deficit present.      Mental Status: Alert and oriented to person, place, and time.   Psychiatric:         Mood and Affect: Mood normal.         Behavior: Behavior normal.  Imaging/Labs:    No Images in the past 120 days found..    I personally reviewed the noninvasive arterial studies which demonstrated less than 50% right ICA stenosis and 50 to 69% left ICA stenosis.  ABIs were 1.28 on the right and 1.31 on the left with TBI's of 1.15 and 0.85 respectively.  There was evidence of noncompressibility.        Assessment / Plan      Assessment / Plan:  Diagnoses and all orders for this visit:    1.  Bilateral carotid artery stenosis (Primary)    2. PVD (peripheral vascular disease) (HCC)  -     Cancel: US Arterial Doppler Lower Extremity Bilateral; Future  -     Doppler Arterial Multi Level Lower Extremity - Bilateral CAR; Future    3. Essential hypertension    4. Pain and swelling of lower extremity, left    5. Pain and swelling of lower extremity, right    6. Diabetic peripheral neuropathy (HCC)    Other orders  -     aspirin 81 MG EC tablet; Take 1 tablet by mouth Daily.  Dispense: 30 tablet; Refill: 11       Compression stockings  Willard is a 62-year-old gentleman with mild PAD and mild to moderate carotid artery stenosis that is asymptomatic.  I recommended repeat Dopplers of his lower extremities in 6 months to evaluate for stability and also carotid duplex in 1 year.  I have also added aspirin to his current regimen and prescribed compression stockings for his bilateral lower extremity swelling.  Thank you for allowing me to participate in your patient's care.    Patient Education:  Patient was given instructions and counseling regarding his condition or for health maintenance advice. Please see specific information pulled into the AVS if appropriate.     Patient educated on the risk of smoking, smoking cessation options discussed. Patient states their understanding.     Follow Up:   Return in about 6 months (around 7/26/2023) for Doppler arterial multilevel.   Or sooner for any further concerns or worsening sign and symptoms. If unable to reach us in the office please dial 911 or go to the nearest emergency department.       Geovanna Solo MD  [facility] Vascular Surgery

## 2023-02-15 ENCOUNTER — TELEPHONE (OUTPATIENT)
Dept: UROLOGY | Facility: CLINIC | Age: 63
End: 2023-02-15
Payer: MEDICARE

## 2023-02-15 NOTE — TELEPHONE ENCOUNTER
LVM asking pt to call back regarding his appt on 02/20/23. Will need to move this due to Gan being in the OR. We would like to move his appt to 02/21/23 at 3:45pm, do not double book.

## 2023-02-19 NOTE — PROGRESS NOTES
Chief Complaint: Urologic complaint    Subjective         History of Present Illness  Willard Lange is a 62 y.o. male      Prostatic adenocarcinoma clinical T1c  ED  BPH      Currently on Flomax 0.4 mg twice daily.  Flomax does help.  Does have a lot of postvoid dribbling.    No GH    No erections since radiation.  No sensation of erection.  Has not tried anything.  Can morning.    Has been to the [facility] - did injections-worked.  Too expensive.    No urologic family history    No CAD history.   Former smoker.  No anticoagulation  Asthma - Short of air sometimes      PVR    2/23   000      Prostate CA      11/22   0.06  2/22     0.08  8/21     0.17  2/21     0.2  11/20    0.2  8/20     0.7  3/20     1.1     2/20 XRT prostate Dr. Buchanan    10/19 prostate biopsy - [facility]  Left base-3+3, 2/2, 28%  Right base-3+3, 1/2, 25%  Left mid-- 3+3, 2/2, 20%  Left apex-3+3, 2/2, 20%  Right apex 3+3, 2/2, 20%      7/19    6.6                Objective     Past Medical History:   Diagnosis Date   • Arthritis    • Asthma    • Benign essential hypertension    • Brain tumor (HCC) 01/2021   • Cancer (HCC)    • DDD (degenerative disc disease), lumbar    • Diabetes mellitus type 2, noninsulin dependent (HCC)    • Diabetes type 2, controlled (HCC)    • GERD (gastroesophageal reflux disease)    • Hepatitis C    • HTN (hypertension)    • Leg pain    • Lumbago 02/15/2017    with low back pain   • Lumbar disc herniation 02/15/2017    L4-4   • Lumbar spinal stenosis 02/15/2017    L4/5   • Muscle cramps    • Neuropathy    • Prostate cancer (HCC)        Past Surgical History:   Procedure Laterality Date   • APPENDECTOMY     • BACK SURGERY  2017   • COLONOSCOPY  2017    Dr. Dorsey-Polyps   • CYSTOSCOPY W/ LASER LITHOTRIPSY      for kidney stones   • LAMINECTOMY  02/17/2017    L4-5   • PROSTATE BIOPSY      transrectal   • TONSILLECTOMY           Current Outpatient Medications:   •  Advair Diskus 250-50 MCG/DOSE DISKUS, Inhale 1 puff 2  (Two) Times a Day., Disp: , Rfl:   •  albuterol sulfate  (90 Base) MCG/ACT inhaler, Inhale 2 puffs Every 6 (Six) Hours As Needed., Disp: , Rfl:   •  amitriptyline (ELAVIL) 25 MG tablet, Take 25 mg by mouth every night at bedtime., Disp: , Rfl:   •  aspirin 81 MG EC tablet, Take 1 tablet by mouth Daily., Disp: 30 tablet, Rfl: 11  •  atorvastatin (LIPITOR) 20 MG tablet, Take 20 mg by mouth., Disp: , Rfl:   •  baclofen (LIORESAL) 10 MG tablet, TAKE 1 TABLET BY MOUTH THREE TIMES DAILY AS NEEDED FOR MUSCLE CRAMPS, Disp: , Rfl:   •  Blood Glucose Monitoring Suppl (FreeStyle Lite) w/Device kit, See Admin Instructions., Disp: , Rfl:   •  busPIRone (BUSPAR) 7.5 MG tablet, Take 7.5 mg by mouth 2 (Two) Times a Day., Disp: , Rfl:   •  chlorhexidine (Hibiclens) 4 % external liquid, Hibiclens 4 % topical liquid  Wash back and gluteal region with Hibiclens soap the night before and morning of procedure, Disp: , Rfl:   •  colestipol (COLESTID) 1 g tablet, TAKE 2 TABLETS BY MOUTH EVERY DAY FOR DIARRHEA, Disp: , Rfl:   •  dicyclomine (BENTYL) 20 MG tablet, TAKE 1 TABLET BY MOUTH 4 TIMES DAILY AS NEEDED FOR GI UPSET, Disp: , Rfl:   •  famotidine (PEPCID) 20 MG tablet, Take 20 mg by mouth every night at bedtime., Disp: , Rfl:   •  FREESTYLE LITE test strip, , Disp: , Rfl:   •  glipizide (GLUCOTROL XL) 5 MG ER tablet, Take 5 mg by mouth Daily., Disp: , Rfl:   •  glucose blood test strip, FreeStyle Lite Strips  USE TO TEST BLOOD SUGAR FOUR TIMES DAILY AND AS NEEDED, Disp: , Rfl:   •  HYDROcodone-acetaminophen (NORCO) 7.5-325 MG per tablet, Take 1 tablet by mouth Every 8 (Eight) Hours As Needed., Disp: , Rfl:   •  indomethacin (INDOCIN) 50 MG capsule, Take 50 mg by mouth 2 (Two) Times a Day With Meals. As needed, Disp: , Rfl:   •  Lancets (freestyle) lancets, , Disp: , Rfl:   •  loratadine (CLARITIN) 10 MG tablet, loratadine 10 mg oral tablet take 1 tablet (10 mg) by oral route once daily   Suspended, Disp: , Rfl:   •   losartan-hydrochlorothiazide (HYZAAR) 100-25 MG per tablet, losartan-hydrochlorothiazide 100-25 mg oral tablet take 1 tablet by oral route once daily   Active, Disp: , Rfl:   •  omeprazole (priLOSEC) 40 MG capsule, Take 40 mg by mouth Daily., Disp: , Rfl:   •  PARoxetine (PAXIL) 10 MG tablet, Take 10 mg by mouth Daily., Disp: , Rfl:   •  pregabalin (LYRICA) 200 MG capsule, TAKE 1 CAPSULE BY MOUTH THREE TIMES DAILY AS DIRECTED, Disp: , Rfl:   •  Steglatro 5 MG tablet, Take 5 mg by mouth Every Morning., Disp: , Rfl:   •  tamsulosin (FLOMAX) 0.4 MG capsule 24 hr capsule, Take 1 capsule by mouth 2 (Two) Times a Day., Disp: 60 capsule, Rfl: 1  •  traZODone (DESYREL) 50 MG tablet, TAKE 1 TABLET BY MOUTH AT BEDTIME FOR INSOMNIA, Disp: , Rfl:   •  Trulicity 1.5 MG/0.5ML solution pen-injector, Inject 1.5 mg under the skin into the appropriate area as directed Every 7 (Seven) Days., Disp: , Rfl:   •  vitamin B-12 (CYANOCOBALAMIN) 1000 MCG tablet, Take 1,000 mcg by mouth Daily., Disp: , Rfl:   •  vitamin D (ERGOCALCIFEROL) 1.25 MG (06437 UT) capsule capsule, Take 50,000 Units by mouth 1 (One) Time Per Week., Disp: , Rfl:     Allergies   Allergen Reactions   • Formaldehyde Rash        Family History   Problem Relation Age of Onset   • Heart disease Father    • Lung cancer Sister    • Diabetes type I Maternal Grandmother        Social History     Socioeconomic History   • Marital status:    Tobacco Use   • Smoking status: Former     Types: Cigarettes     Start date: 1976     Quit date:      Years since quittin.1   • Smokeless tobacco: Never   Vaping Use   • Vaping Use: Never used   Substance and Sexual Activity   • Alcohol use: Yes     Comment: rarely   • Drug use: Never   • Sexual activity: Defer       Vital Signs:   There were no vitals taken for this visit.                   Assessment and Plan    Diagnoses and all orders for this visit:    1. Prostate cancer (HCC) (Primary)      Prostate cancer      PSA  stable, patient needs repeat PSA in 11/23       BPH      Continue Flomax 0.4 mg p.o. 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      He understands if he has erection for greater than 4 hours go to emergency room or risk never having erection again.

## 2023-02-21 ENCOUNTER — OFFICE VISIT (OUTPATIENT)
Dept: UROLOGY | Facility: CLINIC | Age: 63
End: 2023-02-21
Payer: MEDICARE

## 2023-02-21 VITALS — BODY MASS INDEX: 31.83 KG/M2 | WEIGHT: 235 LBS | HEIGHT: 72 IN

## 2023-02-21 DIAGNOSIS — C61 PROSTATE CANCER: Primary | ICD-10-CM

## 2023-02-21 DIAGNOSIS — N40.1 BENIGN PROSTATIC HYPERPLASIA WITH LOWER URINARY TRACT SYMPTOMS, SYMPTOM DETAILS UNSPECIFIED: ICD-10-CM

## 2023-02-21 DIAGNOSIS — N52.35 ERECTILE DYSFUNCTION FOLLOWING RADIATION THERAPY: ICD-10-CM

## 2023-02-21 PROCEDURE — 99203 OFFICE O/P NEW LOW 30 MIN: CPT | Performed by: UROLOGY

## 2023-02-22 ENCOUNTER — TELEPHONE (OUTPATIENT)
Dept: UROLOGY | Facility: CLINIC | Age: 63
End: 2023-02-22
Payer: MEDICARE

## 2023-02-22 NOTE — TELEPHONE ENCOUNTER
Caller: CHHAYA JOHN     Relationship: SELF     Best call back number: [phone]    INCOMING CALL FROM PT. PT IS WANTING YOU ALL TO WORK HIM IN A SOONER APPT THAN 4/13/22. PT SAID HE IS POTENTIALLY GOING OUT OF TOWN THAT WEEK AND TOLD PT THAT 4/13/22 IS THE SOONEST WE HAVE. (PER NEXT AVAILABLE SCHEDULE.) PT IS ALSO CONCERNED BECAUSE THE DATE 4/13 FALLS OUT OF THE 4-6 WEEK TIMEFRAME HE WAS INSTRUCTED TO FOLLOW UP WITHIN.

## 2023-02-23 NOTE — TELEPHONE ENCOUNTER
SPOKE TO PT.  PT WILL KEEP HIS APPT FOR 4/13/23.  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  PT IS OF UNDERSTANDING.

## 2023-04-10 ENCOUNTER — OFFICE VISIT (OUTPATIENT)
Dept: PODIATRY | Facility: CLINIC | Age: 63
End: 2023-04-10
Payer: MEDICARE

## 2023-04-10 VITALS
DIASTOLIC BLOOD PRESSURE: 87 MMHG | BODY MASS INDEX: 31.15 KG/M2 | TEMPERATURE: 97.1 F | HEART RATE: 99 BPM | SYSTOLIC BLOOD PRESSURE: 125 MMHG | OXYGEN SATURATION: 99 % | WEIGHT: 230 LBS | HEIGHT: 72 IN

## 2023-04-10 DIAGNOSIS — I73.9 PVD (PERIPHERAL VASCULAR DISEASE): ICD-10-CM

## 2023-04-10 DIAGNOSIS — M20.42 HAMMER TOES OF BOTH FEET: ICD-10-CM

## 2023-04-10 DIAGNOSIS — M79.672 FOOT PAIN, BILATERAL: Primary | ICD-10-CM

## 2023-04-10 DIAGNOSIS — E11.8 DIABETIC FOOT: ICD-10-CM

## 2023-04-10 DIAGNOSIS — M20.41 HAMMER TOES OF BOTH FEET: ICD-10-CM

## 2023-04-10 DIAGNOSIS — M79.671 FOOT PAIN, BILATERAL: Primary | ICD-10-CM

## 2023-04-10 DIAGNOSIS — G62.9 NEUROPATHY: ICD-10-CM

## 2023-04-10 DIAGNOSIS — E11.9 NON-INSULIN DEPENDENT TYPE 2 DIABETES MELLITUS: ICD-10-CM

## 2023-04-10 DIAGNOSIS — B35.1 ONYCHOMYCOSIS: ICD-10-CM

## 2023-04-10 DIAGNOSIS — L60.0 ONYCHOCRYPTOSIS: ICD-10-CM

## 2023-04-10 PROCEDURE — G8404 LOW EXTEMITY NEUR EXAM DOCUM: HCPCS | Performed by: PODIATRIST

## 2023-04-10 PROCEDURE — 11721 DEBRIDE NAIL 6 OR MORE: CPT | Performed by: PODIATRIST

## 2023-04-10 PROCEDURE — 3074F SYST BP LT 130 MM HG: CPT | Performed by: PODIATRIST

## 2023-04-10 PROCEDURE — 1160F RVW MEDS BY RX/DR IN RCRD: CPT | Performed by: PODIATRIST

## 2023-04-10 PROCEDURE — 1159F MED LIST DOCD IN RCRD: CPT | Performed by: PODIATRIST

## 2023-04-10 PROCEDURE — 3079F DIAST BP 80-89 MM HG: CPT | Performed by: PODIATRIST

## 2023-04-10 NOTE — PROGRESS NOTES
[facility] - PODIATRY    Today's Date: 04/10/23    Patient Name: Willard Lange  MRN: [id number]  CSN: [id number]  PCP: Josselyn Damon APRN, Last PCP Visit: 2/24/2023  Referring Provider: No ref. provider found    SUBJECTIVE     Chief Complaint   Patient presents with   • Left Foot - Follow-up, Nail Problem   • Right Foot - Nail Problem, Follow-up     HPI: Willard Lange, a 62 y.o.male, comes to clinic.    New, Established, New Problem:  established     Location:  Toenails    Duration:   Greater than five years    Onset:  Gradual    Nature:  sore with palpation.    Stable, worsening, improving:   Stable    Aggravating factors:  Pain with shoe gear and ambulation.    Previous Treatment:  debridement  __________________________________    Patient reports the following medical changes since their last visit: Treatment for prostate cancer    Patient denies any fevers, chills, nausea, vomiting, shortness of breathe, nor any other constitutional signs nor symptoms.         Past Medical History:   Diagnosis Date   • Arthritis    • Asthma    • Benign essential hypertension    • Brain tumor 01/2021   • Cancer    • DDD (degenerative disc disease), lumbar    • Diabetes mellitus type 2, noninsulin dependent    • Diabetes type 2, controlled    • GERD (gastroesophageal reflux disease)    • Hepatitis C    • HTN (hypertension)    • Leg pain    • Lumbago 02/15/2017    with low back pain   • Lumbar disc herniation 02/15/2017    L4-4   • Lumbar spinal stenosis 02/15/2017    L4/5   • Muscle cramps    • Neuropathy    • Prostate cancer      Past Surgical History:   Procedure Laterality Date   • APPENDECTOMY     • BACK SURGERY  2017   • COLONOSCOPY  2017    Dr. Dorsey-Polyps   • CYSTOSCOPY W/ LASER LITHOTRIPSY      for kidney stones   • LAMINECTOMY  02/17/2017    L4-5   • PROSTATE BIOPSY      transrectal   • TONSILLECTOMY       Family History   Problem Relation Age of Onset   • Heart disease Father    • Lung cancer  Sister    • Diabetes type I Maternal Grandmother      Social History     Socioeconomic History   • Marital status:    Tobacco Use   • Smoking status: Former     Types: Cigarettes     Start date: 1976     Quit date: 2005     Years since quittin.2   • Smokeless tobacco: Never   Vaping Use   • Vaping Use: Never used   Substance and Sexual Activity   • Alcohol use: Yes     Comment: rarely   • Drug use: Never   • Sexual activity: Defer     Allergies   Allergen Reactions   • Formaldehyde Rash     Current Outpatient Medications   Medication Sig Dispense Refill   • Advair Diskus 250-50 MCG/DOSE DISKUS Inhale 1 puff 2 (Two) Times a Day.     • albuterol sulfate  (90 Base) MCG/ACT inhaler Inhale 2 puffs Every 6 (Six) Hours As Needed.     • amitriptyline (ELAVIL) 25 MG tablet Take 1 tablet by mouth every night at bedtime.     • aspirin 81 MG EC tablet Take 1 tablet by mouth Daily. 30 tablet 11   • atorvastatin (LIPITOR) 20 MG tablet Take 1 tablet by mouth.     • baclofen (LIORESAL) 10 MG tablet TAKE 1 TABLET BY MOUTH THREE TIMES DAILY AS NEEDED FOR MUSCLE CRAMPS     • Blood Glucose Monitoring Suppl (FreeStyle Lite) w/Device kit See Admin Instructions.     • busPIRone (BUSPAR) 7.5 MG tablet Take 1 tablet by mouth 2 (Two) Times a Day.     • chlorhexidine (Hibiclens) 4 % external liquid Hibiclens 4 % topical liquid   Wash back and gluteal region with Hibiclens soap the night before and morning of procedure     • colestipol (COLESTID) 1 g tablet TAKE 2 TABLETS BY MOUTH EVERY DAY FOR DIARRHEA     • dicyclomine (BENTYL) 20 MG tablet TAKE 1 TABLET BY MOUTH 4 TIMES DAILY AS NEEDED FOR GI UPSET     • famotidine (PEPCID) 20 MG tablet Take 1 tablet by mouth every night at bedtime.     • FREESTYLE LITE test strip      • glipizide (GLUCOTROL XL) 5 MG ER tablet Take 1 tablet by mouth Daily.     • glucose blood test strip FreeStyle Lite Strips   USE TO TEST BLOOD SUGAR FOUR TIMES DAILY AND AS NEEDED     •  HYDROcodone-acetaminophen (NORCO) 7.5-325 MG per tablet Take 1 tablet by mouth Every 8 (Eight) Hours As Needed.     • indomethacin (INDOCIN) 50 MG capsule Take 1 capsule by mouth 2 (Two) Times a Day With Meals. As needed     • Lancets (freestyle) lancets      • loratadine (CLARITIN) 10 MG tablet loratadine 10 mg oral tablet take 1 tablet (10 mg) by oral route once daily   Suspended     • losartan-hydrochlorothiazide (HYZAAR) 100-25 MG per tablet losartan-hydrochlorothiazide 100-25 mg oral tablet take 1 tablet by oral route once daily   Active     • omeprazole (priLOSEC) 40 MG capsule Take 1 capsule by mouth Daily.     • PARoxetine (PAXIL) 10 MG tablet Take 1 tablet by mouth Daily.     • pregabalin (LYRICA) 200 MG capsule TAKE 1 CAPSULE BY MOUTH THREE TIMES DAILY AS DIRECTED     • Steglatro 5 MG tablet Take 1 tablet by mouth Every Morning.     • tamsulosin (FLOMAX) 0.4 MG capsule 24 hr capsule Take 1 capsule by mouth 2 (Two) Times a Day. 60 capsule 1   • traZODone (DESYREL) 50 MG tablet TAKE 1 TABLET BY MOUTH AT BEDTIME FOR INSOMNIA     • Trulicity 1.5 MG/0.5ML solution pen-injector Inject 1.5 mg under the skin into the appropriate area as directed Every 7 (Seven) Days.     • vitamin B-12 (CYANOCOBALAMIN) 1000 MCG tablet Take 1 tablet by mouth Daily.     • vitamin D (ERGOCALCIFEROL) 1.25 MG (77255 UT) capsule capsule Take 1 capsule by mouth 1 (One) Time Per Week.       No current facility-administered medications for this visit.     Review of Systems   Constitutional: Negative.    Skin:        Painful toenails   Neurological: Positive for numbness.   All other systems reviewed and are negative.      OBJECTIVE     Vitals:    04/10/23 1400   BP: 125/87   Pulse: 99   Temp: 97.1 °F (36.2 °C)   SpO2: 99%       WBC   Date Value Ref Range Status   03/26/2020 6.92 4.80 - 10.80 10*3/uL Final     RBC   Date Value Ref Range Status   03/26/2020 5.09 4.70 - 6.10 10*6/uL Final     Hemoglobin   Date Value Ref Range Status    03/26/2020 14.5 14.0 - 18.0 g/dL Final     Hematocrit   Date Value Ref Range Status   03/26/2020 44.1 42.0 - 52.0 % Final     MCV   Date Value Ref Range Status   03/26/2020 86.6 80.0 - 96.0 fL Final     MCH   Date Value Ref Range Status   03/26/2020 28.5 27.0 - 31.0 pg Final     MCHC   Date Value Ref Range Status   03/26/2020 32.9 (L) 33.0 - 37.0 Final     RDW   Date Value Ref Range Status   03/26/2020 13.2 11.6 - 14.4 % Final     RDW-SD   Date Value Ref Range Status   03/26/2020 40.8 35.1 - 43.9 fL Final     MPV   Date Value Ref Range Status   03/26/2020 9.6 9.4 - 12.4 fL Final     Platelets   Date Value Ref Range Status   03/26/2020 273 130 - 400 10*3/uL Final     Neutrophil Rel %   Date Value Ref Range Status   03/26/2020 73.3 30.0 - 85.0 % Final     Lymphocyte Rel %   Date Value Ref Range Status   03/26/2020 14.7 (L) 20.0 - 45.0 % Final     Monocyte Rel %   Date Value Ref Range Status   03/26/2020 9.2 3.0 - 10.0 % Final     Eosinophil Rel %   Date Value Ref Range Status   03/26/2020 1.7 0.0 - 7.0 % Final     Basophil Rel %   Date Value Ref Range Status   03/26/2020 0.4 0.0 - 3.0 % Final     Neutrophils Absolute   Date Value Ref Range Status   03/26/2020 5.06 2.00 - 8.00 10*3/uL Final     Lymphocytes Absolute   Date Value Ref Range Status   03/26/2020 1.02 1.00 - 5.00 10*3/uL Final     Monocytes Absolute   Date Value Ref Range Status   03/26/2020 0.64 0.20 - 1.20 10*3/uL Final     Eosinophils Absolute   Date Value Ref Range Status   03/26/2020 0.12 0.00 - 0.70 10*3/uL Final     Basophils Absolute   Date Value Ref Range Status   03/26/2020 0.03 0.00 - 0.20 10*3/uL Final     NRBC   Date Value Ref Range Status   03/26/2020 0.00 0.00 - 0.70 % Final         Lab Results   Component Value Date    GLUCOSE 212 (H) 03/26/2020    CALCIUM 9.6 03/26/2020     03/26/2020    K 4.6 03/26/2020    CO2 26 03/26/2020     03/26/2020    BUN 12 03/26/2020    CREATININE 0.90 07/23/2021    BCR 9 03/26/2020    ANIONGAP 16  03/26/2020       Patient seen in no apparent distress.      PHYSICAL EXAM:     Foot/Ankle Exam:       General:   Diabetic Foot Exam Performed    Appearance: obesity    Orientation: AAOx3    Affect: appropriate    Gait: unimpaired    Shoe Gear:  Casual shoes    VASCULAR      Right Foot Vascularity   Normal vascular exam    Dorsalis pedis:  Absent  Posterior tibial:  Absent  Skin Temperature: warm    Edema Grading:  None  CFT:  4  Pedal Hair Growth:  Absent  Varicosities: none       Left Foot Vascularity   Normal vascular exam    Dorsalis pedis:  Absent  Posterior tibial:  Absent  Skin Temperature: warm    Edema Grading:  None  CFT:  4  Pedal Hair Growth:  Absent  Varicosities: none        NEUROLOGIC     Right Foot Neurologic   Light touch sensation:  Absent  Vibratory sensation:  Absent  Hot/Cold sensation: absent    Protective Sensation using Garrison-Oswaldo Monofilament:  0     Left Foot Neurologic   Light touch sensation:  Absent  Vibratory sensation:  Absent  Hot/cold sensation: absent    Protective Sensation using Garrison-Oswaldo Monofilament:  0     MUSCULOSKELETAL      Right Foot Musculoskeletal   Hammertoe:  Second toe, third toe, fourth toe and fifth toe     Left Foot Musculoskeletal   Hammertoe:  Second toe, third toe, fourth toe and fifth toe     MUSCLE STRENGTH     Right Foot Muscle Strength   Foot dorsiflexion:  4  Foot plantar flexion:  4  Foot inversion:  4  Foot eversion:  4     Left Foot Muscle Strength   Foot dorsiflexion:  4  Foot plantar flexion:  4  Foot inversion:  4  Foot eversion:  4     RANGE OF MOTION      Right Foot Range of Motion   Foot and ankle ROM within normal limits       Left Foot Range of Motion   Foot and ankle ROM within normal limits       DERMATOLOGIC     Right Foot Dermatologic   Skin: skin intact    Nails: onychomycosis, abnormally thick, subungual debris and dystrophic nails    Nails comment:  Toenails 1, 2, 3, 4, and 5     Left Foot Dermatologic   Skin: skin intact     Nails: onychomycosis, abnormally thick, subungual debris, dystrophic nails and ingrown toenail    Nails comment:  Toenails 1, 2, 3, 4, and 5      Diabetic Foot Exam Performed    ASSESSMENT/PLAN     Diagnoses and all orders for this visit:    1. Foot pain, bilateral (Primary)    2. Diabetic foot    3. PVD (peripheral vascular disease)    4. Onychocryptosis    5. Onychomycosis    6. Non-insulin dependent type 2 diabetes mellitus    7. Hammer toes of both feet    8. Neuropathy        Comprehensive lower extremity examination and evaluation was performed.    Discussed findings and treatment plan including risks, benefits, and treatment options with patient in detail. Patient agreed with treatment plan.    Toenails 1 through 5 bilaterally were debrided in thickness and length and then smoothed with a Dremel Tool.  Tolerated the procedure well without complications.    Diabetic foot exam performed and documented this date, compliant with CQM required standards. Detail of findings as noted in physical exam.  Lower extremity Neurologic exam for diabetic patient performed and documented this date, compliant with PQRS required standards. Detail of findings as noted in physical exam.  Advised patient importance of good routine lower extremity hygiene. Advised patient importance of evaluating for intact skin and pain free nail borders.  Advised patient to use mirror to evaluate plantar/ soles of feet for better visualization. Advised patient monitor and phone office to be seen if any cracking to skin, open lesions, painful nail borders or if nails become elongated prior to next visit. Advised patient importance of daily cleansing of lower extremities, followed by good skin cream to maintain normal hydration of skin. Also advised patient importance of close daily monitoring of blood sugar. Advised to regulate diet and medications to maintain control of blood sugar in optimal range. Contact primary care provider if difficulties  maintaining blood sugar levels.  Advised Patient of presence of Diabetes Mellitus condition.  Advised Patient risk of progression and worsening or improvement, then return of condition.  Will monitor condition for any change in future. Treat with most appropriate treatment pending status of condition.  Counseled and advised patient extensively on nature and ramifications of diabetes. Standard instructions given to patient for good diabetic foot care and maintenance. Advised importance of careful monitoring to avoid break down and complications secondary to diabetes. Advised patient importance of strict maintenance of blood sugar control. Advised patient of possible ominous results from neglect of condition, i.e.: amputation/ loss of digits, feet and legs, or even death.  Patient states understands counseling, will monitor closely, continue good hygiene and routine diabetic foot care. Patient will contact office is questions or problems.      An After Visit Summary was printed and given to the patient at discharge, including (if requested) any available informative/educational handouts regarding diagnosis, treatment, or medications. All questions were answered to patient/family satisfaction. Should symptoms fail to improve or worsen they agree to call or return to clinic or to go to the Emergency Department. Discussed the importance of following up with any needed screening tests/labs/specialist appointments and any requested follow-up recommended by me today. Importance of maintaining follow-up discussed and patient accepts that missed appointments can delay diagnosis and potentially lead to worsening of conditions.    Return in about 9 weeks (around 6/12/2023) for Toenail Care., or sooner if acute issues arise.    This document has been electronically signed by Sarkis Ureña DPM on April 10, 2023 14:12 EDT

## 2023-04-11 ENCOUNTER — OFFICE VISIT (OUTPATIENT)
Dept: GASTROENTEROLOGY | Facility: CLINIC | Age: 63
End: 2023-04-11
Payer: MEDICARE

## 2023-04-11 VITALS
BODY MASS INDEX: 31.45 KG/M2 | HEART RATE: 79 BPM | SYSTOLIC BLOOD PRESSURE: 173 MMHG | WEIGHT: 232.2 LBS | DIASTOLIC BLOOD PRESSURE: 75 MMHG | HEIGHT: 72 IN

## 2023-04-11 DIAGNOSIS — R15.2 INCONTINENCE OF FECES WITH FECAL URGENCY: ICD-10-CM

## 2023-04-11 DIAGNOSIS — D12.6 ADENOMATOUS POLYP OF COLON, UNSPECIFIED PART OF COLON: ICD-10-CM

## 2023-04-11 DIAGNOSIS — R15.9 INCONTINENCE OF FECES WITH FECAL URGENCY: ICD-10-CM

## 2023-04-11 DIAGNOSIS — K21.9 GASTROESOPHAGEAL REFLUX DISEASE, UNSPECIFIED WHETHER ESOPHAGITIS PRESENT: ICD-10-CM

## 2023-04-11 DIAGNOSIS — R19.7 DIARRHEA, UNSPECIFIED TYPE: Primary | ICD-10-CM

## 2023-04-11 PROCEDURE — 1160F RVW MEDS BY RX/DR IN RCRD: CPT | Performed by: NURSE PRACTITIONER

## 2023-04-11 PROCEDURE — 99214 OFFICE O/P EST MOD 30 MIN: CPT | Performed by: NURSE PRACTITIONER

## 2023-04-11 PROCEDURE — 3078F DIAST BP <80 MM HG: CPT | Performed by: NURSE PRACTITIONER

## 2023-04-11 PROCEDURE — 3077F SYST BP >= 140 MM HG: CPT | Performed by: NURSE PRACTITIONER

## 2023-04-11 PROCEDURE — 1159F MED LIST DOCD IN RCRD: CPT | Performed by: NURSE PRACTITIONER

## 2023-04-11 NOTE — PATIENT INSTRUCTIONS
Stop colestid  Start 2 fiber daily (benefiber powder, metamucil cookie or powder or fibercon pill)  Continue high fiber diet

## 2023-04-11 NOTE — PROGRESS NOTES
Chief Complaint   Diarrhea    History of Present Illness       Willard Lange is a 62 y.o. male who presents to [facility] for follow up for diarrhea.  He is new to me today.    He has hx diarrhea and admits this has been a problem for years. He will have diarrhea with fecal urgency and fecal incontinence. He is on colestipol 1-2 a day PRN. He maybe takes it several times a week.  If he takes it too often he will get constipated.  Also takes bentyl 2-3 times a day.  He tells me he has never had work-up for his diarrhea in the past.    last colonoscopy done by Dr. Dorsey on 8/2020.  He has a history of adenomatous and hyperplastic colon polyps.  Recall in 8 of 2023.    Benign Brain tumor in 2021. Is supposed to get surveillance on it. History of prostate cancer s/p radiation.     History of Hepatitis C.  Was treated by Dr. Chase. HCV RNA NOT DETECTED 2019    History of GERD--on omeprazole 40 mg daily from PCP.  Never had EGD.  He denies any dysphagia, N&V, rectal bleeding or melena. Good appetite. He has lost 30 lbs over the last several months. He admits he has been eating less to lose.     History of appendectomy    Denies any significant GI family history  Results       Result Review :             His last colonoscopy was done by Dr. Dorsey on 8/27/2020.  He had multiple cecal and colonic polyps removed at that time.  Path was positive for tubular adenomas and hyperplastic polyps.  Recall in 3 years.        Past Medical History     History of  Past Medical History:   Diagnosis Date   • Arthritis    • Asthma    • Benign essential hypertension    • Brain tumor 01/2021   • Cancer    • DDD (degenerative disc disease), lumbar    • Diabetes mellitus type 2, noninsulin dependent    • Diabetes type 2, controlled    • GERD (gastroesophageal reflux disease)    • Hepatitis C    • HTN (hypertension)    • Leg pain    • Lumbago 02/15/2017    with low back pain   • Lumbar disc herniation  02/15/2017    L4-4   • Lumbar spinal stenosis 02/15/2017    L4/5   • Muscle cramps    • Neuropathy    • Prostate cancer        Past Surgical History:   Procedure Laterality Date   • APPENDECTOMY     • BACK SURGERY  2017   • COLONOSCOPY  2017    Dr. Dorsey-Polyps   • CYSTOSCOPY W/ LASER LITHOTRIPSY      for kidney stones   • LAMINECTOMY  02/17/2017    L4-5   • PROSTATE BIOPSY      transrectal   • TONSILLECTOMY           Current Outpatient Medications:   •  Advair Diskus 250-50 MCG/DOSE DISKUS, Inhale 1 puff 2 (Two) Times a Day., Disp: , Rfl:   •  albuterol sulfate  (90 Base) MCG/ACT inhaler, Inhale 2 puffs Every 6 (Six) Hours As Needed., Disp: , Rfl:   •  amitriptyline (ELAVIL) 25 MG tablet, Take 1 tablet by mouth every night at bedtime., Disp: , Rfl:   •  aspirin 81 MG EC tablet, Take 1 tablet by mouth Daily., Disp: 30 tablet, Rfl: 11  •  atorvastatin (LIPITOR) 20 MG tablet, Take 1 tablet by mouth., Disp: , Rfl:   •  baclofen (LIORESAL) 10 MG tablet, TAKE 1 TABLET BY MOUTH THREE TIMES DAILY AS NEEDED FOR MUSCLE CRAMPS, Disp: , Rfl:   •  Blood Glucose Monitoring Suppl (FreeStyle Lite) w/Device kit, See Admin Instructions., Disp: , Rfl:   •  busPIRone (BUSPAR) 7.5 MG tablet, Take 1 tablet by mouth 2 (Two) Times a Day., Disp: , Rfl:   •  chlorhexidine (Hibiclens) 4 % external liquid, Hibiclens 4 % topical liquid  Wash back and gluteal region with Hibiclens soap the night before and morning of procedure, Disp: , Rfl:   •  colestipol (COLESTID) 1 g tablet, TAKE 2 TABLETS BY MOUTH EVERY DAY FOR DIARRHEA, Disp: , Rfl:   •  dicyclomine (BENTYL) 20 MG tablet, TAKE 1 TABLET BY MOUTH 4 TIMES DAILY AS NEEDED FOR GI UPSET, Disp: , Rfl:   •  FREESTYLE LITE test strip, , Disp: , Rfl:   •  glipizide (GLUCOTROL XL) 5 MG ER tablet, Take 1 tablet by mouth Daily., Disp: , Rfl:   •  glucose blood test strip, FreeStyle Lite Strips  USE TO TEST BLOOD SUGAR FOUR TIMES DAILY AND AS NEEDED, Disp: , Rfl:   •   HYDROcodone-acetaminophen (NORCO) 7.5-325 MG per tablet, Take 1 tablet by mouth Every 8 (Eight) Hours As Needed., Disp: , Rfl:   •  indomethacin (INDOCIN) 50 MG capsule, Take 1 capsule by mouth 2 (Two) Times a Day With Meals. As needed, Disp: , Rfl:   •  Lancets (freestyle) lancets, , Disp: , Rfl:   •  loratadine (CLARITIN) 10 MG tablet, loratadine 10 mg oral tablet take 1 tablet (10 mg) by oral route once daily   Suspended, Disp: , Rfl:   •  losartan-hydrochlorothiazide (HYZAAR) 100-25 MG per tablet, losartan-hydrochlorothiazide 100-25 mg oral tablet take 1 tablet by oral route once daily   Active, Disp: , Rfl:   •  omeprazole (priLOSEC) 40 MG capsule, Take 1 capsule by mouth Daily., Disp: , Rfl:   •  PARoxetine (PAXIL) 10 MG tablet, Take 1 tablet by mouth Daily., Disp: , Rfl:   •  pregabalin (LYRICA) 200 MG capsule, TAKE 1 CAPSULE BY MOUTH THREE TIMES DAILY AS DIRECTED, Disp: , Rfl:   •  Steglatro 5 MG tablet, Take 1 tablet by mouth Every Morning., Disp: , Rfl:   •  tamsulosin (FLOMAX) 0.4 MG capsule 24 hr capsule, Take 1 capsule by mouth 2 (Two) Times a Day., Disp: 60 capsule, Rfl: 1  •  traZODone (DESYREL) 50 MG tablet, TAKE 1 TABLET BY MOUTH AT BEDTIME FOR INSOMNIA, Disp: , Rfl:   •  Trulicity 1.5 MG/0.5ML solution pen-injector, Inject 1.5 mg under the skin into the appropriate area as directed Every 7 (Seven) Days., Disp: , Rfl:   •  vitamin B-12 (CYANOCOBALAMIN) 1000 MCG tablet, Take 1 tablet by mouth Daily., Disp: , Rfl:   •  vitamin D (ERGOCALCIFEROL) 1.25 MG (95772 UT) capsule capsule, Take 1 capsule by mouth 1 (One) Time Per Week., Disp: , Rfl:      Allergies   Allergen Reactions   • Formaldehyde Rash       Family History   Problem Relation Age of Onset   • Heart disease Father    • Lung cancer Sister    • Diabetes type I Maternal Grandmother         Social History     Social History Narrative    Lives alone       Objective       Review of Systems   Constitutional: Negative for appetite change, fatigue,  "fever, unexpected weight gain and unexpected weight loss.   HENT: Negative for trouble swallowing.    Respiratory: Negative for cough, choking, chest tightness, shortness of breath, wheezing and stridor.    Cardiovascular: Negative for chest pain, palpitations and leg swelling.   Gastrointestinal: Positive for constipation and diarrhea. Negative for abdominal distention, abdominal pain, anal bleeding, blood in stool, nausea, rectal pain, vomiting, GERD and indigestion.        Vital Signs:   /75 (BP Location: Left arm, Patient Position: Sitting, Cuff Size: Adult)   Pulse 79   Ht 182.9 cm (72\")   Wt 105 kg (232 lb 3.2 oz)   BMI 31.49 kg/m²       Physical Exam  Constitutional:       General: He is not in acute distress.     Appearance: He is well-developed. He is not ill-appearing.   HENT:      Head: Normocephalic.   Eyes:      Pupils: Pupils are equal, round, and reactive to light.   Cardiovascular:      Rate and Rhythm: Normal rate and regular rhythm.      Heart sounds: Normal heart sounds.   Pulmonary:      Effort: Pulmonary effort is normal.      Breath sounds: Normal breath sounds.   Abdominal:      General: Bowel sounds are normal. There is no distension.      Palpations: Abdomen is soft. There is no mass.      Tenderness: There is no abdominal tenderness. There is no guarding or rebound.      Hernia: No hernia is present.   Musculoskeletal:         General: Normal range of motion.   Skin:     General: Skin is warm and dry.   Neurological:      Mental Status: He is alert and oriented to person, place, and time.   Psychiatric:         Speech: Speech normal.         Behavior: Behavior normal.         Judgment: Judgment normal.           Assessment & Plan          Assessment and Plan    Diagnoses and all orders for this visit:    1. Diarrhea, unspecified type (Primary)  -     Calprotectin, Fecal - Stool, Per Rectum  -     Clostridioides difficile Toxin - Stool, Per Rectum  -     Enteric Bacterial Panel - " Stool, Per Rectum  -     Enteric Parasite Panel - Stool, Per Rectum  -     Case Request; Standing  -     Follow Anesthesia Guidelines / Protocol; Future  -     Case Request    2. Incontinence of feces with fecal urgency  -     Case Request; Standing  -     Follow Anesthesia Guidelines / Protocol; Future  -     Case Request    3. Gastroesophageal reflux disease, unspecified whether esophagitis present  -     Case Request; Standing  -     Follow Anesthesia Guidelines / Protocol; Future  -     Case Request    4. Adenomatous polyp of colon, unspecified part of colon  -     Case Request; Standing  -     Follow Anesthesia Guidelines / Protocol; Future  -     Case Request    Other orders  -     Obtain Informed Consent; Standing  -     Verify NPO; Standing  -     Verify Bowel Prep Was Successful; Standing  -     Give Tap Water Enema If Bowel Prep Insufficient; Standing    Sounds like diarrhea is not well controlled currently.  Will start with stool studies.  Stop colestipol.  Start fiber 1-2 times a day.  Fiber handout given today.  Recommend a high-fiber diet.  GERD seems well controlled on omeprazole 40 mg daily.  Continue GERD precautions.  He is due for screening colonoscopy later this year.  It makes sense given his history that we add an EGD to it.  Patient is agreeable to doing both scopes.  Surgical Risk and Benefits discussed: Possible risks/complications, benefits, and alternatives to surgical or invasive procedure have been explained to patient and/or legal guardian; risks include bleeding, infection, and perforation. Patient has been evaluated and can tolerate anesthesia and/or sedation. Risks, benefits, and alternatives to anesthesia and sedation have been explained to patient and/or legal guardian.  Patient to call the office in 2 weeks with an update.  Patient to follow-up with me after his scopes.  Patient is agreeable to the plan.            Follow Up       Follow Up   Return for DIARRHEA, F/U AFTER  PROCEDURE.  Patient was given instructions and counseling regarding his condition or for health maintenance advice. Please see specific information pulled into the AVS if appropriate.

## 2023-04-13 ENCOUNTER — OFFICE VISIT (OUTPATIENT)
Dept: UROLOGY | Facility: CLINIC | Age: 63
End: 2023-04-13
Payer: MEDICARE

## 2023-04-13 VITALS — BODY MASS INDEX: 31.42 KG/M2 | RESPIRATION RATE: 12 BRPM | HEIGHT: 72 IN | WEIGHT: 232 LBS

## 2023-04-13 DIAGNOSIS — N52.35 ERECTILE DYSFUNCTION FOLLOWING RADIATION THERAPY: Primary | ICD-10-CM

## 2023-04-13 PROBLEM — E66.811 OBESITY (BMI 30.0-34.9): Status: ACTIVE | Noted: 2023-04-13

## 2023-04-13 PROBLEM — E66.9 OBESITY (BMI 30.0-34.9): Status: ACTIVE | Noted: 2023-04-13

## 2023-04-13 PROCEDURE — 1160F RVW MEDS BY RX/DR IN RCRD: CPT | Performed by: NURSE PRACTITIONER

## 2023-04-13 PROCEDURE — 99213 OFFICE O/P EST LOW 20 MIN: CPT | Performed by: NURSE PRACTITIONER

## 2023-04-13 PROCEDURE — 54235 NJX CORPORA CAVERNOSA RX AGT: CPT | Performed by: NURSE PRACTITIONER

## 2023-04-13 PROCEDURE — 1159F MED LIST DOCD IN RCRD: CPT | Performed by: NURSE PRACTITIONER

## 2023-04-13 NOTE — PROGRESS NOTES
Chief Complaint: Erectile Dysfunction (Pt here for trimix teaching)    Subjective         History of Present Illness  Willard Lange is a 62 y.o. male presents to [facility] to be seen for trimix teaching.    Patient has been seen by [facility] for for penile injection therapy this was working well for him however the program was too expensive and he could not afford it.    Patient demonstrated ability to successfully perform intracavernosal self injection x 1.   Provided trial dose up to 0.2 for which patient obtained greater than 50% erection.        Objective     Past Medical History:   Diagnosis Date   • Arthritis    • Asthma    • Benign essential hypertension    • Brain tumor 01/2021   • Cancer    • DDD (degenerative disc disease), lumbar    • Diabetes mellitus type 2, noninsulin dependent    • Diabetes type 2, controlled    • GERD (gastroesophageal reflux disease)    • Hepatitis C    • HTN (hypertension)    • Leg pain    • Lumbago 02/15/2017    with low back pain   • Lumbar disc herniation 02/15/2017    L4-4   • Lumbar spinal stenosis 02/15/2017    L4/5   • Muscle cramps    • Neuropathy    • Prostate cancer        Past Surgical History:   Procedure Laterality Date   • APPENDECTOMY     • BACK SURGERY  2017   • COLONOSCOPY  2017    Dr. Dorsey-Polyps   • CYSTOSCOPY W/ LASER LITHOTRIPSY      for kidney stones   • LAMINECTOMY  02/17/2017    L4-5   • PROSTATE BIOPSY      transrectal   • TONSILLECTOMY           Current Outpatient Medications:   •  Advair Diskus 250-50 MCG/DOSE DISKUS, Inhale 1 puff 2 (Two) Times a Day., Disp: , Rfl:   •  albuterol sulfate  (90 Base) MCG/ACT inhaler, Inhale 2 puffs Every 6 (Six) Hours As Needed., Disp: , Rfl:   •  amitriptyline (ELAVIL) 25 MG tablet, Take 1 tablet by mouth every night at bedtime., Disp: , Rfl:   •  aspirin 81 MG EC tablet, Take 1 tablet by mouth Daily., Disp: 30 tablet, Rfl: 11  •  atorvastatin (LIPITOR) 20 MG tablet,  Take 1 tablet by mouth., Disp: , Rfl:   •  baclofen (LIORESAL) 10 MG tablet, TAKE 1 TABLET BY MOUTH THREE TIMES DAILY AS NEEDED FOR MUSCLE CRAMPS, Disp: , Rfl:   •  Blood Glucose Monitoring Suppl (FreeStyle Lite) w/Device kit, See Admin Instructions., Disp: , Rfl:   •  busPIRone (BUSPAR) 7.5 MG tablet, Take 1 tablet by mouth 2 (Two) Times a Day., Disp: , Rfl:   •  chlorhexidine (Hibiclens) 4 % external liquid, Hibiclens 4 % topical liquid  Wash back and gluteal region with Hibiclens soap the night before and morning of procedure, Disp: , Rfl:   •  colestipol (COLESTID) 1 g tablet, TAKE 2 TABLETS BY MOUTH EVERY DAY FOR DIARRHEA, Disp: , Rfl:   •  dicyclomine (BENTYL) 20 MG tablet, TAKE 1 TABLET BY MOUTH 4 TIMES DAILY AS NEEDED FOR GI UPSET, Disp: , Rfl:   •  FREESTYLE LITE test strip, , Disp: , Rfl:   •  glipizide (GLUCOTROL XL) 5 MG ER tablet, Take 1 tablet by mouth Daily., Disp: , Rfl:   •  glucose blood test strip, FreeStyle Lite Strips  USE TO TEST BLOOD SUGAR FOUR TIMES DAILY AND AS NEEDED, Disp: , Rfl:   •  HYDROcodone-acetaminophen (NORCO) 7.5-325 MG per tablet, Take 1 tablet by mouth Every 8 (Eight) Hours As Needed., Disp: , Rfl:   •  indomethacin (INDOCIN) 50 MG capsule, Take 1 capsule by mouth 2 (Two) Times a Day With Meals. As needed, Disp: , Rfl:   •  Lancets (freestyle) lancets, , Disp: , Rfl:   •  loratadine (CLARITIN) 10 MG tablet, loratadine 10 mg oral tablet take 1 tablet (10 mg) by oral route once daily   Suspended, Disp: , Rfl:   •  losartan-hydrochlorothiazide (HYZAAR) 100-25 MG per tablet, losartan-hydrochlorothiazide 100-25 mg oral tablet take 1 tablet by oral route once daily   Active, Disp: , Rfl:   •  omeprazole (priLOSEC) 40 MG capsule, Take 1 capsule by mouth Daily., Disp: , Rfl:   •  PARoxetine (PAXIL) 10 MG tablet, Take 1 tablet by mouth Daily., Disp: , Rfl:   •  pregabalin (LYRICA) 200 MG capsule, TAKE 1 CAPSULE BY MOUTH THREE TIMES DAILY AS DIRECTED, Disp: , Rfl:   •  Steglatro 5 MG  "tablet, Take 1 tablet by mouth Every Morning., Disp: , Rfl:   •  tamsulosin (FLOMAX) 0.4 MG capsule 24 hr capsule, Take 1 capsule by mouth 2 (Two) Times a Day., Disp: 60 capsule, Rfl: 1  •  traZODone (DESYREL) 50 MG tablet, TAKE 1 TABLET BY MOUTH AT BEDTIME FOR INSOMNIA, Disp: , Rfl:   •  Trulicity 1.5 MG/0.5ML solution pen-injector, Inject 1.5 mg under the skin into the appropriate area as directed Every 7 (Seven) Days., Disp: , Rfl:   •  vitamin B-12 (CYANOCOBALAMIN) 1000 MCG tablet, Take 1 tablet by mouth Daily., Disp: , Rfl:   •  vitamin D (ERGOCALCIFEROL) 1.25 MG (72185 UT) capsule capsule, Take 1 capsule by mouth 1 (One) Time Per Week., Disp: , Rfl:     Allergies   Allergen Reactions   • Formaldehyde Rash        Family History   Problem Relation Age of Onset   • Heart disease Father    • Lung cancer Sister    • Diabetes type I Maternal Grandmother        Social History     Socioeconomic History   • Marital status:    Tobacco Use   • Smoking status: Former     Types: Cigarettes     Start date: 1976     Quit date:      Years since quittin.2   • Smokeless tobacco: Never   Vaping Use   • Vaping Use: Never used   Substance and Sexual Activity   • Alcohol use: Yes     Comment: rarely   • Drug use: Never   • Sexual activity: Defer       Vital Signs:   Resp 12   Ht 182.9 cm (72\")   Wt 105 kg (232 lb)   BMI 31.46 kg/m²      Physical Exam     Result Review :   The following data was reviewed by: MARIUM Ahumada on 2023:  Results for orders placed or performed during the hospital encounter of 23   Doppler Arterial Multi Level Lower Extremity - Bilateral CAR   Result Value Ref Range    Target HR (85%) 134 bpm    Max. Pred. HR (100%) 158 bpm    Upper arterial right arm brachial sys max 136 mmHg    Upper arterial left arm brachial sys max 122 mmHg    LEFT LOW THIGH SYS MAX NC     RIGHT POPLITEAL SYS MAX NC     LEFT POPLITEAL SYS      RIGHT POST TIBIAL SYS MAX NC     LEFT " POST TIBIAL SYS      RIGHT DORSALIS PEDIS SYS      LEFT DORSALIS PEDIS SYS      RIGHT GREAT TOE SYS      LEFT GREAT TOE SYS      BH CV LOWER ARTERIAL LEFT LOW THIGH RATIO NC     RIGHT CALF RATIO NC     LEFT CALF RATIO 1.26     RIGHT JONAS RATIO 1.28     LEFT JONAS RATIO 1.31     RIGHT TBI RATIO 1.15     LEFT TBI RATIO 0.85       PSA        11/17/2022    13:48   PSA   PSA 0.062           Procedures        Assessment and Plan    Diagnoses and all orders for this visit:    1. Erectile dysfunction following radiation therapy (Primary)      Patient unsure at this point time he would like for us to send prescription to local compounding pharmacy.  We will follow-up with him in 3 months or sooner if needed.  He will call us to let us know when he would like his prescription sent    instructions provided on how to titrate dose up appropriately and home instructions provided.    Patient to follow up in 3 months or earlier as needed              I spent 20 minutes caring for Willard on this date of service. This time includes time spent by me in the following activities:reviewing tests, obtaining and/or reviewing a separately obtained history, performing a medically appropriate examination and/or evaluation , counseling and educating the patient/family/caregiver, ordering medications, tests, or procedures, and documenting information in the medical record  Follow Up   Return in about 3 months (around 7/13/2023) for f/u trimix.  Patient was given instructions and counseling regarding his condition or for health maintenance advice. Please see specific information pulled into the AVS if appropriate.         This document has been electronically signed by MARIUM Ahumada  April 13, 2023 16:03 EDT

## 2023-04-18 RX ORDER — SODIUM PICOSULFATE, MAGNESIUM OXIDE, AND ANHYDROUS CITRIC ACID 10; 3.5; 12 MG/160ML; G/160ML; G/160ML
160 LIQUID ORAL 2 TIMES DAILY
Qty: 320 ML | Refills: 0 | COMMUNITY
Start: 2023-04-18

## 2023-04-25 ENCOUNTER — TELEPHONE (OUTPATIENT)
Dept: GASTROENTEROLOGY | Facility: CLINIC | Age: 63
End: 2023-04-25
Payer: MEDICARE

## 2023-05-04 ENCOUNTER — OFFICE VISIT (OUTPATIENT)
Dept: NEUROSURGERY | Facility: CLINIC | Age: 63
End: 2023-05-04
Payer: MEDICARE

## 2023-05-04 VITALS
DIASTOLIC BLOOD PRESSURE: 87 MMHG | HEART RATE: 95 BPM | BODY MASS INDEX: 32.23 KG/M2 | SYSTOLIC BLOOD PRESSURE: 130 MMHG | HEIGHT: 72 IN | WEIGHT: 238 LBS

## 2023-05-04 DIAGNOSIS — D32.9 MENINGIOMA: ICD-10-CM

## 2023-05-04 DIAGNOSIS — M47.812 CERVICAL SPONDYLOSIS WITHOUT MYELOPATHY: Primary | ICD-10-CM

## 2023-05-04 DIAGNOSIS — R29.898 UPPER EXTREMITY WEAKNESS: ICD-10-CM

## 2023-05-04 DIAGNOSIS — R20.2 NUMBNESS AND TINGLING IN BOTH HANDS: ICD-10-CM

## 2023-05-04 DIAGNOSIS — R51.9 INCREASED SEVERITY OF HEADACHES: ICD-10-CM

## 2023-05-04 DIAGNOSIS — R20.0 NUMBNESS AND TINGLING IN BOTH HANDS: ICD-10-CM

## 2023-05-04 NOTE — PROGRESS NOTES
Chief Complaint  Neck Pain (Follow up worsening neck pain and hand pain)    Subjective          Willard Lange who is a 62 y.o. year old male who presents to [facility] for neck pain and worsening hand numbness. History of meningioma.    History of Present Illness  Pt was last seen in our office for his low back, at which time Dr. Ford recommended spinal cord stimulator. He underwent spinal cord stimulator which has provided some improvement in his back and leg pain. He has significant numbness in the feet and imbalance due to diabetic peripheral neuropathy.    He is here today to discuss his neck pain. We last discussed his cervical spine in 2021, with MRI Cervical Spine at that time showing spondylosis without significant spinal canal or foraminal stenosis.     He is having concerns of worsening neck pain. No surgical recommendations at that time. Aquatics therapy has helped him in the past. He would like to resume this. He is having concerns of numbness and weakness in the hands and arms. Numbness in all the fingers to the wrists. He has trouble with  strength, dropping things frequently. He has notable atrophy at the FDI.     He also mentions concerns of worsening headaches. He is having severe headaches 2-3 days per week. He is history of meningioma. His last Brain MRI was two years ago.       Interval History 09/21/21  Willard Lange who is a 61 y.o. year old male who presents to [facility] for follow up of his neck and low back pain.     Pt last seen in our office in December of 2020 by GHADA Steele. He was referred to neurology for abnormal MRI Brain and frequent falls. Dr. Cook saw him in January of this year and felt that small meningioma of the brain was a benign finding. He diagnosed patient with a diabetic peripheral polyneuropathy, which he attributed to his imbalance and frequent falls.      He is  "followed by pain management, primarily for his low back pain. He has received several injections, including TFESI and LESB. These provide modest benefit. Has also had MBB without significant benefit.      Pt with chronic neck pain as well denies any radiating pain into the arms. MRI Cervical spine showed degenerative changes without high grade canal or foraminal stenosis. He has not had any interventions for his neck.    Recent Interventions: See above      Review of Systems   Musculoskeletal: Positive for neck pain and neck stiffness.   Neurological: Positive for weakness and numbness.   All other systems reviewed and are negative.       Objective   Vital Signs:   /87 (BP Location: Right arm)   Pulse 95   Ht 182.9 cm (72.01\")   Wt 108 kg (238 lb)   BMI 32.27 kg/m²       Physical Exam  Vitals reviewed.   Constitutional:       Appearance: Normal appearance.   Musculoskeletal:      Right shoulder: No tenderness. Normal range of motion.      Left shoulder: No tenderness. Normal range of motion.      Cervical back: Tenderness present. Pain with movement present. Decreased range of motion.   Neurological:      Mental Status: He is alert and oriented to person, place, and time.      Deep Tendon Reflexes:      Reflex Scores:       Tricep reflexes are 0 on the right side and 0 on the left side.       Bicep reflexes are 0 on the right side and 0 on the left side.       Brachioradialis reflexes are 0 on the right side and 0 on the left side.       Neurologic Exam     Mental Status   Oriented to person, place, and time.   Level of consciousness: alert    Motor Exam   Muscle bulk: decreased (FDI)  Overall muscle tone: normal    Strength   Strength 5/5 except as noted.   Right interossei: 4/5  Left interossei: 4/5    Sensory Exam   Right arm light touch: decreased from wrist  Left arm light touch: decreased from wrist    Gait, Coordination, and Reflexes     Reflexes   Right brachioradialis: 0  Left brachioradialis: " 0  Right biceps: 0  Left biceps: 0  Right triceps: 0  Left triceps: 0  Right Lawrence: absent  Left Lawrence: absentUnsteady gait        Result Review :                 Assessment and Plan    Diagnoses and all orders for this visit:    1. Cervical spondylosis without myelopathy (Primary)  -     MRI Cervical Spine Without Contrast; Future  -     Ambulatory Referral to Physical Therapy Aquatics    2. Numbness and tingling in both hands  -     MRI Cervical Spine Without Contrast; Future  -     EMG & Nerve Conduction Test; Future    3. Upper extremity weakness  -     MRI Cervical Spine Without Contrast; Future  -     EMG & Nerve Conduction Test; Future  -     Ambulatory Referral to Physical Therapy Aquatics    4. Meningioma  -     MRI Brain With & Without Contrast; Future    5. Increased severity of headaches  -     MRI Brain With & Without Contrast; Future    Pt presenting with concerns of worsening neck pain and arm numbness with weakness. Will refer to aquatics therapy which previously provided him benefit. Proceed with MRI Cervical Spine and EMG/NCV to evaluate for interventional management.     He also mention concerns of worsening headaches. Due to his age and history of meningioma, will order MRI Brain w/wo to monitor for growth in meningioma.     He will follow up in 6 weeks.       Follow Up   Return in about 6 weeks (around 6/15/2023).  Patient was given instructions and counseling regarding his condition or for health maintenance advice.       -MRI Brain and Cervical Spine  -EMG/NCV upper extremities  -Aquatics therapy  -Follow up in 6 weeks

## 2023-05-04 NOTE — PATIENT INSTRUCTIONS
-MRI Brain and Cervical Spine  -EMG/NCV upper extremities  -Aquatics therapy  -Follow up in 6 weeks

## 2023-05-12 ENCOUNTER — TELEPHONE (OUTPATIENT)
Dept: UROLOGY | Facility: CLINIC | Age: 63
End: 2023-05-12
Payer: MEDICARE

## 2023-05-12 NOTE — TELEPHONE ENCOUNTER
[facility] called for an order for Trimix. He did the trial in April and is ready for the vial. They do not have any order except for the trial dose.

## 2023-05-18 ENCOUNTER — OFFICE VISIT (OUTPATIENT)
Dept: RADIATION ONCOLOGY | Facility: HOSPITAL | Age: 63
End: 2023-05-18
Payer: MEDICARE

## 2023-05-18 VITALS
DIASTOLIC BLOOD PRESSURE: 84 MMHG | TEMPERATURE: 98.1 F | HEART RATE: 73 BPM | SYSTOLIC BLOOD PRESSURE: 138 MMHG | BODY MASS INDEX: 32.82 KG/M2 | WEIGHT: 242.06 LBS | OXYGEN SATURATION: 96 % | RESPIRATION RATE: 16 BRPM

## 2023-05-18 DIAGNOSIS — R39.9 LOWER URINARY TRACT SYMPTOMS (LUTS): ICD-10-CM

## 2023-05-18 DIAGNOSIS — N52.9 ERECTILE DYSFUNCTION, UNSPECIFIED ERECTILE DYSFUNCTION TYPE: ICD-10-CM

## 2023-05-18 DIAGNOSIS — I73.9 PVD (PERIPHERAL VASCULAR DISEASE): ICD-10-CM

## 2023-05-18 DIAGNOSIS — Z85.46 ENCOUNTER FOR FOLLOW-UP SURVEILLANCE OF PROSTATE CANCER: ICD-10-CM

## 2023-05-18 DIAGNOSIS — Z86.011 HISTORY OF MENINGIOMA OF THE BRAIN: ICD-10-CM

## 2023-05-18 DIAGNOSIS — Z08 ENCOUNTER FOR FOLLOW-UP SURVEILLANCE OF PROSTATE CANCER: ICD-10-CM

## 2023-05-18 DIAGNOSIS — Z92.3 PERSONAL HISTORY OF RADIATION THERAPY: ICD-10-CM

## 2023-05-18 DIAGNOSIS — R19.7 DIARRHEA, UNSPECIFIED TYPE: ICD-10-CM

## 2023-05-18 DIAGNOSIS — Z85.46 PERSONAL HISTORY OF PROSTATE CANCER: Primary | ICD-10-CM

## 2023-05-18 DIAGNOSIS — C61 PROSTATE CANCER: Primary | ICD-10-CM

## 2023-05-18 DIAGNOSIS — E11.42 DIABETIC POLYNEUROPATHY ASSOCIATED WITH TYPE 2 DIABETES MELLITUS: ICD-10-CM

## 2023-05-18 DIAGNOSIS — R51.9 INCREASED SEVERITY OF HEADACHES: ICD-10-CM

## 2023-05-18 DIAGNOSIS — R29.898 UPPER EXTREMITY WEAKNESS: ICD-10-CM

## 2023-05-18 LAB — PSA SERPL-MCNC: 0.07 NG/ML (ref 0–4)

## 2023-05-18 PROCEDURE — G0463 HOSPITAL OUTPT CLINIC VISIT: HCPCS | Performed by: NURSE PRACTITIONER

## 2023-05-18 PROCEDURE — 36415 COLL VENOUS BLD VENIPUNCTURE: CPT | Performed by: NURSE PRACTITIONER

## 2023-05-18 PROCEDURE — 84153 ASSAY OF PSA TOTAL: CPT | Performed by: NURSE PRACTITIONER

## 2023-05-18 RX ORDER — TAMSULOSIN HYDROCHLORIDE 0.4 MG/1
1 CAPSULE ORAL 2 TIMES DAILY
Qty: 180 CAPSULE | Refills: 0 | Status: SHIPPED | OUTPATIENT
Start: 2023-05-18 | End: 2023-08-16

## 2023-05-18 RX ORDER — TAMSULOSIN HYDROCHLORIDE 0.4 MG/1
1 CAPSULE ORAL 2 TIMES DAILY
Qty: 60 CAPSULE | Refills: 3 | Status: SHIPPED | OUTPATIENT
Start: 2023-05-18 | End: 2023-05-18 | Stop reason: SDUPTHER

## 2023-05-18 NOTE — PROGRESS NOTES
Follow Up Office Visit      Encounter Date: 05/18/2023   Patient Name: Willard Lange  YOB: 1960   Medical Record Number: [id number]   Primary Diagnosis: Prostate cancer [C61]     Cancer Staging   Prostate cancer  Staging form: Prostate, AJCC 8th Edition  - Clinical stage from 10/24/2019: Stage I (cT1c, cN0, cM0, PSA: 5.6, Grade Group: 1) - Signed by Shi Cortez APRN on 11/17/2022    Radiation Completion Date:  2/28/2020    Chief Complaint:    Chief Complaint   Patient presents with   • Follow-up   • Prostate Cancer       Oncology/Hematology History   Prostate cancer   10/24/2019 Cancer Staged    Staging form: Prostate, AJCC 8th Edition  - Clinical stage from 10/24/2019: Stage I (cT1c, cN0, cM0, PSA: 5.6, Grade Group: 1) - Signed by Shi Cortez APRN on 11/17/2022 1/22/2020 - 8/28/2020 Radiation    Radiation OncologyTreatment Course:  Willard Lange received 7000 cGy in 28 fractions to prostate and seminal vesicles.      8/25/2021 Initial Diagnosis    Prostate cancer (HCC)         History of Present Illness: Willard Lange is a 63 y.o. male who [place] to [facility] follow-up. PSA obtained today. He reports feeling well overall although he does continue to experience urgency, weak stream, occasional small-volume urge incontinence and postvoid dribbling. Nocturia x1. Currently on Flomax 0.4 mg twice daily. At his last visit with me I referred him to Dr. Gan for erectile dysfunction. He followed up with urology on 4/13/23; note reviewed. He had been seen by [facility] for penile injection therapy which was working well for him, however, the program was too expensive for him to continue.     He is followed by Vascular Surgery for PAD and mild-to-moderate carotid artery stenosis. He is a known diabetic. He also followed up with Neurosurgery on 5/4/23 for complaints of neck pain and worsening right hand numbness and weakness. He believes his RUE  weakness is related to prior rotator cuff injury. He has a history of meningioma. He also reported worsening headaches, severe headaches 2-3 days per week.     He is followed by Pain Management for chronic low back pain. He has a spinal cord stimulator which helps some with his back pain but he has significant numbness in his feet and imbalance due to diabetic peripheral neuropathy. Imaging ordered. He was evaluated by Gastroenterology on 4/11/23 for complaints of diarrhea. Last colonoscopy in 8/2020 by Dr. Dorsey.     Subjective      Review of Systems: Review of Systems   Constitutional: Positive for fatigue. Negative for appetite change and unexpected weight change (intentional weight loss due to better controlled diabetes ). Fever: 7/10.   HENT: Negative for hearing loss, sore throat and trouble swallowing.    Eyes: Negative for visual disturbance.   Respiratory: Negative for cough and shortness of breath.    Cardiovascular: Positive for leg swelling (Bilateral, left worse than right, recently started new diuretic.). Negative for chest pain and palpitations.   Gastrointestinal: Positive for diarrhea (Comes and goes, takes colestipid as needed.). Negative for blood in stool, constipation, nausea and vomiting.   Genitourinary: Positive for difficulty urinating (Occasional hesitancy initiating stream.) and urgency (Ongoing). Negative for dysuria, frequency and hematuria.        Noc x1  Weak stream, stream starts and stops.  Occasional leakage     Musculoskeletal: Positive for arthralgias (Chronic neck, knee and feet pain  ), back pain (Chronic) and joint swelling (Bilateral foot and ankles, ongoing). Negative for gait problem.   Skin: Negative for color change and rash.   Neurological: Positive for dizziness (Occasionally will lose balance and fall, ongoing), weakness (In hands, r/t arthritis- worsening.) and numbness (In feet, states that the numbness is causing him to fall at times.    ). Negative for headaches.    Psychiatric/Behavioral: Positive for sleep disturbance (Trouble falling asleep, takes trazadone with little relief.).     The following portions of the patient's history were reviewed and updated as appropriate: allergies, current medications, past family history, past medical history, past social history, past surgical history and problem list.    Medications:     Current Outpatient Medications:   •  Advair Diskus 250-50 MCG/DOSE DISKUS, Inhale 1 puff 2 (Two) Times a Day., Disp: , Rfl:   •  albuterol sulfate  (90 Base) MCG/ACT inhaler, Inhale 2 puffs Every 6 (Six) Hours As Needed., Disp: , Rfl:   •  amitriptyline (ELAVIL) 25 MG tablet, Take 1 tablet by mouth every night at bedtime., Disp: , Rfl:   •  atorvastatin (LIPITOR) 20 MG tablet, Take 1 tablet by mouth., Disp: , Rfl:   •  baclofen (LIORESAL) 10 MG tablet, TAKE 1 TABLET BY MOUTH THREE TIMES DAILY AS NEEDED FOR MUSCLE CRAMPS, Disp: , Rfl:   •  busPIRone (BUSPAR) 7.5 MG tablet, Take 1 tablet by mouth 2 (Two) Times a Day., Disp: , Rfl:   •  colestipol (COLESTID) 1 g tablet, As Needed., Disp: , Rfl:   •  dicyclomine (BENTYL) 20 MG tablet, TAKE 1 TABLET BY MOUTH 4 TIMES DAILY AS NEEDED FOR GI UPSET, Disp: , Rfl:   •  glipizide (GLUCOTROL XL) 5 MG ER tablet, Take 1 tablet by mouth Daily., Disp: , Rfl:   •  HYDROcodone-acetaminophen (NORCO) 7.5-325 MG per tablet, Take 1 tablet by mouth Every 8 (Eight) Hours As Needed., Disp: , Rfl:   •  loratadine (CLARITIN) 10 MG tablet, loratadine 10 mg oral tablet take 1 tablet (10 mg) by oral route once daily   Suspended, Disp: , Rfl:   •  losartan-hydrochlorothiazide (HYZAAR) 100-25 MG per tablet, losartan-hydrochlorothiazide 100-25 mg oral tablet take 1 tablet by oral route once daily   Active, Disp: , Rfl:   •  omeprazole (priLOSEC) 40 MG capsule, Take 1 capsule by mouth Daily., Disp: , Rfl:   •  pregabalin (LYRICA) 200 MG capsule, TAKE 1 CAPSULE BY MOUTH THREE TIMES DAILY AS DIRECTED, Disp: , Rfl:   •  traZODone  (DESYREL) 50 MG tablet, TAKE 1 TABLET BY MOUTH AT BEDTIME FOR INSOMNIA, Disp: , Rfl:   •  Trulicity 1.5 MG/0.5ML solution pen-injector, Inject 1.5 mg under the skin into the appropriate area as directed Every 7 (Seven) Days., Disp: , Rfl:   •  aspirin 81 MG EC tablet, Take 1 tablet by mouth Daily. (Patient not taking: Reported on 5/18/2023), Disp: 30 tablet, Rfl: 11  •  Blood Glucose Monitoring Suppl (FreeStyle Lite) w/Device kit, See Admin Instructions., Disp: , Rfl:   •  chlorhexidine (Hibiclens) 4 % external liquid, Hibiclens 4 % topical liquid  Wash back and gluteal region with Hibiclens soap the night before and morning of procedure (Patient not taking: Reported on 5/18/2023), Disp: , Rfl:   •  FREESTYLE LITE test strip, , Disp: , Rfl:   •  glucose blood test strip, FreeStyle Lite Strips  USE TO TEST BLOOD SUGAR FOUR TIMES DAILY AND AS NEEDED, Disp: , Rfl:   •  indomethacin (INDOCIN) 50 MG capsule, Take 1 capsule by mouth 2 (Two) Times a Day With Meals. As needed (Patient not taking: Reported on 5/18/2023), Disp: , Rfl:   •  Lancets (freestyle) lancets, , Disp: , Rfl:   •  NAPROXEN PO, Take  by mouth., Disp: , Rfl:   •  PARoxetine (PAXIL) 10 MG tablet, Take 1 tablet by mouth Daily. (Patient not taking: Reported on 5/18/2023), Disp: , Rfl:   •  Sod Picosulfate-Mag Ox-Cit Acd (Clenpiq) 10-3.5-12 MG-GM -GM/160ML solution, Take 160 mL by mouth 2 (Two) Times a Day. (Patient not taking: Reported on 5/18/2023), Disp: 320 mL, Rfl: 0  •  Steglatro 5 MG tablet, Take 1 tablet by mouth Every Morning. (Patient not taking: Reported on 5/18/2023), Disp: , Rfl:   •  tamsulosin (FLOMAX) 0.4 MG capsule 24 hr capsule, Take 1 capsule by mouth 2 (Two) Times a Day for 90 days., Disp: 180 capsule, Rfl: 0  •  vitamin B-12 (CYANOCOBALAMIN) 1000 MCG tablet, Take 1 tablet by mouth Daily. (Patient not taking: Reported on 5/18/2023), Disp: , Rfl:   •  vitamin D (ERGOCALCIFEROL) 1.25 MG (49315 UT) capsule capsule, Take 1 capsule by mouth 1  (One) Time Per Week. (Patient not taking: Reported on 5/18/2023), Disp: , Rfl:     Allergies:   Allergies   Allergen Reactions   • Formaldehyde Rash     Measures:  Quality of Life: 100 - Full Activity   ECOG score: 0  ECOG: (0) Fully active, able to carry on all predisease performance without restriction  Pain: (on a scale of 0-10)   Pain Score    05/18/23 1304   PainSc:   8   PainLoc: Generalized     Willard Lange reports a pain score of 8.  Given his pain assessment as noted, treatment options were discussed and the following options were decided upon as a follow-up plan to address the patient's pain: continuation of current treatment plan for pain. Patient followed by Pain Management.     Objective     Physical Exam:   Vital Signs:   Vitals:    05/18/23 1304   BP: 138/84   Pulse: 73   Resp: 16   Temp: 98.1 °F (36.7 °C)   TempSrc: Temporal   SpO2: 96%   Weight: 110 kg (242 lb 1 oz)   PainSc:   8   PainLoc: Generalized     Body mass index is 32.82 kg/m².   Wt Readings from Last 3 Encounters:   05/18/23 110 kg (242 lb 1 oz)   05/04/23 108 kg (238 lb)   04/13/23 105 kg (232 lb)     Physical Exam  Vitals reviewed.   Constitutional:       General: He is not in acute distress.     Appearance: Normal appearance. He is normal weight. He is not ill-appearing.   HENT:      Head: Normocephalic and atraumatic.      Mouth/Throat:      Mouth: Mucous membranes are moist.      Pharynx: Oropharynx is clear. No oropharyngeal exudate or posterior oropharyngeal erythema.   Eyes:      Conjunctiva/sclera: Conjunctivae normal.      Pupils: Pupils are equal, round, and reactive to light.   Neck:      Comments: Decreased ROM 2/2 pain    Cardiovascular:      Rate and Rhythm: Normal rate and regular rhythm.      Pulses: Normal pulses.      Heart sounds: Normal heart sounds.   Pulmonary:      Effort: Pulmonary effort is normal. No respiratory distress.      Breath sounds: Normal breath sounds.   Musculoskeletal:      Right lower leg: Edema  present.      Left lower leg: Edema present.      Comments: Decreased ROM RUE reportedly 2/2 prior rotator cuff injury   Skin:     General: Skin is warm and dry.   Neurological:      General: No focal deficit present.      Mental Status: He is alert and oriented to person, place, and time. Mental status is at baseline.   Psychiatric:         Mood and Affect: Mood normal.         Behavior: Behavior normal.       Result Review: I independently reviewed the following data. The pertinent findings are as above in the HPI.  -- Urology note on 2/21/23 and 4/13/23  -- Vascular Surgery note on 1/26/23  -- Neurosurgery note on 5/4/23   -- Gastroenterology note on 4/11/23     Imaging: No radiology results for the last 90 days.     Labs:    PSA   Date Value Ref Range Status   05/18/2023 0.065 0.000 - 4.000 ng/mL Final   11/17/2022 0.062 0.000 - 4.000 ng/mL Final   02/15/2022 0.086 0.000 - 4.000 ng/mL Final   08/25/2021 0.178 0.000 - 4.000 ng/mL Final   02/22/2021 0.20 0.00 - 4.00 ng/mL Final   11/17/2020 0.28 0.00 - 4.00 ng/mL Final   08/12/2020 0.71 0.00 - 4.00 ng/mL Final   03/26/2020 1.16 0.00 - 4.00 ng/mL Final   10/07/2019 5.64 (H) 0.00 - 4.00 ng/mL Final      Assessment / Plan      Impression: Willard Lange is a pleasant 63 y.o. male with cT1c prostate cancer, Batool 3+3=6, ipsa 5.64.  Status post external beam radiotherapy, completed on 2/28/2020. He is doing reasonably well overall but does continue to experience chronic lower urinary tract outlet obstruction symptoms and erectile dysfunction. His urinary symptoms have somewhat improved since last visit where Flomax was increased to twice daily. His AUA Symptom Score today is 28 (AUA-SS at consultation was 27).     Erectile dysfunction: pertinent history likely contributing factors to his ED include chronic low back pain with spinal cord stimulator in place, PAD and mild-to-moderate carotid artery stenosis. He is also a known diabetic. He is currently prescribed  Trimix as directed by urology.     Cervical pain with upper extremity weakness and headaches: History of benign meningoma diagnosed in 12/2020 (no surgical intervention).  Recently with complaints of neck pain, worsening hand numbness and headaches. His most recent visit with Neurosurgery was on 5/4/23 and MRI Cervical Spine, EMG/NCV and MRI Brain ordered.     Diarrhea/fecal urgency and incontinence: currently taking colestipol. Stool studies have been ordered. He is scheduled for endoscopy with Dr. Dorsey on 7/6/23.     Former Smoker: quit smoking in 2005. Does not meet criteria for LDCT for lung cancer screening.    Assessment/Plan:   Diagnoses and all orders for this visit:    1. Prostate cancer (Primary)    2. Encounter for follow-up surveillance of prostate cancer    3. Personal history of radiation therapy    4. Lower urinary tract symptoms (LUTS)    5. Erectile dysfunction, unspecified erectile dysfunction type    6. PVD (peripheral vascular disease)    7. History of meningioma of the brain    8. Upper extremity weakness    9. Increased severity of headaches    10. Diarrhea, unspecified type    11. Diabetic polyneuropathy associated with type 2 diabetes mellitus       Follow Up:   1. Awaiting results of PSA obtained today. Will call him with results.   2. Return for follow-up in 6 months with repeat PSA. Refill for Flomax sent to pharmacy today, 90 day supply per patient request.   3. Follow-up with Urology as scheduled.    4. Follow-up with Vascular surgery as scheduled for ongoing management of PAD.   5. Followed up with Neurosurgery as scheduled on 6/15/23.   6. Follow-up with PCP as scheduled for ongoing management of diabetes.        Return in about 6 months (around 11/18/2023) for Office Visit.  Willard Lange was encouraged to contact me in the interim with any questions or concerns regarding his care.    ADDENDUM on 5/19/23: PSA on 5/18/23 remains stable at 0.065 ng/mL. Recheck in 6 months.         MARIUM Singleton  Radiation Oncology  [facility]    This document has been signed by MARIUM Wu on May 19, 2023 08:14 EDT

## 2023-05-19 ENCOUNTER — TELEPHONE (OUTPATIENT)
Dept: RADIATION ONCOLOGY | Facility: HOSPITAL | Age: 63
End: 2023-05-19
Payer: MEDICARE

## 2023-05-19 NOTE — TELEPHONE ENCOUNTER
Mr. Lange states that he received a voicemail to call our office back regarding his recent lab work results.    Reviewed Mr. Lange's PSA level with him that was drawn yesterday at his visit. He v/u.  No questions or concerns voiced.     Lab Results   Component Value Date    PSA 0.065 05/18/2023    PSA 0.062 11/17/2022    PSA 0.086 02/15/2022     Susanne GHOTRA

## 2023-05-23 ENCOUNTER — TELEPHONE (OUTPATIENT)
Dept: GASTROENTEROLOGY | Facility: CLINIC | Age: 63
End: 2023-05-23
Payer: MEDICARE

## 2023-05-23 ENCOUNTER — LAB (OUTPATIENT)
Dept: LAB | Facility: HOSPITAL | Age: 63
End: 2023-05-23
Payer: MEDICARE

## 2023-05-23 DIAGNOSIS — R19.7 DIARRHEA, UNSPECIFIED TYPE: ICD-10-CM

## 2023-05-23 LAB
027 TOXIN: NORMAL
C DIFF TOX GENS STL QL NAA+PROBE: NEGATIVE

## 2023-05-23 PROCEDURE — 83993 ASSAY FOR CALPROTECTIN FECAL: CPT

## 2023-05-23 PROCEDURE — 87506 IADNA-DNA/RNA PROBE TQ 6-11: CPT

## 2023-05-23 PROCEDURE — 87493 C DIFF AMPLIFIED PROBE: CPT

## 2023-05-23 NOTE — TELEPHONE ENCOUNTER
Patient walked into the office this afternoon and stated that he had tried to turn in his stool study but they told him we hadn't sent over the orders. I verbalized understanding and advised the patient to have a seat and as soon as one of my MA's comes back from the quick staff meeting that I would have them reopen the orders and print them off so he could take them to the lab. Patient verbalized understanding, Carlie reopened the orders and printed them off for the patient.

## 2023-05-24 ENCOUNTER — TELEPHONE (OUTPATIENT)
Dept: GASTROENTEROLOGY | Facility: CLINIC | Age: 63
End: 2023-05-24
Payer: MEDICARE

## 2023-05-24 LAB
C COLI+JEJ+UPSA DNA STL QL NAA+NON-PROBE: NOT DETECTED
CRYPTOSP DNA STL QL NAA+NON-PROBE: NOT DETECTED
E HISTOLYT DNA STL QL NAA+NON-PROBE: NOT DETECTED
EC STX1+STX2 GENES STL QL NAA+NON-PROBE: NOT DETECTED
G LAMBLIA DNA STL QL NAA+NON-PROBE: NOT DETECTED
S ENT+BONG DNA STL QL NAA+NON-PROBE: NOT DETECTED
SHIGELLA SP+EIEC IPAH ST NAA+NON-PROBE: NOT DETECTED

## 2023-05-24 NOTE — TELEPHONE ENCOUNTER
----- Message from MARIUM Brito sent at 5/24/2023 12:59 PM EDT -----  Stool studies so far negative.  Calprotectin pending.

## 2023-06-04 LAB — CALPROTECTIN STL-MCNT: 134 UG/G (ref 0–120)

## 2023-06-06 ENCOUNTER — TELEPHONE (OUTPATIENT)
Dept: GASTROENTEROLOGY | Facility: CLINIC | Age: 63
End: 2023-06-06
Payer: MEDICARE

## 2023-06-06 NOTE — TELEPHONE ENCOUNTER
----- Message from MARIUM Brito sent at 6/5/2023 12:39 PM EDT -----  Please call the patient and let him know his fecal calprotectin did come back elevated.  This is a sign of inflammation in the bowels.  So I am happy that he is going ahead and getting a colonoscopy next month for further work-up.  For now how is he doing on the fiber?  Is he still having the diarrhea?  Thanks

## 2023-06-06 NOTE — TELEPHONE ENCOUNTER
Patient was notified of his results and verbalized understanding. Patient has never started the fiber diet yet but will start that now and patient reported that his diarrhea is controlled with taking two colestipol a day.

## 2023-07-06 ENCOUNTER — HOSPITAL ENCOUNTER (OUTPATIENT)
Dept: MRI IMAGING | Facility: HOSPITAL | Age: 63
End: 2023-07-06
Payer: MEDICARE

## 2023-07-24 ENCOUNTER — TELEPHONE (OUTPATIENT)
Dept: NEUROSURGERY | Facility: CLINIC | Age: 63
End: 2023-07-24
Payer: MEDICARE

## 2023-07-24 NOTE — TELEPHONE ENCOUNTER
Spoke to patient to follow up on the MRI of Brain and Cervical. Patients mother was ill & he had to travel to [place] to take care of her. Pt says they are scheduled for August and he will schedule a follow up to review.

## 2023-08-03 ENCOUNTER — TELEPHONE (OUTPATIENT)
Dept: UROLOGY | Facility: CLINIC | Age: 63
End: 2023-08-03
Payer: MEDICARE

## 2023-08-07 ENCOUNTER — OFFICE VISIT (OUTPATIENT)
Dept: PODIATRY | Facility: CLINIC | Age: 63
End: 2023-08-07
Payer: MEDICARE

## 2023-08-07 VITALS
TEMPERATURE: 97.3 F | HEART RATE: 81 BPM | WEIGHT: 241 LBS | OXYGEN SATURATION: 95 % | BODY MASS INDEX: 32.68 KG/M2 | SYSTOLIC BLOOD PRESSURE: 143 MMHG | DIASTOLIC BLOOD PRESSURE: 75 MMHG

## 2023-08-07 DIAGNOSIS — B35.1 ONYCHOMYCOSIS: ICD-10-CM

## 2023-08-07 DIAGNOSIS — M20.42 HAMMER TOES OF BOTH FEET: ICD-10-CM

## 2023-08-07 DIAGNOSIS — I73.9 PVD (PERIPHERAL VASCULAR DISEASE): ICD-10-CM

## 2023-08-07 DIAGNOSIS — M79.671 FOOT PAIN, BILATERAL: Primary | ICD-10-CM

## 2023-08-07 DIAGNOSIS — M79.672 FOOT PAIN, BILATERAL: Primary | ICD-10-CM

## 2023-08-07 DIAGNOSIS — G62.9 NEUROPATHY: ICD-10-CM

## 2023-08-07 DIAGNOSIS — L60.0 ONYCHOCRYPTOSIS: ICD-10-CM

## 2023-08-07 DIAGNOSIS — M20.41 HAMMER TOES OF BOTH FEET: ICD-10-CM

## 2023-08-07 DIAGNOSIS — E11.8 DIABETIC FOOT: ICD-10-CM

## 2023-08-07 DIAGNOSIS — E11.9 NON-INSULIN DEPENDENT TYPE 2 DIABETES MELLITUS: ICD-10-CM

## 2023-08-07 NOTE — PROGRESS NOTES
[facility] - PODIATRY    Today's Date: 23    Patient Name: Willard Lange  MRN: [id number]  CSN: [id number]  PCP: Josselyn Damon APRN, Last PCP Visit: 2023  Referring Provider: No ref. provider found    SUBJECTIVE     Chief Complaint   Patient presents with    Left Foot - Follow-up, Nail Problem    Right Foot - Follow-up, Nail Problem     HPI: Willard Lange, a 63 y.o.male, comes to clinic.    New, Established, New Problem:  established     Location:  Toenails    Duration:   Greater than five years    Onset:  Gradual    Nature:  sore with palpation.    Stable, worsening, improving:   Stable    Aggravating factors:  Pain with shoe gear and ambulation.    Previous Treatment:  debridement  __________________________________    Patient reports the following medical changes since their last visit: none    Patient states their most recent blood glucose readin.      Patient denies any fevers, chills, nausea, vomiting, shortness of breathe, nor any other constitutional signs nor symptoms.         Past Medical History:   Diagnosis Date    Arthritis     Asthma     Benign essential hypertension     Brain tumor 2021    Cancer     DDD (degenerative disc disease), lumbar     Diabetes mellitus type 2, noninsulin dependent     Diabetes type 2, controlled     GERD (gastroesophageal reflux disease)     Hepatitis C     HTN (hypertension)     Leg pain     Lumbago 02/15/2017    with low back pain    Lumbar disc herniation 02/15/2017    L4-4    Lumbar spinal stenosis 02/15/2017    L4/5    Muscle cramps     Neuropathy     Prostate cancer      Past Surgical History:   Procedure Laterality Date    APPENDECTOMY      BACK SURGERY  2017    COLONOSCOPY  2017    Dr. Dorsey-Polyps    CYSTOSCOPY W/ LASER LITHOTRIPSY      for kidney stones    LAMINECTOMY  2017    L4-5    PROSTATE BIOPSY      transrectal    TONSILLECTOMY       Family History   Problem Relation Age of Onset    Heart disease Father     Lung  cancer Sister     Diabetes type I Maternal Grandmother      Social History     Socioeconomic History    Marital status:    Tobacco Use    Smoking status: Former     Types: Cigarettes     Start date: 1976     Quit date:      Years since quittin.6    Smokeless tobacco: Never   Vaping Use    Vaping Use: Never used   Substance and Sexual Activity    Alcohol use: Yes     Comment: rarely    Drug use: Never    Sexual activity: Defer     Allergies   Allergen Reactions    Formaldehyde Rash     Current Outpatient Medications   Medication Sig Dispense Refill    Advair Diskus 250-50 MCG/DOSE DISKUS Inhale 1 puff 2 (Two) Times a Day.      albuterol sulfate  (90 Base) MCG/ACT inhaler Inhale 2 puffs Every 6 (Six) Hours As Needed.      amitriptyline (ELAVIL) 25 MG tablet Take 1 tablet by mouth every night at bedtime.      aspirin 81 MG EC tablet Take 1 tablet by mouth Daily. 30 tablet 11    atorvastatin (LIPITOR) 20 MG tablet Take 2 tablets by mouth Every Night.      baclofen (LIORESAL) 10 MG tablet TAKE 1 TABLET BY MOUTH THREE TIMES DAILY AS NEEDED FOR MUSCLE CRAMPS      Blood Glucose Monitoring Suppl (FreeStyle Lite) w/Device kit See Admin Instructions.      busPIRone (BUSPAR) 7.5 MG tablet Take 1 tablet by mouth 2 (Two) Times a Day.      chlorhexidine (Hibiclens) 4 % external liquid       colestipol (COLESTID) 1 g tablet As Needed.      dicyclomine (BENTYL) 20 MG tablet TAKE 1 TABLET BY MOUTH 4 TIMES DAILY AS NEEDED FOR GI UPSET      FREESTYLE LITE test strip       glipizide (GLUCOTROL XL) 5 MG ER tablet Take 1 tablet by mouth Daily.      glucose blood test strip FreeStyle Lite Strips   USE TO TEST BLOOD SUGAR FOUR TIMES DAILY AND AS NEEDED      HYDROcodone-acetaminophen (NORCO) 7.5-325 MG per tablet Take 1 tablet by mouth Every 8 (Eight) Hours As Needed.      Lancets (freestyle) lancets       loratadine (CLARITIN) 10 MG tablet loratadine 10 mg oral tablet take 1 tablet (10 mg) by oral route once  daily   Suspended      losartan-hydrochlorothiazide (HYZAAR) 100-25 MG per tablet losartan-hydrochlorothiazide 100-25 mg oral tablet take 1 tablet by oral route once daily   Active      NAPROXEN PO Take  by mouth.      omeprazole (priLOSEC) 40 MG capsule Take 1 capsule by mouth Daily.      pregabalin (LYRICA) 200 MG capsule TAKE 1 CAPSULE BY MOUTH THREE TIMES DAILY AS DIRECTED      tamsulosin (FLOMAX) 0.4 MG capsule 24 hr capsule Take 1 capsule by mouth 2 (Two) Times a Day for 90 days. 180 capsule 0    traZODone (DESYREL) 50 MG tablet TAKE 1 TABLET BY MOUTH AT BEDTIME FOR INSOMNIA      Trulicity 1.5 MG/0.5ML solution pen-injector Inject 1.5 mg under the skin into the appropriate area as directed Every 7 (Seven) Days.      indomethacin (INDOCIN) 50 MG capsule Take 1 capsule by mouth 2 (Two) Times a Day With Meals. As needed (Patient not taking: Reported on 5/18/2023)      PARoxetine (PAXIL) 10 MG tablet Take 1 tablet by mouth Daily. (Patient not taking: Reported on 5/18/2023)      Sod Picosulfate-Mag Ox-Cit Acd (Clenpiq) 10-3.5-12 MG-GM -GM/160ML solution Take 160 mL by mouth 2 (Two) Times a Day. (Patient not taking: Reported on 5/18/2023) 320 mL 0    Steglatro 5 MG tablet Take 1 tablet by mouth Every Morning. (Patient not taking: Reported on 5/18/2023)      vitamin B-12 (CYANOCOBALAMIN) 1000 MCG tablet Take 1 tablet by mouth Daily. (Patient not taking: Reported on 5/18/2023)      vitamin D (ERGOCALCIFEROL) 1.25 MG (42041 UT) capsule capsule Take 1 capsule by mouth 1 (One) Time Per Week. (Patient not taking: Reported on 5/18/2023)       No current facility-administered medications for this visit.     Review of Systems   Constitutional: Negative.    Skin:         Painful toenails   Neurological:  Positive for numbness.   All other systems reviewed and are negative.    OBJECTIVE     Vitals:    08/07/23 0831   BP: 143/75   Pulse: 81   Temp: 97.3 øF (36.3 øC)   SpO2: 95%       WBC   Date Value Ref Range Status    03/26/2020 6.92 4.80 - 10.80 10*3/uL Final     RBC   Date Value Ref Range Status   03/26/2020 5.09 4.70 - 6.10 10*6/uL Final     Hemoglobin   Date Value Ref Range Status   03/26/2020 14.5 14.0 - 18.0 g/dL Final     Hematocrit   Date Value Ref Range Status   03/26/2020 44.1 42.0 - 52.0 % Final     MCV   Date Value Ref Range Status   03/26/2020 86.6 80.0 - 96.0 fL Final     MCH   Date Value Ref Range Status   03/26/2020 28.5 27.0 - 31.0 pg Final     MCHC   Date Value Ref Range Status   03/26/2020 32.9 (L) 33.0 - 37.0 Final     RDW   Date Value Ref Range Status   03/26/2020 13.2 11.6 - 14.4 % Final     RDW-SD   Date Value Ref Range Status   03/26/2020 40.8 35.1 - 43.9 fL Final     MPV   Date Value Ref Range Status   03/26/2020 9.6 9.4 - 12.4 fL Final     Platelets   Date Value Ref Range Status   03/26/2020 273 130 - 400 10*3/uL Final     Neutrophil Rel %   Date Value Ref Range Status   03/26/2020 73.3 30.0 - 85.0 % Final     Lymphocyte Rel %   Date Value Ref Range Status   03/26/2020 14.7 (L) 20.0 - 45.0 % Final     Monocyte Rel %   Date Value Ref Range Status   03/26/2020 9.2 3.0 - 10.0 % Final     Eosinophil Rel %   Date Value Ref Range Status   03/26/2020 1.7 0.0 - 7.0 % Final     Basophil Rel %   Date Value Ref Range Status   03/26/2020 0.4 0.0 - 3.0 % Final     Neutrophils Absolute   Date Value Ref Range Status   03/26/2020 5.06 2.00 - 8.00 10*3/uL Final     Lymphocytes Absolute   Date Value Ref Range Status   03/26/2020 1.02 1.00 - 5.00 10*3/uL Final     Monocytes Absolute   Date Value Ref Range Status   03/26/2020 0.64 0.20 - 1.20 10*3/uL Final     Eosinophils Absolute   Date Value Ref Range Status   03/26/2020 0.12 0.00 - 0.70 10*3/uL Final     Basophils Absolute   Date Value Ref Range Status   03/26/2020 0.03 0.00 - 0.20 10*3/uL Final     NRBC   Date Value Ref Range Status   03/26/2020 0.00 0.00 - 0.70 % Final         Lab Results   Component Value Date    GLUCOSE 212 (H) 03/26/2020    CALCIUM 9.6 03/26/2020      03/26/2020    K 4.6 03/26/2020    CO2 26 03/26/2020     03/26/2020    BUN 12 03/26/2020    CREATININE 0.90 07/23/2021    BCR 9 03/26/2020    ANIONGAP 16 03/26/2020       Patient seen in no apparent distress.      PHYSICAL EXAM:     Foot/Ankle Exam    GENERAL  Diabetic foot exam performed    Appearance:  obese  Orientation:  AAOx3  Affect:  appropriate  Gait:  unimpaired  Assistance:  independent  Right shoe gear: casual shoe  Left shoe gear: casual shoe    VASCULAR     Right Foot Vascularity   Normal vascular exam    Dorsalis pedis:  Absent  Posterior tibial:  Absent  Skin temperature:  warm  Edema grading:  None  CFT:  4  Pedal hair growth:  Absent  Varicosities:  none     Left Foot Vascularity   Normal vascular exam    Dorsalis pedis:  Absent  Posterior tibial:  Absent  Skin temperature:  warm  Edema grading:  None  CFT:  4  Pedal hair growth:  Absent  Varicosities:  none     NEUROLOGIC     Right Foot Neurologic   Light touch sensation: absent  Vibratory sensation: absent  Hot/Cold sensation: absent  Protective Sensation using [place]do Monofilament:   Sites tested: 10     Left Foot Neurologic   Light touch sensation: absent  Vibratory sensation: absent  Hot/Cold sensation:  absent  Protective Sensation using [place]do Monofilament:   Sites tested: 10    MUSCULOSKELETAL     Right Foot Musculoskeletal   Hammertoe:  Second toe, third toe, fourth toe and fifth toe     Left Foot Musculoskeletal   Hammertoe:  Second toe, third toe, fourth toe and fifth toe    MUSCLE STRENGTH     Right Foot Muscle Strength   Foot dorsiflexion:  4  Foot plantar flexion:  4  Foot inversion:  4  Foot eversion:  4     Left Foot Muscle Strength   Foot dorsiflexion:  4  Foot plantar flexion:  4  Foot inversion:  4  Foot eversion:  4    RANGE OF MOTION     Right Foot Range of Motion   Foot and ankle ROM within normal limits       Left Foot Range of Motion   Foot and ankle ROM within normal limits      DERMATOLOGIC       Right Foot Dermatologic   Skin  Right foot skin is intact.   Nails  1.  Positive for elongated, onychomycosis, abnormal thickness, subungual debris and ingrown toenail.  2.  Positive for elongated, onychomycosis, abnormal thickness, subungual debris and ingrown toenail.  3.  Positive for elongated, onychomycosis, abnormal thickness, subungual debris and ingrown toenail.  4.  Positive for elongated, onychomycosis, abnormal thickness, subungual debris and ingrown toenail.  5.  Positive for elongated, onychomycosis, abnormal thickness, subungual debris and ingrown toenail.  Nails comment:  Toenails 1, 2, 3, 4, and 5     Left Foot Dermatologic   Skin  Left foot skin is intact.   Nails comment:  Toenails 1, 2, 3, 4, and 5  Nails  1.  Positive for elongated, onychomycosis, abnormal thickness, subungual debris and ingrown toenail.  2.  Positive for elongated, onychomycosis, abnormal thickness, subungual debris and ingrown toenail.  3.  Positive for elongated, onychomycosis, abnormal thickness, subungual debris and ingrown toenail.  4.  Positive for elongated, onychomycosis, abnormally thick, subungual debris and ingrown toenail.  5.  Positive for elongated, onychomycosis, abnormally thick, subungual debris and ingrown toenail.    Diabetic Foot Exam Performed and Monofilament Test Performed    ASSESSMENT/PLAN     Diagnoses and all orders for this visit:    1. Foot pain, bilateral (Primary)    2. Onychomycosis    3. Diabetic foot    4. Non-insulin dependent type 2 diabetes mellitus    5. PVD (peripheral vascular disease)    6. Hammer toes of both feet    7. Onychocryptosis    8. Neuropathy    Prescriptions written for diabetic shoes.    Comprehensive lower extremity examination and evaluation was performed.    Discussed findings and treatment plan including risks, benefits, and treatment options with patient in detail. Patient agreed with treatment plan.    Toenails 1 through 5 bilaterally were debrided in thickness and  length and then smoothed with a Dremel Tool.  Tolerated the procedure well without complications.    Diabetic foot exam performed and documented this date, compliant with CQM required standards. Detail of findings as noted in physical exam.  Lower extremity Neurologic exam for diabetic patient performed and documented this date, compliant with PQRS required standards. Detail of findings as noted in physical exam.  Advised patient importance of good routine lower extremity hygiene. Advised patient importance of evaluating for intact skin and pain free nail borders.  Advised patient to use mirror to evaluate plantar/ soles of feet for better visualization. Advised patient monitor and phone office to be seen if any cracking to skin, open lesions, painful nail borders or if nails become elongated prior to next visit. Advised patient importance of daily cleansing of lower extremities, followed by good skin cream to maintain normal hydration of skin. Also advised patient importance of close daily monitoring of blood sugar. Advised to regulate diet and medications to maintain control of blood sugar in optimal range. Contact primary care provider if difficulties maintaining blood sugar levels.  Advised Patient of presence of Diabetes Mellitus condition.  Advised Patient risk of progression and worsening or improvement, then return of condition.  Will monitor condition for any change in future. Treat with most appropriate treatment pending status of condition.  Counseled and advised patient extensively on nature and ramifications of diabetes. Standard instructions given to patient for good diabetic foot care and maintenance. Advised importance of careful monitoring to avoid break down and complications secondary to diabetes. Advised patient importance of strict maintenance of blood sugar control. Advised patient of possible ominous results from neglect of condition, i.e.: amputation/ loss of digits, feet and legs, or even  death.  Patient states understands counseling, will monitor closely, continue good hygiene and routine diabetic foot care. Patient will contact office is questions or problems.      An After Visit Summary was printed and given to the patient at discharge, including (if requested) any available informative/educational handouts regarding diagnosis, treatment, or medications. All questions were answered to patient/family satisfaction. Should symptoms fail to improve or worsen they agree to call or return to clinic or to go to the Emergency Department. Discussed the importance of following up with any needed screening tests/labs/specialist appointments and any requested follow-up recommended by me today. Importance of maintaining follow-up discussed and patient accepts that missed appointments can delay diagnosis and potentially lead to worsening of conditions.    Return in about 9 weeks (around 10/9/2023) for Toenail Care., or sooner if acute issues arise.    This document has been electronically signed by Sarkis Ureña DPM on August 7, 2023 09:09 EDT

## 2023-08-08 ENCOUNTER — APPOINTMENT (OUTPATIENT)
Dept: CT IMAGING | Facility: HOSPITAL | Age: 63
DRG: 440 | End: 2023-08-08
Payer: MEDICARE

## 2023-08-08 ENCOUNTER — HOSPITAL ENCOUNTER (INPATIENT)
Facility: HOSPITAL | Age: 63
LOS: 5 days | Discharge: HOME | DRG: 440 | End: 2023-08-13
Attending: EMERGENCY MEDICINE | Admitting: INTERNAL MEDICINE
Payer: MEDICARE

## 2023-08-08 DIAGNOSIS — K85.90 ACUTE PANCREATITIS WITHOUT INFECTION OR NECROSIS, UNSPECIFIED PANCREATITIS TYPE: Primary | ICD-10-CM

## 2023-08-08 LAB
ALBUMIN SERPL-MCNC: 4.3 G/DL (ref 3.5–5.2)
ALBUMIN/GLOB SERPL: 1.2 G/DL
ALP SERPL-CCNC: 113 U/L (ref 39–117)
ALT SERPL W P-5'-P-CCNC: 23 U/L (ref 1–41)
ANION GAP SERPL CALCULATED.3IONS-SCNC: 12.9 MMOL/L (ref 5–15)
AST SERPL-CCNC: 21 U/L (ref 1–40)
BACTERIA UR QL AUTO: ABNORMAL /HPF
BASOPHILS # BLD AUTO: 0.03 10*3/MM3 (ref 0–0.2)
BASOPHILS NFR BLD AUTO: 0.2 % (ref 0–1.5)
BILIRUB SERPL-MCNC: 1.1 MG/DL (ref 0–1.2)
BILIRUB UR QL STRIP: ABNORMAL
BUN SERPL-MCNC: 11 MG/DL (ref 8–23)
BUN/CREAT SERPL: 12.2 (ref 7–25)
CALCIUM SPEC-SCNC: 9.8 MG/DL (ref 8.6–10.5)
CHLORIDE SERPL-SCNC: 98 MMOL/L (ref 98–107)
CLARITY UR: CLEAR
CO2 SERPL-SCNC: 26.1 MMOL/L (ref 22–29)
COLOR UR: ABNORMAL
CREAT SERPL-MCNC: 0.9 MG/DL (ref 0.76–1.27)
D-LACTATE SERPL-SCNC: 1.6 MMOL/L (ref 0.5–2)
DEPRECATED RDW RBC AUTO: 40.8 FL (ref 37–54)
EGFRCR SERPLBLD CKD-EPI 2021: 96 ML/MIN/1.73
EOSINOPHIL # BLD AUTO: 0.02 10*3/MM3 (ref 0–0.4)
EOSINOPHIL NFR BLD AUTO: 0.1 % (ref 0.3–6.2)
ERYTHROCYTE [DISTWIDTH] IN BLOOD BY AUTOMATED COUNT: 13.3 % (ref 12.3–15.4)
GLOBULIN UR ELPH-MCNC: 3.6 GM/DL
GLUCOSE SERPL-MCNC: 197 MG/DL (ref 65–99)
GLUCOSE UR STRIP-MCNC: ABNORMAL MG/DL
HCT VFR BLD AUTO: 45 % (ref 37.5–51)
HGB BLD-MCNC: 15.6 G/DL (ref 13–17.7)
HGB UR QL STRIP.AUTO: ABNORMAL
HOLD SPECIMEN: NORMAL
HOLD SPECIMEN: NORMAL
HYALINE CASTS UR QL AUTO: ABNORMAL /LPF
IMM GRANULOCYTES # BLD AUTO: 0.05 10*3/MM3 (ref 0–0.05)
IMM GRANULOCYTES NFR BLD AUTO: 0.3 % (ref 0–0.5)
KETONES UR QL STRIP: ABNORMAL
LDH SERPL-CCNC: 218 U/L (ref 135–225)
LEUKOCYTE ESTERASE UR QL STRIP.AUTO: NEGATIVE
LIPASE SERPL-CCNC: 112 U/L (ref 13–60)
LYMPHOCYTES # BLD AUTO: 0.72 10*3/MM3 (ref 0.7–3.1)
LYMPHOCYTES NFR BLD AUTO: 4.2 % (ref 19.6–45.3)
MCH RBC QN AUTO: 29.2 PG (ref 26.6–33)
MCHC RBC AUTO-ENTMCNC: 34.7 G/DL (ref 31.5–35.7)
MCV RBC AUTO: 84.1 FL (ref 79–97)
MONOCYTES # BLD AUTO: 1.48 10*3/MM3 (ref 0.1–0.9)
MONOCYTES NFR BLD AUTO: 8.6 % (ref 5–12)
NEUTROPHILS NFR BLD AUTO: 15 10*3/MM3 (ref 1.7–7)
NEUTROPHILS NFR BLD AUTO: 86.6 % (ref 42.7–76)
NITRITE UR QL STRIP: NEGATIVE
NRBC BLD AUTO-RTO: 0 /100 WBC (ref 0–0.2)
PH UR STRIP.AUTO: 6 [PH] (ref 5–8)
PLATELET # BLD AUTO: 289 10*3/MM3 (ref 140–450)
PMV BLD AUTO: 9.7 FL (ref 6–12)
POTASSIUM SERPL-SCNC: 4 MMOL/L (ref 3.5–5.2)
PROT SERPL-MCNC: 7.9 G/DL (ref 6–8.5)
PROT UR QL STRIP: ABNORMAL
RBC # BLD AUTO: 5.35 10*6/MM3 (ref 4.14–5.8)
RBC # UR STRIP: ABNORMAL /HPF
REF LAB TEST METHOD: ABNORMAL
SODIUM SERPL-SCNC: 137 MMOL/L (ref 136–145)
SP GR UR STRIP: 1.03 (ref 1–1.03)
SQUAMOUS #/AREA URNS HPF: ABNORMAL /HPF
UROBILINOGEN UR QL STRIP: ABNORMAL
WBC # UR STRIP: ABNORMAL /HPF
WBC NRBC COR # BLD: 17.3 10*3/MM3 (ref 3.4–10.8)
WHOLE BLOOD HOLD COAG: NORMAL
WHOLE BLOOD HOLD SPECIMEN: NORMAL

## 2023-08-08 PROCEDURE — 99285 EMERGENCY DEPT VISIT HI MDM: CPT

## 2023-08-08 PROCEDURE — 83615 LACTATE (LD) (LDH) ENZYME: CPT | Performed by: EMERGENCY MEDICINE

## 2023-08-08 PROCEDURE — 80053 COMPREHEN METABOLIC PANEL: CPT

## 2023-08-08 PROCEDURE — 81001 URINALYSIS AUTO W/SCOPE: CPT

## 2023-08-08 PROCEDURE — 83690 ASSAY OF LIPASE: CPT

## 2023-08-08 PROCEDURE — 85025 COMPLETE CBC W/AUTO DIFF WBC: CPT

## 2023-08-08 PROCEDURE — 36415 COLL VENOUS BLD VENIPUNCTURE: CPT

## 2023-08-08 PROCEDURE — 83605 ASSAY OF LACTIC ACID: CPT

## 2023-08-08 PROCEDURE — 25010000002 ENOXAPARIN PER 10 MG: Performed by: INTERNAL MEDICINE

## 2023-08-08 PROCEDURE — 25010000002 KETOROLAC TROMETHAMINE PER 15 MG: Performed by: EMERGENCY MEDICINE

## 2023-08-08 PROCEDURE — 25510000001 IOPAMIDOL PER 1 ML: Performed by: NURSE PRACTITIONER

## 2023-08-08 PROCEDURE — 25010000002 ONDANSETRON PER 1 MG: Performed by: INTERNAL MEDICINE

## 2023-08-08 PROCEDURE — 25010000002 LEVOFLOXACIN PER 250 MG: Performed by: INTERNAL MEDICINE

## 2023-08-08 PROCEDURE — 74177 CT ABD & PELVIS W/CONTRAST: CPT

## 2023-08-08 PROCEDURE — 25010000002 HYDROMORPHONE 1 MG/ML SOLUTION: Performed by: INTERNAL MEDICINE

## 2023-08-08 PROCEDURE — 25010000002 ONDANSETRON PER 1 MG: Performed by: NURSE PRACTITIONER

## 2023-08-08 RX ORDER — SODIUM CHLORIDE 0.9 % (FLUSH) 0.9 %
10 SYRINGE (ML) INJECTION AS NEEDED
Status: DISCONTINUED | OUTPATIENT
Start: 2023-08-08 | End: 2023-08-13 | Stop reason: HOSPADM

## 2023-08-08 RX ORDER — HYDROCODONE BITARTRATE AND ACETAMINOPHEN 5; 325 MG/1; MG/1
1 TABLET ORAL EVERY 4 HOURS PRN
Status: DISCONTINUED | OUTPATIENT
Start: 2023-08-08 | End: 2023-08-13 | Stop reason: HOSPADM

## 2023-08-08 RX ORDER — DEXTROSE MONOHYDRATE, SODIUM CHLORIDE, AND POTASSIUM CHLORIDE 50; 1.49; 9 G/1000ML; G/1000ML; G/1000ML
75 INJECTION, SOLUTION INTRAVENOUS CONTINUOUS
Status: DISCONTINUED | OUTPATIENT
Start: 2023-08-08 | End: 2023-08-13 | Stop reason: HOSPADM

## 2023-08-08 RX ORDER — ONDANSETRON 2 MG/ML
4 INJECTION INTRAMUSCULAR; INTRAVENOUS EVERY 6 HOURS PRN
Status: DISCONTINUED | OUTPATIENT
Start: 2023-08-08 | End: 2023-08-09

## 2023-08-08 RX ORDER — SODIUM CHLORIDE AND POTASSIUM CHLORIDE 150; 900 MG/100ML; MG/100ML
150 INJECTION, SOLUTION INTRAVENOUS CONTINUOUS
Status: DISCONTINUED | OUTPATIENT
Start: 2023-08-08 | End: 2023-08-08

## 2023-08-08 RX ORDER — LEVOFLOXACIN 5 MG/ML
500 INJECTION, SOLUTION INTRAVENOUS EVERY 24 HOURS
Status: DISCONTINUED | OUTPATIENT
Start: 2023-08-08 | End: 2023-08-11

## 2023-08-08 RX ORDER — NICOTINE POLACRILEX 4 MG
15 LOZENGE BUCCAL
Status: DISCONTINUED | OUTPATIENT
Start: 2023-08-08 | End: 2023-08-13 | Stop reason: HOSPADM

## 2023-08-08 RX ORDER — ACETAMINOPHEN 650 MG/1
650 SUPPOSITORY RECTAL EVERY 4 HOURS PRN
Status: DISCONTINUED | OUTPATIENT
Start: 2023-08-08 | End: 2023-08-13 | Stop reason: HOSPADM

## 2023-08-08 RX ORDER — ONDANSETRON 2 MG/ML
4 INJECTION INTRAMUSCULAR; INTRAVENOUS ONCE
Status: COMPLETED | OUTPATIENT
Start: 2023-08-08 | End: 2023-08-08

## 2023-08-08 RX ORDER — FAMOTIDINE 10 MG/ML
20 INJECTION, SOLUTION INTRAVENOUS 2 TIMES DAILY
Status: DISCONTINUED | OUTPATIENT
Start: 2023-08-08 | End: 2023-08-13 | Stop reason: HOSPADM

## 2023-08-08 RX ORDER — SODIUM CHLORIDE 9 MG/ML
40 INJECTION, SOLUTION INTRAVENOUS AS NEEDED
Status: DISCONTINUED | OUTPATIENT
Start: 2023-08-08 | End: 2023-08-13 | Stop reason: HOSPADM

## 2023-08-08 RX ORDER — KETOROLAC TROMETHAMINE 30 MG/ML
30 INJECTION, SOLUTION INTRAMUSCULAR; INTRAVENOUS ONCE
Status: COMPLETED | OUTPATIENT
Start: 2023-08-08 | End: 2023-08-08

## 2023-08-08 RX ORDER — ENOXAPARIN SODIUM 100 MG/ML
40 INJECTION SUBCUTANEOUS EVERY 24 HOURS
Status: DISCONTINUED | OUTPATIENT
Start: 2023-08-08 | End: 2023-08-13 | Stop reason: HOSPADM

## 2023-08-08 RX ORDER — ACETAMINOPHEN 325 MG/1
650 TABLET ORAL EVERY 4 HOURS PRN
Status: DISCONTINUED | OUTPATIENT
Start: 2023-08-08 | End: 2023-08-13 | Stop reason: HOSPADM

## 2023-08-08 RX ORDER — NALOXONE HCL 0.4 MG/ML
0.4 VIAL (ML) INJECTION
Status: DISCONTINUED | OUTPATIENT
Start: 2023-08-08 | End: 2023-08-13 | Stop reason: HOSPADM

## 2023-08-08 RX ORDER — ACETAMINOPHEN 160 MG/5ML
650 SOLUTION ORAL EVERY 4 HOURS PRN
Status: DISCONTINUED | OUTPATIENT
Start: 2023-08-08 | End: 2023-08-13 | Stop reason: HOSPADM

## 2023-08-08 RX ORDER — DEXTROSE MONOHYDRATE 25 G/50ML
25 INJECTION, SOLUTION INTRAVENOUS
Status: DISCONTINUED | OUTPATIENT
Start: 2023-08-08 | End: 2023-08-13 | Stop reason: HOSPADM

## 2023-08-08 RX ORDER — SODIUM CHLORIDE 0.9 % (FLUSH) 0.9 %
10 SYRINGE (ML) INJECTION EVERY 12 HOURS SCHEDULED
Status: DISCONTINUED | OUTPATIENT
Start: 2023-08-08 | End: 2023-08-13 | Stop reason: HOSPADM

## 2023-08-08 RX ORDER — ALBUTEROL SULFATE 2.5 MG/3ML
2.5 SOLUTION RESPIRATORY (INHALATION) EVERY 6 HOURS PRN
Status: DISCONTINUED | OUTPATIENT
Start: 2023-08-08 | End: 2023-08-13 | Stop reason: HOSPADM

## 2023-08-08 RX ORDER — PREDNISOLONE ACETATE 10 MG/ML
1 SUSPENSION/ DROPS OPHTHALMIC 3 TIMES DAILY
COMMUNITY

## 2023-08-08 RX ADMIN — KETOROLAC TROMETHAMINE 30 MG: 30 INJECTION, SOLUTION INTRAMUSCULAR; INTRAVENOUS at 18:52

## 2023-08-08 RX ADMIN — IOPAMIDOL 93 ML: 755 INJECTION, SOLUTION INTRAVENOUS at 17:14

## 2023-08-08 RX ADMIN — ONDANSETRON 4 MG: 2 INJECTION INTRAMUSCULAR; INTRAVENOUS at 23:35

## 2023-08-08 RX ADMIN — HYDROMORPHONE HYDROCHLORIDE 0.5 MG: 1 INJECTION, SOLUTION INTRAMUSCULAR; INTRAVENOUS; SUBCUTANEOUS at 23:34

## 2023-08-08 RX ADMIN — SODIUM CHLORIDE 1000 ML: 9 INJECTION, SOLUTION INTRAVENOUS at 18:53

## 2023-08-08 RX ADMIN — Medication 10 ML: at 23:36

## 2023-08-08 RX ADMIN — SODIUM CHLORIDE 1000 ML: 9 INJECTION, SOLUTION INTRAVENOUS at 16:15

## 2023-08-08 RX ADMIN — POTASSIUM CHLORIDE, DEXTROSE MONOHYDRATE AND SODIUM CHLORIDE 150 ML/HR: 150; 5; 900 INJECTION, SOLUTION INTRAVENOUS at 23:35

## 2023-08-08 RX ADMIN — ENOXAPARIN SODIUM 40 MG: 100 INJECTION SUBCUTANEOUS at 23:40

## 2023-08-08 RX ADMIN — LEVOFLOXACIN 500 MG: 5 INJECTION, SOLUTION INTRAVENOUS at 23:40

## 2023-08-08 RX ADMIN — FAMOTIDINE 20 MG: 10 INJECTION INTRAVENOUS at 23:41

## 2023-08-08 RX ADMIN — ONDANSETRON 4 MG: 2 INJECTION INTRAMUSCULAR; INTRAVENOUS at 16:16

## 2023-08-08 NOTE — ED PROVIDER NOTES
"Time: 6:03 PM EDT  Date of encounter:  8/8/2023  Independent Historian/Clinical History and Information was obtained by:   Patient    History is limited by: N/A    Chief Complaint: Abdominal/back pain      History of Present Illness:  Patient is a 63 y.o. year old male who presents to the emergency department for evaluation of epigastric and mid back pain for the past 2 days.  Patient states that pain \"jumps around\".  Complains of nausea and vomiting with no diarrhea or constipation.  No hematemesis.  No reported fevers.  Denies urinary symptoms aside from dark urine.    HPI    Patient Care Team  Primary Care Provider: Josselyn Damon APRN    Past Medical History:     Allergies   Allergen Reactions    Formaldehyde Rash     Past Medical History:   Diagnosis Date    Arthritis     Asthma     Benign essential hypertension     Brain tumor 01/2021    Cancer     DDD (degenerative disc disease), lumbar     Diabetes mellitus type 2, noninsulin dependent     Diabetes type 2, controlled     GERD (gastroesophageal reflux disease)     Hepatitis C     HTN (hypertension)     Leg pain     Lumbago 02/15/2017    with low back pain    Lumbar disc herniation 02/15/2017    L4-4    Lumbar spinal stenosis 02/15/2017    L4/5    Muscle cramps     Neuropathy     Prostate cancer      Past Surgical History:   Procedure Laterality Date    APPENDECTOMY      BACK SURGERY  2017    COLONOSCOPY  2017    Dr. Dorsey-Polyps    CYSTOSCOPY W/ LASER LITHOTRIPSY      for kidney stones    LAMINECTOMY  02/17/2017    L4-5    PROSTATE BIOPSY      transrectal    TONSILLECTOMY       Family History   Problem Relation Age of Onset    Heart disease Father     Lung cancer Sister     Diabetes type I Maternal Grandmother        Home Medications:  Prior to Admission medications    Medication Sig Start Date End Date Taking? Authorizing Provider   Advair Diskus 250-50 MCG/DOSE DISKUS Inhale 1 puff 2 (Two) Times a Day. 6/8/21   Provider, MD Hilton   albuterol " sulfate  (90 Base) MCG/ACT inhaler Inhale 2 puffs Every 6 (Six) Hours As Needed. 12/16/22   Hilton Carroll MD   amitriptyline (ELAVIL) 25 MG tablet Take 1 tablet by mouth every night at bedtime. 10/5/21   Hilton Carroll MD   aspirin 81 MG EC tablet Take 1 tablet by mouth Daily. 1/26/23 1/26/24  Geovanna Solo MD   atorvastatin (LIPITOR) 20 MG tablet Take 2 tablets by mouth Every Night. 3/30/22   Hilton Carroll MD   baclofen (LIORESAL) 10 MG tablet TAKE 1 TABLET BY MOUTH THREE TIMES DAILY AS NEEDED FOR MUSCLE CRAMPS 10/5/21   Hilton Carroll MD   Blood Glucose Monitoring Suppl (FreeStyle Lite) w/Device kit See Admin Instructions. 10/24/22   Hilton Carroll MD   busPIRone (BUSPAR) 7.5 MG tablet Take 1 tablet by mouth 2 (Two) Times a Day. 8/18/21   Hilton Carroll MD   chlorhexidine (Hibiclens) 4 % external liquid     Hilton Carroll MD   colestipol (COLESTID) 1 g tablet As Needed. 5/2/22   Hilton Carroll MD   dicyclomine (BENTYL) 20 MG tablet TAKE 1 TABLET BY MOUTH 4 TIMES DAILY AS NEEDED FOR GI UPSET 8/4/21   Hilton Carroll MD   FREESTYLE LITE test strip  11/4/21   Hilton Carroll MD   glipizide (GLUCOTROL XL) 5 MG ER tablet Take 1 tablet by mouth Daily. 6/7/21   Hilton Carroll MD   glucose blood test strip FreeStyle Lite Strips   USE TO TEST BLOOD SUGAR FOUR TIMES DAILY AND AS NEEDED    Hilton Carroll MD   HYDROcodone-acetaminophen (NORCO) 7.5-325 MG per tablet Take 1 tablet by mouth Every 8 (Eight) Hours As Needed. 8/1/21   Hilton Carroll MD   indomethacin (INDOCIN) 50 MG capsule Take 1 capsule by mouth 2 (Two) Times a Day With Meals. As needed  Patient not taking: Reported on 5/18/2023 11/4/21   Hilton Carroll MD   Lancets (freestyle) lancets  11/4/21   Hilton Carroll MD   loratadine (CLARITIN) 10 MG tablet loratadine 10 mg oral tablet take 1 tablet (10 mg) by oral route once daily   Suspended    Provider,  MD Hilton   losartan-hydrochlorothiazide (HYZAAR) 100-25 MG per tablet losartan-hydrochlorothiazide 100-25 mg oral tablet take 1 tablet by oral route once daily   Active    Hilton Carroll MD   NAPROXEN PO Take  by mouth.    Hilton Carroll MD   omeprazole (priLOSEC) 40 MG capsule Take 1 capsule by mouth Daily. 21   Hilton Carroll MD   PARoxetine (PAXIL) 10 MG tablet Take 1 tablet by mouth Daily.  Patient not taking: Reported on 2023   Hilton Carroll MD   pregabalin (LYRICA) 200 MG capsule TAKE 1 CAPSULE BY MOUTH THREE TIMES DAILY AS DIRECTED 21   Hilton Carroll MD   Sod Picosulfate-Mag Ox-Cit Acd (Clenpiq) 10-3.5-12 MG-GM -GM/160ML solution Take 160 mL by mouth 2 (Two) Times a Day.  Patient not taking: Reported on 2023   Jeaneth Chowdary APRN   Steglatro 5 MG tablet Take 1 tablet by mouth Every Morning.  Patient not taking: Reported on 2023   Hilton Carroll MD   tamsulosin (FLOMAX) 0.4 MG capsule 24 hr capsule Take 1 capsule by mouth 2 (Two) Times a Day for 90 days. 23  Shi Cortez APRN   traZODone (DESYREL) 50 MG tablet TAKE 1 TABLET BY MOUTH AT BEDTIME FOR INSOMNIA 3/2/22   Hilton Carroll MD   Trulicity 1.5 MG/0.5ML solution pen-injector Inject 1.5 mg under the skin into the appropriate area as directed Every 7 (Seven) Days. 21   Hilton Carroll MD   vitamin B-12 (CYANOCOBALAMIN) 1000 MCG tablet Take 1 tablet by mouth Daily.  Patient not taking: Reported on 2023   Hilton Carroll MD   vitamin D (ERGOCALCIFEROL) 1.25 MG (35867 UT) capsule capsule Take 1 capsule by mouth 1 (One) Time Per Week.  Patient not taking: Reported on 2023   Hilton Carroll MD        Social History:   Social History     Tobacco Use    Smoking status: Former     Types: Cigarettes     Start date: 1976     Quit date:      Years since quittin.6    Smokeless tobacco:  "Never   Vaping Use    Vaping Use: Never used   Substance Use Topics    Alcohol use: Yes     Comment: rarely    Drug use: Never         Review of Systems:  Review of Systems   Constitutional:  Negative for chills and fever.   HENT:  Negative for congestion, rhinorrhea and sore throat.    Eyes:  Negative for photophobia.   Respiratory:  Negative for apnea, cough, chest tightness and shortness of breath.    Cardiovascular:  Negative for chest pain and palpitations.   Gastrointestinal:  Positive for abdominal pain, nausea and vomiting. Negative for diarrhea.   Endocrine: Negative.    Genitourinary:  Negative for difficulty urinating and dysuria.   Musculoskeletal:  Positive for back pain. Negative for joint swelling and myalgias.   Skin:  Negative for color change and wound.   Allergic/Immunologic: Negative.    Neurological:  Negative for seizures and headaches.   Psychiatric/Behavioral: Negative.     All other systems reviewed and are negative.     Physical Exam:  /92   Pulse 96   Temp 99 øF (37.2 øC) (Oral)   Resp 16   Ht 182.9 cm (72\")   Wt 108 kg (237 lb 3.4 oz)   SpO2 93%   BMI 32.17 kg/mý     Physical Exam  Vitals and nursing note reviewed.   Constitutional:       General: He is awake.      Appearance: Normal appearance.   HENT:      Head: Normocephalic and atraumatic.      Nose: Nose normal.      Mouth/Throat:      Mouth: Mucous membranes are moist.   Eyes:      Extraocular Movements: Extraocular movements intact.      Pupils: Pupils are equal, round, and reactive to light.   Cardiovascular:      Rate and Rhythm: Normal rate and regular rhythm.      Heart sounds: Normal heart sounds.   Pulmonary:      Effort: Pulmonary effort is normal. No respiratory distress.      Breath sounds: Normal breath sounds. No wheezing, rhonchi or rales.   Abdominal:      General: Bowel sounds are normal.      Palpations: Abdomen is soft.      Tenderness: There is no abdominal tenderness. There is no guarding or rebound. "      Comments: No rigidity   Musculoskeletal:         General: No tenderness. Normal range of motion.      Cervical back: Normal range of motion and neck supple.   Skin:     General: Skin is warm and dry.      Coloration: Skin is not jaundiced.   Neurological:      General: No focal deficit present.      Mental Status: He is alert and oriented to person, place, and time. Mental status is at baseline.      Sensory: Sensation is intact.      Motor: Motor function is intact.      Coordination: Coordination is intact.   Psychiatric:         Attention and Perception: Attention and perception normal.         Mood and Affect: Mood and affect normal.         Speech: Speech normal.         Behavior: Behavior normal.         Judgment: Judgment normal.                Procedures:  Procedures      Medical Decision Making:      Comorbidities that affect care:    Type 2 diabetes, hepatitis C, arthritis, GERD, hypertension, cancer    External Notes reviewed:    Encounter review: Office visit with podiatry yesterday.  Dr. Ureña.  Bilateral foot pain/toenail care      The following orders were placed and all results were independently analyzed by me:  Orders Placed This Encounter   Procedures    CT Abdomen Pelvis With Contrast    Mcbrides Draw    Comprehensive Metabolic Panel    Lipase    Urinalysis With Microscopic If Indicated (No Culture) - Urine, Clean Catch    Lactic Acid, Plasma    CBC Auto Differential    Urinalysis, Microscopic Only - Urine, Clean Catch    Lactate Dehydrogenase    NPO Diet NPO Type: Strict NPO    Undress & Gown    Please document a repeat temperature  Nursing Communication    Hospitalist (on-call MD unless specified)    Insert Peripheral IV    Inpatient Admission    CBC & Differential    Green Top (Gel)    Lavender Top    Gold Top - SST    Light Blue Top       Medications Given in the Emergency Department:  Medications   sodium chloride 0.9 % flush 10 mL (has no administration in time range)   sodium  chloride 0.9 % bolus 1,000 mL (0 mL Intravenous Stopped 8/8/23 1734)   ondansetron (ZOFRAN) injection 4 mg (4 mg Intravenous Given 8/8/23 1616)   iopamidol (ISOVUE-370) 76 % injection 100 mL (93 mL Intravenous Given 8/8/23 1714)   ketorolac (TORADOL) injection 30 mg (30 mg Intravenous Given 8/8/23 1852)   sodium chloride 0.9 % bolus 1,000 mL (1,000 mL Intravenous New Bag 8/8/23 1853)        ED Course:         Labs:    Lab Results (last 24 hours)       Procedure Component Value Units Date/Time    CBC & Differential [[id number]]  (Abnormal) Collected: 08/08/23 1349    Specimen: Blood from Arm, Right Updated: 08/08/23 1401    Narrative:      The following orders were created for panel order CBC & Differential.  Procedure                               Abnormality         Status                     ---------                               -----------         ------                     CBC Auto Differential[[id number]]        Abnormal            Final result                 Please view results for these tests on the individual orders.    Comprehensive Metabolic Panel [[id number]]  (Abnormal) Collected: 08/08/23 1349    Specimen: Blood from Arm, Right Updated: 08/08/23 1415     Glucose 197 mg/dL      BUN 11 mg/dL      Creatinine 0.90 mg/dL      Sodium 137 mmol/L      Potassium 4.0 mmol/L      Chloride 98 mmol/L      CO2 26.1 mmol/L      Calcium 9.8 mg/dL      Total Protein 7.9 g/dL      Albumin 4.3 g/dL      ALT (SGPT) 23 U/L      AST (SGOT) 21 U/L      Alkaline Phosphatase 113 U/L      Total Bilirubin 1.1 mg/dL      Globulin 3.6 gm/dL      A/G Ratio 1.2 g/dL      BUN/Creatinine Ratio 12.2     Anion Gap 12.9 mmol/L      eGFR 96.0 mL/min/1.73     Narrative:      GFR Normal >60  Chronic Kidney Disease <60  Kidney Failure <15      Lipase [[id number]]  (Abnormal) Collected: 08/08/23 1349    Specimen: Blood from Arm, Right Updated: 08/08/23 1415     Lipase 112 U/L     CBC Auto Differential [[id number]]  (Abnormal) Collected: 08/08/23  1349    Specimen: Blood from Arm, Right Updated: 08/08/23 1401     WBC 17.30 10*3/mm3      RBC 5.35 10*6/mm3      Hemoglobin 15.6 g/dL      Hematocrit 45.0 %      MCV 84.1 fL      MCH 29.2 pg      MCHC 34.7 g/dL      RDW 13.3 %      RDW-SD 40.8 fl      MPV 9.7 fL      Platelets 289 10*3/mm3      Neutrophil % 86.6 %      Lymphocyte % 4.2 %      Monocyte % 8.6 %      Eosinophil % 0.1 %      Basophil % 0.2 %      Immature Grans % 0.3 %      Neutrophils, Absolute 15.00 10*3/mm3      Lymphocytes, Absolute 0.72 10*3/mm3      Monocytes, Absolute 1.48 10*3/mm3      Eosinophils, Absolute 0.02 10*3/mm3      Basophils, Absolute 0.03 10*3/mm3      Immature Grans, Absolute 0.05 10*3/mm3      nRBC 0.0 /100 WBC     Lactate Dehydrogenase [[id number]]  (Normal) Collected: 08/08/23 1349    Specimen: Blood from Arm, Right Updated: 08/08/23 1843      U/L     Urinalysis With Microscopic If Indicated (No Culture) - Urine, Clean Catch [[id number]]  (Abnormal) Collected: 08/08/23 1353    Specimen: Urine, Clean Catch Updated: 08/08/23 1417     Color, UA Dark Yellow     Appearance, UA Clear     pH, UA 6.0     Specific Gravity, UA 1.028     Glucose,  mg/dL (1+)     Ketones, UA 40 mg/dL (2+)     Bilirubin, UA Small (1+)     Blood, UA Small (1+)     Protein, UA >=300 mg/dL (3+)     Leuk Esterase, UA Negative     Nitrite, UA Negative     Urobilinogen, UA 1.0 E.U./dL    Urinalysis, Microscopic Only - Urine, Clean Catch [[id number]]  (Abnormal) Collected: 08/08/23 1353    Specimen: Urine, Clean Catch Updated: 08/08/23 1417     RBC, UA 0-2 /HPF      WBC, UA 3-5 /HPF      Bacteria, UA None Seen /HPF      Squamous Epithelial Cells, UA 0-2 /HPF      Hyaline Casts, UA 3-6 /LPF      Methodology Automated Microscopy    Lactic Acid, Plasma [[id number]]  (Normal) Collected: 08/08/23 1517    Specimen: Blood Updated: 08/08/23 1551     Lactate 1.6 mmol/L              Imaging:    CT Abdomen Pelvis With Contrast    Result Date: 8/8/2023  PROCEDURE: CT  ABDOMEN PELVIS W CONTRAST  COMPARISON: Other, CT, CT ANGIOGRAM AORTA, 11/15/2021, 12:44.  Other, CT, CT ABDOMEN W CONTRAST, 10/18/2021, 14:33.  INDICATIONS: elevate lipase. FLANK PAIN AND VOMITING  X2DAYS  TECHNIQUE: After obtaining the patient's consent, CT images were created with non-ionic intravenous contrast material.   PROTOCOL:   Standard imaging protocol performed    RADIATION:   DLP: 1965.7 mGy*cm   Automated exposure control was utilized to minimize radiation dose. CONTRAST: 93 cc Isovue 370 I.V. LABS:   eGFR: 96 ml/min/1.73m2  FINDINGS:  There is suspected mild atelectasis or scarring in the lung bases.  No significant pleural or pericardial effusion.  There is calcific atherosclerosis of the coronary arteries.  Heart size appears within normal limits.  There is abnormal edema and peripancreatic fat stranding involving the head of the pancreas.  No definite peripancreatic collection is seen at this time.  There is mild prominence of peripancreatic lymph nodes.  No significant ductal dilatation is seen.  Pancreatic enhancement appears within normal limits.  There is fatty infiltration of the pancreas.  Bile ducts do not appear dilated.  No suspicious gallbladder abnormality is seen.  The liver appears mildly hypodense which may indicate hepatic steatosis.  No focal hepatic lesion is identified.  No hydronephrosis or suspicious renal lesion.  Adrenal glands and spleen appear unremarkable.  There is calcific atherosclerosis of the aorta and branch vessels.  Mesenteric vessels appear to enhance as expected.  There is a small focal aneurysm of the abdominal aorta estimated to measure up to approximately 2.8 x 2.6 cm, similar to the prior exam.  No other significant abdominal lymphadenopathy is seen.  There is some mild wall thickening of the gastric antrum and duodenum which may be reactive.  No definite ectopic bowel gas is seen.  No definite small bowel dilatation.  There is diastasis of the rectus  abdominus.  The appendix is not well seen.  No definite right lower quadrant inflammation is identified.  There is diverticulosis of the colon.  No acute colonic or rectal abnormality is seen.  The common iliac arteries appear to measure up to approximately 2.3 cm in diameter, similar to the prior exam.  There is also aneurysm of proximal internal iliac arteries, as before.  No definite pelvic lymphadenopathy.  There appear to be small fat containing inguinal hernias.  The prostate does not appear significantly enlarged.  There is a left posterior pelvic spinal stimulator device with electrodes extending superiorly beyond the margins of this exam.  There is advanced multilevel disc degeneration in the thoracolumbar spine.  No definite aggressive osseous lesion is identified.        1. Findings suspicious for acute pancreatitis.  No definite peripancreatic collection or ductal dilatation is seen at this time. 2. Calcific atherosclerosis with abdominal aortic aneurysm measuring up to 2.8 cm.  Additional aneurysms are seen in the common iliac and internal iliac arteries. 3. No definite biliary ductal dilatation or acute gallbladder abnormality. 4. Hepatic steatosis      SEAN GARRIDO MD       Electronically Signed and Approved By: SEAN GARRIDO MD on 8/08/2023 at 18:04                Differential Diagnosis and Discussion:    Abdominal Pain: Based on the patient's signs and symptoms, I considered abdominal aortic aneurysm, small bowel obstruction, pancreatitis, acute cholecystitis, acute appendecitis, peptic ulcer disease, gastritis, colitis, endocrine disorders, irritable bowel syndrome and other differential diagnosis an etiology of the patient's abdominal pain.    All labs were reviewed and interpreted by me.  EKG was interpreted by me.    MDM     Amount and/or Complexity of Data Reviewed  Clinical lab tests: reviewed  Tests in the radiology section of [facility]: reviewed             Patient Care  Considerations:          Consultants/Shared Management Plan:    Hospitalist: I have discussed the case with Dr. JOHN Mars who agrees to accept the patient for admission.    Social Determinants of Health:    Patient is independent, reliable, and has access to care.       Disposition and Care Coordination:    Admit:   Through independent evaluation of the patient's history, physical, and imperical data, the patient meets criteria for observation/admission to the hospital.        Final diagnoses:   Acute pancreatitis without infection or necrosis, unspecified pancreatitis type        ED Disposition       ED Disposition   Decision to Admit    Condition   --    Comment   Level of Care: Med/Surg [1]   Diagnosis: Acute pancreatitis [577.0.ICD-9-CM]   Admitting Physician: BETTIE MARS [[id number]]   Attending Physician: BETTIE MARS [[id number]]   Isolate for COVID?: No [0]   Certification: I Certify That Inpatient Hospital Services Are Medically Necessary For Greater Than 2 Midnights                 This medical record created using voice recognition software.             Artemio Mchugh MD  08/08/23 2030

## 2023-08-09 LAB
ALBUMIN SERPL-MCNC: 3.6 G/DL (ref 3.5–5.2)
ALBUMIN/GLOB SERPL: 1.1 G/DL
ALP SERPL-CCNC: 87 U/L (ref 39–117)
ALT SERPL W P-5'-P-CCNC: 18 U/L (ref 1–41)
ANION GAP SERPL CALCULATED.3IONS-SCNC: 10.3 MMOL/L (ref 5–15)
AST SERPL-CCNC: 26 U/L (ref 1–40)
BASOPHILS # BLD AUTO: 0.02 10*3/MM3 (ref 0–0.2)
BASOPHILS NFR BLD AUTO: 0.2 % (ref 0–1.5)
BILIRUB SERPL-MCNC: 0.8 MG/DL (ref 0–1.2)
BUN SERPL-MCNC: 13 MG/DL (ref 8–23)
BUN/CREAT SERPL: 17.3 (ref 7–25)
CALCIUM SPEC-SCNC: 8.7 MG/DL (ref 8.6–10.5)
CHLORIDE SERPL-SCNC: 103 MMOL/L (ref 98–107)
CO2 SERPL-SCNC: 25.7 MMOL/L (ref 22–29)
CREAT SERPL-MCNC: 0.75 MG/DL (ref 0.76–1.27)
DEPRECATED RDW RBC AUTO: 42.5 FL (ref 37–54)
EGFRCR SERPLBLD CKD-EPI 2021: 101.4 ML/MIN/1.73
EOSINOPHIL # BLD AUTO: 0.08 10*3/MM3 (ref 0–0.4)
EOSINOPHIL NFR BLD AUTO: 0.6 % (ref 0.3–6.2)
ERYTHROCYTE [DISTWIDTH] IN BLOOD BY AUTOMATED COUNT: 13.4 % (ref 12.3–15.4)
GLOBULIN UR ELPH-MCNC: 3.2 GM/DL
GLUCOSE BLDC GLUCOMTR-MCNC: 105 MG/DL (ref 70–99)
GLUCOSE BLDC GLUCOMTR-MCNC: 141 MG/DL (ref 70–99)
GLUCOSE BLDC GLUCOMTR-MCNC: 154 MG/DL (ref 70–99)
GLUCOSE BLDC GLUCOMTR-MCNC: 177 MG/DL (ref 70–99)
GLUCOSE SERPL-MCNC: 171 MG/DL (ref 65–99)
HCT VFR BLD AUTO: 40.4 % (ref 37.5–51)
HGB BLD-MCNC: 13.6 G/DL (ref 13–17.7)
IMM GRANULOCYTES # BLD AUTO: 0.06 10*3/MM3 (ref 0–0.05)
IMM GRANULOCYTES NFR BLD AUTO: 0.5 % (ref 0–0.5)
LIPASE SERPL-CCNC: 61 U/L (ref 13–60)
LYMPHOCYTES # BLD AUTO: 0.75 10*3/MM3 (ref 0.7–3.1)
LYMPHOCYTES NFR BLD AUTO: 5.9 % (ref 19.6–45.3)
MCH RBC QN AUTO: 29.3 PG (ref 26.6–33)
MCHC RBC AUTO-ENTMCNC: 33.7 G/DL (ref 31.5–35.7)
MCV RBC AUTO: 87.1 FL (ref 79–97)
MONOCYTES # BLD AUTO: 1.41 10*3/MM3 (ref 0.1–0.9)
MONOCYTES NFR BLD AUTO: 11.1 % (ref 5–12)
NEUTROPHILS NFR BLD AUTO: 10.41 10*3/MM3 (ref 1.7–7)
NEUTROPHILS NFR BLD AUTO: 81.7 % (ref 42.7–76)
NRBC BLD AUTO-RTO: 0 /100 WBC (ref 0–0.2)
PLATELET # BLD AUTO: 221 10*3/MM3 (ref 140–450)
PMV BLD AUTO: 9.6 FL (ref 6–12)
POTASSIUM SERPL-SCNC: 4 MMOL/L (ref 3.5–5.2)
PROT SERPL-MCNC: 6.8 G/DL (ref 6–8.5)
RBC # BLD AUTO: 4.64 10*6/MM3 (ref 4.14–5.8)
SODIUM SERPL-SCNC: 139 MMOL/L (ref 136–145)
WBC NRBC COR # BLD: 12.73 10*3/MM3 (ref 3.4–10.8)

## 2023-08-09 PROCEDURE — 25010000002 ENOXAPARIN PER 10 MG: Performed by: INTERNAL MEDICINE

## 2023-08-09 PROCEDURE — 80053 COMPREHEN METABOLIC PANEL: CPT | Performed by: INTERNAL MEDICINE

## 2023-08-09 PROCEDURE — 82948 REAGENT STRIP/BLOOD GLUCOSE: CPT

## 2023-08-09 PROCEDURE — 63710000001 INSULIN REGULAR HUMAN PER 5 UNITS: Performed by: INTERNAL MEDICINE

## 2023-08-09 PROCEDURE — 25010000002 LEVOFLOXACIN PER 250 MG: Performed by: INTERNAL MEDICINE

## 2023-08-09 PROCEDURE — 25010000002 ONDANSETRON PER 1 MG: Performed by: INTERNAL MEDICINE

## 2023-08-09 PROCEDURE — 83690 ASSAY OF LIPASE: CPT | Performed by: INTERNAL MEDICINE

## 2023-08-09 PROCEDURE — 25010000002 HYDROMORPHONE 1 MG/ML SOLUTION: Performed by: INTERNAL MEDICINE

## 2023-08-09 PROCEDURE — 85025 COMPLETE CBC W/AUTO DIFF WBC: CPT | Performed by: INTERNAL MEDICINE

## 2023-08-09 RX ORDER — ONDANSETRON 2 MG/ML
4 INJECTION INTRAMUSCULAR; INTRAVENOUS EVERY 4 HOURS PRN
Status: DISCONTINUED | OUTPATIENT
Start: 2023-08-09 | End: 2023-08-13 | Stop reason: HOSPADM

## 2023-08-09 RX ADMIN — HYDROMORPHONE HYDROCHLORIDE 0.5 MG: 1 INJECTION, SOLUTION INTRAMUSCULAR; INTRAVENOUS; SUBCUTANEOUS at 12:15

## 2023-08-09 RX ADMIN — POTASSIUM CHLORIDE, DEXTROSE MONOHYDRATE AND SODIUM CHLORIDE 150 ML/HR: 150; 5; 900 INJECTION, SOLUTION INTRAVENOUS at 14:53

## 2023-08-09 RX ADMIN — ONDANSETRON 4 MG: 2 INJECTION INTRAMUSCULAR; INTRAVENOUS at 18:19

## 2023-08-09 RX ADMIN — Medication 10 ML: at 09:30

## 2023-08-09 RX ADMIN — INSULIN HUMAN 2 UNITS: 100 INJECTION, SOLUTION PARENTERAL at 12:15

## 2023-08-09 RX ADMIN — ONDANSETRON 4 MG: 2 INJECTION INTRAMUSCULAR; INTRAVENOUS at 07:56

## 2023-08-09 RX ADMIN — ENOXAPARIN SODIUM 40 MG: 100 INJECTION SUBCUTANEOUS at 23:50

## 2023-08-09 RX ADMIN — HYDROMORPHONE HYDROCHLORIDE 0.5 MG: 1 INJECTION, SOLUTION INTRAMUSCULAR; INTRAVENOUS; SUBCUTANEOUS at 23:31

## 2023-08-09 RX ADMIN — FAMOTIDINE 20 MG: 10 INJECTION INTRAVENOUS at 09:30

## 2023-08-09 RX ADMIN — ONDANSETRON 4 MG: 2 INJECTION INTRAMUSCULAR; INTRAVENOUS at 13:41

## 2023-08-09 RX ADMIN — Medication 10 ML: at 23:35

## 2023-08-09 RX ADMIN — HYDROMORPHONE HYDROCHLORIDE 0.5 MG: 1 INJECTION, SOLUTION INTRAMUSCULAR; INTRAVENOUS; SUBCUTANEOUS at 18:19

## 2023-08-09 RX ADMIN — INSULIN HUMAN 2 UNITS: 100 INJECTION, SOLUTION PARENTERAL at 05:47

## 2023-08-09 RX ADMIN — POTASSIUM CHLORIDE, DEXTROSE MONOHYDRATE AND SODIUM CHLORIDE 150 ML/HR: 150; 5; 900 INJECTION, SOLUTION INTRAVENOUS at 07:54

## 2023-08-09 RX ADMIN — LEVOFLOXACIN 500 MG: 5 INJECTION, SOLUTION INTRAVENOUS at 23:51

## 2023-08-09 RX ADMIN — HYDROMORPHONE HYDROCHLORIDE 0.5 MG: 1 INJECTION, SOLUTION INTRAMUSCULAR; INTRAVENOUS; SUBCUTANEOUS at 05:41

## 2023-08-09 RX ADMIN — Medication 10 ML: at 20:39

## 2023-08-09 RX ADMIN — ONDANSETRON 4 MG: 2 INJECTION INTRAMUSCULAR; INTRAVENOUS at 23:31

## 2023-08-09 RX ADMIN — FAMOTIDINE 20 MG: 10 INJECTION INTRAVENOUS at 23:26

## 2023-08-09 RX ADMIN — HYDROMORPHONE HYDROCHLORIDE 0.5 MG: 1 INJECTION, SOLUTION INTRAMUSCULAR; INTRAVENOUS; SUBCUTANEOUS at 16:08

## 2023-08-09 RX ADMIN — HYDROMORPHONE HYDROCHLORIDE 0.5 MG: 1 INJECTION, SOLUTION INTRAMUSCULAR; INTRAVENOUS; SUBCUTANEOUS at 08:00

## 2023-08-09 RX ADMIN — HYDROMORPHONE HYDROCHLORIDE 0.5 MG: 1 INJECTION, SOLUTION INTRAMUSCULAR; INTRAVENOUS; SUBCUTANEOUS at 20:38

## 2023-08-09 NOTE — PROGRESS NOTES
[facility]     Progress Note    Patient Name: Willard Lange  : 1960  MRN: [id number]  Primary Care Physician:  Josselyn Damon APRN  Date of admission: 2023      Subjective   Brief summary.  Patient admitted with acute pancreatitis and nausea vomiting      HPI:  Vomiting resolved but still nauseated.  Continues to have abdominal pain different sites, continues to complains of back pain    Review of Systems     No fever or chills, persistent nausea, no vomiting, abdominal pain, no blood in the stools or black-colored stools      Objective     Vitals:   Temp:  [98.4 øF (36.9 øC)-99.5 øF (37.5 øC)] 98.8 øF (37.1 øC)  Heart Rate:  [74-84] 75  Resp:  [16-20] 16  BP: (126-182)/(60-88) 136/65    Physical Exam :     Elderly looking male not in acute distress, tired looking.  No icterus or pallor.  Heart regular.  Lungs clear.  Abdomen slightly distended with diffuse tenderness, no guarding no peritoneal signs      Result Review:  I have personally reviewed the results from the time of this admission to 2023 19:36 EDT and agree with these findings:  [x]  Laboratory  []  Microbiology  []  Radiology  []  EKG/Telemetry   []  Cardiology/Vascular   []  Pathology  []  Old records  []  Other:           Assessment / Plan       Active Hospital Problems:  Active Hospital Problems    Diagnosis     **Acute pancreatitis        Plan:   Patient's labs improving lipase down.  But continues to have more symptoms.  Will consult GI, increased frequency of Zofran.  Continue to maintain n.p.o. status with ice chips for now       DVT prophylaxis:  Medical DVT prophylaxis orders are present.    CODE STATUS:   Code Status (Patient has no pulse and is not breathing): CPR (Attempt to Resuscitate)  Medical Interventions (Patient has pulse or is breathing): Full Support            Electronically signed by Tristen Masr MD, 23, 7:36 PM EDT.

## 2023-08-09 NOTE — CONSULTS
Consult received for Database Referral Only. Discussed with patient his current diagnosis of acute pancreatitis possibly related to GLP-1 use. Patient was previously prescribed Ozempic, tolerated it well, and insurance requested a change to Trulicity. Patient believes he has been on Trulicity consistent for approximately 4 months.

## 2023-08-09 NOTE — SIGNIFICANT NOTE
Wound Eval / Progress Noted    DYLAN Pathak     Patient Name: Willard Lange  : 1960  MRN: [id number]  Today's Date: 2023                 Admit Date: 2023    Visit Dx:    ICD-10-CM ICD-9-CM   1. Acute pancreatitis without infection or necrosis, unspecified pancreatitis type  K85.90 577.0         Acute pancreatitis        Past Medical History:   Diagnosis Date    Arthritis     Asthma     Benign essential hypertension     Brain tumor 2021    Cancer     DDD (degenerative disc disease), lumbar     Diabetes mellitus type 2, noninsulin dependent     Diabetes type 2, controlled     GERD (gastroesophageal reflux disease)     Hepatitis C     HTN (hypertension)     Leg pain     Lumbago 02/15/2017    with low back pain    Lumbar disc herniation 02/15/2017    L4-4    Lumbar spinal stenosis 02/15/2017    L4/5    Muscle cramps     Neuropathy     Prostate cancer         Past Surgical History:   Procedure Laterality Date    APPENDECTOMY      BACK SURGERY      COLONOSCOPY      Dr. Dorsey-Polyps    CYSTOSCOPY W/ LASER LITHOTRIPSY      for kidney stones    LAMINECTOMY  2017    L4-5    PROSTATE BIOPSY      transrectal    TONSILLECTOMY           Physical Assessment:  Wound 23 0613 Right anterior second toe (Active)   Wound Image   23 0622   Dressing Appearance open to air 23 1114   Closure None 23 1114   Base moist;red 23 1114   Periwound dry;intact 23 1114   Periwound Temperature warm 23 1114   Periwound Skin Turgor soft 23 1114   Edges open 23 1114   Drainage Characteristics/Odor serosanguineous 23 1114   Drainage Amount scant 23 1114   Care, Wound cleansed with;sterile normal saline 23 1114   Dressing Care dressing applied;petroleum-based;non-adherent;gauze;other (see comments) 23 1114   Periwound Care absorptive dressing applied 23 1114       Wound 23 0614 Right anterior third toe (Active)   Wound Image    08/09/23 0622   Dressing Appearance open to air 08/09/23 1114   Closure None 08/09/23 1114   Base dry;red;scab 08/09/23 1114   Periwound dry;intact 08/09/23 1114   Periwound Temperature warm 08/09/23 1114   Periwound Skin Turgor soft 08/09/23 1114   Edges open 08/09/23 1114   Drainage Amount none 08/09/23 1114   Care, Wound cleansed with;sterile normal saline 08/09/23 1114   Dressing Care dressing applied;petroleum-based;non-adherent;gauze;other (see comments) 08/09/23 1114   Periwound Care absorptive dressing applied 08/09/23 1114      Wound Check / Follow-up:  Patient seen today for wound consult. Patient with traumatic injuries to right second toe and right third toe. Right second toe with moist red wound base. Right third toe with dry red scabbed area. Applied non-adherent petroleum based gauze to both and secured with BandAid. Patient reports he has neuropathy and hit his toes against something but didn't realize that it happened. Recommending daily dressing changes. Patient denies any other skin issues or wounds.      Impression: Traumatic injuries to right second toe and right third toe.      Short term goals:  Regain skin integrity, daily dressing changes.      Shannan Tran RN    8/9/2023    13:57 EDT

## 2023-08-09 NOTE — PAYOR COMM NOTE
"[facility] DEPARTMENT    Susanna Malloy RN  Phone [phone]  Fax [phone]    [address] [place] [address]    NPI [id number]  TAX ID [id number]    PHYSICIAN NAME AND NPI  BETTIE MARS  [id number]    BED TYPE INPATIENT MEDICAL    TYPE OF ADMISSION: ED ADMISSION MEDICAL    ICD 10 CODE K85.90    DATE OF ADMISSION 08/08/2023      LangeWillard black (63 y.o. Male)       Date of Birth   1960    Social Security Number       [facility]   [address] [facility] [address]    Home Phone   [phone]    MRN   [id number]       Jewish   Jewish    Marital Status                               Admission Date   8/8/23    Admission Type   Emergency    Admitting Provider   Bettie Mars MD    Attending Provider   Bettie Mars MD    Department, Room/Bed   [address], 4016/1       Discharge Date       Discharge Disposition       Discharge Destination                                 Attending Provider: Bettie Mars MD    Allergies: Formaldehyde    Isolation: None   Infection: None   Code Status: CPR    Ht: 182.9 cm (72\")   Wt: 108 kg (237 lb 3.4 oz)    Admission Cmt: None   Principal Problem: Acute pancreatitis [K85.90]                   Active Insurance as of 8/8/2023       Primary Coverage       Payor Plan Insurance Group Employer/Plan Group    [facility] MEDICARE REPLACEMENT [facility] MEDICARE [place]PLACEMENT [id number]       Payor Plan Address Payor Plan Phone Number Payor Plan Fax Number Effective Dates    [address] [address]   3/1/2023 - None Entered    [facility] [id number]         Subscriber Name Subscriber Birth Date Member ID       WILLARD LANGE 1960 [id number]               Secondary Coverage       Payor Plan Insurance Group Employer/Plan Group    [facility] [id number]       Payor Plan Address Payor Plan Phone Number Payor Plan Fax Number Effective Dates    [address] [address]   1/1/2021 - None Entered    " "[facility] [address]         Subscriber Name Subscriber Birth Date Member ID       CHHAYA JOHN 1960 [id number]                     Emergency Contacts        (Rel.) Home Phone Work Phone Mobile Phone    Chelsea Garnica (Friend) -- -- [phone]             Pancreatitis [facility] Inpatient Care by Gege Jenkins RN       Indications Met: Reviewed on 8/9/2023 by Gege Jenkins RN       Created Using Review Status Review Entered   [facility] for Case Management Primary Completed 8/9/2023 0837       Created By   Gege Jenkins RN       Criteria Set Name - Subset   Pancreatitis [facility] Inpatient Care      Criteria Review   Pancreatitis [facility] Inpatient Care     Overall Determination: Indications Met     Criteria:  [x] Admission is indicated for  1 or more  of the following :      [x] Acute pancreatitis, [D] as indicated by  2 or more  of the following :          [x] Abdominal pain              8/9/2023  8:37 AM                  -- 8/9/2023  8:37 AM by Gege Jenkins RN --                      f epigastric and mid back pain for the past 2 days. Patient states that pain \"jumps around\". Complains of nausea and vomiting          [x] Findings on imaging indicative of acute pancreatitis (eg, pancreatic inflammation, pancreatic necrosis, peripancreatic fluid collection)              8/9/2023  8:37 AM                  -- 8/9/2023  8:37 AM by Gege Jenkins RN --                      Findings suspicious for acute pancreatitis.- per CT abd 8/8/23     Notes:  -- 8/9/2023  8:37 AM by Gege Jenkins RN --      63 y.o. male with known history of type 2 diabetes, hypertension and multiple other medical problems, came to emergency room with abdominal pain, nausea vomiting all symptoms going on for 2 to 3 days. Patient is unable to eat much. Unable to sleep with his symptoms. Work-up in the ER revealed mildly elevated lipase and possible pancreatitis CT.            Lipase 112, wbc- 17.3            Meds in ED: NS bolus " x 2, zofran 4 mg iv, toradol iv,             Current meds: levaquin 500 mg iv q 24 hr, D3 .9 NS with kcl at 150 cc/hr, pepcid 20 mg iv bid,             dilaudid .5 mg iv at 0541 and 0800, zofran 4 mg iv at 0756            Order noted for Lawson CASTILLO: ALEN [id number] Tax ID [id number]     History & Physical        Tristen Mars MD at 23 2228           [facility]   HISTORY AND PHYSICAL    Patient Name: Willard Lange  : 1960  MRN: [id number]  Primary Care Physician:  Josselyn Damon APRN  Date of admission: 2023    Subjective   Subjective     Chief Complaint:   Abdominal pain, nausea vomiting      HPI:    Willard Lange is a 63 y.o. male with known history of type 2 diabetes, hypertension and multiple other medical problems, came to emergency room with abdominal pain, nausea vomiting all symptoms going on for 2 to 3 days.  Patient is unable to eat much.  Unable to sleep with his symptoms.  Work-up in the ER revealed mildly elevated lipase and possible pancreatitis CT.        Review of Systems:    No fever chills.  Denies any blood in the vomitus.  Nonspecific chest pain back pain abdominal pain.  Lower back hurts all the time.  Abdominal discomfort.  No blood in the stools or black-colored stools.  Denies any dizziness or lightheadedness        Personal History     Past Medical History:   Diagnosis Date    Arthritis     Asthma     Benign essential hypertension     Brain tumor 2021    Cancer     DDD (degenerative disc disease), lumbar     Diabetes mellitus type 2, noninsulin dependent     Diabetes type 2, controlled     GERD (gastroesophageal reflux disease)     Hepatitis C     HTN (hypertension)     Leg pain     Lumbago 02/15/2017    with low back pain    Lumbar disc herniation 02/15/2017    L4-4    Lumbar spinal stenosis 02/15/2017    L4/5    Muscle cramps     Neuropathy     Prostate cancer        Past Surgical History:   Procedure Laterality Date    APPENDECTOMY       BACK SURGERY  2017    COLONOSCOPY  2017    Dr. Dorsey-Polyps    CYSTOSCOPY W/ LASER LITHOTRIPSY      for kidney stones    LAMINECTOMY  02/17/2017    L4-5    PROSTATE BIOPSY      transrectal    TONSILLECTOMY         Family History: family history includes Diabetes type I in his maternal grandmother; Heart disease in his father; Lung cancer in his sister. Otherwise pertinent FHx was reviewed and not pertinent to current issue.    Social History:  reports that he quit smoking about 18 years ago. His smoking use included cigarettes. He started smoking about 46 years ago. He has never used smokeless tobacco. He reports current alcohol use. He reports that he does not use drugs.    Home Medications:  Dulaglutide, Fluticasone-Salmeterol, FreeStyle Lite, HYDROcodone-acetaminophen, albuterol sulfate HFA, atorvastatin, baclofen, colestipol, freestyle, glipizide, glucose blood, losartan-hydrochlorothiazide, omeprazole, prednisoLONE acetate, pregabalin, and tamsulosin      Allergies:  Allergies   Allergen Reactions    Formaldehyde Rash       Objective   Objective     Vitals:   Temp:  [99 øF (37.2 øC)-99.5 øF (37.5 øC)] 99.5 øF (37.5 øC)  Heart Rate:  [77-96] 84  Resp:  [16-20] 20  BP: (140-188)/(70-96) 182/88    Physical Exam  Elderly male looks his stated age.  Not in acute distress but anxious.  HEENT without any icterus or pallor.  Neck supple.  Heart regular.  Lungs clear.  Abdomen soft and obese mild epigastric tenderness.  No guarding no rebound no peritoneal signs.  Neuro awake alert and oriented.  Extremities no edema        I have personally reviewed the results from the time of this admission to 8/8/2023 22:28 EDT and agree with these findings:  [x]  Laboratory  []  Microbiology  [x]  Radiology  [x]  EKG/Telemetry   []  Cardiology/Vascular   []  Pathology  []  Old records  []  Other:    CBC:    WBC   Date Value Ref Range Status   08/08/2023 17.30 (H) 3.40 - 10.80 10*3/mm3 Final     RBC   Date Value Ref  Range Status   08/08/2023 5.35 4.14 - 5.80 10*6/mm3 Final     Hemoglobin   Date Value Ref Range Status   08/08/2023 15.6 13.0 - 17.7 g/dL Final     Hematocrit   Date Value Ref Range Status   08/08/2023 45.0 37.5 - 51.0 % Final     MCV   Date Value Ref Range Status   08/08/2023 84.1 79.0 - 97.0 fL Final     MCH   Date Value Ref Range Status   08/08/2023 29.2 26.6 - 33.0 pg Final     MCHC   Date Value Ref Range Status   08/08/2023 34.7 31.5 - 35.7 g/dL Final     RDW   Date Value Ref Range Status   08/08/2023 13.3 12.3 - 15.4 % Final     RDW-SD   Date Value Ref Range Status   08/08/2023 40.8 37.0 - 54.0 fl Final     MPV   Date Value Ref Range Status   08/08/2023 9.7 6.0 - 12.0 fL Final     Platelets   Date Value Ref Range Status   08/08/2023 289 140 - 450 10*3/mm3 Final     Neutrophil %   Date Value Ref Range Status   08/08/2023 86.6 (H) 42.7 - 76.0 % Final     Lymphocyte %   Date Value Ref Range Status   08/08/2023 4.2 (L) 19.6 - 45.3 % Final     Monocyte %   Date Value Ref Range Status   08/08/2023 8.6 5.0 - 12.0 % Final     Eosinophil %   Date Value Ref Range Status   08/08/2023 0.1 (L) 0.3 - 6.2 % Final     Basophil %   Date Value Ref Range Status   08/08/2023 0.2 0.0 - 1.5 % Final     Immature Grans %   Date Value Ref Range Status   08/08/2023 0.3 0.0 - 0.5 % Final     Neutrophils, Absolute   Date Value Ref Range Status   08/08/2023 15.00 (H) 1.70 - 7.00 10*3/mm3 Final     Lymphocytes, Absolute   Date Value Ref Range Status   08/08/2023 0.72 0.70 - 3.10 10*3/mm3 Final     Monocytes, Absolute   Date Value Ref Range Status   08/08/2023 1.48 (H) 0.10 - 0.90 10*3/mm3 Final     Eosinophils, Absolute   Date Value Ref Range Status   08/08/2023 0.02 0.00 - 0.40 10*3/mm3 Final     Basophils, Absolute   Date Value Ref Range Status   08/08/2023 0.03 0.00 - 0.20 10*3/mm3 Final     Immature Grans, Absolute   Date Value Ref Range Status   08/08/2023 0.05 0.00 - 0.05 10*3/mm3 Final     nRBC   Date Value Ref Range Status    08/08/2023 0.0 0.0 - 0.2 /100 WBC Final        BMP:    Lab Results   Component Value Date    GLUCOSE 197 (H) 08/08/2023    BUN 11 08/08/2023    CREATININE 0.90 08/08/2023    BCR 12.2 08/08/2023    K 4.0 08/08/2023    CO2 26.1 08/08/2023    CALCIUM 9.8 08/08/2023    ALBUMIN 4.3 08/08/2023    LABIL2 1.3 (L) 03/26/2020    AST 21 08/08/2023    ALT 23 08/08/2023        CT Abdomen Pelvis With Contrast    Result Date: 8/8/2023    1. Findings suspicious for acute pancreatitis.  No definite peripancreatic collection or ductal dilatation is seen at this time. 2. Calcific atherosclerosis with abdominal aortic aneurysm measuring up to 2.8 cm.  Additional aneurysms are seen in the common iliac and internal iliac arteries. 3. No definite biliary ductal dilatation or acute gallbladder abnormality. 4. Hepatic steatosis      SEAN GARRIDO MD       Electronically Signed and Approved By: SEAN GARRIDO MD on 8/08/2023 at 18:04                     Assessment & Plan   Assessment / Plan       Current Diagnosis:  Active Hospital Problems    Diagnosis     **Acute pancreatitis      Plan:   Patient with abdominal pain, nausea and vomiting most likely pancreatitis.  Admit to hospital as patient has persistent nausea vomiting and ketones in urine.  Patient is getting dehydrated.  He is type II diabetic.  Start IV fluids.  N.p.o. status.  Monitor sugars.  Use sliding scale.  Pain control with narcotics.  Patient with leukocytosis, IV Levaquin for now.  Recheck labs in a.m..  Further management will be based on clinical course, lab results.        DVT prophylaxis:  Medical DVT prophylaxis orders are present.    GI Prophylaxis:       IV Pepcid    CODE STATUS:    Code Status (Patient has no pulse and is not breathing): CPR (Attempt to Resuscitate)  Medical Interventions (Patient has pulse or is breathing): Full Support    Admission Status:  I believe this patient meets inpatient status.             I have dictated this note utilizing Dragon  "Dictation.             Please note that portions of this note were completed with a voice recognition program.             Part of this note may be an electronic transcription/translation of spoken language to printed text         using the Dragon Dictation System.       Electronically signed by Tristen Mars MD, 08/08/23, 10:28 PM EDT.    Total time spent with in evaluation and management: 39 minutes         Electronically signed by Tristen Mars MD at 08/08/23 2231          Emergency Department Notes        Artemio Mchugh MD at 08/08/23 1803          Time: 6:03 PM EDT  Date of encounter:  8/8/2023  Independent Historian/Clinical History and Information was obtained by:   Patient    History is limited by: N/A    Chief Complaint: Abdominal/back pain      History of Present Illness:  Patient is a 63 y.o. year old male who presents to the emergency department for evaluation of epigastric and mid back pain for the past 2 days.  Patient states that pain \"jumps around\".  Complains of nausea and vomiting with no diarrhea or constipation.  No hematemesis.  No reported fevers.  Denies urinary symptoms aside from dark urine.    HPI    Patient Care Team  Primary Care Provider: Josselyn Damon APRN    Past Medical History:     Allergies   Allergen Reactions    Formaldehyde Rash     Past Medical History:   Diagnosis Date    Arthritis     Asthma     Benign essential hypertension     Brain tumor 01/2021    Cancer     DDD (degenerative disc disease), lumbar     Diabetes mellitus type 2, noninsulin dependent     Diabetes type 2, controlled     GERD (gastroesophageal reflux disease)     Hepatitis C     HTN (hypertension)     Leg pain     Lumbago 02/15/2017    with low back pain    Lumbar disc herniation 02/15/2017    L4-4    Lumbar spinal stenosis 02/15/2017    L4/5    Muscle cramps     Neuropathy     Prostate cancer      Past Surgical History:   Procedure Laterality Date    APPENDECTOMY      BACK SURGERY  2017 "    COLONOSCOPY  2017    Dr. Dorsey-Polyps    CYSTOSCOPY W/ LASER LITHOTRIPSY      for kidney stones    LAMINECTOMY  02/17/2017    L4-5    PROSTATE BIOPSY      transrectal    TONSILLECTOMY       Family History   Problem Relation Age of Onset    Heart disease Father     Lung cancer Sister     Diabetes type I Maternal Grandmother        Home Medications:  Prior to Admission medications    Medication Sig Start Date End Date Taking? Authorizing Provider   Advair Diskus 250-50 MCG/DOSE DISKUS Inhale 1 puff 2 (Two) Times a Day. 6/8/21   Hilton Carroll MD   albuterol sulfate  (90 Base) MCG/ACT inhaler Inhale 2 puffs Every 6 (Six) Hours As Needed. 12/16/22   Hilton Carroll MD   amitriptyline (ELAVIL) 25 MG tablet Take 1 tablet by mouth every night at bedtime. 10/5/21   Hilton Carroll MD   aspirin 81 MG EC tablet Take 1 tablet by mouth Daily. 1/26/23 1/26/24  Geovanna Solo MD   atorvastatin (LIPITOR) 20 MG tablet Take 2 tablets by mouth Every Night. 3/30/22   Hilton Carroll MD   baclofen (LIORESAL) 10 MG tablet TAKE 1 TABLET BY MOUTH THREE TIMES DAILY AS NEEDED FOR MUSCLE CRAMPS 10/5/21   Hilton Carroll MD   Blood Glucose Monitoring Suppl (FreeStyle Lite) w/Device kit See Admin Instructions. 10/24/22   Hilton Carroll MD   busPIRone (BUSPAR) 7.5 MG tablet Take 1 tablet by mouth 2 (Two) Times a Day. 8/18/21   Hilton Carroll MD   chlorhexidine (Hibiclens) 4 % external liquid     Hilton Carroll MD   colestipol (COLESTID) 1 g tablet As Needed. 5/2/22   Hilton Carroll MD   dicyclomine (BENTYL) 20 MG tablet TAKE 1 TABLET BY MOUTH 4 TIMES DAILY AS NEEDED FOR GI UPSET 8/4/21   Hilton Carroll MD   FREESTYLE LITE test strip  11/4/21   Hilton Carroll MD   glipizide (GLUCOTROL XL) 5 MG ER tablet Take 1 tablet by mouth Daily. 6/7/21   Hilton Carroll MD   glucose blood test strip FreeStyle Lite Strips   USE TO TEST BLOOD SUGAR FOUR TIMES  DAILY AND AS NEEDED    Hilton Carroll MD   HYDROcodone-acetaminophen (NORCO) 7.5-325 MG per tablet Take 1 tablet by mouth Every 8 (Eight) Hours As Needed. 8/1/21   Hilton Carroll MD   indomethacin (INDOCIN) 50 MG capsule Take 1 capsule by mouth 2 (Two) Times a Day With Meals. As needed  Patient not taking: Reported on 5/18/2023 11/4/21   Hilton Carroll MD   Lancets (freestyle) lancets  11/4/21   Hilton Carroll MD   loratadine (CLARITIN) 10 MG tablet loratadine 10 mg oral tablet take 1 tablet (10 mg) by oral route once daily   Suspended    Hilton Carroll MD   losartan-hydrochlorothiazide (HYZAAR) 100-25 MG per tablet losartan-hydrochlorothiazide 100-25 mg oral tablet take 1 tablet by oral route once daily   Active    Hilton Carroll MD   NAPROXEN PO Take  by mouth.    Hilton Carroll MD   omeprazole (priLOSEC) 40 MG capsule Take 1 capsule by mouth Daily. 7/22/21   Hilton Carroll MD   PARoxetine (PAXIL) 10 MG tablet Take 1 tablet by mouth Daily.  Patient not taking: Reported on 5/18/2023 9/9/22   Hilton Carroll MD   pregabalin (LYRICA) 200 MG capsule TAKE 1 CAPSULE BY MOUTH THREE TIMES DAILY AS DIRECTED 8/1/21   Hilton Carroll MD   Sod Picosulfate-Mag Ox-Cit Acd (Clenpiq) 10-3.5-12 MG-GM -GM/160ML solution Take 160 mL by mouth 2 (Two) Times a Day.  Patient not taking: Reported on 5/18/2023 4/18/23   Jeaneth Chowdary APRN   Steglatro 5 MG tablet Take 1 tablet by mouth Every Morning.  Patient not taking: Reported on 5/18/2023 6/7/21   Hilton Carroll MD   tamsulosin (FLOMAX) 0.4 MG capsule 24 hr capsule Take 1 capsule by mouth 2 (Two) Times a Day for 90 days. 5/18/23 8/16/23  Shi Cortez APRN   traZODone (DESYREL) 50 MG tablet TAKE 1 TABLET BY MOUTH AT BEDTIME FOR INSOMNIA 3/2/22   Hilton Carroll MD   Trulicity 1.5 MG/0.5ML solution pen-injector Inject 1.5 mg under the skin into the appropriate area as directed Every 7 (Seven) Days.  "21   Hilton Carroll MD   vitamin B-12 (CYANOCOBALAMIN) 1000 MCG tablet Take 1 tablet by mouth Daily.  Patient not taking: Reported on 2023   Hilton Carroll MD   vitamin D (ERGOCALCIFEROL) 1.25 MG (54452 UT) capsule capsule Take 1 capsule by mouth 1 (One) Time Per Week.  Patient not taking: Reported on 2023   Hilton Carroll MD        Social History:   Social History     Tobacco Use    Smoking status: Former     Types: Cigarettes     Start date: 1976     Quit date:      Years since quittin.6    Smokeless tobacco: Never   Vaping Use    Vaping Use: Never used   Substance Use Topics    Alcohol use: Yes     Comment: rarely    Drug use: Never         Review of Systems:  Review of Systems   Constitutional:  Negative for chills and fever.   HENT:  Negative for congestion, rhinorrhea and sore throat.    Eyes:  Negative for photophobia.   Respiratory:  Negative for apnea, cough, chest tightness and shortness of breath.    Cardiovascular:  Negative for chest pain and palpitations.   Gastrointestinal:  Positive for abdominal pain, nausea and vomiting. Negative for diarrhea.   Endocrine: Negative.    Genitourinary:  Negative for difficulty urinating and dysuria.   Musculoskeletal:  Positive for back pain. Negative for joint swelling and myalgias.   Skin:  Negative for color change and wound.   Allergic/Immunologic: Negative.    Neurological:  Negative for seizures and headaches.   Psychiatric/Behavioral: Negative.     All other systems reviewed and are negative.     Physical Exam:  /92   Pulse 96   Temp 99 øF (37.2 øC) (Oral)   Resp 16   Ht 182.9 cm (72\")   Wt 108 kg (237 lb 3.4 oz)   SpO2 93%   BMI 32.17 kg/mý     Physical Exam  Vitals and nursing note reviewed.   Constitutional:       General: He is awake.      Appearance: Normal appearance.   HENT:      Head: Normocephalic and atraumatic.      Nose: Nose normal.      Mouth/Throat:      Mouth: " Mucous membranes are moist.   Eyes:      Extraocular Movements: Extraocular movements intact.      Pupils: Pupils are equal, round, and reactive to light.   Cardiovascular:      Rate and Rhythm: Normal rate and regular rhythm.      Heart sounds: Normal heart sounds.   Pulmonary:      Effort: Pulmonary effort is normal. No respiratory distress.      Breath sounds: Normal breath sounds. No wheezing, rhonchi or rales.   Abdominal:      General: Bowel sounds are normal.      Palpations: Abdomen is soft.      Tenderness: There is no abdominal tenderness. There is no guarding or rebound.      Comments: No rigidity   Musculoskeletal:         General: No tenderness. Normal range of motion.      Cervical back: Normal range of motion and neck supple.   Skin:     General: Skin is warm and dry.      Coloration: Skin is not jaundiced.   Neurological:      General: No focal deficit present.      Mental Status: He is alert and oriented to person, place, and time. Mental status is at baseline.      Sensory: Sensation is intact.      Motor: Motor function is intact.      Coordination: Coordination is intact.   Psychiatric:         Attention and Perception: Attention and perception normal.         Mood and Affect: Mood and affect normal.         Speech: Speech normal.         Behavior: Behavior normal.         Judgment: Judgment normal.                Procedures:  Procedures      Medical Decision Making:      Comorbidities that affect care:    Type 2 diabetes, hepatitis C, arthritis, GERD, hypertension, cancer    External Notes reviewed:    Encounter review: Office visit with podiatry yesterday.  Dr. Ureña.  Bilateral foot pain/toenail care      The following orders were placed and all results were independently analyzed by me:  Orders Placed This Encounter   Procedures    CT Abdomen Pelvis With Contrast    [place] Draw    Comprehensive Metabolic Panel    Lipase    Urinalysis With Microscopic If Indicated (No Culture) -  Urine, Clean Catch    Lactic Acid, Plasma    CBC Auto Differential    Urinalysis, Microscopic Only - Urine, Clean Catch    Lactate Dehydrogenase    NPO Diet NPO Type: Strict NPO    Undress & Gown    Please document a repeat temperature  Nursing Communication    Hospitalist (on-call MD unless specified)    Insert Peripheral IV    Inpatient Admission    CBC & Differential    Green Top (Gel)    Lavender Top    Gold Top - SST    Light Blue Top       Medications Given in the Emergency Department:  Medications   sodium chloride 0.9 % flush 10 mL (has no administration in time range)   sodium chloride 0.9 % bolus 1,000 mL (0 mL Intravenous Stopped 8/8/23 1734)   ondansetron (ZOFRAN) injection 4 mg (4 mg Intravenous Given 8/8/23 1616)   iopamidol (ISOVUE-370) 76 % injection 100 mL (93 mL Intravenous Given 8/8/23 1714)   ketorolac (TORADOL) injection 30 mg (30 mg Intravenous Given 8/8/23 1852)   sodium chloride 0.9 % bolus 1,000 mL (1,000 mL Intravenous New Bag 8/8/23 1853)        ED Course:         Labs:    Lab Results (last 24 hours)       Procedure Component Value Units Date/Time    CBC & Differential [[id number]]  (Abnormal) Collected: 08/08/23 1349    Specimen: Blood from Arm, Right Updated: 08/08/23 1401    Narrative:      The following orders were created for panel order CBC & Differential.  Procedure                               Abnormality         Status                     ---------                               -----------         ------                     CBC Auto Differential[[id number]]        Abnormal            Final result                 Please view results for these tests on the individual orders.    Comprehensive Metabolic Panel [[id number]]  (Abnormal) Collected: 08/08/23 1349    Specimen: Blood from Arm, Right Updated: 08/08/23 1415     Glucose 197 mg/dL      BUN 11 mg/dL      Creatinine 0.90 mg/dL      Sodium 137 mmol/L      Potassium 4.0 mmol/L      Chloride 98 mmol/L      CO2 26.1  mmol/L      Calcium 9.8 mg/dL      Total Protein 7.9 g/dL      Albumin 4.3 g/dL      ALT (SGPT) 23 U/L      AST (SGOT) 21 U/L      Alkaline Phosphatase 113 U/L      Total Bilirubin 1.1 mg/dL      Globulin 3.6 gm/dL      A/G Ratio 1.2 g/dL      BUN/Creatinine Ratio 12.2     Anion Gap 12.9 mmol/L      eGFR 96.0 mL/min/1.73     Narrative:      GFR Normal >60  Chronic Kidney Disease <60  Kidney Failure <15      Lipase [[id number]]  (Abnormal) Collected: 08/08/23 1349    Specimen: Blood from Arm, Right Updated: 08/08/23 1415     Lipase 112 U/L     CBC Auto Differential [[id number]]  (Abnormal) Collected: 08/08/23 1349    Specimen: Blood from Arm, Right Updated: 08/08/23 1401     WBC 17.30 10*3/mm3      RBC 5.35 10*6/mm3      Hemoglobin 15.6 g/dL      Hematocrit 45.0 %      MCV 84.1 fL      MCH 29.2 pg      MCHC 34.7 g/dL      RDW 13.3 %      RDW-SD 40.8 fl      MPV 9.7 fL      Platelets 289 10*3/mm3      Neutrophil % 86.6 %      Lymphocyte % 4.2 %      Monocyte % 8.6 %      Eosinophil % 0.1 %      Basophil % 0.2 %      Immature Grans % 0.3 %      Neutrophils, Absolute 15.00 10*3/mm3      Lymphocytes, Absolute 0.72 10*3/mm3      Monocytes, Absolute 1.48 10*3/mm3      Eosinophils, Absolute 0.02 10*3/mm3      Basophils, Absolute 0.03 10*3/mm3      Immature Grans, Absolute 0.05 10*3/mm3      nRBC 0.0 /100 WBC     Lactate Dehydrogenase [[id number]]  (Normal) Collected: 08/08/23 1349    Specimen: Blood from Arm, Right Updated: 08/08/23 1843      U/L     Urinalysis With Microscopic If Indicated (No Culture) - Urine, Clean Catch [[id number]]  (Abnormal) Collected: 08/08/23 1353    Specimen: Urine, Clean Catch Updated: 08/08/23 1417     Color, UA Dark Yellow     Appearance, UA Clear     pH, UA 6.0     Specific Gravity, UA 1.028     Glucose,  mg/dL (1+)     Ketones, UA 40 mg/dL (2+)     Bilirubin, UA Small (1+)     Blood, UA Small (1+)     Protein, UA >=300 mg/dL (3+)     Leuk Esterase, UA Negative     Nitrite, UA  Negative     Urobilinogen, UA 1.0 E.U./dL    Urinalysis, Microscopic Only - Urine, Clean Catch [[id number]]  (Abnormal) Collected: 08/08/23 1353    Specimen: Urine, Clean Catch Updated: 08/08/23 1417     RBC, UA 0-2 /HPF      WBC, UA 3-5 /HPF      Bacteria, UA None Seen /HPF      Squamous Epithelial Cells, UA 0-2 /HPF      Hyaline Casts, UA 3-6 /LPF      Methodology Automated Microscopy    Lactic Acid, Plasma [[id number]]  (Normal) Collected: 08/08/23 1517    Specimen: Blood Updated: 08/08/23 1551     Lactate 1.6 mmol/L              Imaging:    CT Abdomen Pelvis With Contrast    Result Date: 8/8/2023  PROCEDURE: CT ABDOMEN PELVIS W CONTRAST  COMPARISON: Other, CT, CT ANGIOGRAM AORTA, 11/15/2021, 12:44.  Other, CT, CT ABDOMEN W CONTRAST, 10/18/2021, 14:33.  INDICATIONS: elevate lipase. FLANK PAIN AND VOMITING  X2DAYS  TECHNIQUE: After obtaining the patient's consent, CT images were created with non-ionic intravenous contrast material.   PROTOCOL:   Standard imaging protocol performed    RADIATION:   DLP: 1965.7 mGy*cm   Automated exposure control was utilized to minimize radiation dose. CONTRAST: 93 cc Isovue 370 I.V. LABS:   eGFR: 96 ml/min/1.73m2  FINDINGS:  There is suspected mild atelectasis or scarring in the lung bases.  No significant pleural or pericardial effusion.  There is calcific atherosclerosis of the coronary arteries.  Heart size appears within normal limits.  There is abnormal edema and peripancreatic fat stranding involving the head of the pancreas.  No definite peripancreatic collection is seen at this time.  There is mild prominence of peripancreatic lymph nodes.  No significant ductal dilatation is seen.  Pancreatic enhancement appears within normal limits.  There is fatty infiltration of the pancreas.  Bile ducts do not appear dilated.  No suspicious gallbladder abnormality is seen.  The liver appears mildly hypodense which may indicate hepatic steatosis.  No focal hepatic lesion is identified.   No hydronephrosis or suspicious renal lesion.  Adrenal glands and spleen appear unremarkable.  There is calcific atherosclerosis of the aorta and branch vessels.  Mesenteric vessels appear to enhance as expected.  There is a small focal aneurysm of the abdominal aorta estimated to measure up to approximately 2.8 x 2.6 cm, similar to the prior exam.  No other significant abdominal lymphadenopathy is seen.  There is some mild wall thickening of the gastric antrum and duodenum which may be reactive.  No definite ectopic bowel gas is seen.  No definite small bowel dilatation.  There is diastasis of the rectus abdominus.  The appendix is not well seen.  No definite right lower quadrant inflammation is identified.  There is diverticulosis of the colon.  No acute colonic or rectal abnormality is seen.  The common iliac arteries appear to measure up to approximately 2.3 cm in diameter, similar to the prior exam.  There is also aneurysm of proximal internal iliac arteries, as before.  No definite pelvic lymphadenopathy.  There appear to be small fat containing inguinal hernias.  The prostate does not appear significantly enlarged.  There is a left posterior pelvic spinal stimulator device with electrodes extending superiorly beyond the margins of this exam.  There is advanced multilevel disc degeneration in the thoracolumbar spine.  No definite aggressive osseous lesion is identified.        1. Findings suspicious for acute pancreatitis.  No definite peripancreatic collection or ductal dilatation is seen at this time. 2. Calcific atherosclerosis with abdominal aortic aneurysm measuring up to 2.8 cm.  Additional aneurysms are seen in the common iliac and internal iliac arteries. 3. No definite biliary ductal dilatation or acute gallbladder abnormality. 4. Hepatic steatosis      SEAN GARRIDO MD       Electronically Signed and Approved By: SEAN GARRIDO MD on 8/08/2023 at 18:04                Differential Diagnosis and  Discussion:    Abdominal Pain: Based on the patient's signs and symptoms, I considered abdominal aortic aneurysm, small bowel obstruction, pancreatitis, acute cholecystitis, acute appendecitis, peptic ulcer disease, gastritis, colitis, endocrine disorders, irritable bowel syndrome and other differential diagnosis an etiology of the patient's abdominal pain.    All labs were reviewed and interpreted by me.  EKG was interpreted by me.    MDM     Amount and/or Complexity of Data Reviewed  Clinical lab tests: reviewed  Tests in the radiology section of CP[facility]: reviewed             Patient Care Considerations:          Consultants/Shared Management Plan:    Hospitalist: I have discussed the case with Dr. JOHN Mars who agrees to accept the patient for admission.    Social Determinants of Health:    Patient is independent, reliable, and has access to care.       Disposition and Care Coordination:    Admit:   Through independent evaluation of the patient's history, physical, and imperical data, the patient meets criteria for observation/admission to the hospital.        Final diagnoses:   Acute pancreatitis without infection or necrosis, unspecified pancreatitis type        ED Disposition       ED Disposition   Decision to Admit    Condition   --    Comment   Level of Care: Med/Surg [1]   Diagnosis: Acute pancreatitis [577.0.ICD-9-CM]   Admitting Physician: BETITE MARS [[id number]]   Attending Physician: BETTIE MARS [[id number]]   Isolate for COVID?: No [0]   Certification: I Certify That Inpatient Hospital Services Are Medically Necessary For Greater Than 2 Midnights                 This medical record created using voice recognition software.             Artemio Mchugh MD  08/08/23 2030      Electronically signed by Artemio Mchugh MD at 08/08/23 2030       Current Facility-Administered Medications   Medication Dose Route Frequency Provider Last Rate Last Admin    acetaminophen (TYLENOL) tablet 650 mg  650 mg Oral Q4H PRN  Tristen Mars MD        Or    acetaminophen (TYLENOL) 160 MG/5ML solution 650 mg  650 mg Oral Q4H PRN Tristen Mars MD        Or    acetaminophen (TYLENOL) suppository 650 mg  650 mg Rectal Q4H PRN Tristen Mars MD        albuterol (PROVENTIL) nebulizer solution 0.083% 2.5 mg/3mL  2.5 mg Nebulization Q6H PRN Tristen Mars MD        dextrose (D50W) (25 g/50 mL) IV injection 25 g  25 g Intravenous Q15 Min PRN Tristen Mars MD        dextrose (GLUTOSE) oral gel 15 g  15 g Oral Q15 Min PRN Tristen Mars MD        dextrose 5 % and sodium chloride 0.9 % with KCl 20 mEq/L infusion  150 mL/hr Intravenous Continuous Tristen Mars  mL/hr at 08/09/23 0754 150 mL/hr at 08/09/23 0754    Enoxaparin Sodium (LOVENOX) syringe 40 mg  40 mg Subcutaneous Q24H Tristen Mars MD   40 mg at 08/08/23 2340    famotidine (PEPCID) injection 20 mg  20 mg Intravenous BID Tristen Mars MD   20 mg at 08/09/23 0930    glucagon (GLUCAGEN) injection 1 mg  1 mg Intramuscular Q15 Min PRN Tristen Mars MD        HYDROcodone-acetaminophen (NORCO) 5-325 MG per tablet 1 tablet  1 tablet Oral Q4H PRN Tristen Mars MD        HYDROmorphone (DILAUDID) injection 0.5 mg  0.5 mg Intravenous Q2H PRN Tristen Mars MD   0.5 mg at 08/09/23 0800    And    naloxone (NARCAN) injection 0.4 mg  0.4 mg Intravenous Q5 Min PRN Tristen Mars MD        insulin regular (humuLIN R,novoLIN R) injection 2-9 Units  2-9 Units Subcutaneous Q6H Tristen Mars MD   2 Units at 08/09/23 0547    levoFLOXacin (LEVAQUIN) 500 mg/100 mL D5W (premix) (LEVAQUIN) 500 mg  500 mg Intravenous Q24H Tristen Mars  mL/hr at 08/08/23 2340 500 mg at 08/08/23 2340    ondansetron (ZOFRAN) injection 4 mg  4 mg Intravenous Q6H PRN Tristen Mars MD   4 mg at 08/09/23 3336    Pharmacy to Dose enoxaparin (LOVENOX)   Does not apply Continuous PRN Tristen Mars MD        sodium chloride 0.9 % flush 10 mL  10 mL Intravenous PRN Tristen Mars MD        sodium chloride 0.9 % flush 10 mL  10 mL  Intravenous Q12H Tristen Mars MD   10 mL at 08/09/23 0930    sodium chloride 0.9 % flush 10 mL  10 mL Intravenous PRN Tristen Mars MD        sodium chloride 0.9 % infusion 40 mL  40 mL Intravenous PRN Tristen Mars MD         Physician Progress Notes (last 24 hours)  Notes from 08/08/23 0948 through 08/09/23 0948   No notes of this type exist for this encounter.       Consult Notes (last 24 hours)  Notes from 08/08/23 0948 through 08/09/23 0948   No notes of this type exist for this encounter.

## 2023-08-09 NOTE — PLAN OF CARE
Goal Outcome Evaluation:  Plan of Care Reviewed With: patient        Progress: improving  Outcome Evaluation: Pleasant patient rested in bed throughout the day. Frequent complaints of pain and nausea. Medicated per MAR and MD aware. Patient up ad meng to shower. Seen by JOSE MARIA and wound care performed on foot. Continuous fluids running and patient continues to be NPO with ice chips. Family at bedside. No new issues at this time.

## 2023-08-09 NOTE — H&P
[facility]   HISTORY AND PHYSICAL    Patient Name: Willard Lange  : 1960  MRN: [id number]  Primary Care Physician:  Josselyn Damon APRN  Date of admission: 2023    Subjective   Subjective     Chief Complaint:   Abdominal pain, nausea vomiting      HPI:    Willard Lange is a 63 y.o. male with known history of type 2 diabetes, hypertension and multiple other medical problems, came to emergency room with abdominal pain, nausea vomiting all symptoms going on for 2 to 3 days.  Patient is unable to eat much.  Unable to sleep with his symptoms.  Work-up in the ER revealed mildly elevated lipase and possible pancreatitis CT.        Review of Systems:    No fever chills.  Denies any blood in the vomitus.  Nonspecific chest pain back pain abdominal pain.  Lower back hurts all the time.  Abdominal discomfort.  No blood in the stools or black-colored stools.  Denies any dizziness or lightheadedness        Personal History     Past Medical History:   Diagnosis Date    Arthritis     Asthma     Benign essential hypertension     Brain tumor 2021    Cancer     DDD (degenerative disc disease), lumbar     Diabetes mellitus type 2, noninsulin dependent     Diabetes type 2, controlled     GERD (gastroesophageal reflux disease)     Hepatitis C     HTN (hypertension)     Leg pain     Lumbago 02/15/2017    with low back pain    Lumbar disc herniation 02/15/2017    L4-4    Lumbar spinal stenosis 02/15/2017    L4/5    Muscle cramps     Neuropathy     Prostate cancer        Past Surgical History:   Procedure Laterality Date    APPENDECTOMY      BACK SURGERY  2017    COLONOSCOPY      Dr. Dorsey-Polyps    CYSTOSCOPY W/ LASER LITHOTRIPSY      for kidney stones    LAMINECTOMY  2017    L4-5    PROSTATE BIOPSY      transrectal    TONSILLECTOMY         Family History: family history includes Diabetes type I in his maternal grandmother; Heart disease in his father; Lung cancer in his sister. Otherwise pertinent FHx  was reviewed and not pertinent to current issue.    Social History:  reports that he quit smoking about 18 years ago. His smoking use included cigarettes. He started smoking about 46 years ago. He has never used smokeless tobacco. He reports current alcohol use. He reports that he does not use drugs.    Home Medications:  Dulaglutide, Fluticasone-Salmeterol, FreeStyle Lite, HYDROcodone-acetaminophen, albuterol sulfate HFA, atorvastatin, baclofen, colestipol, freestyle, glipizide, glucose blood, losartan-hydrochlorothiazide, omeprazole, prednisoLONE acetate, pregabalin, and tamsulosin      Allergies:  Allergies   Allergen Reactions    Formaldehyde Rash       Objective   Objective     Vitals:   Temp:  [99 øF (37.2 øC)-99.5 øF (37.5 øC)] 99.5 øF (37.5 øC)  Heart Rate:  [77-96] 84  Resp:  [16-20] 20  BP: (140-188)/(70-96) 182/88    Physical Exam  Elderly male looks his stated age.  Not in acute distress but anxious.  HEENT without any icterus or pallor.  Neck supple.  Heart regular.  Lungs clear.  Abdomen soft and obese mild epigastric tenderness.  No guarding no rebound no peritoneal signs.  Neuro awake alert and oriented.  Extremities no edema        I have personally reviewed the results from the time of this admission to 8/8/2023 22:28 EDT and agree with these findings:  [x]  Laboratory  []  Microbiology  [x]  Radiology  [x]  EKG/Telemetry   []  Cardiology/Vascular   []  Pathology  []  Old records  []  Other:    CBC:    WBC   Date Value Ref Range Status   08/08/2023 17.30 (H) 3.40 - 10.80 10*3/mm3 Final     RBC   Date Value Ref Range Status   08/08/2023 5.35 4.14 - 5.80 10*6/mm3 Final     Hemoglobin   Date Value Ref Range Status   08/08/2023 15.6 13.0 - 17.7 g/dL Final     Hematocrit   Date Value Ref Range Status   08/08/2023 45.0 37.5 - 51.0 % Final     MCV   Date Value Ref Range Status   08/08/2023 84.1 79.0 - 97.0 fL Final     MCH   Date Value Ref Range Status   08/08/2023 29.2 26.6 - 33.0 pg Final     MCHC    Date Value Ref Range Status   08/08/2023 34.7 31.5 - 35.7 g/dL Final     RDW   Date Value Ref Range Status   08/08/2023 13.3 12.3 - 15.4 % Final     RDW-SD   Date Value Ref Range Status   08/08/2023 40.8 37.0 - 54.0 fl Final     MPV   Date Value Ref Range Status   08/08/2023 9.7 6.0 - 12.0 fL Final     Platelets   Date Value Ref Range Status   08/08/2023 289 140 - 450 10*3/mm3 Final     Neutrophil %   Date Value Ref Range Status   08/08/2023 86.6 (H) 42.7 - 76.0 % Final     Lymphocyte %   Date Value Ref Range Status   08/08/2023 4.2 (L) 19.6 - 45.3 % Final     Monocyte %   Date Value Ref Range Status   08/08/2023 8.6 5.0 - 12.0 % Final     Eosinophil %   Date Value Ref Range Status   08/08/2023 0.1 (L) 0.3 - 6.2 % Final     Basophil %   Date Value Ref Range Status   08/08/2023 0.2 0.0 - 1.5 % Final     Immature Grans %   Date Value Ref Range Status   08/08/2023 0.3 0.0 - 0.5 % Final     Neutrophils, Absolute   Date Value Ref Range Status   08/08/2023 15.00 (H) 1.70 - 7.00 10*3/mm3 Final     Lymphocytes, Absolute   Date Value Ref Range Status   08/08/2023 0.72 0.70 - 3.10 10*3/mm3 Final     Monocytes, Absolute   Date Value Ref Range Status   08/08/2023 1.48 (H) 0.10 - 0.90 10*3/mm3 Final     Eosinophils, Absolute   Date Value Ref Range Status   08/08/2023 0.02 0.00 - 0.40 10*3/mm3 Final     Basophils, Absolute   Date Value Ref Range Status   08/08/2023 0.03 0.00 - 0.20 10*3/mm3 Final     Immature Grans, Absolute   Date Value Ref Range Status   08/08/2023 0.05 0.00 - 0.05 10*3/mm3 Final     nRBC   Date Value Ref Range Status   08/08/2023 0.0 0.0 - 0.2 /100 WBC Final        BMP:    Lab Results   Component Value Date    GLUCOSE 197 (H) 08/08/2023    BUN 11 08/08/2023    CREATININE 0.90 08/08/2023    BCR 12.2 08/08/2023    K 4.0 08/08/2023    CO2 26.1 08/08/2023    CALCIUM 9.8 08/08/2023    ALBUMIN 4.3 08/08/2023    LABIL2 1.3 (L) 03/26/2020    AST 21 08/08/2023    ALT 23 08/08/2023        CT Abdomen Pelvis With  Contrast    Result Date: 8/8/2023    1. Findings suspicious for acute pancreatitis.  No definite peripancreatic collection or ductal dilatation is seen at this time. 2. Calcific atherosclerosis with abdominal aortic aneurysm measuring up to 2.8 cm.  Additional aneurysms are seen in the common iliac and internal iliac arteries. 3. No definite biliary ductal dilatation or acute gallbladder abnormality. 4. Hepatic steatosis      SEAN GARRIDO MD       Electronically Signed and Approved By: SEAN GARRIDO MD on 8/08/2023 at 18:04                     Assessment & Plan   Assessment / Plan       Current Diagnosis:  Active Hospital Problems    Diagnosis     **Acute pancreatitis      Plan:   Patient with abdominal pain, nausea and vomiting most likely pancreatitis.  Admit to hospital as patient has persistent nausea vomiting and ketones in urine.  Patient is getting dehydrated.  He is type II diabetic.  Start IV fluids.  N.p.o. status.  Monitor sugars.  Use sliding scale.  Pain control with narcotics.  Patient with leukocytosis, IV Levaquin for now.  Recheck labs in a.m..  Further management will be based on clinical course, lab results.        DVT prophylaxis:  Medical DVT prophylaxis orders are present.    GI Prophylaxis:       IV Pepcid    CODE STATUS:    Code Status (Patient has no pulse and is not breathing): CPR (Attempt to Resuscitate)  Medical Interventions (Patient has pulse or is breathing): Full Support    Admission Status:  I believe this patient meets inpatient status.             I have dictated this note utilizing Dragon Dictation.             Please note that portions of this note were completed with a voice recognition program.             Part of this note may be an electronic transcription/translation of spoken language to printed text         using the Dragon Dictation System.       Electronically signed by Tristen Mars MD, 08/08/23, 10:28 PM EDT.    Total time spent with in evaluation and management: 39  minutes

## 2023-08-10 ENCOUNTER — TELEPHONE (OUTPATIENT)
Dept: NEUROLOGY | Facility: CLINIC | Age: 63
End: 2023-08-10
Payer: MEDICARE

## 2023-08-10 LAB
ALBUMIN SERPL-MCNC: 3.8 G/DL (ref 3.5–5.2)
ALBUMIN/GLOB SERPL: 1.1 G/DL
ALP SERPL-CCNC: 88 U/L (ref 39–117)
ALT SERPL W P-5'-P-CCNC: 20 U/L (ref 1–41)
ANION GAP SERPL CALCULATED.3IONS-SCNC: 10.6 MMOL/L (ref 5–15)
AST SERPL-CCNC: 21 U/L (ref 1–40)
BASOPHILS # BLD AUTO: 0.04 10*3/MM3 (ref 0–0.2)
BASOPHILS NFR BLD AUTO: 0.5 % (ref 0–1.5)
BILIRUB SERPL-MCNC: 1.1 MG/DL (ref 0–1.2)
BUN SERPL-MCNC: 8 MG/DL (ref 8–23)
BUN/CREAT SERPL: 11.3 (ref 7–25)
CALCIUM SPEC-SCNC: 9.2 MG/DL (ref 8.6–10.5)
CHLORIDE SERPL-SCNC: 102 MMOL/L (ref 98–107)
CHOLEST SERPL-MCNC: 168 MG/DL (ref 0–200)
CO2 SERPL-SCNC: 26.4 MMOL/L (ref 22–29)
CREAT SERPL-MCNC: 0.71 MG/DL (ref 0.76–1.27)
DEPRECATED RDW RBC AUTO: 41.2 FL (ref 37–54)
EGFRCR SERPLBLD CKD-EPI 2021: 103.1 ML/MIN/1.73
EOSINOPHIL # BLD AUTO: 0.27 10*3/MM3 (ref 0–0.4)
EOSINOPHIL NFR BLD AUTO: 3.4 % (ref 0.3–6.2)
ERYTHROCYTE [DISTWIDTH] IN BLOOD BY AUTOMATED COUNT: 13.2 % (ref 12.3–15.4)
GLOBULIN UR ELPH-MCNC: 3.4 GM/DL
GLUCOSE BLDC GLUCOMTR-MCNC: 102 MG/DL (ref 70–99)
GLUCOSE BLDC GLUCOMTR-MCNC: 115 MG/DL (ref 70–99)
GLUCOSE BLDC GLUCOMTR-MCNC: 136 MG/DL (ref 70–99)
GLUCOSE BLDC GLUCOMTR-MCNC: 158 MG/DL (ref 70–99)
GLUCOSE SERPL-MCNC: 161 MG/DL (ref 65–99)
HCT VFR BLD AUTO: 40.1 % (ref 37.5–51)
HDLC SERPL-MCNC: 36 MG/DL (ref 40–60)
HGB BLD-MCNC: 13.5 G/DL (ref 13–17.7)
IMM GRANULOCYTES # BLD AUTO: 0.02 10*3/MM3 (ref 0–0.05)
IMM GRANULOCYTES NFR BLD AUTO: 0.3 % (ref 0–0.5)
LDLC SERPL CALC-MCNC: 109 MG/DL (ref 0–100)
LDLC/HDLC SERPL: 2.96 {RATIO}
LYMPHOCYTES # BLD AUTO: 0.89 10*3/MM3 (ref 0.7–3.1)
LYMPHOCYTES NFR BLD AUTO: 11.3 % (ref 19.6–45.3)
MCH RBC QN AUTO: 29.1 PG (ref 26.6–33)
MCHC RBC AUTO-ENTMCNC: 33.7 G/DL (ref 31.5–35.7)
MCV RBC AUTO: 86.4 FL (ref 79–97)
MONOCYTES # BLD AUTO: 0.63 10*3/MM3 (ref 0.1–0.9)
MONOCYTES NFR BLD AUTO: 8 % (ref 5–12)
NEUTROPHILS NFR BLD AUTO: 6.04 10*3/MM3 (ref 1.7–7)
NEUTROPHILS NFR BLD AUTO: 76.5 % (ref 42.7–76)
NRBC BLD AUTO-RTO: 0 /100 WBC (ref 0–0.2)
PLATELET # BLD AUTO: 220 10*3/MM3 (ref 140–450)
PMV BLD AUTO: 9.5 FL (ref 6–12)
POTASSIUM SERPL-SCNC: 4.1 MMOL/L (ref 3.5–5.2)
PROT SERPL-MCNC: 7.2 G/DL (ref 6–8.5)
RBC # BLD AUTO: 4.64 10*6/MM3 (ref 4.14–5.8)
SODIUM SERPL-SCNC: 139 MMOL/L (ref 136–145)
TRIGL SERPL-MCNC: 127 MG/DL (ref 0–150)
VLDLC SERPL-MCNC: 23 MG/DL (ref 5–40)
WBC NRBC COR # BLD: 7.89 10*3/MM3 (ref 3.4–10.8)

## 2023-08-10 PROCEDURE — 25010000002 ONDANSETRON PER 1 MG: Performed by: INTERNAL MEDICINE

## 2023-08-10 PROCEDURE — 80053 COMPREHEN METABOLIC PANEL: CPT | Performed by: INTERNAL MEDICINE

## 2023-08-10 PROCEDURE — 25010000002 ENOXAPARIN PER 10 MG: Performed by: INTERNAL MEDICINE

## 2023-08-10 PROCEDURE — 99222 1ST HOSP IP/OBS MODERATE 55: CPT | Performed by: INTERNAL MEDICINE

## 2023-08-10 PROCEDURE — 82948 REAGENT STRIP/BLOOD GLUCOSE: CPT

## 2023-08-10 PROCEDURE — 25010000002 HYDROMORPHONE 1 MG/ML SOLUTION: Performed by: INTERNAL MEDICINE

## 2023-08-10 PROCEDURE — 85025 COMPLETE CBC W/AUTO DIFF WBC: CPT | Performed by: INTERNAL MEDICINE

## 2023-08-10 PROCEDURE — 80061 LIPID PANEL: CPT | Performed by: INTERNAL MEDICINE

## 2023-08-10 PROCEDURE — 82787 IGG 1 2 3 OR 4 EACH: CPT | Performed by: INTERNAL MEDICINE

## 2023-08-10 PROCEDURE — 25010000002 LEVOFLOXACIN PER 250 MG: Performed by: INTERNAL MEDICINE

## 2023-08-10 PROCEDURE — 86038 ANTINUCLEAR ANTIBODIES: CPT | Performed by: INTERNAL MEDICINE

## 2023-08-10 RX ADMIN — ONDANSETRON 4 MG: 2 INJECTION INTRAMUSCULAR; INTRAVENOUS at 04:26

## 2023-08-10 RX ADMIN — HYDROMORPHONE HYDROCHLORIDE 0.5 MG: 1 INJECTION, SOLUTION INTRAMUSCULAR; INTRAVENOUS; SUBCUTANEOUS at 22:30

## 2023-08-10 RX ADMIN — POTASSIUM CHLORIDE, DEXTROSE MONOHYDRATE AND SODIUM CHLORIDE 150 ML/HR: 150; 5; 900 INJECTION, SOLUTION INTRAVENOUS at 14:17

## 2023-08-10 RX ADMIN — ONDANSETRON 4 MG: 2 INJECTION INTRAMUSCULAR; INTRAVENOUS at 16:48

## 2023-08-10 RX ADMIN — FAMOTIDINE 20 MG: 10 INJECTION INTRAVENOUS at 09:20

## 2023-08-10 RX ADMIN — ONDANSETRON 4 MG: 2 INJECTION INTRAMUSCULAR; INTRAVENOUS at 12:58

## 2023-08-10 RX ADMIN — ONDANSETRON 4 MG: 2 INJECTION INTRAMUSCULAR; INTRAVENOUS at 22:29

## 2023-08-10 RX ADMIN — POTASSIUM CHLORIDE, DEXTROSE MONOHYDRATE AND SODIUM CHLORIDE 150 ML/HR: 150; 5; 900 INJECTION, SOLUTION INTRAVENOUS at 22:29

## 2023-08-10 RX ADMIN — ENOXAPARIN SODIUM 40 MG: 100 INJECTION SUBCUTANEOUS at 22:32

## 2023-08-10 RX ADMIN — POTASSIUM CHLORIDE, DEXTROSE MONOHYDRATE AND SODIUM CHLORIDE 150 ML/HR: 150; 5; 900 INJECTION, SOLUTION INTRAVENOUS at 07:29

## 2023-08-10 RX ADMIN — HYDROMORPHONE HYDROCHLORIDE 0.5 MG: 1 INJECTION, SOLUTION INTRAMUSCULAR; INTRAVENOUS; SUBCUTANEOUS at 04:20

## 2023-08-10 RX ADMIN — HYDROMORPHONE HYDROCHLORIDE 0.5 MG: 1 INJECTION, SOLUTION INTRAMUSCULAR; INTRAVENOUS; SUBCUTANEOUS at 16:49

## 2023-08-10 RX ADMIN — ONDANSETRON 4 MG: 2 INJECTION INTRAMUSCULAR; INTRAVENOUS at 09:20

## 2023-08-10 RX ADMIN — HYDROMORPHONE HYDROCHLORIDE 0.5 MG: 1 INJECTION, SOLUTION INTRAMUSCULAR; INTRAVENOUS; SUBCUTANEOUS at 07:29

## 2023-08-10 RX ADMIN — FAMOTIDINE 20 MG: 10 INJECTION INTRAVENOUS at 21:59

## 2023-08-10 RX ADMIN — HYDROMORPHONE HYDROCHLORIDE 0.5 MG: 1 INJECTION, SOLUTION INTRAMUSCULAR; INTRAVENOUS; SUBCUTANEOUS at 01:52

## 2023-08-10 RX ADMIN — Medication 10 ML: at 21:59

## 2023-08-10 RX ADMIN — LEVOFLOXACIN 500 MG: 5 INJECTION, SOLUTION INTRAVENOUS at 22:32

## 2023-08-10 NOTE — CONSULTS
"SW met with pt at bedside and provided \"Conversations that Matter\" pamphlet. SW informed pt that he could complete Advance Directive at any given time in the hospital. Pt v/u.   "

## 2023-08-10 NOTE — PLAN OF CARE
Goal Outcome Evaluation:      Pt states pain is better today, nausea has been a problem for him today. Pt with redness to left cheek and nose, states his eye is starting to itch      Progress: no change

## 2023-08-10 NOTE — PLAN OF CARE
Goal Outcome Evaluation:           Progress: no change  PT A&O times 4 able to voice wants and needs no s/s of distress, cont NPO cont with c/o abd pain and nausea meds given per order CB in reach

## 2023-08-10 NOTE — TELEPHONE ENCOUNTER
LVM to re-sched appears patient is currently admitted to [facility].     Dr. Cook is out of office on 08/11 office can re-sched for sooner appt or if he would prefer N/A that is fine as well.

## 2023-08-10 NOTE — PROGRESS NOTES
[facility]     Progress Note    Patient Name: Willard Lange  : 1960  MRN: [id number]  Primary Care Physician:  Josselyn Damon APRN  Date of admission: 2023      Subjective   Brief summary.  Patient admitted with acute pancreatitis and nausea vomiting      HPI:  Continues to complain of abdominal discomfort, restless, no nausea vomiting.  Feels tired and fatigued    Review of Systems     No fever or chills,  Complains of abdominal discomfort and nausea    Objective     Vitals:   Temp:  [97.9 øF (36.6 øC)-98.6 øF (37 øC)] 97.9 øF (36.6 øC)  Heart Rate:  [69-78] 71  Resp:  [16-18] 18  BP: (148-175)/(59-86) 169/76    Physical Exam :     Elderly looking male somewhat restless..  No icterus or pallor.  Heart regular.  Lungs clear.  Abdomen slightly distended with diffuse tenderness, no guarding no peritoneal signs      Result Review:  I have personally reviewed the results from the time of this admission to 8/10/2023 16:06 EDT and agree with these findings:  [x]  Laboratory  []  Microbiology  []  Radiology  []  EKG/Telemetry   []  Cardiology/Vascular   []  Pathology  []  Old records  []  Other:       Labs stable    Assessment / Plan       Active Hospital Problems:  Active Hospital Problems    Diagnosis     **Acute pancreatitis     Type 2 diabetes mellitus (HCC)        Plan:   Patient clinically looks same as of yesterday, still a lot of pain.  Abdominal pain persist.  Labs stable and slightly better.  Will consult GI.  Will see recommendations from GI for now continue with n.p.o. status except for ice chips.  Monitor sugars, continue IV fluids, check labs    DVT prophylaxis:  Medical DVT prophylaxis orders are present.    CODE STATUS:   Code Status (Patient has no pulse and is not breathing): CPR (Attempt to Resuscitate)  Medical Interventions (Patient has pulse or is breathing): Full Support            Electronically signed by Tristen Mars MD, 08/10/23, 4:07 PM EDT.

## 2023-08-10 NOTE — CONSULTS
[facility]  Initial Inpatient Consult Note    Referring Provider: Dr. Mars    Reason for Consultation: pancreatitis    Subjective     History of present illness:    63 y.o. male with history of HTN, DM2, GERD, HCV, neuropathy, and prostate cancer who presented with c/o mid abd pain that started this past  evening.  Pt reports pain was severe but much improved currently.  He reports associated nausea.  Pt reports no new medications.  He has been on Trulicity x 6 months.  No EtOH use.  Labs on presentation with AST 21, ALT 23, alk phos 113, TB 1.1.  CT abd/pelvis with contrast showed findings suspicious for acute pancreatitis, no biliary ductal dilatation, no suspicious gallbladder abnormality, hepatic steatosis.      Past Medical History:  Past Medical History:   Diagnosis Date    Arthritis     Asthma     Benign essential hypertension     Brain tumor 2021    Cancer     DDD (degenerative disc disease), lumbar     Diabetes mellitus type 2, noninsulin dependent     Diabetes type 2, controlled     GERD (gastroesophageal reflux disease)     Hepatitis C     HTN (hypertension)     Leg pain     Lumbago 02/15/2017    with low back pain    Lumbar disc herniation 02/15/2017    L4-4    Lumbar spinal stenosis 02/15/2017    L4/5    Muscle cramps     Neuropathy     Prostate cancer      Past Surgical History:  Past Surgical History:   Procedure Laterality Date    APPENDECTOMY      BACK SURGERY  2017    COLONOSCOPY  2017    Dr. Dorsey-Polyps    CYSTOSCOPY W/ LASER LITHOTRIPSY      for kidney stones    LAMINECTOMY  2017    L4-5    PROSTATE BIOPSY      transrectal    TONSILLECTOMY        Social History:   Social History     Tobacco Use    Smoking status: Former     Types: Cigarettes     Start date: 1976     Quit date:      Years since quittin.6    Smokeless tobacco: Never   Substance Use Topics    Alcohol use: Yes     Comment: rarely      Family History:  Family History   Problem  Relation Age of Onset    Heart disease Father     Lung cancer Sister     Diabetes type I Maternal Grandmother        Home Meds:  Medications Prior to Admission   Medication Sig Dispense Refill Last Dose    Advair Diskus 250-50 MCG/DOSE DISKUS Inhale 1 puff 2 (Two) Times a Day.       albuterol sulfate  (90 Base) MCG/ACT inhaler Inhale 2 puffs Every 6 (Six) Hours As Needed.       atorvastatin (LIPITOR) 20 MG tablet Take 1 tablet by mouth Every Night.       baclofen (LIORESAL) 10 MG tablet Take 1 tablet by mouth 3 (Three) Times a Day.       Blood Glucose Monitoring Suppl (FreeStyle Lite) w/Device kit See Admin Instructions.       colestipol (COLESTID) 1 g tablet Take 1 tablet by mouth As Needed.       FREESTYLE LITE test strip        glipizide (GLUCOTROL XL) 5 MG ER tablet Take 1 tablet by mouth Daily.       glucose blood test strip FreeStyle Lite Strips   USE TO TEST BLOOD SUGAR FOUR TIMES DAILY AND AS NEEDED       HYDROcodone-acetaminophen (NORCO) 7.5-325 MG per tablet Take 1 tablet by mouth Every 8 (Eight) Hours As Needed.       Lancets (freestyle) lancets        losartan-hydrochlorothiazide (HYZAAR) 100-25 MG per tablet Take 1 tablet by mouth Daily.       omeprazole (priLOSEC) 40 MG capsule Take 1 capsule by mouth Daily.       prednisoLONE acetate (PRED FORTE) 1 % ophthalmic suspension Administer 1 drop to both eyes 3 (Three) Times a Day.       pregabalin (LYRICA) 200 MG capsule Take 1 capsule by mouth Daily.       tamsulosin (FLOMAX) 0.4 MG capsule 24 hr capsule Take 1 capsule by mouth 2 (Two) Times a Day for 90 days. 180 capsule 0     Trulicity 1.5 MG/0.5ML solution pen-injector Inject 1.5 mg under the skin into the appropriate area as directed Every 7 (Seven) Days.        Current Meds:   enoxaparin, 40 mg, Subcutaneous, Q24H  famotidine, 20 mg, Intravenous, BID  insulin regular, 2-9 Units, Subcutaneous, Q6H  levoFLOXacin, 500 mg, Intravenous, Q24H  sodium chloride, 10 mL, Intravenous,  Q12H      Allergies:  Allergies   Allergen Reactions    Formaldehyde Rash     Review of Systems  Pertinent items are noted in HPI, all other systems reviewed and negative     Objective     Vital Signs  Temp:  [97.9 øF (36.6 øC)-98.8 øF (37.1 øC)] 97.9 øF (36.6 øC)  Heart Rate:  [69-78] 71  Resp:  [16-18] 18  BP: (136-175)/(59-86) 169/76  Physical Exam:  General Appearance:    Alert, cooperative, in no acute distress   Head:    Normocephalic, without obvious abnormality, atraumatic   Eyes:          conjunctivae and sclerae normal, no   icterus   Throat:   no thrush, oral mucosa moist   Neck:   Supple, no adenopathy   Lungs:     Breathing unlabored    Heart:    No chest tenderness    Chest Wall:    No abnormalities observed   Abdomen:     Soft, nondistended, mild diffuse TTP   Extremities:   no edema, no redness   Skin:   No bruising or rash   Psychiatric:  normal mood and insight     Results Review:   I reviewed the patient's new clinical results.    Results from last 7 days   Lab Units 08/09/23  0559 08/08/23  1349   WBC 10*3/mm3 12.73* 17.30*   HEMOGLOBIN g/dL 13.6 15.6   HEMATOCRIT % 40.4 45.0   PLATELETS 10*3/mm3 221 289     Results from last 7 days   Lab Units 08/09/23  0559 08/08/23  1349   SODIUM mmol/L 139 137   POTASSIUM mmol/L 4.0 4.0   CHLORIDE mmol/L 103 98   CO2 mmol/L 25.7 26.1   BUN mg/dL 13 11   CREATININE mg/dL 0.75* 0.90   CALCIUM mg/dL 8.7 9.8   BILIRUBIN mg/dL 0.8 1.1   ALK PHOS U/L 87 113   ALT (SGPT) U/L 18 23   AST (SGOT) U/L 26 21   GLUCOSE mg/dL 171* 197*         Lab Results   Lab Value Date/Time    LIPASE 61 (H) 08/09/2023 0559    LIPASE 112 (H) 08/08/2023 1349       Radiology:  CT Abdomen Pelvis With Contrast   Final Result       1. Findings suspicious for acute pancreatitis.  No definite peripancreatic collection or ductal    dilatation is seen at this time.   2. Calcific atherosclerosis with abdominal aortic aneurysm measuring up to 2.8 cm.  Additional    aneurysms are seen in the common  iliac and internal iliac arteries.   3. No definite biliary ductal dilatation or acute gallbladder abnormality.   4. Hepatic steatosis                  SEAN GARRIDO MD          Electronically Signed and Approved By: SEAN GARRIDO MD on 8/08/2023 at 18:04                               Assessment & Plan     Acute pancreatitis       Assessment:  Acute pancreatitis    Plan:  No gallstones/biliary dilatation noted on imaging; no h/o EtOH  Will check TG level  Will check ESTELLE, IgG4 to eval for autoimmune pancreatitis  Medication induced pancreatitis is a consideration given patient's Trulicity use  Will advance to clear liquids      I discussed the patients findings and my recommendations with patient and primary care team.    Kisha Dorsey MD

## 2023-08-11 ENCOUNTER — TELEPHONE (OUTPATIENT)
Dept: GASTROENTEROLOGY | Facility: CLINIC | Age: 63
End: 2023-08-11
Payer: MEDICARE

## 2023-08-11 LAB
ANA SER QL: NEGATIVE
GLUCOSE BLDC GLUCOMTR-MCNC: 109 MG/DL (ref 70–99)
GLUCOSE BLDC GLUCOMTR-MCNC: 110 MG/DL (ref 70–99)
GLUCOSE BLDC GLUCOMTR-MCNC: 113 MG/DL (ref 70–99)
GLUCOSE BLDC GLUCOMTR-MCNC: 132 MG/DL (ref 70–99)
GLUCOSE BLDC GLUCOMTR-MCNC: 139 MG/DL (ref 70–99)
GLUCOSE BLDC GLUCOMTR-MCNC: 160 MG/DL (ref 70–99)

## 2023-08-11 PROCEDURE — 25010000002 ONDANSETRON PER 1 MG: Performed by: INTERNAL MEDICINE

## 2023-08-11 PROCEDURE — 25010000002 ENOXAPARIN PER 10 MG: Performed by: INTERNAL MEDICINE

## 2023-08-11 PROCEDURE — 99232 SBSQ HOSP IP/OBS MODERATE 35: CPT | Performed by: INTERNAL MEDICINE

## 2023-08-11 PROCEDURE — 25010000002 HYDROMORPHONE 1 MG/ML SOLUTION: Performed by: INTERNAL MEDICINE

## 2023-08-11 PROCEDURE — 82948 REAGENT STRIP/BLOOD GLUCOSE: CPT

## 2023-08-11 RX ORDER — SACCHAROMYCES BOULARDII 250 MG
250 CAPSULE ORAL 2 TIMES DAILY
Status: DISCONTINUED | OUTPATIENT
Start: 2023-08-11 | End: 2023-08-13 | Stop reason: HOSPADM

## 2023-08-11 RX ORDER — LOSARTAN POTASSIUM 25 MG/1
50 TABLET ORAL
Status: DISCONTINUED | OUTPATIENT
Start: 2023-08-12 | End: 2023-08-13 | Stop reason: HOSPADM

## 2023-08-11 RX ORDER — HYDROCHLOROTHIAZIDE 12.5 MG/1
12.5 TABLET ORAL DAILY
Status: DISCONTINUED | OUTPATIENT
Start: 2023-08-12 | End: 2023-08-13 | Stop reason: HOSPADM

## 2023-08-11 RX ADMIN — ENOXAPARIN SODIUM 40 MG: 100 INJECTION SUBCUTANEOUS at 23:42

## 2023-08-11 RX ADMIN — ONDANSETRON 4 MG: 2 INJECTION INTRAMUSCULAR; INTRAVENOUS at 14:06

## 2023-08-11 RX ADMIN — ONDANSETRON 4 MG: 2 INJECTION INTRAMUSCULAR; INTRAVENOUS at 08:30

## 2023-08-11 RX ADMIN — FAMOTIDINE 20 MG: 10 INJECTION INTRAVENOUS at 21:10

## 2023-08-11 RX ADMIN — HYDROMORPHONE HYDROCHLORIDE 0.5 MG: 1 INJECTION, SOLUTION INTRAMUSCULAR; INTRAVENOUS; SUBCUTANEOUS at 23:27

## 2023-08-11 RX ADMIN — HYDROMORPHONE HYDROCHLORIDE 0.5 MG: 1 INJECTION, SOLUTION INTRAMUSCULAR; INTRAVENOUS; SUBCUTANEOUS at 08:30

## 2023-08-11 RX ADMIN — POTASSIUM CHLORIDE, DEXTROSE MONOHYDRATE AND SODIUM CHLORIDE 150 ML/HR: 150; 5; 900 INJECTION, SOLUTION INTRAVENOUS at 07:15

## 2023-08-11 RX ADMIN — Medication 10 ML: at 21:10

## 2023-08-11 RX ADMIN — Medication 250 MG: at 21:10

## 2023-08-11 RX ADMIN — FAMOTIDINE 20 MG: 10 INJECTION INTRAVENOUS at 08:30

## 2023-08-11 RX ADMIN — ONDANSETRON 4 MG: 2 INJECTION INTRAMUSCULAR; INTRAVENOUS at 18:52

## 2023-08-11 RX ADMIN — POTASSIUM CHLORIDE, DEXTROSE MONOHYDRATE AND SODIUM CHLORIDE 75 ML/HR: 150; 5; 900 INJECTION, SOLUTION INTRAVENOUS at 17:41

## 2023-08-11 RX ADMIN — HYDROMORPHONE HYDROCHLORIDE 0.5 MG: 1 INJECTION, SOLUTION INTRAMUSCULAR; INTRAVENOUS; SUBCUTANEOUS at 14:06

## 2023-08-11 RX ADMIN — HYDROMORPHONE HYDROCHLORIDE 0.5 MG: 1 INJECTION, SOLUTION INTRAMUSCULAR; INTRAVENOUS; SUBCUTANEOUS at 18:50

## 2023-08-11 RX ADMIN — Medication 10 ML: at 08:30

## 2023-08-11 RX ADMIN — ONDANSETRON 4 MG: 2 INJECTION INTRAMUSCULAR; INTRAVENOUS at 23:27

## 2023-08-11 NOTE — PLAN OF CARE
Goal Outcome Evaluation:   Abdominal pain throughout shift, treated with PRN medication, see MAR.  Diet upgraded, tolerating well.

## 2023-08-11 NOTE — PLAN OF CARE
Goal Outcome Evaluation:  Plan of Care Reviewed With: patient        Progress: no change  Outcome Evaluation: Pt on clear liquid diet. Reports liquid stools x1 this shift. Medicated for pain & nausea. VS WDL. No needs at this time.

## 2023-08-11 NOTE — PROGRESS NOTES
[facility]  Inpatient Progress Note    Reason for Follow Up:  pancreatitis    Subjective     Interval History:   Pt evaluated this AM.  Pt reports some difficulty with abd pain over night.  Worse with eating.  On clears.    Current Facility-Administered Medications:     acetaminophen (TYLENOL) tablet 650 mg, 650 mg, Oral, Q4H PRN **OR** acetaminophen (TYLENOL) 160 MG/5ML solution 650 mg, 650 mg, Oral, Q4H PRN **OR** acetaminophen (TYLENOL) suppository 650 mg, 650 mg, Rectal, Q4H PRN, Tristen Mars MD    albuterol (PROVENTIL) nebulizer solution 0.083% 2.5 mg/3mL, 2.5 mg, Nebulization, Q6H PRN, Tristen Mars MD    dextrose (D50W) (25 g/50 mL) IV injection 25 g, 25 g, Intravenous, Q15 Min PRN, Tristen Mars MD    dextrose (GLUTOSE) oral gel 15 g, 15 g, Oral, Q15 Min PRN, Tristen Mars MD    dextrose 5 % and sodium chloride 0.9 % with KCl 20 mEq/L infusion, 75 mL/hr, Intravenous, Continuous, Tristen Mars MD, Last Rate: 75 mL/hr at 08/11/23 1049, 75 mL/hr at 08/11/23 1049    Enoxaparin Sodium (LOVENOX) syringe 40 mg, 40 mg, Subcutaneous, Q24H, Tristen Mars MD, 40 mg at 08/10/23 2232    famotidine (PEPCID) injection 20 mg, 20 mg, Intravenous, BID, Tristen Mars MD, 20 mg at 08/11/23 0830    glucagon (GLUCAGEN) injection 1 mg, 1 mg, Intramuscular, Q15 Min PRN, Tristen Mars MD    HYDROcodone-acetaminophen (NORCO) 5-325 MG per tablet 1 tablet, 1 tablet, Oral, Q4H PRN, Tristen Mars MD    HYDROmorphone (DILAUDID) injection 0.5 mg, 0.5 mg, Intravenous, Q2H PRN, 0.5 mg at 08/11/23 0830 **AND** naloxone (NARCAN) injection 0.4 mg, 0.4 mg, Intravenous, Q5 Min PRN, Tristen Mars MD    insulin regular (humuLIN R,novoLIN R) injection 2-9 Units, 2-9 Units, Subcutaneous, Q6H, Tristen Mars MD, 2 Units at 08/09/23 1215    ondansetron (ZOFRAN) injection 4 mg, 4 mg, Intravenous, Q4H PRN, Tristen Mars MD, 4 mg at 08/11/23 0830    Pharmacy to Dose enoxaparin (LOVENOX), , Does not apply, Continuous PRN, Tristen Mars MD     saccharomyces boulardii (FLORASTOR) capsule 250 mg, 250 mg, Oral, BID, Tristen Mars MD    sodium chloride 0.9 % flush 10 mL, 10 mL, Intravenous, PRN, Tristen Mars MD, 10 mL at 08/09/23 2335    sodium chloride 0.9 % flush 10 mL, 10 mL, Intravenous, Q12H, Tristen Mars MD, 10 mL at 08/11/23 0830    sodium chloride 0.9 % flush 10 mL, 10 mL, Intravenous, PRN, Tristen Mars MD    sodium chloride 0.9 % infusion 40 mL, 40 mL, Intravenous, PRN, Tristen Mars MD  Review of Systems:    The following systems were reviewed and negative;  constitution, respiratory, and cardiovascular    Objective     Vital Signs  Temp:  [98.1 øF (36.7 øC)-98.8 øF (37.1 øC)] 98.8 øF (37.1 øC)  Heart Rate:  [68-77] 68  Resp:  [18] 18  BP: (152-184)/(84-92) 176/84  Body mass index is 32.17 kg/mý.    Intake/Output Summary (Last 24 hours) at 8/11/2023 1344  Last data filed at 8/11/2023 0900  Gross per 24 hour   Intake 1380 ml   Output 1300 ml   Net 80 ml     I/O this shift:  In: 120 [P.O.:120]  Out: 650 [Urine:650]     Physical Exam:   General: awake, alert and in no acute distress   Eyes: eyes move symmetrical in all directions, no scleral icterus   Neck: supple, trachea is midline   Skin: warm and dry, not jaundiced   Cardiovascular: no chest tenderness   Pulm: breathing unlabored   Abdomen: soft, nontender, diffuse TTP   Rectal: deferred   Extremities: no rash or edema   Psychiatric: mental status within normal limits     Results Review:     I reviewed the patient's new clinical results.    Results from last 7 days   Lab Units 08/10/23  1335 08/09/23  0559 08/08/23  1349   WBC 10*3/mm3 7.89 12.73* 17.30*   HEMOGLOBIN g/dL 13.5 13.6 15.6   HEMATOCRIT % 40.1 40.4 45.0   PLATELETS 10*3/mm3 220 221 289     Results from last 7 days   Lab Units 08/10/23  1335 08/09/23  0559 08/08/23  1349   SODIUM mmol/L 139 139 137   POTASSIUM mmol/L 4.1 4.0 4.0   CHLORIDE mmol/L 102 103 98   CO2 mmol/L 26.4 25.7 26.1   BUN mg/dL 8 13 11   CREATININE mg/dL 0.71* 0.75*  0.90   CALCIUM mg/dL 9.2 8.7 9.8   BILIRUBIN mg/dL 1.1 0.8 1.1   ALK PHOS U/L 88 87 113   ALT (SGPT) U/L 20 18 23   AST (SGOT) U/L 21 26 21   GLUCOSE mg/dL 161* 171* 197*         Lab Results   Lab Value Date/Time    LIPASE 61 (H) 08/09/2023 0559    LIPASE 112 (H) 08/08/2023 1349       Radiology:              Assessment & Plan   Assessment:     Acute pancreatitis    Plan:     No gallstones/biliary dilatation noted on imaging; no h/o EtOH  Triglycerides normal  Will check ESTELLE (negative), IgG4 (pending) to eval for autoimmune pancreatitis  Medication induced pancreatitis is a consideration given pancreatitis is a potential side effect with Trulicity  IV fluids, pain control  Advance diet as tolerated  Will discuss f/u imaging as outpatient; would consider MRI abd w/ and w/o with MRCP to evaluate pancreas and bile/pancreatic duct.  Would perform in 4 - 6 weeks.  Arrange at outpatient f/u.  Will have my office arrange f/u appointment    I discussed the patients findings and my recommendations with patient.         Kisha Dorsey M.D.  [facility]  [address]  [place]  [id number]  Office: [phone]

## 2023-08-11 NOTE — PROGRESS NOTES
[facility]     Progress Note    Patient Name: Willard Lange  : 1960  MRN: [id number]  Primary Care Physician:  Josselyn Damon APRN  Date of admission: 2023      Subjective   Brief summary.  Patient admitted with acute pancreatitis and nausea vomiting      HPI:  Continues to complain of abdominal discomfort, restless,   Complains of aches and pains.  Tolerating liquid diet.  Reports diarrhea with liquidy diet.      Review of Systems     No fever or chills,  Complains of abdominal discomfort and nausea  Diarrhea  Joint pains and aches    Objective     Vitals:   Temp:  [98.1 øF (36.7 øC)-98.8 øF (37.1 øC)] 98.8 øF (37.1 øC)  Heart Rate:  [68-77] 68  Resp:  [18] 18  BP: (152-184)/(84-92) 176/84    Physical Exam :     Elderly looking male somewhat restless..    No icterus or pallor.    Heart regular.  Lungs clear.    Abdomen slightly distended with not much of tenderness today, no guarding no peritoneal signs.  Extremities no edema    Result Review:  I have personally reviewed the results from the time of this admission to 2023 13:36 EDT and agree with these findings:  [x]  Laboratory  []  Microbiology  []  Radiology  []  EKG/Telemetry   []  Cardiology/Vascular   []  Pathology  []  Old records  []  Other:       Labs stable    Assessment / Plan       Active Hospital Problems:  Active Hospital Problems    Diagnosis     **Acute pancreatitis     Type 2 diabetes mellitus (HCC)        Plan:   Slightly better tolerating liquid diet.  Advance diet as tolerated  DC Levaquin antibiotic as white cell count normalized.  Add probiotics.    Check labs in the morning if remains stable discharge home    DVT prophylaxis:  Medical DVT prophylaxis orders are present.    CODE STATUS:   Code Status (Patient has no pulse and is not breathing): CPR (Attempt to Resuscitate)  Medical Interventions (Patient has pulse or is breathing): Full Support            Electronically signed by Tristen Mars MD, 23, 2:07 PM  EDT.

## 2023-08-12 LAB
ALBUMIN SERPL-MCNC: 3.5 G/DL (ref 3.5–5.2)
ALBUMIN/GLOB SERPL: 1 G/DL
ALP SERPL-CCNC: 93 U/L (ref 39–117)
ALT SERPL W P-5'-P-CCNC: 85 U/L (ref 1–41)
ANION GAP SERPL CALCULATED.3IONS-SCNC: 9.9 MMOL/L (ref 5–15)
AST SERPL-CCNC: 68 U/L (ref 1–40)
BASOPHILS # BLD AUTO: 0.05 10*3/MM3 (ref 0–0.2)
BASOPHILS NFR BLD AUTO: 0.8 % (ref 0–1.5)
BILIRUB SERPL-MCNC: 0.8 MG/DL (ref 0–1.2)
BUN SERPL-MCNC: 10 MG/DL (ref 8–23)
BUN/CREAT SERPL: 12.8 (ref 7–25)
CALCIUM SPEC-SCNC: 9 MG/DL (ref 8.6–10.5)
CHLORIDE SERPL-SCNC: 101 MMOL/L (ref 98–107)
CO2 SERPL-SCNC: 26.1 MMOL/L (ref 22–29)
CREAT SERPL-MCNC: 0.78 MG/DL (ref 0.76–1.27)
DEPRECATED RDW RBC AUTO: 39.7 FL (ref 37–54)
EGFRCR SERPLBLD CKD-EPI 2021: 100.2 ML/MIN/1.73
EOSINOPHIL # BLD AUTO: 0.38 10*3/MM3 (ref 0–0.4)
EOSINOPHIL NFR BLD AUTO: 6.4 % (ref 0.3–6.2)
ERYTHROCYTE [DISTWIDTH] IN BLOOD BY AUTOMATED COUNT: 12.8 % (ref 12.3–15.4)
GLOBULIN UR ELPH-MCNC: 3.4 GM/DL
GLUCOSE BLDC GLUCOMTR-MCNC: 119 MG/DL (ref 70–99)
GLUCOSE BLDC GLUCOMTR-MCNC: 126 MG/DL (ref 70–99)
GLUCOSE BLDC GLUCOMTR-MCNC: 157 MG/DL (ref 70–99)
GLUCOSE SERPL-MCNC: 136 MG/DL (ref 65–99)
HCT VFR BLD AUTO: 39.2 % (ref 37.5–51)
HGB BLD-MCNC: 13.3 G/DL (ref 13–17.7)
IMM GRANULOCYTES # BLD AUTO: 0.03 10*3/MM3 (ref 0–0.05)
IMM GRANULOCYTES NFR BLD AUTO: 0.5 % (ref 0–0.5)
LIPASE SERPL-CCNC: 26 U/L (ref 13–60)
LYMPHOCYTES # BLD AUTO: 1.17 10*3/MM3 (ref 0.7–3.1)
LYMPHOCYTES NFR BLD AUTO: 19.7 % (ref 19.6–45.3)
MCH RBC QN AUTO: 29 PG (ref 26.6–33)
MCHC RBC AUTO-ENTMCNC: 33.9 G/DL (ref 31.5–35.7)
MCV RBC AUTO: 85.6 FL (ref 79–97)
MONOCYTES # BLD AUTO: 0.82 10*3/MM3 (ref 0.1–0.9)
MONOCYTES NFR BLD AUTO: 13.8 % (ref 5–12)
NEUTROPHILS NFR BLD AUTO: 3.48 10*3/MM3 (ref 1.7–7)
NEUTROPHILS NFR BLD AUTO: 58.8 % (ref 42.7–76)
NRBC BLD AUTO-RTO: 0 /100 WBC (ref 0–0.2)
PLATELET # BLD AUTO: 247 10*3/MM3 (ref 140–450)
PMV BLD AUTO: 9.8 FL (ref 6–12)
POTASSIUM SERPL-SCNC: 3.8 MMOL/L (ref 3.5–5.2)
PROT SERPL-MCNC: 6.9 G/DL (ref 6–8.5)
RBC # BLD AUTO: 4.58 10*6/MM3 (ref 4.14–5.8)
SODIUM SERPL-SCNC: 137 MMOL/L (ref 136–145)
WBC NRBC COR # BLD: 5.93 10*3/MM3 (ref 3.4–10.8)

## 2023-08-12 PROCEDURE — 83690 ASSAY OF LIPASE: CPT | Performed by: INTERNAL MEDICINE

## 2023-08-12 PROCEDURE — 63710000001 INSULIN REGULAR HUMAN PER 5 UNITS: Performed by: INTERNAL MEDICINE

## 2023-08-12 PROCEDURE — 85025 COMPLETE CBC W/AUTO DIFF WBC: CPT | Performed by: INTERNAL MEDICINE

## 2023-08-12 PROCEDURE — 82948 REAGENT STRIP/BLOOD GLUCOSE: CPT

## 2023-08-12 PROCEDURE — 80053 COMPREHEN METABOLIC PANEL: CPT | Performed by: INTERNAL MEDICINE

## 2023-08-12 PROCEDURE — 25010000002 ONDANSETRON PER 1 MG: Performed by: INTERNAL MEDICINE

## 2023-08-12 RX ADMIN — Medication 250 MG: at 20:52

## 2023-08-12 RX ADMIN — LOSARTAN POTASSIUM 50 MG: 25 TABLET, FILM COATED ORAL at 00:23

## 2023-08-12 RX ADMIN — Medication 10 ML: at 08:45

## 2023-08-12 RX ADMIN — FAMOTIDINE 20 MG: 10 INJECTION INTRAVENOUS at 08:44

## 2023-08-12 RX ADMIN — POTASSIUM CHLORIDE, DEXTROSE MONOHYDRATE AND SODIUM CHLORIDE 75 ML/HR: 150; 5; 900 INJECTION, SOLUTION INTRAVENOUS at 18:32

## 2023-08-12 RX ADMIN — ONDANSETRON 4 MG: 2 INJECTION INTRAMUSCULAR; INTRAVENOUS at 08:44

## 2023-08-12 RX ADMIN — POTASSIUM CHLORIDE, DEXTROSE MONOHYDRATE AND SODIUM CHLORIDE 75 ML/HR: 150; 5; 900 INJECTION, SOLUTION INTRAVENOUS at 06:40

## 2023-08-12 RX ADMIN — HYDROCODONE BITARTRATE AND ACETAMINOPHEN 1 TABLET: 5; 325 TABLET ORAL at 09:11

## 2023-08-12 RX ADMIN — Medication 10 ML: at 20:53

## 2023-08-12 RX ADMIN — HYDROCHLOROTHIAZIDE 12.5 MG: 12.5 TABLET ORAL at 00:23

## 2023-08-12 RX ADMIN — Medication 250 MG: at 08:44

## 2023-08-12 RX ADMIN — FAMOTIDINE 20 MG: 10 INJECTION INTRAVENOUS at 20:52

## 2023-08-12 RX ADMIN — INSULIN HUMAN 2 UNITS: 100 INJECTION, SOLUTION PARENTERAL at 12:01

## 2023-08-12 NOTE — PLAN OF CARE
Goal Outcome Evaluation:           Progress: improving  Outcome Evaluation: Patient alert and oriented. Vital signs stable. Patient c/o abdominal pain and nausea at times, relieved with use of PRN medications. Patient reports liquid stool X1. Ambulated ad meng to bathroom. Patient tolerating bland diet well

## 2023-08-12 NOTE — PLAN OF CARE
Goal Outcome Evaluation:   Patient a&ox4. Blood glucose monitored as ordered. Medicated x1 for c/o pain and nausea. No other complaints per the patient.

## 2023-08-12 NOTE — PROGRESS NOTES
[facility]     Progress Note    Patient Name: Willard Lange  : 1960  MRN: [id number]  Primary Care Physician:  Josselyn Damon APRN  Date of admission: 2023    Subjective   Subjective     Chief Complaint: Acute pancreatitis    HPI:  Patient Reports pain in abdomen on eating.  Seen by GI.  Has diarrhea also    Review of Systems   Review of Systems   Constitutional:  Activity change: in bed.   HENT: Negative.     Eyes: Negative.    Respiratory: Negative.     Cardiovascular: Negative.    Gastrointestinal:  Positive for abdominal pain.   Endocrine: Negative.    Genitourinary: Negative.    Musculoskeletal: Negative.    Skin: Negative.    Allergic/Immunologic: Negative.    Neurological:  Positive for weakness.   Hematological: Negative.    Psychiatric/Behavioral: Negative.       Objective   Objective     Vitals:   Temp:  [97.7 øF (36.5 øC)-98.8 øF (37.1 øC)] 97.7 øF (36.5 øC)  Heart Rate:  [65-68] 66  Resp:  [18-20] 20  BP: (129-176)/(62-88) 143/62  Physical Exam    Constitutional: Awake, alert   Eyes: PERRLA, sclerae anicteric, no conjunctival injection   HENT: NCAT, mucous membranes moist   Neck: Supple, no thyromegaly, no lymphadenopathy, trachea midline   Respiratory: Clear to auscultation bilaterally, nonlabored respirations    Cardiovascular: RRR, no murmurs, rubs, or gallops, palpable pedal pulses bilaterally   Gastrointestinal: Positive bowel sounds, soft, nontender, nondistended   Musculoskeletal: No bilateral ankle edema, no clubbing or cyanosis to extremities   Psychiatric: Appropriate affect, cooperative   Neurologic: Oriented x 3, strength symmetric in all extremities, Cranial Nerves grossly intact to confrontation, speech clear   Skin: No rashes     Result Review    Result Review:  I have personally reviewed the results from the time of this admission to 2023 11:55 EDT and agree with these findings:  [x]  Laboratory  []  Microbiology  []  Radiology  []  EKG/Telemetry   []   Cardiology/Vascular   []  Pathology  []  Old records  []  Other:  Most notable findings include: Evaded LFTs    Assessment & Plan   Assessment / Plan     Brief Patient Summary:  Willard Lange is a 63 y.o. male who has acute pancreatitis on treatment    Active Hospital Problems:  Active Hospital Problems    Diagnosis     **Acute pancreatitis     Type 2 diabetes mellitus (HCC)      Plan:   Continue probiotics  Continue treatment for acute pancreatitis  DVT prophylaxis:  Medical DVT prophylaxis orders are present.    CODE STATUS:   Code Status (Patient has no pulse and is not breathing): CPR (Attempt to Resuscitate)  Medical Interventions (Patient has pulse or is breathing): Full Support      Electronically signed by Breanna Davila MD, 08/12/23, 11:55 AM EDT.

## 2023-08-13 ENCOUNTER — READMISSION MANAGEMENT (OUTPATIENT)
Dept: CALL CENTER | Facility: HOSPITAL | Age: 63
End: 2023-08-13
Payer: MEDICARE

## 2023-08-13 VITALS
HEIGHT: 72 IN | BODY MASS INDEX: 32.13 KG/M2 | OXYGEN SATURATION: 95 % | HEART RATE: 66 BPM | SYSTOLIC BLOOD PRESSURE: 175 MMHG | DIASTOLIC BLOOD PRESSURE: 88 MMHG | TEMPERATURE: 98.8 F | WEIGHT: 237.22 LBS | RESPIRATION RATE: 18 BRPM

## 2023-08-13 PROBLEM — K85.90 ACUTE PANCREATITIS: Status: RESOLVED | Noted: 2023-08-08 | Resolved: 2023-08-13

## 2023-08-13 LAB
GLUCOSE BLDC GLUCOMTR-MCNC: 126 MG/DL (ref 70–99)
GLUCOSE BLDC GLUCOMTR-MCNC: 144 MG/DL (ref 70–99)
GLUCOSE BLDC GLUCOMTR-MCNC: 150 MG/DL (ref 70–99)
IGG4 SER-MCNC: 18 MG/DL (ref 2–96)

## 2023-08-13 PROCEDURE — 82948 REAGENT STRIP/BLOOD GLUCOSE: CPT

## 2023-08-13 PROCEDURE — 25010000002 ENOXAPARIN PER 10 MG: Performed by: INTERNAL MEDICINE

## 2023-08-13 PROCEDURE — 63710000001 INSULIN REGULAR HUMAN PER 5 UNITS: Performed by: INTERNAL MEDICINE

## 2023-08-13 RX ORDER — SACCHAROMYCES BOULARDII 250 MG
250 CAPSULE ORAL 2 TIMES DAILY
Qty: 26 CAPSULE | Refills: 0 | Status: SHIPPED | OUTPATIENT
Start: 2023-08-13 | End: 2023-08-26

## 2023-08-13 RX ADMIN — FAMOTIDINE 20 MG: 10 INJECTION INTRAVENOUS at 08:48

## 2023-08-13 RX ADMIN — ENOXAPARIN SODIUM 40 MG: 100 INJECTION SUBCUTANEOUS at 00:24

## 2023-08-13 RX ADMIN — Medication 10 ML: at 08:48

## 2023-08-13 RX ADMIN — Medication 250 MG: at 08:48

## 2023-08-13 RX ADMIN — HYDROCHLOROTHIAZIDE 12.5 MG: 12.5 TABLET ORAL at 08:48

## 2023-08-13 RX ADMIN — LOSARTAN POTASSIUM 50 MG: 25 TABLET, FILM COATED ORAL at 08:48

## 2023-08-13 RX ADMIN — INSULIN HUMAN 2 UNITS: 100 INJECTION, SOLUTION PARENTERAL at 12:51

## 2023-08-13 RX ADMIN — POTASSIUM CHLORIDE, DEXTROSE MONOHYDRATE AND SODIUM CHLORIDE 75 ML/HR: 150; 5; 900 INJECTION, SOLUTION INTRAVENOUS at 07:18

## 2023-08-13 NOTE — PLAN OF CARE
Goal Outcome Evaluation:              Outcome Evaluation: denies pain/discomfort this shift. wound care per orders with pics for discharge taken. educated patient on foot care d/t diabetes neuropathy. no new issues/needs noted at this time.

## 2023-08-13 NOTE — PLAN OF CARE
Goal Outcome Evaluation:  Plan of Care Reviewed With: patient        Progress: improving  Outcome Evaluation: No changes in patient condition this shift. Denies pain no signs of discomfort. VS WDL.

## 2023-08-14 NOTE — OUTREACH NOTE
Prep Survey      Flowsheet Row Responses   [facility] facility patient discharged from? [facility]   Is LACE score < 7 ? No   Eligibility Readm Mgmt   Discharge diagnosis **Acute pancreatitis   Does the patient have one of the following disease processes/diagnoses(primary or secondary)? Other   Does the patient have Home health ordered? No   Is there a DME ordered? No   Prep survey completed? Yes            [facility] - Registered Nurse

## 2023-08-15 NOTE — DISCHARGE SUMMARY
[facility]         DISCHARGE SUMMARY    Patient Name: Willard Lange  : 1960  MRN: [id number]    Date of Admission: 2023  Date of Discharge:  2023  Primary Care Physician: Josselyn Damon APRN    Consults       Date and Time Order Name Status Description    8/10/2023 11:59 AM Inpatient Gastroenterology Consult Completed             Presenting Problem:   Acute pancreatitis [K85.90]  Acute pancreatitis without infection or necrosis, unspecified pancreatitis type [K85.90]    Active and Resolved Hospital Problems:  Active Hospital Problems    Diagnosis POA    Type 2 diabetes mellitus (HCC) [E11.9] Yes     Priority: High      Resolved Hospital Problems    Diagnosis POA    Acute pancreatitis [K85.90] Yes     Priority: High         Hospital Course     Hospital Course:  Willard Lange is a 63 y.o. male mated by Dr. Robles for acute pancreatitis.  He has a history of diabetes mellitus type 2, hypertension.  He came to the ER with abdominal pain ,nausea ,vomiting and unable to eat.  He had an elevated lipase and a CT scan showing possible pancreatitis and was admitted.  He has not any fever or chills, redness or bleeding.  Leukocytosis was present.  He was started on IV Levaquin kept n.p.o., monitor his sugar.  His vomiting resolved.  Seen by Dr. Dorsey.  He gives history of being on Trulicity for 6 months.  Lipase improved his Levaquin was stopped.  His triglycerides were normal.  He will follow-up with Dr. Dorsey as an outpatient for an MRI in 4 to 6 weeks.  He is feeling better and was discharged on 2023.        DISCHARGE Follow Up Recommendations for labs and diagnostics: In PMDs office      Day of Discharge     Vital Signs:     Physical Exam:  Constitutional: Awake, alert   Eyes: PERRLA, sclerae anicteric, no conjunctival injection   HENT: NCAT, mucous membranes moist   Neck: Supple, no thyromegaly, no lymphadenopathy, trachea midline   Respiratory: Clear to auscultation  bilaterally, nonlabored respirations    Cardiovascular: RRR, no murmurs, rubs, or gallops, palpable pedal pulses bilaterally   Gastrointestinal: Positive bowel sounds, soft, nontender, nondistended   Musculoskeletal: No bilateral ankle edema, no clubbing or cyanosis to extremities   Psychiatric: Appropriate affect, cooperative   Neurologic: Oriented x 3, strength symmetric in all extremities, Cranial Nerves grossly intact to confrontation, speech clear   Skin: No rashes       Pertinent  and/or Most Recent Results     LAB RESULTS:      Lab 08/12/23  0540 08/10/23  1335 08/09/23  0559 08/08/23  1517 08/08/23  1349   WBC 5.93 7.89 12.73*  --  17.30*   HEMOGLOBIN 13.3 13.5 13.6  --  15.6   HEMATOCRIT 39.2 40.1 40.4  --  45.0   PLATELETS 247 220 221  --  289   NEUTROS ABS 3.48 6.04 10.41*  --  15.00*   IMMATURE GRANS (ABS) 0.03 0.02 0.06*  --  0.05   LYMPHS ABS 1.17 0.89 0.75  --  0.72   MONOS ABS 0.82 0.63 1.41*  --  1.48*   EOS ABS 0.38 0.27 0.08  --  0.02   MCV 85.6 86.4 87.1  --  84.1   LACTATE  --   --   --  1.6  --    LDH  --   --   --   --  218         Lab 08/12/23  0540 08/10/23  1335 08/09/23  0559 08/08/23  1349   SODIUM 137 139 139 137   POTASSIUM 3.8 4.1 4.0 4.0   CHLORIDE 101 102 103 98   CO2 26.1 26.4 25.7 26.1   ANION GAP 9.9 10.6 10.3 12.9   BUN 10 8 13 11   CREATININE 0.78 0.71* 0.75* 0.90   EGFR 100.2 103.1 101.4 96.0   GLUCOSE 136* 161* 171* 197*   CALCIUM 9.0 9.2 8.7 9.8         Lab 08/12/23  0540 08/10/23  1335 08/09/23  0559 08/08/23  1349   TOTAL PROTEIN 6.9 7.2 6.8 7.9   ALBUMIN 3.5 3.8 3.6 4.3   GLOBULIN 3.4 3.4 3.2 3.6   ALT (SGPT) 85* 20 18 23   AST (SGOT) 68* 21 26 21   BILIRUBIN 0.8 1.1 0.8 1.1   ALK PHOS 93 88 87 113   LIPASE 26  --  61* 112*             Lab 08/10/23  1335   CHOLESTEROL 168   LDL CHOL 109*   HDL CHOL 36*   TRIGLYCERIDES 127             Brief Urine Lab Results  (Last result in the past 365 days)        Color   Clarity   Blood   Leuk Est   Nitrite   Protein   CREAT   Urine HCG         08/08/23 1353 Dark Yellow   Clear   Small (1+)   Negative   Negative   >=300 mg/dL (3+)                 Microbiology Results (last 10 days)       ** No results found for the last 240 hours. **            CT Abdomen Pelvis With Contrast    Result Date: 8/8/2023  Impression:   1. Findings suspicious for acute pancreatitis.  No definite peripancreatic collection or ductal dilatation is seen at this time. 2. Calcific atherosclerosis with abdominal aortic aneurysm measuring up to 2.8 cm.  Additional aneurysms are seen in the common iliac and internal iliac arteries. 3. No definite biliary ductal dilatation or acute gallbladder abnormality. 4. Hepatic steatosis      SEAN GARRIDO MD       Electronically Signed and Approved By: SEAN GARRIDO MD on 8/08/2023 at 18:04              Results for orders placed during the hospital encounter of 01/26/23    Doppler Arterial Multi Level Lower Extremity - Bilateral CAR    Interpretation Summary    Right Conclusion: The right JONAS is normal. Waveforms are consistent with femoral disease, popliteal disease and tib-peroneal disease. Normal digital pressures.    Left Conclusion: The left JONAS is normal. Waveforms are consistent with femoral disease, popliteal disease and tib-peroneal disease. Normal digital pressures.    Evidence suggestive calcified tibial level vessels as there is variable compressibility present.      Results for orders placed during the hospital encounter of 01/26/23    Doppler Arterial Multi Level Lower Extremity - Bilateral CAR    Interpretation Summary    Right Conclusion: The right JONAS is normal. Waveforms are consistent with femoral disease, popliteal disease and tib-peroneal disease. Normal digital pressures.    Left Conclusion: The left JONAS is normal. Waveforms are consistent with femoral disease, popliteal disease and tib-peroneal disease. Normal digital pressures.    Evidence suggestive calcified tibial level vessels as there is variable compressibility  present.          Labs Pending at Discharge: None        Discharge Details        Discharge Medications        New Medications        Instructions Start Date   saccharomyces boulardii 250 MG capsule  Commonly known as: FLORASTOR   250 mg, Oral, 2 Times Daily             Continue These Medications        Instructions Start Date   Advair Diskus 250-50 MCG/ACT DISKUS  Generic drug: Fluticasone-Salmeterol   1 puff, Inhalation, 2 Times Daily      albuterol sulfate  (90 Base) MCG/ACT inhaler  Commonly known as: PROVENTIL HFA;VENTOLIN HFA;PROAIR HFA   2 puffs, Inhalation, Every 6 Hours PRN      atorvastatin 20 MG tablet  Commonly known as: LIPITOR   20 mg, Oral, Nightly      baclofen 10 MG tablet  Commonly known as: LIORESAL   Take 1 tablet by mouth 3 (Three) Times a Day.      colestipol 1 g tablet  Commonly known as: COLESTID   Take 1 tablet by mouth As Needed.      freestyle lancets   No dose, route, or frequency recorded.      glucose blood test strip   FreeStyle Lite Strips   USE TO TEST BLOOD SUGAR FOUR TIMES DAILY AND AS NEEDED      FREESTYLE LITE test strip  Generic drug: glucose blood   No dose, route, or frequency recorded.      FreeStyle Lite w/Device kit   See Admin Instructions      glipizide 5 MG ER tablet  Commonly known as: GLUCOTROL XL   5 mg, Oral, Daily      HYDROcodone-acetaminophen 7.5-325 MG per tablet  Commonly known as: NORCO   1 tablet, Oral, Every 8 Hours PRN      losartan-hydrochlorothiazide 100-25 MG per tablet  Commonly known as: HYZAAR   Take 1 tablet by mouth Daily.      omeprazole 40 MG capsule  Commonly known as: priLOSEC   40 mg, Oral, Daily      prednisoLONE acetate 1 % ophthalmic suspension  Commonly known as: PRED FORTE   1 drop, Both Eyes, 3 Times Daily      pregabalin 200 MG capsule  Commonly known as: LYRICA   Take 1 capsule by mouth Daily.      tamsulosin 0.4 MG capsule 24 hr capsule  Commonly known as: FLOMAX   0.4 mg, Oral, 2 Times Daily             Stop These Medications       Trulicity 1.5 MG/0.5ML solution pen-injector  Generic drug: Dulaglutide              Allergies   Allergen Reactions    Formaldehyde Rash         Discharge Disposition:  Home or Self Care    Patient Condition on discharge: Stable    Diet:  Hospital:No active diet order        Discharge Activity: As tolerated        CODE STATUS:  Code Status and Medical Interventions:   Ordered at: 08/08/23 2157     Code Status (Patient has no pulse and is not breathing):    CPR (Attempt to Resuscitate)     Medical Interventions (Patient has pulse or is breathing):    Full Support         Future Appointments   Date Time Provider Department Center   8/16/2023 10:00 AM Jeaneth Chowdary APRJULIANE [facility] GE ETWH [facility]   8/18/2023  2:45 PM Kade Cook MD [facility] BHUPENDRA ETWN [facility]   8/29/2023  3:00 PM Yue Leon APRJULIANE [facility] U ETRING [facility]   9/5/2023  1:00 PM KIERRA HVS VAS ROOM 2  [facility] OVHD [facility]   9/5/2023  1:45 PM KIERRA HVS VAS ROOM 2  [facility] OVHD [facility]   9/5/2023  2:45 PM Geovanna Solo MD [facility] VS ETOWN [facility]   9/7/2023  1:00 PM KIERRA JORGE MRI 1  [facility] ETWMR [facility]   9/7/2023  1:45 PM KIERRA JORGE MRI 1  [facility] ETWMR [facility]   9/26/2023  3:00 PM Niesha Hester APRN [facility] DIAB ET [facility]   10/30/2023  1:30 PM Sarkis Ureña DPM [facility] POD ETWN [facility]   11/20/2023  1:00 PM Shi Cortez APRN [facility] RO KIERRA KIERRA             Electronically signed by Breanna Davila MD, 08/15/23, 1:07 PM EDT.

## 2023-08-16 ENCOUNTER — READMISSION MANAGEMENT (OUTPATIENT)
Dept: CALL CENTER | Facility: HOSPITAL | Age: 63
End: 2023-08-16
Payer: MEDICARE

## 2023-08-16 ENCOUNTER — OFFICE VISIT (OUTPATIENT)
Dept: GASTROENTEROLOGY | Facility: CLINIC | Age: 63
End: 2023-08-16
Payer: MEDICARE

## 2023-08-16 VITALS
HEART RATE: 74 BPM | HEIGHT: 72 IN | BODY MASS INDEX: 32.53 KG/M2 | SYSTOLIC BLOOD PRESSURE: 124 MMHG | DIASTOLIC BLOOD PRESSURE: 55 MMHG | WEIGHT: 240.2 LBS

## 2023-08-16 DIAGNOSIS — R93.5 ABNORMAL CT OF THE ABDOMEN: ICD-10-CM

## 2023-08-16 DIAGNOSIS — R10.10 PAIN OF UPPER ABDOMEN: ICD-10-CM

## 2023-08-16 DIAGNOSIS — K85.31 DRUG-INDUCED ACUTE PANCREATITIS WITH UNINFECTED NECROSIS: Primary | ICD-10-CM

## 2023-08-16 DIAGNOSIS — R11.2 NAUSEA AND VOMITING, UNSPECIFIED VOMITING TYPE: ICD-10-CM

## 2023-08-16 PROCEDURE — 99214 OFFICE O/P EST MOD 30 MIN: CPT | Performed by: NURSE PRACTITIONER

## 2023-08-16 PROCEDURE — 3074F SYST BP LT 130 MM HG: CPT | Performed by: NURSE PRACTITIONER

## 2023-08-16 PROCEDURE — 1159F MED LIST DOCD IN RCRD: CPT | Performed by: NURSE PRACTITIONER

## 2023-08-16 PROCEDURE — 3078F DIAST BP <80 MM HG: CPT | Performed by: NURSE PRACTITIONER

## 2023-08-16 PROCEDURE — 1160F RVW MEDS BY RX/DR IN RCRD: CPT | Performed by: NURSE PRACTITIONER

## 2023-08-16 NOTE — PROGRESS NOTES
Chief Complaint   Pancreatitis    History of Present Illness       Willard Lange is a 63 y.o. male who presents to [facility] for follow-up for pancreatitis.  He was last seen in the office by me on 4/11/2023.    He was hospitalized from 8/8-8/13/23 for acute pancreatitis. He had CT scan done that showed:    IMPRESSION:                 1. Findings suspicious for acute pancreatitis.  No definite peripancreatic collection or ductal   dilatation is seen at this time.  2. Calcific atherosclerosis with abdominal aortic aneurysm measuring up to 2.8 cm.  Additional   aneurysms are seen in the common iliac and internal iliac arteries.  3. No definite biliary ductal dilatation or acute gallbladder abnormality.  4. Hepatic steatosis       Dr. Dorsey was consulted and was thinking maybe this was caused by Trulicity but she was not definite.  He had been on trulicity x 6 months. Lipase was elevated. He did finish the antibiotics. He is better but still occasionally having the upper abd pain. Bowels are better. NO more diarrhea right now. Actually having constipation. He is going but its hard and not a huge amount. Off the colestid. Still taking omeprazole. He is off trulicity. He is scheduled for scopes next month. He admits he has not had a ETOH beverage in months.  Before this episode of pancreatitis he was suffering from crazy diarrhea.    IgG4, ESTELLE and lipid panel were normal.  Stool studies were normal except for an elevated calprotectin.       last colonoscopy by Dr. Dorsey on 8/2020.  He has a history of adenomatous and hyperplastic colon polyps.  Recall in 8 of 2023.     Benign Brain tumor in 2021. Is supposed to get surveillance on it. History of prostate cancer s/p radiation.      History of Hepatitis C.  Was treated by Dr. Chase. HCV RNA NOT DETECTED 2019     History of GERD--on omeprazole 40 mg daily from PCP.  Never had EGD.      He denies any dysphagia, N&V, rectal bleeding  or melena. Good appetite. He has lost 30 lbs over the last several months. He admits he has been eating less to lose.      History of appendectomy     Denies any significant GI family history      Results       Result Review :       CMP          8/9/2023    05:59 8/10/2023    13:35 8/12/2023    05:40   CMP   Glucose 171  161  136    BUN 13  8  10    Creatinine 0.75  0.71  0.78    EGFR 101.4  103.1  100.2    Sodium 139  139  137    Potassium 4.0  4.1  3.8    Chloride 103  102  101    Calcium 8.7  9.2  9.0    Total Protein 6.8  7.2  6.9    Albumin 3.6  3.8  3.5    Globulin 3.2  3.4  3.4    Total Bilirubin 0.8  1.1  0.8    Alkaline Phosphatase 87  88  93    AST (SGOT) 26  21  68    ALT (SGPT) 18  20  85    Albumin/Globulin Ratio 1.1  1.1  1.0    BUN/Creatinine Ratio 17.3  11.3  12.8    Anion Gap 10.3  10.6  9.9      CBC          8/9/2023    05:59 8/10/2023    13:35 8/12/2023    05:40   CBC   WBC 12.73  7.89  5.93    RBC 4.64  4.64  4.58    Hemoglobin 13.6  13.5  13.3    Hematocrit 40.4  40.1  39.2    MCV 87.1  86.4  85.6    MCH 29.3  29.1  29.0    MCHC 33.7  33.7  33.9    RDW 13.4  13.2  12.8    Platelets 221  220  247      Lipid Panel          8/10/2023    13:35   Lipid Panel   Total Cholesterol 168    Triglycerides 127    HDL Cholesterol 36    VLDL Cholesterol 23    LDL Cholesterol  109    LDL/HDL Ratio 2.96          Lipase   Lipase   Date Value Ref Range Status   08/12/2023 26 13 - 60 U/L Final                 Past Medical History       Past Medical History:   Diagnosis Date    Arthritis     Asthma     Benign essential hypertension     Brain tumor 01/2021    Cancer     DDD (degenerative disc disease), lumbar     Diabetes mellitus type 2, noninsulin dependent     Diabetes type 2, controlled     GERD (gastroesophageal reflux disease)     Hepatitis C     HTN (hypertension)     Leg pain     Lumbago 02/15/2017    with low back pain    Lumbar disc herniation 02/15/2017    L4-4    Lumbar spinal stenosis 02/15/2017    L4/5     Muscle cramps     Neuropathy     Prostate cancer        Past Surgical History:   Procedure Laterality Date    APPENDECTOMY      BACK SURGERY  2017    COLONOSCOPY  2017    Dr. Dorsey-Polyps    CYSTOSCOPY W/ LASER LITHOTRIPSY      for kidney stones    LAMINECTOMY  02/17/2017    L4-5    PROSTATE BIOPSY      transrectal    TONSILLECTOMY           Current Outpatient Medications:     Advair Diskus 250-50 MCG/DOSE DISKUS, Inhale 1 puff 2 (Two) Times a Day., Disp: , Rfl:     albuterol sulfate  (90 Base) MCG/ACT inhaler, Inhale 2 puffs Every 6 (Six) Hours As Needed., Disp: , Rfl:     atorvastatin (LIPITOR) 20 MG tablet, Take 1 tablet by mouth Every Night., Disp: , Rfl:     baclofen (LIORESAL) 10 MG tablet, Take 1 tablet by mouth 3 (Three) Times a Day., Disp: , Rfl:     Blood Glucose Monitoring Suppl (FreeStyle Lite) w/Device kit, See Admin Instructions., Disp: , Rfl:     colestipol (COLESTID) 1 g tablet, Take 1 tablet by mouth As Needed., Disp: , Rfl:     FREESTYLE LITE test strip, , Disp: , Rfl:     glipizide (GLUCOTROL XL) 5 MG ER tablet, Take 1 tablet by mouth Daily., Disp: , Rfl:     glucose blood test strip, FreeStyle Lite Strips  USE TO TEST BLOOD SUGAR FOUR TIMES DAILY AND AS NEEDED, Disp: , Rfl:     HYDROcodone-acetaminophen (NORCO) 7.5-325 MG per tablet, Take 1 tablet by mouth Every 8 (Eight) Hours As Needed., Disp: , Rfl:     Lancets (freestyle) lancets, , Disp: , Rfl:     losartan-hydrochlorothiazide (HYZAAR) 100-25 MG per tablet, Take 1 tablet by mouth Daily., Disp: , Rfl:     omeprazole (priLOSEC) 40 MG capsule, Take 1 capsule by mouth Daily., Disp: , Rfl:     prednisoLONE acetate (PRED FORTE) 1 % ophthalmic suspension, Administer 1 drop to both eyes 3 (Three) Times a Day., Disp: , Rfl:     pregabalin (LYRICA) 200 MG capsule, Take 1 capsule by mouth Daily., Disp: , Rfl:     saccharomyces boulardii (FLORASTOR) 250 MG capsule, Take 1 capsule by mouth 2 (Two) Times a Day for 26 doses., Disp: 26 capsule,  "Rfl: 0    tamsulosin (FLOMAX) 0.4 MG capsule 24 hr capsule, Take 1 capsule by mouth 2 (Two) Times a Day for 90 days., Disp: 180 capsule, Rfl: 0     Allergies   Allergen Reactions    Formaldehyde Rash       Family History   Problem Relation Age of Onset    Heart disease Father     Lung cancer Sister     Diabetes type I Maternal Grandmother         Social History     Social History Narrative    Lives alone       Objective       Review of Systems   Constitutional:  Negative for appetite change, fatigue, fever, unexpected weight gain and unexpected weight loss.   HENT:  Negative for trouble swallowing.    Respiratory:  Negative for cough, choking, chest tightness, shortness of breath, wheezing and stridor.    Cardiovascular:  Negative for chest pain, palpitations and leg swelling.   Gastrointestinal:  Positive for abdominal distention, abdominal pain and constipation. Negative for anal bleeding, blood in stool, diarrhea, nausea, rectal pain, vomiting, GERD and indigestion.      Vital Signs:   /55 (BP Location: Right arm, Patient Position: Sitting, Cuff Size: Adult)   Pulse 74   Ht 182.9 cm (72\")   Wt 109 kg (240 lb 3.2 oz)   BMI 32.58 kg/mý       Physical Exam  Constitutional:       General: He is not in acute distress.     Appearance: He is well-developed. He is not ill-appearing.   HENT:      Head: Normocephalic.   Eyes:      Pupils: Pupils are equal, round, and reactive to light.   Cardiovascular:      Rate and Rhythm: Normal rate and regular rhythm.      Heart sounds: Normal heart sounds.   Pulmonary:      Effort: Pulmonary effort is normal.      Breath sounds: Normal breath sounds.   Abdominal:      General: Bowel sounds are normal. There is distension.      Palpations: Abdomen is soft. There is no mass.      Tenderness: There is no abdominal tenderness. There is no guarding or rebound.      Hernia: No hernia is present.   Musculoskeletal:         General: Normal range of motion.   Skin:     General: Skin " is warm and dry.   Neurological:      Mental Status: He is alert and oriented to person, place, and time.   Psychiatric:         Speech: Speech normal.         Behavior: Behavior normal.         Judgment: Judgment normal.         Assessment & Plan          Assessment and Plan    Diagnoses and all orders for this visit:    1. Drug-induced acute pancreatitis with uninfected necrosis (Primary)  -     Cancel: IgG 4; Future  -     MRI abdomen w wo contrast mrcp; Future  -     Comprehensive Metabolic Panel  -     CBC & Differential  -     Lipase  -     Amylase    2. Pain of upper abdomen  -     Cancel: IgG 4; Future  -     MRI abdomen w wo contrast mrcp; Future  -     Comprehensive Metabolic Panel  -     CBC & Differential  -     Lipase  -     Amylase    3. Abnormal CT of the abdomen  -     Cancel: IgG 4; Future  -     MRI abdomen w wo contrast mrcp; Future  -     Comprehensive Metabolic Panel  -     CBC & Differential  -     Lipase  -     Amylase    4. Nausea and vomiting, unspecified vomiting type  -     Cancel: IgG 4; Future  -     MRI abdomen w wo contrast mrcp; Future  -     Comprehensive Metabolic Panel  -     CBC & Differential  -     Lipase  -     Amylase    Reviewed medical history with him today.  Reviewed most recent labs and imaging results with him as well.  It sounds like overall he is doing much better.  Diarrhea has resolved.  Abdominal pain is significantly better.  Still having issues though now with constipation.  Recommend he continue a high-fiber/low-fat/low sugar diet for now.  Avoid alcohol.  IgG4 was normal.  Lipid panel was unremarkable.  We will check an MRCP and repeat CBC and CMP as well as a lipase level.  To make sure things are going back down to normal.  He is off of Trulicity for now.  Currently off of Colestid.  No longer needing it.  GERD seems well controlled on omeprazole 40 mg daily.  Continue GERD precautions.  Patient to keep scheduled scopes next month as planned.  Patient to call  the office in 1 to 2 weeks with an update.  Patient to follow-up with me in 1 month.  Patient is agreeable to the plan.            Follow Up       Follow Up   Return in about 4 weeks (around 9/13/2023) for ABDOMINAL PAIN.  Patient was given instructions and counseling regarding his condition or for health maintenance advice. Please see specific information pulled into the AVS if appropriate.

## 2023-08-16 NOTE — OUTREACH NOTE
Medical Week 1 Survey      Flowsheet Row Responses   [facility] patient discharged from? [facility]   Does the patient have one of the following disease processes/diagnoses(primary or secondary)? Other   Week 1 attempt successful? Yes   Call start time 1611   Call end time 1617   Discharge diagnosis **Acute pancreatitis   Meds reviewed with patient/caregiver? Yes   Is the patient having any side effects they believe may be caused by any medication additions or changes? No   Does the patient have all medications ordered at discharge? Yes   Prescription comments Pt reports florastor was cancelled by pharmacy so uncertain if insurance issue, he will check today   Is the patient taking all medications as directed (includes completed medication regime)? Yes   Medication comments confirmed pt has stopped trulicity   Comments regarding appointments GI today 8/16/23   Does the patient have a primary care provider?  Yes   Does the patient have an appointment with their PCP within 7 days of discharge? Greater than 7 days   Nursing Interventions Verified appointment date/time/provider  [8/21/23]   Has the patient kept scheduled appointments due by today? Yes   Has home health visited the patient within 72 hours of discharge? N/A   Psychosocial issues? No   Did the patient receive a copy of their discharge instructions? Yes   Nursing interventions Reviewed instructions with patient   What is the patient's perception of their health status since discharge? Improving  [Still has some abdominal pain at times, BMs tend to be constipated, tolerating po intake--has upcoming MRI abdomen to be scheduled.]   Is the patient/caregiver able to teach back signs and symptoms related to disease process for when to call PCP? Yes   Week 1 call completed? Yes   Call end time 1617            SOY HANSON - Registered Nurse

## 2023-08-18 ENCOUNTER — PROCEDURE VISIT (OUTPATIENT)
Dept: NEUROLOGY | Facility: CLINIC | Age: 63
End: 2023-08-18
Payer: MEDICARE

## 2023-08-18 ENCOUNTER — TELEPHONE (OUTPATIENT)
Dept: NEUROLOGY | Facility: CLINIC | Age: 63
End: 2023-08-18

## 2023-08-18 VITALS
HEIGHT: 72 IN | DIASTOLIC BLOOD PRESSURE: 79 MMHG | BODY MASS INDEX: 32.37 KG/M2 | WEIGHT: 239 LBS | HEART RATE: 104 BPM | SYSTOLIC BLOOD PRESSURE: 139 MMHG

## 2023-08-18 DIAGNOSIS — R20.2 NUMBNESS AND TINGLING IN BOTH HANDS: ICD-10-CM

## 2023-08-18 DIAGNOSIS — R20.0 NUMBNESS AND TINGLING IN BOTH HANDS: ICD-10-CM

## 2023-08-18 DIAGNOSIS — E11.42 DIABETIC POLYNEUROPATHY ASSOCIATED WITH TYPE 2 DIABETES MELLITUS: Primary | ICD-10-CM

## 2023-08-18 DIAGNOSIS — R29.898 UPPER EXTREMITY WEAKNESS: ICD-10-CM

## 2023-08-18 NOTE — PROGRESS NOTES
"Chief Complaint  No chief complaint on file.    Subjective          Willard Lange is a 63 y.o. male who presents to [facility]  History of Present Illness  63-year-old man evaluated for nerve conduction EMG study.  He has had numbness and weakness of his  and weakness of his arms.  Is been ongoing for the last 1 to 2 years.  He has a history of diabetes for the past 10 years.  Objective   Vital Signs:   /79   Pulse 104   Ht 182.9 cm (72.01\")   Wt 108 kg (239 lb)   BMI 32.41 kg/mý     Physical Exam   There is 5/5 weakness of the deltoids, biceps, triceps, pronators, wrist extensors with a tremor noted.  There is 4/5 strength on flexors of the fingers.  There is significant atrophy of intrinsic hand muscles with wasting of the interossei muscle.        Assessment and Plan  Diagnoses and all orders for this visit:    1. Diabetic polyneuropathy associated with type 2 diabetes mellitus (Primary)  Assessment & Plan:  Bilateral nerve conduction study and EMG of the left upper extremity is abnormal shows electrophysiologic evidence for severe axonal sensorimotor polyneuropathy.      2. Numbness and tingling in both hands  -     EMG & Nerve Conduction Test    3. Upper extremity weakness  -     EMG & Nerve Conduction Test         Nerve Conduction Study:  10 nerves    EMG:  Complete    Total time spent with the patient and coordinating patient care was 15 minutes.    Follow Up  No follow-ups on file.  Patient was given instructions and counseling regarding his condition or for health maintenance advice. Please see specific information pulled into the AVS if appropriate.       "

## 2023-08-18 NOTE — ASSESSMENT & PLAN NOTE
Bilateral nerve conduction study and EMG of the left upper extremity is abnormal shows electrophysiologic evidence for severe axonal sensorimotor polyneuropathy.

## 2023-08-24 ENCOUNTER — READMISSION MANAGEMENT (OUTPATIENT)
Dept: CALL CENTER | Facility: HOSPITAL | Age: 63
End: 2023-08-24
Payer: MEDICARE

## 2023-08-24 NOTE — OUTREACH NOTE
Medical Week 2 Survey      Flowsheet Row Responses   [facility], [place] patient discharged from? [facility]   Does the patient have one of the following disease processes/diagnoses(primary or secondary)? Other   Week 2 attempt successful? No   Unsuccessful attempts Attempt 1            MERVIN PETERS - Registered Nurse

## 2023-08-29 ENCOUNTER — OFFICE VISIT (OUTPATIENT)
Dept: UROLOGY | Facility: CLINIC | Age: 63
End: 2023-08-29
Payer: MEDICARE

## 2023-08-29 ENCOUNTER — TELEPHONE (OUTPATIENT)
Dept: NEUROSURGERY | Facility: CLINIC | Age: 63
End: 2023-08-29
Payer: MEDICARE

## 2023-08-29 ENCOUNTER — TELEPHONE (OUTPATIENT)
Dept: GASTROENTEROLOGY | Facility: CLINIC | Age: 63
End: 2023-08-29
Payer: MEDICARE

## 2023-08-29 VITALS — WEIGHT: 237.4 LBS | HEIGHT: 72 IN | BODY MASS INDEX: 32.15 KG/M2 | RESPIRATION RATE: 16 BRPM

## 2023-08-29 DIAGNOSIS — N52.35 ERECTILE DYSFUNCTION FOLLOWING RADIATION THERAPY: Primary | ICD-10-CM

## 2023-08-29 PROBLEM — E11.42 DIABETIC PERIPHERAL NEUROPATHY: Status: ACTIVE | Noted: 2023-05-12

## 2023-08-29 PROBLEM — M96.1 LUMBAR POST-LAMINECTOMY SYNDROME: Status: ACTIVE | Noted: 2023-05-12

## 2023-08-29 PROBLEM — M54.50 LOW BACK PAIN: Status: ACTIVE | Noted: 2021-05-28

## 2023-08-29 PROCEDURE — 1160F RVW MEDS BY RX/DR IN RCRD: CPT | Performed by: NURSE PRACTITIONER

## 2023-08-29 PROCEDURE — 1159F MED LIST DOCD IN RCRD: CPT | Performed by: NURSE PRACTITIONER

## 2023-08-29 PROCEDURE — 99212 OFFICE O/P EST SF 10 MIN: CPT | Performed by: NURSE PRACTITIONER

## 2023-08-29 NOTE — PROGRESS NOTES
Chief Complaint  Diabetes (New patient no devices )    Referred By: Self Referring    Subjective          Willard Lange presents to [facility] for diabetes medication management    History of Present Illness    Visit type:  to establish care  Diabetes type:  Type 2  Age at time of dx/Year of dx/Number of years: Reports he was diagnosed with type 2 diabetes about 10 years ago  Family History of Diabetes: paternal grandmother type 1  Current diabetes status/concerns/issues:  He self referred himself for management for his diabetes. States he was admitted to [facility] on 8/13/2023 for pancreatitis that was induced by Trulicity.  States he has a follow-up MRI scheduled on 9/28/2023.  Reports he was on colestipol for diarrhea but since coming off Trulicity he no longer has a diarrhea. Reports he would like to get a CGM. Admits he checks his blood sugar 3 times day. Reports normally his blood sugar runs low 100s. Currently he is only taking glipizide 5mg qd.  Other current health concerns: He is scheduled for colonoscopy on 9/14/2023.  Current Diabetes symptoms:    Polyuria: No   Polydipsia: Yes     Polyphagia: No   Blurred vision: No   Excessive fatigue: Yes    Known Diabetes complications:  Neuropathy: Numbness, Tingling, Burning, and Shooting Pain; Location: Feet, Legs, and Hands  Renal: None  Eyes: None; Location: N/A; Last Eye Exam:  last month ; Location:   Amputation/Wounds:  slow to heal  GI: Pancreatitis (History of)  Cardiovascular: Hypertension and Hyperlipidemia  ED: Patient Reported  Other: None  Hospitalizations/ED/911 secondary to DM?  No  Hypoglycemia:  None reported at this time  Hypoglycemia Symptoms:  No hypoglycemia at this time  Current Diabetes treatment:  glipizide 5mg qd   Prior diabetes treatments:  Ozempic, Trulicity, Steglatro  Using ACEI or ARB: Yes, Losartan-hctz 100-25mg qd, Managed by other provider  Using Statin: Yes, atorvastatin 20mg qd, Managed by  other provider  Blood glucose device:  Meter  Blood glucose monitoring frequency:  3  Blood glucose range/average:   low 100s  Glucose Source: Patient Reported  Dietary behavior:  Limits high carb/sweet foods, Avoids sugary drinks, Number of meals each day - 2; Number of snacks each day - 1  Activity/Exercise:   he is doing PT for weakness in his hands and legs  Last Foot Exam:  podiatry-  Diabetes Education Hx: None  Social Determinants of Health: None    Past Medical History:   Diagnosis Date    Arthritis     Asthma     Benign essential hypertension     Brain tumor 2021    Cancer     DDD (degenerative disc disease), lumbar     Diabetes mellitus type 2, noninsulin dependent     Diabetes type 2, controlled     GERD (gastroesophageal reflux disease)     Hepatitis C     HTN (hypertension)     Leg pain     Lumbago 02/15/2017    with low back pain    Lumbar disc herniation 02/15/2017    L4-4    Lumbar spinal stenosis 02/15/2017    L4/5    Muscle cramps     Neuropathy     Prostate cancer      Past Surgical History:   Procedure Laterality Date    APPENDECTOMY      BACK SURGERY      COLONOSCOPY      Dr. Dorsey-Polyps    CYSTOSCOPY W/ LASER LITHOTRIPSY      for kidney stones    LAMINECTOMY  2017    L4-5    PROSTATE BIOPSY      transrectal    TONSILLECTOMY       Family History   Problem Relation Age of Onset    Heart disease Father     Lung cancer Sister     Diabetes type I Maternal Grandmother      Social History     Socioeconomic History    Marital status:    Tobacco Use    Smoking status: Former     Types: Cigarettes     Start date: 1976     Quit date:      Years since quittin.6     Passive exposure: Past    Smokeless tobacco: Never   Vaping Use    Vaping Use: Never used   Substance and Sexual Activity    Alcohol use: Yes     Comment: rarely    Drug use: Never    Sexual activity: Defer     Allergies   Allergen Reactions    Formaldehyde Rash       Current Outpatient  "Medications:     Advair Diskus 250-50 MCG/DOSE DISKUS, Inhale 1 puff 2 (Two) Times a Day., Disp: , Rfl:     albuterol sulfate  (90 Base) MCG/ACT inhaler, Inhale 2 puffs Every 6 (Six) Hours As Needed., Disp: , Rfl:     atorvastatin (LIPITOR) 20 MG tablet, Take 1 tablet by mouth Every Night., Disp: , Rfl:     baclofen (LIORESAL) 10 MG tablet, Take 1 tablet by mouth 3 (Three) Times a Day., Disp: , Rfl:     Blood Glucose Monitoring Suppl (FreeStyle Lite) w/Device kit, See Admin Instructions., Disp: , Rfl:     colestipol (COLESTID) 1 g tablet, Take 1 tablet by mouth As Needed., Disp: , Rfl:     FREESTYLE LITE test strip, , Disp: , Rfl:     glipizide (GLUCOTROL XL) 5 MG ER tablet, Take 1 tablet by mouth Daily., Disp: , Rfl:     glucose blood test strip, FreeStyle Lite Strips  USE TO TEST BLOOD SUGAR FOUR TIMES DAILY AND AS NEEDED, Disp: , Rfl:     HYDROcodone-acetaminophen (NORCO) 7.5-325 MG per tablet, Take 1 tablet by mouth Every 8 (Eight) Hours As Needed., Disp: , Rfl:     Lancets (freestyle) lancets, , Disp: , Rfl:     losartan-hydrochlorothiazide (HYZAAR) 100-25 MG per tablet, Take 1 tablet by mouth Daily., Disp: , Rfl:     omeprazole (priLOSEC) 40 MG capsule, Take 1 capsule by mouth Daily., Disp: , Rfl:     pregabalin (LYRICA) 200 MG capsule, Take 1 capsule by mouth Daily., Disp: , Rfl:     Continuous Blood Gluc Sensor (FreeStyle Clarisa 3 Sensor) misc, Use 1 each Every 14 (Fourteen) Days., Disp: 2 each, Rfl: 5    empagliflozin (Jardiance) 10 MG tablet tablet, Take 1 tablet by mouth Daily for 90 days., Disp: 90 tablet, Rfl: 0    Objective     Vitals:    08/30/23 1304   BP: 138/70   BP Location: Left arm   Patient Position: Sitting   Cuff Size: Adult   Pulse: 63   SpO2: 95%   Weight: 109 kg (239 lb 6.4 oz)   Height: 182.9 cm (72\")     Body mass index is 32.47 kg/mý.    Physical Exam  Constitutional:       Appearance: Normal appearance. He is obese.      Comments: Obesity (BMI 30 - 39.9) Pt Current BMI = 32.47    "   HENT:      Head: Normocephalic and atraumatic.      Right Ear: External ear normal.      Left Ear: External ear normal.      Nose: Nose normal.   Eyes:      Extraocular Movements: Extraocular movements intact.      Conjunctiva/sclera: Conjunctivae normal.   Pulmonary:      Effort: Pulmonary effort is normal.   Musculoskeletal:         General: Normal range of motion.      Cervical back: Normal range of motion.   Skin:     General: Skin is warm and dry.   Neurological:      General: No focal deficit present.      Mental Status: He is alert and oriented to person, place, and time. Mental status is at baseline.   Psychiatric:         Mood and Affect: Mood normal.         Behavior: Behavior normal.         Thought Content: Thought content normal.         Judgment: Judgment normal.           Result Review :   The following data was reviewed by: MARIUM Powers on 08/30/2023:    Most Recent A1C          8/30/2023    13:19   HGBA1C Most Recent   Hemoglobin A1C 6.4        A1C Last 3 Results          8/30/2023    13:19   HGBA1C Last 3 Results   Hemoglobin A1C 6.4      A1c collected 8/30/2023 is 6.4%, indicating Controlled Type II diabetes.      Glucose   Date Value Ref Range Status   08/30/2023 152 (H) 70 - 99 mg/dL Final     Comment:     Serial Number: [id number]:  [id number]     Point of care glucose in the office today is within normal limits for nonfasting glucose    Creatinine   Date Value Ref Range Status   08/12/2023 0.78 0.76 - 1.27 mg/dL Final   08/10/2023 0.71 (L) 0.76 - 1.27 mg/dL Final   07/23/2021 0.90 mg/dL Final     Comment:     Serial Number: [id number]:  [id number]     eGFR   Date Value Ref Range Status   08/12/2023 100.2 >60.0 mL/min/1.73 Final   08/10/2023 103.1 >60.0 mL/min/1.73 Final     Labs collected on 8/12/23 show Normal values        Total Cholesterol   Date Value Ref Range Status   08/10/2023 168 0 - 200 mg/dL Final     Triglycerides   Date Value Ref Range Status   08/10/2023  127 0 - 150 mg/dL Final     HDL Cholesterol   Date Value Ref Range Status   08/10/2023 36 (L) 40 - 60 mg/dL Final     LDL Cholesterol    Date Value Ref Range Status   08/10/2023 109 (H) 0 - 100 mg/dL Final     Lipid panel collected on 8/10/2023 shows Hyperlipidemia and low HDL            Assessment: Patient self-referred himself to our clinic for management of his diabetes.  He was admitted to the hospital on 8/13/2023 for pancreatitis that was induced by Trulicity.  He reports the Trulicity also cause diarrhea.  States he was taking colestipol for the diarrhea but it has resolved since stopping the Trulicity.  He is currently taking glipizide 5 mg once daily he reports his glucose levels run in the low 100s.  His A1c in the office today is 6.4% indicating controlled type 2 diabetes.  He is requesting a continuous glucose monitor for closer monitoring of his glucose levels.  Discussed with patient his insurance may not cover the CGM due to not being on insulin or having hypoglycemia but we will send it in to check for coverage.      Diagnoses and all orders for this visit:    1. Type 2 diabetes mellitus with hyperglycemia, unspecified whether long term insulin use (Primary)  -     POC Glycosylated Hemoglobin (Hb A1C)  -     Ambulatory Referral to [facility]  -     empagliflozin (Jardiance) 10 MG tablet tablet; Take 1 tablet by mouth Daily for 90 days.  Dispense: 90 tablet; Refill: 0  -     Continuous Blood Gluc Sensor (FreeStyle Clarisa 3 Sensor) misc; Use 1 each Every 14 (Fourteen) Days.  Dispense: 2 each; Refill: 5  -     Ambulatory Referral to [facility]    2. Type 2 diabetes mellitus with diabetic neuropathy, without long-term current use of insulin    3. Obesity (BMI 30-39.9)    Other orders  -     POC Glucose        Plan: Prescribe Jardiance 10 mg once daily.  Instructed patient make sure to drink plenty water with this medication to avoid dehydration and yeast infections.   Instructed patient to call the office if he develops any of these symptoms.  A brennen 3 CGM was prescribed.  Instructed patient if the CGM is covered to call the office to schedule appointment with the diabetic nurse educator for teaching.  A referral was also placed to the dietitian to discuss low-carb low sugar diet.  Scheduled a 1 month follow-up.    The patient will monitor his blood glucose levels per CGM.  If he develops problematic hyperglycemia or hypoglycemia or adverse drug reactions, he will contact the office for further instructions.        Follow Up     Return in about 4 weeks (around 9/27/2023) for DMNT, DSME, Medication Mgmt, CGM Follow Up.    Patient was given instructions and counseling regarding his condition or for health maintenance advice. Please see specific information pulled into the AVS if appropriate.     Zuri Helton, MARIUM  08/30/2023      Dictated Utilizing Dragon Dictation.  Please note that portions of this note were completed with a voice recognition program.  Part of this note may be an electronic transcription/translation of spoken language to printed text using the Dragon Dictation System.

## 2023-08-29 NOTE — PROGRESS NOTES
Chief Complaint: Erectile Dysfunction    Subjective         History of Present Illness  Willard Lange is a 63 y.o. male presents to [facility] to be seen for trimix teaching.    Patient started on tadalafil 15/1/10 at last visit.    He has not been using the trimix.     He is going to get the prescription in the next week or so.     Previous:    Patient has been seen by [facility] for for penile injection therapy this was working well for him however the program was too expensive and he could not afford it.    Patient demonstrated ability to successfully perform intracavernosal self injection x 1.   Provided trial dose up to 0.2 for which patient obtained greater than 50% erection.        Objective     Past Medical History:   Diagnosis Date    Arthritis     Asthma     Benign essential hypertension     Brain tumor 01/2021    Cancer     DDD (degenerative disc disease), lumbar     Diabetes mellitus type 2, noninsulin dependent     Diabetes type 2, controlled     GERD (gastroesophageal reflux disease)     Hepatitis C     HTN (hypertension)     Leg pain     Lumbago 02/15/2017    with low back pain    Lumbar disc herniation 02/15/2017    L4-4    Lumbar spinal stenosis 02/15/2017    L4/5    Muscle cramps     Neuropathy     Prostate cancer        Past Surgical History:   Procedure Laterality Date    APPENDECTOMY      BACK SURGERY  2017    COLONOSCOPY  2017    Dr. Dorsey-Polyps    CYSTOSCOPY W/ LASER LITHOTRIPSY      for kidney stones    LAMINECTOMY  02/17/2017    L4-5    PROSTATE BIOPSY      transrectal    TONSILLECTOMY           Current Outpatient Medications:     Advair Diskus 250-50 MCG/DOSE DISKUS, Inhale 1 puff 2 (Two) Times a Day., Disp: , Rfl:     albuterol sulfate  (90 Base) MCG/ACT inhaler, Inhale 2 puffs Every 6 (Six) Hours As Needed., Disp: , Rfl:     atorvastatin (LIPITOR) 20 MG tablet, Take 1 tablet by mouth Every Night., Disp: , Rfl:     baclofen (LIORESAL) 10 MG  "tablet, Take 1 tablet by mouth 3 (Three) Times a Day., Disp: , Rfl:     Blood Glucose Monitoring Suppl (FreeStyle Lite) w/Device kit, See Admin Instructions., Disp: , Rfl:     colestipol (COLESTID) 1 g tablet, Take 1 tablet by mouth As Needed., Disp: , Rfl:     FREESTYLE LITE test strip, , Disp: , Rfl:     glipizide (GLUCOTROL XL) 5 MG ER tablet, Take 1 tablet by mouth Daily., Disp: , Rfl:     glucose blood test strip, FreeStyle Lite Strips  USE TO TEST BLOOD SUGAR FOUR TIMES DAILY AND AS NEEDED, Disp: , Rfl:     HYDROcodone-acetaminophen (NORCO) 7.5-325 MG per tablet, Take 1 tablet by mouth Every 8 (Eight) Hours As Needed., Disp: , Rfl:     Lancets (freestyle) lancets, , Disp: , Rfl:     losartan-hydrochlorothiazide (HYZAAR) 100-25 MG per tablet, Take 1 tablet by mouth Daily., Disp: , Rfl:     omeprazole (priLOSEC) 40 MG capsule, Take 1 capsule by mouth Daily., Disp: , Rfl:     prednisoLONE acetate (PRED FORTE) 1 % ophthalmic suspension, Administer 1 drop to both eyes 3 (Three) Times a Day., Disp: , Rfl:     pregabalin (LYRICA) 200 MG capsule, Take 1 capsule by mouth Daily., Disp: , Rfl:     Allergies   Allergen Reactions    Formaldehyde Rash        Family History   Problem Relation Age of Onset    Heart disease Father     Lung cancer Sister     Diabetes type I Maternal Grandmother        Social History     Socioeconomic History    Marital status:    Tobacco Use    Smoking status: Former     Types: Cigarettes     Start date: 1976     Quit date:      Years since quittin.6     Passive exposure: Past    Smokeless tobacco: Never   Vaping Use    Vaping Use: Never used   Substance and Sexual Activity    Alcohol use: Yes     Comment: rarely    Drug use: Never    Sexual activity: Defer       Vital Signs:   Resp 16   Ht 182.9 cm (72\")   Wt 108 kg (237 lb 6.4 oz)   BMI 32.20 kg/mý      Physical Exam     Result Review :   The following data was reviewed by: MARIUM Ahumada on " 04/13/2023:  Results for orders placed or performed during the hospital encounter of 08/08/23   Comprehensive Metabolic Panel    Specimen: Arm, Right; Blood   Result Value Ref Range    Glucose 197 (H) 65 - 99 mg/dL    BUN 11 8 - 23 mg/dL    Creatinine 0.90 0.76 - 1.27 mg/dL    Sodium 137 136 - 145 mmol/L    Potassium 4.0 3.5 - 5.2 mmol/L    Chloride 98 98 - 107 mmol/L    CO2 26.1 22.0 - 29.0 mmol/L    Calcium 9.8 8.6 - 10.5 mg/dL    Total Protein 7.9 6.0 - 8.5 g/dL    Albumin 4.3 3.5 - 5.2 g/dL    ALT (SGPT) 23 1 - 41 U/L    AST (SGOT) 21 1 - 40 U/L    Alkaline Phosphatase 113 39 - 117 U/L    Total Bilirubin 1.1 0.0 - 1.2 mg/dL    Globulin 3.6 gm/dL    A/G Ratio 1.2 g/dL    BUN/Creatinine Ratio 12.2 7.0 - 25.0    Anion Gap 12.9 5.0 - 15.0 mmol/L    eGFR 96.0 >60.0 mL/min/1.73   Lipase    Specimen: Arm, Right; Blood   Result Value Ref Range    Lipase 112 (H) 13 - 60 U/L   Urinalysis With Microscopic If Indicated (No Culture) - Urine, Clean Catch    Specimen: Urine, Clean Catch   Result Value Ref Range    Color, UA Dark Yellow (A) Yellow, Straw    Appearance, UA Clear Clear    pH, UA 6.0 5.0 - 8.0    Specific Gravity, UA 1.028 1.005 - 1.030    Glucose,  mg/dL (1+) (A) Negative    Ketones, UA 40 mg/dL (2+) (A) Negative    Bilirubin, UA Small (1+) (A) Negative    Blood, UA Small (1+) (A) Negative    Protein, UA >=300 mg/dL (3+) (A) Negative    Leuk Esterase, UA Negative Negative    Nitrite, UA Negative Negative    Urobilinogen, UA 1.0 E.U./dL 0.2 - 1.0 E.U./dL   Lactic Acid, Plasma    Specimen: Blood   Result Value Ref Range    Lactate 1.6 0.5 - 2.0 mmol/L   CBC Auto Differential    Specimen: Arm, Right; Blood   Result Value Ref Range    WBC 17.30 (H) 3.40 - 10.80 10*3/mm3    RBC 5.35 4.14 - 5.80 10*6/mm3    Hemoglobin 15.6 13.0 - 17.7 g/dL    Hematocrit 45.0 37.5 - 51.0 %    MCV 84.1 79.0 - 97.0 fL    MCH 29.2 26.6 - 33.0 pg    MCHC 34.7 31.5 - 35.7 g/dL    RDW 13.3 12.3 - 15.4 %    RDW-SD 40.8 37.0 - 54.0 fl     MPV 9.7 6.0 - 12.0 fL    Platelets 289 140 - 450 10*3/mm3    Neutrophil % 86.6 (H) 42.7 - 76.0 %    Lymphocyte % 4.2 (L) 19.6 - 45.3 %    Monocyte % 8.6 5.0 - 12.0 %    Eosinophil % 0.1 (L) 0.3 - 6.2 %    Basophil % 0.2 0.0 - 1.5 %    Immature Grans % 0.3 0.0 - 0.5 %    Neutrophils, Absolute 15.00 (H) 1.70 - 7.00 10*3/mm3    Lymphocytes, Absolute 0.72 0.70 - 3.10 10*3/mm3    Monocytes, Absolute 1.48 (H) 0.10 - 0.90 10*3/mm3    Eosinophils, Absolute 0.02 0.00 - 0.40 10*3/mm3    Basophils, Absolute 0.03 0.00 - 0.20 10*3/mm3    Immature Grans, Absolute 0.05 0.00 - 0.05 10*3/mm3    nRBC 0.0 0.0 - 0.2 /100 WBC   Urinalysis, Microscopic Only - Urine, Clean Catch    Specimen: Urine, Clean Catch   Result Value Ref Range    RBC, UA 0-2 (A) None Seen /HPF    WBC, UA 3-5 (A) None Seen /HPF    Bacteria, UA None Seen None Seen /HPF    Squamous Epithelial Cells, UA 0-2 None Seen, 0-2 /HPF    Hyaline Casts, UA 3-6 None Seen /LPF    Methodology Automated Microscopy    Lactate Dehydrogenase    Specimen: Arm, Right; Blood   Result Value Ref Range     135 - 225 U/L   Comprehensive Metabolic Panel    Specimen: Arm, Left; Blood   Result Value Ref Range    Glucose 171 (H) 65 - 99 mg/dL    BUN 13 8 - 23 mg/dL    Creatinine 0.75 (L) 0.76 - 1.27 mg/dL    Sodium 139 136 - 145 mmol/L    Potassium 4.0 3.5 - 5.2 mmol/L    Chloride 103 98 - 107 mmol/L    CO2 25.7 22.0 - 29.0 mmol/L    Calcium 8.7 8.6 - 10.5 mg/dL    Total Protein 6.8 6.0 - 8.5 g/dL    Albumin 3.6 3.5 - 5.2 g/dL    ALT (SGPT) 18 1 - 41 U/L    AST (SGOT) 26 1 - 40 U/L    Alkaline Phosphatase 87 39 - 117 U/L    Total Bilirubin 0.8 0.0 - 1.2 mg/dL    Globulin 3.2 gm/dL    A/G Ratio 1.1 g/dL    BUN/Creatinine Ratio 17.3 7.0 - 25.0    Anion Gap 10.3 5.0 - 15.0 mmol/L    eGFR 101.4 >60.0 mL/min/1.73   Lipase    Specimen: Arm, Left; Blood   Result Value Ref Range    Lipase 61 (H) 13 - 60 U/L   CBC Auto Differential    Specimen: Arm, Left; Blood   Result Value Ref Range    WBC  12.73 (H) 3.40 - 10.80 10*3/mm3    RBC 4.64 4.14 - 5.80 10*6/mm3    Hemoglobin 13.6 13.0 - 17.7 g/dL    Hematocrit 40.4 37.5 - 51.0 %    MCV 87.1 79.0 - 97.0 fL    MCH 29.3 26.6 - 33.0 pg    MCHC 33.7 31.5 - 35.7 g/dL    RDW 13.4 12.3 - 15.4 %    RDW-SD 42.5 37.0 - 54.0 fl    MPV 9.6 6.0 - 12.0 fL    Platelets 221 140 - 450 10*3/mm3    Neutrophil % 81.7 (H) 42.7 - 76.0 %    Lymphocyte % 5.9 (L) 19.6 - 45.3 %    Monocyte % 11.1 5.0 - 12.0 %    Eosinophil % 0.6 0.3 - 6.2 %    Basophil % 0.2 0.0 - 1.5 %    Immature Grans % 0.5 0.0 - 0.5 %    Neutrophils, Absolute 10.41 (H) 1.70 - 7.00 10*3/mm3    Lymphocytes, Absolute 0.75 0.70 - 3.10 10*3/mm3    Monocytes, Absolute 1.41 (H) 0.10 - 0.90 10*3/mm3    Eosinophils, Absolute 0.08 0.00 - 0.40 10*3/mm3    Basophils, Absolute 0.02 0.00 - 0.20 10*3/mm3    Immature Grans, Absolute 0.06 (H) 0.00 - 0.05 10*3/mm3    nRBC 0.0 0.0 - 0.2 /100 WBC   Lipid Panel    Specimen: Arm, Right; Blood   Result Value Ref Range    Total Cholesterol 168 0 - 200 mg/dL    Triglycerides 127 0 - 150 mg/dL    HDL Cholesterol 36 (L) 40 - 60 mg/dL    LDL Cholesterol  109 (H) 0 - 100 mg/dL    VLDL Cholesterol 23 5 - 40 mg/dL    LDL/HDL Ratio 2.96    ESTELLE    Specimen: Arm, Right; Blood   Result Value Ref Range    ESTELLE Direct Negative Negative   IgG 4    Specimen: Arm, Right; Blood   Result Value Ref Range    IgG Subclass 4 18 2 - 96 mg/dL   Comprehensive Metabolic Panel    Specimen: Arm, Right; Blood   Result Value Ref Range    Glucose 161 (H) 65 - 99 mg/dL    BUN 8 8 - 23 mg/dL    Creatinine 0.71 (L) 0.76 - 1.27 mg/dL    Sodium 139 136 - 145 mmol/L    Potassium 4.1 3.5 - 5.2 mmol/L    Chloride 102 98 - 107 mmol/L    CO2 26.4 22.0 - 29.0 mmol/L    Calcium 9.2 8.6 - 10.5 mg/dL    Total Protein 7.2 6.0 - 8.5 g/dL    Albumin 3.8 3.5 - 5.2 g/dL    ALT (SGPT) 20 1 - 41 U/L    AST (SGOT) 21 1 - 40 U/L    Alkaline Phosphatase 88 39 - 117 U/L    Total Bilirubin 1.1 0.0 - 1.2 mg/dL    Globulin 3.4 gm/dL    A/G Ratio  1.1 g/dL    BUN/Creatinine Ratio 11.3 7.0 - 25.0    Anion Gap 10.6 5.0 - 15.0 mmol/L    eGFR 103.1 >60.0 mL/min/1.73   CBC Auto Differential    Specimen: Arm, Right; Blood   Result Value Ref Range    WBC 7.89 3.40 - 10.80 10*3/mm3    RBC 4.64 4.14 - 5.80 10*6/mm3    Hemoglobin 13.5 13.0 - 17.7 g/dL    Hematocrit 40.1 37.5 - 51.0 %    MCV 86.4 79.0 - 97.0 fL    MCH 29.1 26.6 - 33.0 pg    MCHC 33.7 31.5 - 35.7 g/dL    RDW 13.2 12.3 - 15.4 %    RDW-SD 41.2 37.0 - 54.0 fl    MPV 9.5 6.0 - 12.0 fL    Platelets 220 140 - 450 10*3/mm3    Neutrophil % 76.5 (H) 42.7 - 76.0 %    Lymphocyte % 11.3 (L) 19.6 - 45.3 %    Monocyte % 8.0 5.0 - 12.0 %    Eosinophil % 3.4 0.3 - 6.2 %    Basophil % 0.5 0.0 - 1.5 %    Immature Grans % 0.3 0.0 - 0.5 %    Neutrophils, Absolute 6.04 1.70 - 7.00 10*3/mm3    Lymphocytes, Absolute 0.89 0.70 - 3.10 10*3/mm3    Monocytes, Absolute 0.63 0.10 - 0.90 10*3/mm3    Eosinophils, Absolute 0.27 0.00 - 0.40 10*3/mm3    Basophils, Absolute 0.04 0.00 - 0.20 10*3/mm3    Immature Grans, Absolute 0.02 0.00 - 0.05 10*3/mm3    nRBC 0.0 0.0 - 0.2 /100 WBC   Comprehensive Metabolic Panel    Specimen: Blood   Result Value Ref Range    Glucose 136 (H) 65 - 99 mg/dL    BUN 10 8 - 23 mg/dL    Creatinine 0.78 0.76 - 1.27 mg/dL    Sodium 137 136 - 145 mmol/L    Potassium 3.8 3.5 - 5.2 mmol/L    Chloride 101 98 - 107 mmol/L    CO2 26.1 22.0 - 29.0 mmol/L    Calcium 9.0 8.6 - 10.5 mg/dL    Total Protein 6.9 6.0 - 8.5 g/dL    Albumin 3.5 3.5 - 5.2 g/dL    ALT (SGPT) 85 (H) 1 - 41 U/L    AST (SGOT) 68 (H) 1 - 40 U/L    Alkaline Phosphatase 93 39 - 117 U/L    Total Bilirubin 0.8 0.0 - 1.2 mg/dL    Globulin 3.4 gm/dL    A/G Ratio 1.0 g/dL    BUN/Creatinine Ratio 12.8 7.0 - 25.0    Anion Gap 9.9 5.0 - 15.0 mmol/L    eGFR 100.2 >60.0 mL/min/1.73   Lipase    Specimen: Blood   Result Value Ref Range    Lipase 26 13 - 60 U/L   CBC Auto Differential    Specimen: Blood   Result Value Ref Range    WBC 5.93 3.40 - 10.80 10*3/mm3     RBC 4.58 4.14 - 5.80 10*6/mm3    Hemoglobin 13.3 13.0 - 17.7 g/dL    Hematocrit 39.2 37.5 - 51.0 %    MCV 85.6 79.0 - 97.0 fL    MCH 29.0 26.6 - 33.0 pg    MCHC 33.9 31.5 - 35.7 g/dL    RDW 12.8 12.3 - 15.4 %    RDW-SD 39.7 37.0 - 54.0 fl    MPV 9.8 6.0 - 12.0 fL    Platelets 247 140 - 450 10*3/mm3    Neutrophil % 58.8 42.7 - 76.0 %    Lymphocyte % 19.7 19.6 - 45.3 %    Monocyte % 13.8 (H) 5.0 - 12.0 %    Eosinophil % 6.4 (H) 0.3 - 6.2 %    Basophil % 0.8 0.0 - 1.5 %    Immature Grans % 0.5 0.0 - 0.5 %    Neutrophils, Absolute 3.48 1.70 - 7.00 10*3/mm3    Lymphocytes, Absolute 1.17 0.70 - 3.10 10*3/mm3    Monocytes, Absolute 0.82 0.10 - 0.90 10*3/mm3    Eosinophils, Absolute 0.38 0.00 - 0.40 10*3/mm3    Basophils, Absolute 0.05 0.00 - 0.20 10*3/mm3    Immature Grans, Absolute 0.03 0.00 - 0.05 10*3/mm3    nRBC 0.0 0.0 - 0.2 /100 WBC   POC Glucose Once    Specimen: Blood   Result Value Ref Range    Glucose 141 (H) 70 - 99 mg/dL   POC Glucose Once    Specimen: Blood   Result Value Ref Range    Glucose 177 (H) 70 - 99 mg/dL   POC Glucose Once    Specimen: Blood   Result Value Ref Range    Glucose 154 (H) 70 - 99 mg/dL   POC Glucose Once    Specimen: Blood   Result Value Ref Range    Glucose 105 (H) 70 - 99 mg/dL   POC Glucose Once    Specimen: Blood   Result Value Ref Range    Glucose 102 (H) 70 - 99 mg/dL   POC Glucose Once    Specimen: Blood   Result Value Ref Range    Glucose 136 (H) 70 - 99 mg/dL   POC Glucose Once    Specimen: Blood   Result Value Ref Range    Glucose 158 (H) 70 - 99 mg/dL   POC Glucose Once    Specimen: Blood   Result Value Ref Range    Glucose 115 (H) 70 - 99 mg/dL   POC Glucose Once    Specimen: Blood   Result Value Ref Range    Glucose 110 (H) 70 - 99 mg/dL   POC Glucose Once    Specimen: Blood   Result Value Ref Range    Glucose 139 (H) 70 - 99 mg/dL   POC Glucose Once    Specimen: Blood   Result Value Ref Range    Glucose 132 (H) 70 - 99 mg/dL   POC Glucose Once    Specimen: Blood   Result  Value Ref Range    Glucose 109 (H) 70 - 99 mg/dL   POC Glucose Once    Specimen: Blood   Result Value Ref Range    Glucose 160 (H) 70 - 99 mg/dL   POC Glucose Once    Specimen: Blood   Result Value Ref Range    Glucose 113 (H) 70 - 99 mg/dL   POC Glucose Once    Specimen: Blood   Result Value Ref Range    Glucose 126 (H) 70 - 99 mg/dL   POC Glucose Once    Specimen: Blood   Result Value Ref Range    Glucose 157 (H) 70 - 99 mg/dL   POC Glucose Once    Specimen: Blood   Result Value Ref Range    Glucose 119 (H) 70 - 99 mg/dL   POC Glucose Once    Specimen: Blood   Result Value Ref Range    Glucose 126 (H) 70 - 99 mg/dL   POC Glucose Once    Specimen: Blood   Result Value Ref Range    Glucose 144 (H) 70 - 99 mg/dL   POC Glucose Once    Specimen: Blood   Result Value Ref Range    Glucose 150 (H) 70 - 99 mg/dL   Green Top (Gel)   Result Value Ref Range    Extra Tube Hold for add-ons.    Lavender Top   Result Value Ref Range    Extra Tube hold for add-on    Gold Top - SST   Result Value Ref Range    Extra Tube Hold for add-ons.    Light Blue Top   Result Value Ref Range    Extra Tube Hold for add-ons.       PSA          11/17/2022    13:48 5/18/2023    14:03   PSA   PSA 0.062  0.065          Procedures        Assessment and Plan    Diagnoses and all orders for this visit:    1. Erectile dysfunction following radiation therapy (Primary)      Follow-up 3-month Trimix        I spent 20 minutes caring for Willard on this date of service. This time includes time spent by me in the following activities:reviewing tests, obtaining and/or reviewing a separately obtained history, performing a medically appropriate examination and/or evaluation , counseling and educating the patient/family/caregiver, ordering medications, tests, or procedures, and documenting information in the medical record  Follow Up   Return in about 3 months (around 11/29/2023) for f/u ED/ TRImix .  Patient was given instructions and counseling regarding his  condition or for health maintenance advice. Please see specific information pulled into the AVS if appropriate.         This document has been electronically signed by MARIUM Ahumada  August 29, 2023 15:44 EDT

## 2023-08-29 NOTE — TELEPHONE ENCOUNTER
Left message with patient asking for a returned call to follow up on overdue results for laboratory tests.

## 2023-08-30 ENCOUNTER — OFFICE VISIT (OUTPATIENT)
Dept: DIABETES SERVICES | Facility: HOSPITAL | Age: 63
End: 2023-08-30
Payer: MEDICARE

## 2023-08-30 VITALS
WEIGHT: 239.4 LBS | BODY MASS INDEX: 32.43 KG/M2 | DIASTOLIC BLOOD PRESSURE: 70 MMHG | HEART RATE: 63 BPM | SYSTOLIC BLOOD PRESSURE: 138 MMHG | HEIGHT: 72 IN | OXYGEN SATURATION: 95 %

## 2023-08-30 DIAGNOSIS — E11.65 TYPE 2 DIABETES MELLITUS WITH HYPERGLYCEMIA, UNSPECIFIED WHETHER LONG TERM INSULIN USE: Primary | ICD-10-CM

## 2023-08-30 DIAGNOSIS — E66.9 OBESITY (BMI 30-39.9): ICD-10-CM

## 2023-08-30 DIAGNOSIS — E11.40 TYPE 2 DIABETES MELLITUS WITH DIABETIC NEUROPATHY, WITHOUT LONG-TERM CURRENT USE OF INSULIN: ICD-10-CM

## 2023-08-30 LAB
EXPIRATION DATE: ABNORMAL
GLUCOSE BLDC GLUCOMTR-MCNC: 152 MG/DL (ref 70–99)
HBA1C MFR BLD: 6.4 %
Lab: ABNORMAL

## 2023-08-30 PROCEDURE — 82948 REAGENT STRIP/BLOOD GLUCOSE: CPT | Performed by: NURSE PRACTITIONER

## 2023-08-30 RX ORDER — BLOOD-GLUCOSE SENSOR
1 EACH MISCELLANEOUS
Qty: 2 EACH | Refills: 5 | Status: SHIPPED | OUTPATIENT
Start: 2023-08-30 | End: 2023-09-01

## 2023-08-30 NOTE — TELEPHONE ENCOUNTER
Hub staff attempted to follow warm transfer process and was unsuccessful     Caller: Willard Lange    Relationship to patient: Self    Best call back number: [phone]    Patient is needing: PT RETURNING PHONE CALL, UNABLE TO TRANSFER, PLEASE CALL BACK TO GO OVER TEST RESULTS.

## 2023-08-30 NOTE — PATIENT INSTRUCTIONS
Start Jardiance 10mg once daily. Make sure to drink plenty of fluid with this medication to avoid yeast infection and dehydration. If this develops call the office.    A Modality 3 continuous glucose monitor was sent to the pharmacy. Please call the office to schedule an appointment with the diabetic nurse educator for teaching.     A referral was placed for the dietician

## 2023-08-31 ENCOUNTER — TELEPHONE (OUTPATIENT)
Dept: GASTROENTEROLOGY | Facility: CLINIC | Age: 63
End: 2023-08-31
Payer: MEDICARE

## 2023-08-31 NOTE — TELEPHONE ENCOUNTER
I have called patient and left a detailed message for an upcoming procedure including date and a good callback number for any questions.   Mychart reminder also sent.

## 2023-09-01 ENCOUNTER — TELEPHONE (OUTPATIENT)
Dept: DIABETES SERVICES | Facility: HOSPITAL | Age: 63
End: 2023-09-01
Payer: MEDICARE

## 2023-09-01 NOTE — TELEPHONE ENCOUNTER
CHHAYA JOHN Dutton: [id number]  FreeStyle Clarisa 3 Sensor  Approvedtoday  The request has been approved. The authorization is effective from 09/05/2023 to 09/04/2024, as long as the member is enrolled in their current health plan. The request was approved as submitted. A written notification letter will follow with additional details.

## 2023-09-04 NOTE — PROGRESS NOTES
[facility] Vascular Surgery Office Follow Up Note     Date of Encounter: 2023     MRN Number: [id number]  Name: Willard Lange  Phone Number: [phone]     Referred By: Geovanna Solo MD  PCP: Josselyn Damon APRN    Chief Complaint:    Chief Complaint   Patient presents with    Follow-up     PATIENT IS HERE AS A FOLLOW UP WITH CAROTID DUPLEX AND LOWER EXTREMITY DOPPLERS TODAY.        Subjective      History of Present Illness:    Willard Lange is a 63 y.o. male  [facility]     Progress Note    Patient Name: Willard Lange  : 1960  MRN: [id number]  Primary Care Physician:  Josselyn Damon APRN  Date of admission: (Not on file)    Subjective   Subjective     Mr. Lange presents for follow-up with a arterial Doppler and a carotid duplex performed today.  Mr. Lange is a diabetic with peripheral neuropathy, uses a cane for gait assistance.  He has a nerve stimulator and is followed by pain management.  He denies any typical claudication symptoms or ischemic rest pain along with any signs/symptoms to suggest CVA, TIA or amaurosis fugax.  No other complaints at this time.  He is a former smoker who quit in .  He does take a statin medication daily    Objective   Objective     Vitals:   Temp:  [97.8 °F (36.6 °C)] 97.8 °F (36.6 °C)  Heart Rate:  [66] 66  Resp:  [18] 18  BP: (121-129)/(65-76) 121/65    Physical Exam       Result Review    Result Review:  I have personally reviewed the results from the time of this admission to 2023 14:59 EDT and agree with these findings:  []  Laboratory  []  Microbiology  []  Radiology  []  EKG/Telemetry   []  Cardiology/Vascular   []  Pathology  []  Old records  []  Other:    Most notable findings include:   The carotid duplex performed today revealed a slight increase in the right proximal ICA at 185.2 cm/s with a diastolic of 31.6 cm/s.       Assessment & Plan   Assessment / Plan     Assessment/Plan:  Diagnoses and all orders for this  visit:    1. PVD (peripheral vascular disease) (Primary)    2. Bilateral carotid artery stenosis    3. Essential hypertension    4. Diabetic peripheral neuropathy      Mr. Lange is doing well, he denies any claudication symptoms and the serial Doppler revealed normal ABIs.  He also had a carotid duplex that revealed no significant changes when compared to the previous 1 performed in January 2023, the right internal carotid artery continues to main 50 to 69% stenosis and he is asymptomatic.  I recommended follow-up with a carotid duplex in 6 months and a arterial doppler in one year.     I have answered all of his questions and he is in agreement with the plan at this time.  Thank you for allowing me to participate in your patient's care.    Active Hospital Problems:  There are no active hospital problems to display for this patient.          Electronically signed by MARIUM Avila, 09/05/23, 2:59 PM EDT.  Review of Systems:  Systems reviewed and negative except as noted in the above HPI.     I have reviewed the following portions of the patient's history: problem list, current medications, allergies, past surgical history, past medical history, past social history, past family history, and ROS and confirm it's accurate.    Allergies:  Allergies   Allergen Reactions    Formaldehyde Rash       Medications:      Current Outpatient Medications:     Advair Diskus 250-50 MCG/DOSE DISKUS, Inhale 1 puff 2 (Two) Times a Day., Disp: , Rfl:     albuterol sulfate  (90 Base) MCG/ACT inhaler, Inhale 2 puffs Every 6 (Six) Hours As Needed., Disp: , Rfl:     atorvastatin (LIPITOR) 20 MG tablet, Take 1 tablet by mouth Every Night., Disp: , Rfl:     baclofen (LIORESAL) 10 MG tablet, Take 1 tablet by mouth 3 (Three) Times a Day., Disp: , Rfl:     Blood Glucose Monitoring Suppl (FreeStyle Lite) w/Device kit, See Admin Instructions., Disp: , Rfl:     colestipol (COLESTID) 1 g tablet, Take 1 tablet by mouth As Needed., Disp: ,  Rfl:     Continuous Blood Gluc Sensor (FreeStyle Clarisa 2 Sensor) misc, Use 1 each Every 14 (Fourteen) Days., Disp: 2 each, Rfl: 5    empagliflozin (Jardiance) 10 MG tablet tablet, Take 1 tablet by mouth Daily for 90 days., Disp: 90 tablet, Rfl: 0    FREESTYLE LITE test strip, , Disp: , Rfl:     glipizide (GLUCOTROL XL) 5 MG ER tablet, Take 1 tablet by mouth Daily., Disp: , Rfl:     glucose blood test strip, FreeStyle Lite Strips  USE TO TEST BLOOD SUGAR FOUR TIMES DAILY AND AS NEEDED, Disp: , Rfl:     HYDROcodone-acetaminophen (NORCO) 7.5-325 MG per tablet, Take 1 tablet by mouth Every 8 (Eight) Hours As Needed., Disp: , Rfl:     Lancets (freestyle) lancets, , Disp: , Rfl:     losartan-hydrochlorothiazide (HYZAAR) 100-25 MG per tablet, Take 1 tablet by mouth Daily., Disp: , Rfl:     omeprazole (priLOSEC) 40 MG capsule, Take 1 capsule by mouth Daily., Disp: , Rfl:     pregabalin (LYRICA) 200 MG capsule, Take 1 capsule by mouth Daily., Disp: , Rfl:     History:   Past Medical History:   Diagnosis Date    Arthritis     Asthma     Benign essential hypertension     Brain tumor 01/2021    Cancer     DDD (degenerative disc disease), lumbar     Diabetes mellitus type 2, noninsulin dependent     Diabetes type 2, controlled     GERD (gastroesophageal reflux disease)     Hepatitis C     HTN (hypertension)     Leg pain     Lumbago 02/15/2017    with low back pain    Lumbar disc herniation 02/15/2017    L4-4    Lumbar spinal stenosis 02/15/2017    L4/5    Muscle cramps     Neuropathy     Prostate cancer        Past Surgical History:   Procedure Laterality Date    APPENDECTOMY      BACK SURGERY  2017    COLONOSCOPY  2017    Dr. Dorsey-Polyps    CYSTOSCOPY W/ LASER LITHOTRIPSY      for kidney stones    LAMINECTOMY  02/17/2017    L4-5    PROSTATE BIOPSY      transrectal    TONSILLECTOMY         Social History     Socioeconomic History    Marital status:    Tobacco Use    Smoking status: Former     Types: Cigarettes      Start date: 1976     Quit date:      Years since quittin.6     Passive exposure: Past    Smokeless tobacco: Never   Vaping Use    Vaping Use: Never used   Substance and Sexual Activity    Alcohol use: Yes     Comment: rarely    Drug use: Never    Sexual activity: Defer        Family History   Problem Relation Age of Onset    Heart disease Father     Lung cancer Sister     Diabetes type I Maternal Grandmother        Objective   Physical Exam:  Vitals:    23 1436 23 1438   BP: 129/76 121/65   BP Location: Right arm Left arm   Patient Position: Lying Lying   Cuff Size: Large Adult Large Adult   Pulse: 66    Resp: 18    Temp: 97.8 °F (36.6 °C)    TempSrc: Temporal    SpO2: 98%    PainSc:   8    PainLoc: Generalized       There is no height or weight on file to calculate BMI.        Imaging/Labs:    CT Abdomen Pelvis With Contrast    Result Date: 2023    1. Findings suspicious for acute pancreatitis.  No definite peripancreatic collection or ductal dilatation is seen at this time. 2. Calcific atherosclerosis with abdominal aortic aneurysm measuring up to 2.8 cm.  Additional aneurysms are seen in the common iliac and internal iliac arteries. 3. No definite biliary ductal dilatation or acute gallbladder abnormality. 4. Hepatic steatosis      SEAN GARRIDO MD       Electronically Signed and Approved By: SEAN GARRIDO MD on 2023 at 18:04                       Assessment / Plan      Assessment / Plan:  Diagnoses and all orders for this visit:    1. PVD (peripheral vascular disease) (Primary)    2. Bilateral carotid artery stenosis    3. Essential hypertension    4. Diabetic peripheral neuropathy           Thank you for allowing me to participate in your patient's care.    Patient Education:  Patient was given instructions and counseling regarding his condition or for health maintenance advice. Please see specific information pulled into the AVS if appropriate.     Patient educated on the  risk of smoking, smoking cessation options discussed. Patient states their understanding.     Follow Up:   No follow-ups on file.   Or sooner for any further concerns or worsening sign and symptoms. If unable to reach us in the office please dial 911 or go to the nearest emergency department.       Geovanna Solo MD  [facility] Vascular Surgery

## 2023-09-05 ENCOUNTER — HOSPITAL ENCOUNTER (OUTPATIENT)
Dept: CARDIOLOGY | Facility: HOSPITAL | Age: 63
Discharge: HOME | End: 2023-09-05
Payer: MEDICARE

## 2023-09-05 ENCOUNTER — OFFICE VISIT (OUTPATIENT)
Dept: VASCULAR SURGERY | Facility: HOSPITAL | Age: 63
End: 2023-09-05
Payer: MEDICARE

## 2023-09-05 VITALS
TEMPERATURE: 97.8 F | RESPIRATION RATE: 18 BRPM | HEART RATE: 66 BPM | OXYGEN SATURATION: 98 % | DIASTOLIC BLOOD PRESSURE: 65 MMHG | SYSTOLIC BLOOD PRESSURE: 121 MMHG

## 2023-09-05 DIAGNOSIS — I73.9 PVD (PERIPHERAL VASCULAR DISEASE): Primary | ICD-10-CM

## 2023-09-05 DIAGNOSIS — E11.42 DIABETIC PERIPHERAL NEUROPATHY: ICD-10-CM

## 2023-09-05 DIAGNOSIS — I65.23 BILATERAL CAROTID ARTERY STENOSIS: ICD-10-CM

## 2023-09-05 DIAGNOSIS — I10 ESSENTIAL HYPERTENSION: ICD-10-CM

## 2023-09-05 DIAGNOSIS — I73.9 PVD (PERIPHERAL VASCULAR DISEASE): ICD-10-CM

## 2023-09-05 LAB
BH CV LOWER ARTERIAL LEFT ABI RATIO: 0.9
BH CV LOWER ARTERIAL LEFT CALF RATIO: 1.2
BH CV LOWER ARTERIAL LEFT DORSALIS PEDIS SYS MAX: 115
BH CV LOWER ARTERIAL LEFT GREAT TOE SYS MAX: 87
BH CV LOWER ARTERIAL LEFT LOW THIGH RATIO: 1.8
BH CV LOWER ARTERIAL LEFT LOW THIGH SYS MAX: 226
BH CV LOWER ARTERIAL LEFT POPLITEAL SYS MAX: 148
BH CV LOWER ARTERIAL LEFT POST TIBIAL SYS MAX: 118
BH CV LOWER ARTERIAL RIGHT ABI RATIO: 1
BH CV LOWER ARTERIAL RIGHT CALF RATIO: NORMAL
BH CV LOWER ARTERIAL RIGHT DORSALIS PEDIS SYS MAX: 125
BH CV LOWER ARTERIAL RIGHT GREAT TOE SYS MAX: 127
BH CV LOWER ARTERIAL RIGHT LOW THIGH RATIO: 1
BH CV LOWER ARTERIAL RIGHT LOW THIGH SYS MAX: 131
BH CV LOWER ARTERIAL RIGHT POPLITEAL SYS MAX: NORMAL
BH CV LOWER ARTERIAL RIGHT POST TIBIAL SYS MAX: NORMAL
BH CV XLRA MEAS CAROTID RIGHT ICA/CCA DIASTOLIC RATIO: 1.3
BH CV XLRA MEAS LEFT DIST CCA EDV: -44.5 CM/SEC
BH CV XLRA MEAS LEFT DIST CCA PSV: -164.6 CM/SEC
BH CV XLRA MEAS LEFT DIST ICA EDV: -35.7 CM/SEC
BH CV XLRA MEAS LEFT DIST ICA PSV: -119.4 CM/SEC
BH CV XLRA MEAS LEFT ICA/CCA DIASTOLIC RATIO: 0.96
BH CV XLRA MEAS LEFT ICA/CCA RATIO: 1
BH CV XLRA MEAS LEFT MID ICA EDV: -35.7 CM/SEC
BH CV XLRA MEAS LEFT MID ICA PSV: -155 CM/SEC
BH CV XLRA MEAS LEFT PROX CCA EDV: -29.1 CM/SEC
BH CV XLRA MEAS LEFT PROX CCA PSV: -120.1 CM/SEC
BH CV XLRA MEAS LEFT PROX ECA EDV: 22.2 CM/SEC
BH CV XLRA MEAS LEFT PROX ECA PSV: 144.7 CM/SEC
BH CV XLRA MEAS LEFT PROX ICA EDV: -42.5 CM/SEC
BH CV XLRA MEAS LEFT PROX ICA PSV: -165 CM/SEC
BH CV XLRA MEAS LEFT VERTEBRAL A EDV: -9.3 CM/SEC
BH CV XLRA MEAS LEFT VERTEBRAL A PSV: -36 CM/SEC
BH CV XLRA MEAS RIGHT DIST CCA EDV: -24.3 CM/SEC
BH CV XLRA MEAS RIGHT DIST CCA PSV: -130.8 CM/SEC
BH CV XLRA MEAS RIGHT DIST ICA EDV: 23.2 CM/SEC
BH CV XLRA MEAS RIGHT DIST ICA PSV: 90.1 CM/SEC
BH CV XLRA MEAS RIGHT ICA/CCA RATIO: 1.42
BH CV XLRA MEAS RIGHT MID ICA EDV: 24.2 CM/SEC
BH CV XLRA MEAS RIGHT MID ICA PSV: 118.1 CM/SEC
BH CV XLRA MEAS RIGHT PROX CCA EDV: -23 CM/SEC
BH CV XLRA MEAS RIGHT PROX CCA PSV: -103.1 CM/SEC
BH CV XLRA MEAS RIGHT PROX ECA EDV: -18.4 CM/SEC
BH CV XLRA MEAS RIGHT PROX ECA PSV: -141.4 CM/SEC
BH CV XLRA MEAS RIGHT PROX ICA EDV: -31.6 CM/SEC
BH CV XLRA MEAS RIGHT PROX ICA PSV: -185.2 CM/SEC
BH CV XLRA MEAS RIGHT VERTEBRAL A EDV: -18.4 CM/SEC
BH CV XLRA MEAS RIGHT VERTEBRAL A PSV: -48.4 CM/SEC
LEFT ARM BP: NORMAL MMHG
RIGHT ARM BP: NORMAL MMHG
UPPER ARTERIAL LEFT ARM BRACHIAL SYS MAX: 121
UPPER ARTERIAL RIGHT ARM BRACHIAL SYS MAX: 129

## 2023-09-05 PROCEDURE — 93923 UPR/LXTR ART STDY 3+ LVLS: CPT

## 2023-09-05 PROCEDURE — 93880 EXTRACRANIAL BILAT STUDY: CPT

## 2023-09-06 ENCOUNTER — NUTRITION (OUTPATIENT)
Dept: DIABETES SERVICES | Facility: HOSPITAL | Age: 63
End: 2023-09-06
Payer: MEDICARE

## 2023-09-06 ENCOUNTER — TELEPHONE (OUTPATIENT)
Dept: VASCULAR SURGERY | Facility: HOSPITAL | Age: 63
End: 2023-09-06
Payer: MEDICARE

## 2023-09-06 ENCOUNTER — TELEPHONE (OUTPATIENT)
Dept: DIABETES SERVICES | Facility: HOSPITAL | Age: 63
End: 2023-09-06
Payer: MEDICARE

## 2023-09-06 DIAGNOSIS — E11.65 TYPE 2 DIABETES MELLITUS WITH HYPERGLYCEMIA, WITHOUT LONG-TERM CURRENT USE OF INSULIN: Primary | ICD-10-CM

## 2023-09-06 DIAGNOSIS — E11.65 TYPE 2 DIABETES MELLITUS WITH HYPERGLYCEMIA, UNSPECIFIED WHETHER LONG TERM INSULIN USE: Primary | ICD-10-CM

## 2023-09-06 PROCEDURE — 97802 MEDICAL NUTRITION INDIV IN: CPT | Performed by: DIETITIAN, REGISTERED

## 2023-09-06 NOTE — TELEPHONE ENCOUNTER
Patient is requesting the Clarisa 2 sensor RX be sent in to his pharmacy as the Clarisa 3 is too expensive. [address]

## 2023-09-06 NOTE — TELEPHONE ENCOUNTER
Patient had sent message through Epic asking about an explanation of the two studies he had completed on 09/05/2023. Patient was seen by APRN after testing. Patient has remaining questions. Patient was called to explain test results but no answer.     Hub.... if patient called back, please transfer to office.

## 2023-09-07 ENCOUNTER — HOSPITAL ENCOUNTER (OUTPATIENT)
Dept: MRI IMAGING | Facility: HOSPITAL | Age: 63
Discharge: HOME | End: 2023-09-07
Payer: MEDICARE

## 2023-09-07 DIAGNOSIS — D32.9 MENINGIOMA: ICD-10-CM

## 2023-09-07 DIAGNOSIS — M47.812 CERVICAL SPONDYLOSIS WITHOUT MYELOPATHY: ICD-10-CM

## 2023-09-07 DIAGNOSIS — R29.898 UPPER EXTREMITY WEAKNESS: ICD-10-CM

## 2023-09-07 DIAGNOSIS — R20.2 NUMBNESS AND TINGLING IN BOTH HANDS: ICD-10-CM

## 2023-09-07 DIAGNOSIS — R51.9 INCREASED SEVERITY OF HEADACHES: ICD-10-CM

## 2023-09-07 DIAGNOSIS — R20.0 NUMBNESS AND TINGLING IN BOTH HANDS: ICD-10-CM

## 2023-09-07 PROCEDURE — 72141 MRI NECK SPINE W/O DYE: CPT

## 2023-09-07 PROCEDURE — 70553 MRI BRAIN STEM W/O & W/DYE: CPT

## 2023-09-07 PROCEDURE — 0 GADOBENATE DIMEGLUMINE 529 MG/ML SOLUTION: Performed by: NURSE PRACTITIONER

## 2023-09-07 PROCEDURE — A9577 INJ MULTIHANCE: HCPCS | Performed by: NURSE PRACTITIONER

## 2023-09-07 RX ADMIN — GADOBENATE DIMEGLUMINE 20 ML: 529 INJECTION, SOLUTION INTRAVENOUS at 14:24

## 2023-09-07 NOTE — PRE-PROCEDURE INSTRUCTIONS
"Instructed on date and arrival time of 0730. Come to entrance \"C\". Must have  over age 18 to drive home.  May have two visitors; however, children under 12 must stay in waiting room.  Discussed clear liquid diet (no red or purple) and bowel prep.  May take medications as usual except for blood thinners, diabetic medications, and weight loss medications.  Bring list of medications.  Verbalized understanding of instructions given.  Instructed to call for questions or concerns.  "

## 2023-09-12 ENCOUNTER — TELEPHONE (OUTPATIENT)
Dept: GASTROENTEROLOGY | Facility: CLINIC | Age: 63
End: 2023-09-12
Payer: MEDICARE

## 2023-09-12 ENCOUNTER — TELEPHONE (OUTPATIENT)
Dept: DIABETES SERVICES | Facility: HOSPITAL | Age: 63
End: 2023-09-12
Payer: MEDICARE

## 2023-09-12 NOTE — TELEPHONE ENCOUNTER
Willard Lange Key: [id number] - PA Case ID: [id number] - Rx #: [id number]  FreeStyle Clarisa 2 Sensor    Approvedtoday  Request Reference Number: [id number]. FREESTY LIBR KIT 2 SENSOR is approved through 12/31/2023. Your patient may now fill this prescription and it will be covered.

## 2023-09-12 NOTE — TELEPHONE ENCOUNTER
[facility] called wanting to verify where patients libres were being sent. Let them know they were sent to [facility] in [place].

## 2023-09-13 ENCOUNTER — TELEPHONE (OUTPATIENT)
Dept: NEUROSURGERY | Facility: CLINIC | Age: 63
End: 2023-09-13
Payer: MEDICARE

## 2023-09-14 ENCOUNTER — ANESTHESIA EVENT (OUTPATIENT)
Dept: GASTROENTEROLOGY | Facility: HOSPITAL | Age: 63
End: 2023-09-14
Payer: MEDICARE

## 2023-09-14 ENCOUNTER — ANESTHESIA (OUTPATIENT)
Dept: GASTROENTEROLOGY | Facility: HOSPITAL | Age: 63
End: 2023-09-14
Payer: MEDICARE

## 2023-09-14 ENCOUNTER — HOSPITAL ENCOUNTER (OUTPATIENT)
Facility: HOSPITAL | Age: 63
Setting detail: HOSPITAL OUTPATIENT SURGERY
Discharge: HOME | End: 2023-09-14
Attending: INTERNAL MEDICINE | Admitting: INTERNAL MEDICINE
Payer: MEDICARE

## 2023-09-14 VITALS
SYSTOLIC BLOOD PRESSURE: 131 MMHG | TEMPERATURE: 97.4 F | OXYGEN SATURATION: 93 % | RESPIRATION RATE: 18 BRPM | HEART RATE: 73 BPM | BODY MASS INDEX: 32.05 KG/M2 | WEIGHT: 236.33 LBS | DIASTOLIC BLOOD PRESSURE: 67 MMHG

## 2023-09-14 DIAGNOSIS — K21.9 GASTROESOPHAGEAL REFLUX DISEASE, UNSPECIFIED WHETHER ESOPHAGITIS PRESENT: ICD-10-CM

## 2023-09-14 DIAGNOSIS — R15.2 INCONTINENCE OF FECES WITH FECAL URGENCY: ICD-10-CM

## 2023-09-14 DIAGNOSIS — D12.6 ADENOMATOUS POLYP OF COLON, UNSPECIFIED PART OF COLON: ICD-10-CM

## 2023-09-14 DIAGNOSIS — R15.9 INCONTINENCE OF FECES WITH FECAL URGENCY: ICD-10-CM

## 2023-09-14 DIAGNOSIS — R19.7 DIARRHEA, UNSPECIFIED TYPE: ICD-10-CM

## 2023-09-14 LAB — GLUCOSE BLDC GLUCOMTR-MCNC: 128 MG/DL (ref 70–99)

## 2023-09-14 PROCEDURE — 82948 REAGENT STRIP/BLOOD GLUCOSE: CPT

## 2023-09-14 PROCEDURE — 25010000002 PROPOFOL 10 MG/ML EMULSION: Performed by: MARRIAGE & FAMILY THERAPIST

## 2023-09-14 PROCEDURE — 88305 TISSUE EXAM BY PATHOLOGIST: CPT | Performed by: INTERNAL MEDICINE

## 2023-09-14 RX ORDER — PROPOFOL 10 MG/ML
VIAL (ML) INTRAVENOUS AS NEEDED
Status: DISCONTINUED | OUTPATIENT
Start: 2023-09-14 | End: 2023-09-14 | Stop reason: SURG

## 2023-09-14 RX ORDER — LIDOCAINE HYDROCHLORIDE 20 MG/ML
INJECTION, SOLUTION EPIDURAL; INFILTRATION; INTRACAUDAL; PERINEURAL AS NEEDED
Status: DISCONTINUED | OUTPATIENT
Start: 2023-09-14 | End: 2023-09-14 | Stop reason: SURG

## 2023-09-14 RX ORDER — SODIUM CHLORIDE, SODIUM LACTATE, POTASSIUM CHLORIDE, CALCIUM CHLORIDE 600; 310; 30; 20 MG/100ML; MG/100ML; MG/100ML; MG/100ML
30 INJECTION, SOLUTION INTRAVENOUS CONTINUOUS
Status: DISCONTINUED | OUTPATIENT
Start: 2023-09-14 | End: 2023-09-14 | Stop reason: HOSPADM

## 2023-09-14 RX ORDER — GLYCOPYRROLATE 0.2 MG/ML
INJECTION INTRAMUSCULAR; INTRAVENOUS AS NEEDED
Status: DISCONTINUED | OUTPATIENT
Start: 2023-09-14 | End: 2023-09-14 | Stop reason: SURG

## 2023-09-14 RX ADMIN — PROPOFOL 145 MCG/KG/MIN: 10 INJECTION, EMULSION INTRAVENOUS at 08:35

## 2023-09-14 RX ADMIN — SODIUM CHLORIDE, POTASSIUM CHLORIDE, SODIUM LACTATE AND CALCIUM CHLORIDE 30 ML/HR: 600; 310; 30; 20 INJECTION, SOLUTION INTRAVENOUS at 07:57

## 2023-09-14 RX ADMIN — LIDOCAINE HYDROCHLORIDE 50 MG: 20 INJECTION, SOLUTION EPIDURAL; INFILTRATION; INTRACAUDAL; PERINEURAL at 08:35

## 2023-09-14 RX ADMIN — PROPOFOL 50 MG: 10 INJECTION, EMULSION INTRAVENOUS at 08:35

## 2023-09-14 RX ADMIN — PROPOFOL 50 MG: 10 INJECTION, EMULSION INTRAVENOUS at 08:40

## 2023-09-14 RX ADMIN — GLYCOPYRROLATE 0.1 MG: 0.2 INJECTION INTRAMUSCULAR; INTRAVENOUS at 08:35

## 2023-09-14 NOTE — ANESTHESIA POSTPROCEDURE EVALUATION
Patient: Willard Lange    Procedure Summary       Date: 09/14/23 Room / Location: formerly [facility] ENDOSCOPY 3 / formerly [facility] ENDOSCOPY    Anesthesia Start: 0834 Anesthesia Stop: 0854    Procedures:       ESOPHAGOGASTRODUODENOSCOPY WITH BIOPSIES      COLONOSCOPY FOR SCREENING Diagnosis:       Diarrhea, unspecified type      Incontinence of feces with fecal urgency      Gastroesophageal reflux disease, unspecified whether esophagitis present      Adenomatous polyp of colon, unspecified part of colon      (Diarrhea, unspecified type [R19.7])      (Incontinence of feces with fecal urgency [R15.9, R15.2])      (Gastroesophageal reflux disease, unspecified whether esophagitis present [K21.9])      (Adenomatous polyp of colon, unspecified part of colon [D12.6])    Surgeons: Kisha Dorsey MD Provider: Trip Monae CRNA    Anesthesia Type: general ASA Status: 3            Anesthesia Type: general    Vitals  Vitals Value Taken Time   /67 09/14/23 0908   Temp 36.3 °C (97.4 °F) 09/14/23 0908   Pulse 76 09/14/23 0909   Resp 18 09/14/23 0908   SpO2 93 % 09/14/23 0909   Vitals shown include unvalidated device data.        Post Anesthesia Care and Evaluation    Patient location during evaluation: bedside  Patient participation: complete - patient participated  Level of consciousness: awake  Pain management: adequate    Airway patency: patent  Anesthetic complications: No anesthetic complications  PONV Status: controlled  Cardiovascular status: acceptable and stable  Respiratory status: acceptable

## 2023-09-14 NOTE — H&P
Pre Procedure History & Physical    Chief Complaint:   GERD, diarrhea    Subjective     HPI:   62 yo M here for eval of GERD, diarrhea.    Past Medical History:   Past Medical History:   Diagnosis Date    Arthritis     Asthma     Benign essential hypertension     Brain tumor 01/2021    Cancer     DDD (degenerative disc disease), lumbar     Diabetes mellitus type 2, noninsulin dependent     Diabetes type 2, controlled     GERD (gastroesophageal reflux disease)     Hepatitis C     HTN (hypertension)     Leg pain     Lumbago 02/15/2017    with low back pain    Lumbar disc herniation 02/15/2017    L4-4    Lumbar spinal stenosis 02/15/2017    L4/5    Muscle cramps     Neuropathy     Prostate cancer        Past Surgical History:  Past Surgical History:   Procedure Laterality Date    APPENDECTOMY      BACK SURGERY  2017    COLONOSCOPY  2017    Dr. Dorsey-Polyps    CYSTOSCOPY W/ LASER LITHOTRIPSY      for kidney stones    LAMINECTOMY  02/17/2017    L4-5    PROSTATE BIOPSY      transrectal    TONSILLECTOMY         Family History:  Family History   Problem Relation Age of Onset    Heart disease Father     Lung cancer Sister     Diabetes type I Maternal Grandmother        Social History:   reports that he quit smoking about 18 years ago. His smoking use included cigarettes. He started smoking about 47 years ago. He has been exposed to tobacco smoke. He has never used smokeless tobacco. He reports current alcohol use. He reports that he does not use drugs.    Medications:   Medications Prior to Admission   Medication Sig Dispense Refill Last Dose    Advair Diskus 250-50 MCG/DOSE DISKUS Inhale 1 puff 2 (Two) Times a Day.       albuterol sulfate  (90 Base) MCG/ACT inhaler Inhale 2 puffs Every 6 (Six) Hours As Needed.       atorvastatin (LIPITOR) 20 MG tablet Take 1 tablet by mouth Every Night.       baclofen (LIORESAL) 10 MG tablet Take 1 tablet by mouth 3 (Three) Times a Day.       Blood Glucose Monitoring Suppl  (FreeStyle Lite) w/Device kit See Admin Instructions.       colestipol (COLESTID) 1 g tablet Take 1 tablet by mouth As Needed.       Continuous Blood Gluc Sensor (FreeStyle Clarisa 2 Sensor) misc Use 1 each Every 14 (Fourteen) Days. 2 each 5     empagliflozin (Jardiance) 10 MG tablet tablet Take 1 tablet by mouth Daily for 90 days. 90 tablet 0     FREESTYLE LITE test strip        glipizide (GLUCOTROL XL) 5 MG ER tablet Take 1 tablet by mouth Daily.       glucose blood test strip FreeStyle Lite Strips   USE TO TEST BLOOD SUGAR FOUR TIMES DAILY AND AS NEEDED       HYDROcodone-acetaminophen (NORCO) 7.5-325 MG per tablet Take 1 tablet by mouth Every 8 (Eight) Hours As Needed.       Lancets (freestyle) lancets        losartan-hydrochlorothiazide (HYZAAR) 100-25 MG per tablet Take 1 tablet by mouth Daily.       omeprazole (priLOSEC) 40 MG capsule Take 1 capsule by mouth Daily.       pregabalin (LYRICA) 200 MG capsule Take 1 capsule by mouth Daily.          Allergies:  Formaldehyde    ROS:    Pertinent items are noted in HPI     Objective     Blood pressure 124/66, pulse 66, temperature 98.2 °F (36.8 °C), temperature source Temporal, resp. rate 16, weight 107 kg (236 lb 5.3 oz), SpO2 96 %.    Physical Exam   Constitutional: Pt is oriented to person, place, and time and well-developed, well-nourished, and in no distress.   Mouth/Throat: Oropharynx is clear and moist.   Neck: Normal range of motion.   Cardiovascular: Normal rate, regular rhythm and normal heart sounds.    Pulmonary/Chest: Effort normal and breath sounds normal.   Abdominal: Soft. Nontender  Skin: Skin is warm and dry.   Psychiatric: Mood, memory, affect and judgment normal.     Assessment & Plan     Diagnosis:  GERD, diarrhea    Anticipated Surgical Procedure:  EGD/colonoscopy    The risks, benefits, and alternatives of this procedure have been discussed with the patient or the responsible party- the patient understands and agrees to proceed.

## 2023-09-14 NOTE — ANESTHESIA PREPROCEDURE EVALUATION
Anesthesia Evaluation     Patient summary reviewed and Nursing notes reviewed   NPO Solid Status: > 8 hours  NPO Liquid Status: > 4 hours           Airway   Mallampati: II  TM distance: >3 FB  Neck ROM: full  No difficulty expected  Dental    (+) edentulous and upper dentures    Pulmonary - normal exam    breath sounds clear to auscultation  (+) asthma,  Cardiovascular - normal exam  Exercise tolerance: good (4-7 METS)    Rhythm: regular  Rate: normal    (+) hypertension well controlled, CAD, hyperlipidemia      Neuro/Psych  (+) numbness, psychiatric history Anxiety and Depression  GI/Hepatic/Renal/Endo    (+) GERD well controlled, hepatitis C, liver disease, diabetes mellitus type 2 well controlled    Musculoskeletal     Abdominal  - normal exam   Substance History      OB/GYN          Other   arthritis,   history of cancer remission                    Anesthesia Plan    ASA 3     general   total IV anesthesia  intravenous induction     Anesthetic plan, risks, benefits, and alternatives have been provided, discussed and informed consent has been obtained with: patient.  Pre-procedure education provided  Plan discussed with CRNA.      CODE STATUS:

## 2023-09-15 LAB
CYTO UR: NORMAL
LAB AP CASE REPORT: NORMAL
LAB AP CLINICAL INFORMATION: NORMAL
PATH REPORT.FINAL DX SPEC: NORMAL
PATH REPORT.GROSS SPEC: NORMAL

## 2023-09-18 ENCOUNTER — TELEPHONE (OUTPATIENT)
Dept: GASTROENTEROLOGY | Facility: CLINIC | Age: 63
End: 2023-09-18
Payer: MEDICARE

## 2023-09-18 NOTE — TELEPHONE ENCOUNTER
Patient called and was notified of results and verbalized understanding. Patient had a follow up scheduled.

## 2023-09-18 NOTE — TELEPHONE ENCOUNTER
----- Message from MARIUM Brito sent at 9/18/2023  9:19 AM EDT -----  Biopsies are consistent with gastritis.  Recommend to avoid NSAIDs and continue PPI.  Schedule for follow-up.  There was a large volume of food on the EGD noted.  There was also a large volume of stool noted on the colonoscopy.  Due to that patient needs a repeat colonoscopy within the next 6 months.  Needs to 2 a 2-day bowel prep with the next scope.  Please call patient and get him scheduled for repeat colonoscopy.  Thanks

## 2023-09-21 ENCOUNTER — OFFICE VISIT (OUTPATIENT)
Dept: GASTROENTEROLOGY | Facility: CLINIC | Age: 63
End: 2023-09-21
Payer: MEDICARE

## 2023-09-21 VITALS
SYSTOLIC BLOOD PRESSURE: 101 MMHG | DIASTOLIC BLOOD PRESSURE: 53 MMHG | BODY MASS INDEX: 31.45 KG/M2 | HEIGHT: 72 IN | HEART RATE: 70 BPM | WEIGHT: 232.2 LBS

## 2023-09-21 DIAGNOSIS — R68.81 EARLY SATIETY: Primary | ICD-10-CM

## 2023-09-21 DIAGNOSIS — K21.9 GASTROESOPHAGEAL REFLUX DISEASE WITHOUT ESOPHAGITIS: ICD-10-CM

## 2023-09-21 DIAGNOSIS — K59.03 DRUG-INDUCED CONSTIPATION: ICD-10-CM

## 2023-09-21 DIAGNOSIS — Z87.19 HISTORY OF PANCREATITIS: ICD-10-CM

## 2023-09-21 PROCEDURE — 1160F RVW MEDS BY RX/DR IN RCRD: CPT | Performed by: NURSE PRACTITIONER

## 2023-09-21 PROCEDURE — 1159F MED LIST DOCD IN RCRD: CPT | Performed by: NURSE PRACTITIONER

## 2023-09-21 PROCEDURE — 3078F DIAST BP <80 MM HG: CPT | Performed by: NURSE PRACTITIONER

## 2023-09-21 PROCEDURE — 3074F SYST BP LT 130 MM HG: CPT | Performed by: NURSE PRACTITIONER

## 2023-09-21 PROCEDURE — 99214 OFFICE O/P EST MOD 30 MIN: CPT | Performed by: NURSE PRACTITIONER

## 2023-09-21 RX ORDER — DICYCLOMINE HCL 20 MG
TABLET ORAL
COMMUNITY
Start: 2023-09-04

## 2023-09-21 RX ORDER — POLYETHYLENE GLYCOL 3350 17 G/17G
17 POWDER, FOR SOLUTION ORAL DAILY
Qty: 5 PACKET | Refills: 0 | COMMUNITY
Start: 2023-09-21

## 2023-09-21 NOTE — PROGRESS NOTES
Chief Complaint   Abdominal Pain and Constipation    History of Present Illness       Willard Lange is a 63 y.o. male who presents to [facility] for follow-up for constipation.  He was last seen in the office by me on 8/16/2023.    He was hospitalized from 8/8-8/13/23 for acute pancreatitis. He had CT scan done that showed:     IMPRESSION:                 1. Findings suspicious for acute pancreatitis.  No definite peripancreatic collection or ductal   dilatation is seen at this time.  2. Calcific atherosclerosis with abdominal aortic aneurysm measuring up to 2.8 cm.  Additional   aneurysms are seen in the common iliac and internal iliac arteries.  3. No definite biliary ductal dilatation or acute gallbladder abnormality.  4. Hepatic steatosis       Dr. Dorsey was consulted and was thinking maybe this was caused by Trulicity but she was not definite.  He had been on trulicity x 6 months. Lipase was elevated. He did finish the antibiotics. He is better but still occasionally having the upper abd pain.      IgG4, ESTELLE and lipid panel were normal.  Stool studies were normal except for an elevated calprotectin.        He has a history of adenomatous and hyperplastic colon polyps.  Recall in 8 of 2023.     Benign Brain tumor in 2021. Is supposed to get surveillance on it. History of prostate cancer s/p radiation.      History of Hepatitis C.  Was treated by Dr. Chase. HCV RNA NOT DETECTED 2019     History of GERD--on omeprazole 40 mg daily from PCP.  Never had EGD.        He denies any dysphagia, N&V, rectal bleeding or melena. Good appetite. He has lost 30 lbs over the last several months. He admits he has been eating less to lose.      History of appendectomy     Denies any significant GI family history     IgG4 normal.  ESTELLE normal.  LDL slightly elevated.  Total cholesterol normal.  Lipase back down to normal.      He underwent EGD and colonoscopy with Dr. Dorsey on 9/14/2023.   EGD showed gastritis and a large amount of food in the stomach.  Normal esophagus.  Colonoscopy could not be completed due to stool in the entire colon.  Flexible sigmoidoscopy was instead done.  Preparation of the colon was not adequate.  No specimens collected.  Dr. Dorsey recommends repeat colonoscopy within the next 6 months.  Path was positive for mild chronic inactive gastritis.    Still on colestid PRN. Will have several days without a BM and then will have nasty diarrhea with urgency and frequency. Is not taking anything for the constipation. He does admit he is doing better on omeprazole 40 mg daily. Still having horrible gas and belching.  Still gets full really quickly.  Is eating less and less food now.  Still trying to lose weight.  Results       Result Review :       CMP          8/9/2023    05:59 8/10/2023    13:35 8/12/2023    05:40   CMP   Glucose 171  161  136    BUN 13  8  10    Creatinine 0.75  0.71  0.78    EGFR 101.4  103.1  100.2    Sodium 139  139  137    Potassium 4.0  4.1  3.8    Chloride 103  102  101    Calcium 8.7  9.2  9.0    Total Protein 6.8  7.2  6.9    Albumin 3.6  3.8  3.5    Globulin 3.2  3.4  3.4    Total Bilirubin 0.8  1.1  0.8    Alkaline Phosphatase 87  88  93    AST (SGOT) 26  21  68    ALT (SGPT) 18  20  85    Albumin/Globulin Ratio 1.1  1.1  1.0    BUN/Creatinine Ratio 17.3  11.3  12.8    Anion Gap 10.3  10.6  9.9      CBC          8/9/2023    05:59 8/10/2023    13:35 8/12/2023    05:40   CBC   WBC 12.73  7.89  5.93    RBC 4.64  4.64  4.58    Hemoglobin 13.6  13.5  13.3    Hematocrit 40.4  40.1  39.2    MCV 87.1  86.4  85.6    MCH 29.3  29.1  29.0    MCHC 33.7  33.7  33.9    RDW 13.4  13.2  12.8    Platelets 221  220  247      Lipid Panel          8/10/2023    13:35   Lipid Panel   Total Cholesterol 168    Triglycerides 127    HDL Cholesterol 36    VLDL Cholesterol 23    LDL Cholesterol  109    LDL/HDL Ratio 2.96          Lipase   Lipase   Date Value Ref Range Status    08/12/2023 26 13 - 60 U/L Final     Amylase No results found for: AMYLASE            Past Medical History       Past Medical History:   Diagnosis Date    Arthritis     Asthma     Benign essential hypertension     Brain tumor 01/2021    Cancer     DDD (degenerative disc disease), lumbar     Diabetes mellitus type 2, noninsulin dependent     Diabetes type 2, controlled     GERD (gastroesophageal reflux disease)     Hepatitis C     HTN (hypertension)     Leg pain     Lumbago 02/15/2017    with low back pain    Lumbar disc herniation 02/15/2017    L4-4    Lumbar spinal stenosis 02/15/2017    L4/5    Muscle cramps     Neuropathy     Prostate cancer        Past Surgical History:   Procedure Laterality Date    APPENDECTOMY      BACK SURGERY  2017    COLONOSCOPY  2017    Dr. Dorsey-Polyps    COLONOSCOPY N/A 9/14/2023    Procedure: COLONOSCOPY FOR SCREENING;  Surgeon: Kisha Dorsey MD;  Location: [facility] ENDOSCOPY;  Service: Gastroenterology;  Laterality: N/A;  POOR PREP    CYSTOSCOPY W/ LASER LITHOTRIPSY      for kidney stones    ENDOSCOPY N/A 9/14/2023    Procedure: ESOPHAGOGASTRODUODENOSCOPY WITH BIOPSIES;  Surgeon: Kisha Dorsey MD;  Location: [facility] ENDOSCOPY;  Service: Gastroenterology;  Laterality: N/A;  RETAINED FOOD IN THE STOMACH, GASTRITIS    LAMINECTOMY  02/17/2017    L4-5    PROSTATE BIOPSY      transrectal    TONSILLECTOMY           Current Outpatient Medications:     Advair Diskus 250-50 MCG/DOSE DISKUS, Inhale 1 puff 2 (Two) Times a Day., Disp: , Rfl:     albuterol sulfate  (90 Base) MCG/ACT inhaler, Inhale 2 puffs Every 6 (Six) Hours As Needed., Disp: , Rfl:     atorvastatin (LIPITOR) 20 MG tablet, Take 1 tablet by mouth Every Night., Disp: , Rfl:     baclofen (LIORESAL) 10 MG tablet, Take 1 tablet by mouth 3 (Three) Times a Day., Disp: , Rfl:     Blood Glucose Monitoring Suppl (FreeStyle Lite) w/Device kit, See Admin Instructions., Disp: , Rfl:     colestipol (COLESTID) 1 g  tablet, Take 1 tablet by mouth As Needed., Disp: , Rfl:     Continuous Blood Gluc Sensor (FreeStyle Clarisa 2 Sensor) misc, Use 1 each Every 14 (Fourteen) Days., Disp: 2 each, Rfl: 5    dicyclomine (BENTYL) 20 MG tablet, TAKE 1 TABLET BY MOUTH FOUR TIMES DAILY AS NEEDED FOR UPSET STOMACH, Disp: , Rfl:     empagliflozin (Jardiance) 10 MG tablet tablet, Take 1 tablet by mouth Daily for 90 days., Disp: 90 tablet, Rfl: 0    FREESTYLE LITE test strip, , Disp: , Rfl:     glipizide (GLUCOTROL XL) 5 MG ER tablet, Take 1 tablet by mouth Daily., Disp: , Rfl:     glucose blood test strip, FreeStyle Lite Strips  USE TO TEST BLOOD SUGAR FOUR TIMES DAILY AND AS NEEDED, Disp: , Rfl:     HYDROcodone-acetaminophen (NORCO) 7.5-325 MG per tablet, Take 1 tablet by mouth Every 8 (Eight) Hours As Needed., Disp: , Rfl:     Lancets (freestyle) lancets, , Disp: , Rfl:     losartan-hydrochlorothiazide (HYZAAR) 100-25 MG per tablet, Take 1 tablet by mouth Daily., Disp: , Rfl:     omeprazole (priLOSEC) 40 MG capsule, Take 1 capsule by mouth Daily., Disp: , Rfl:     pregabalin (LYRICA) 200 MG capsule, Take 1 capsule by mouth Daily., Disp: , Rfl:      Allergies   Allergen Reactions    Formaldehyde Rash       Family History   Problem Relation Age of Onset    Heart disease Father     Lung cancer Sister     Diabetes type I Maternal Grandmother     Malig Hyperthermia Neg Hx         Social History     Social History Narrative    Lives alone       Objective       Review of Systems   Constitutional:  Positive for appetite change. Negative for fatigue, fever, unexpected weight gain and unexpected weight loss.   HENT:  Negative for trouble swallowing.    Respiratory:  Negative for cough, choking, chest tightness, shortness of breath, wheezing and stridor.    Cardiovascular:  Negative for chest pain, palpitations and leg swelling.   Gastrointestinal:  Positive for constipation, diarrhea and nausea. Negative for abdominal distention, abdominal pain, anal  "bleeding, blood in stool, rectal pain, vomiting, GERD and indigestion.      Vital Signs:   /53 (BP Location: Left arm, Patient Position: Sitting, Cuff Size: Adult)   Pulse 70   Ht 182.9 cm (72\")   Wt 105 kg (232 lb 3.2 oz)   BMI 31.49 kg/m²       Physical Exam  Constitutional:       General: He is not in acute distress.     Appearance: He is well-developed. He is not ill-appearing.   HENT:      Head: Normocephalic.   Eyes:      Pupils: Pupils are equal, round, and reactive to light.   Cardiovascular:      Rate and Rhythm: Normal rate and regular rhythm.      Heart sounds: Normal heart sounds.   Pulmonary:      Effort: Pulmonary effort is normal.      Breath sounds: Normal breath sounds.   Abdominal:      General: Bowel sounds are normal. There is no distension.      Palpations: Abdomen is soft. There is no mass.      Tenderness: There is no abdominal tenderness. There is no guarding or rebound.      Hernia: No hernia is present.   Musculoskeletal:         General: Normal range of motion.   Skin:     General: Skin is warm and dry.   Neurological:      Mental Status: He is alert and oriented to person, place, and time.   Psychiatric:         Speech: Speech normal.         Behavior: Behavior normal.         Judgment: Judgment normal.         Assessment & Plan          Assessment and Plan    Diagnoses and all orders for this visit:    1. Early satiety (Primary)    2. Gastroesophageal reflux disease without esophagitis    3. Drug-induced constipation    4. History of pancreatitis      Reviewed EGD and colonoscopy results with him today.  Due to the stomach being full of food on the EGD I do worry about gastroparesis given his diabetes history.  Would like to check gastric emptying study for further evaluation.  Patient is agreeable to the test.  For now patient encouraged to eat smaller more frequent meals.  We will give him handout on gastroparesis.  He needs to repeat the colonoscopy due to poor prep.  We " will get him back on the colonoscopy schedule in the next couple of months.  For now we need to get his constipation under better control.  We need to break this cycle of constipation followed by diarrhea.  We will start daily MiraLAX.  Samples given today.  GERD seems better on omeprazole 40 mg daily.  Continue GERD precautions.  Patient to call the office next week with an update.  Patient to follow-up with me in 1 month.  Patient is agreeable to the plan.          Follow Up       Follow Up   No follow-ups on file.  Patient was given instructions and counseling regarding his condition or for health maintenance advice. Please see specific information pulled into the AVS if appropriate.

## 2023-09-28 ENCOUNTER — HOSPITAL ENCOUNTER (OUTPATIENT)
Dept: MRI IMAGING | Facility: HOSPITAL | Age: 63
Discharge: HOME | End: 2023-09-28
Admitting: NURSE PRACTITIONER
Payer: MEDICARE

## 2023-09-28 ENCOUNTER — OFFICE VISIT (OUTPATIENT)
Dept: NEUROSURGERY | Facility: CLINIC | Age: 63
End: 2023-09-28
Payer: MEDICARE

## 2023-09-28 VITALS
HEART RATE: 77 BPM | WEIGHT: 236.5 LBS | BODY MASS INDEX: 32.03 KG/M2 | SYSTOLIC BLOOD PRESSURE: 129 MMHG | DIASTOLIC BLOOD PRESSURE: 64 MMHG | HEIGHT: 72 IN

## 2023-09-28 DIAGNOSIS — R26.89 IMBALANCE: ICD-10-CM

## 2023-09-28 DIAGNOSIS — D32.9 MENINGIOMA: ICD-10-CM

## 2023-09-28 DIAGNOSIS — R93.5 ABNORMAL CT OF THE ABDOMEN: ICD-10-CM

## 2023-09-28 DIAGNOSIS — R10.10 PAIN OF UPPER ABDOMEN: ICD-10-CM

## 2023-09-28 DIAGNOSIS — R11.2 NAUSEA AND VOMITING, UNSPECIFIED VOMITING TYPE: ICD-10-CM

## 2023-09-28 DIAGNOSIS — M47.812 CERVICAL SPONDYLOSIS WITHOUT MYELOPATHY: Primary | ICD-10-CM

## 2023-09-28 DIAGNOSIS — M48.062 SPINAL STENOSIS, LUMBAR REGION, WITH NEUROGENIC CLAUDICATION: ICD-10-CM

## 2023-09-28 DIAGNOSIS — K85.31 DRUG-INDUCED ACUTE PANCREATITIS WITH UNINFECTED NECROSIS: ICD-10-CM

## 2023-09-28 DIAGNOSIS — M47.27 OSTEOARTHRITIS OF SPINE WITH RADICULOPATHY, LUMBOSACRAL REGION: ICD-10-CM

## 2023-09-28 DIAGNOSIS — E11.42 DIABETIC PERIPHERAL NEUROPATHY: ICD-10-CM

## 2023-09-28 DIAGNOSIS — Z98.890 HISTORY OF LUMBAR LAMINECTOMY: ICD-10-CM

## 2023-09-28 LAB
CREAT BLDA-MCNC: 1.1 MG/DL
EGFRCR SERPLBLD CKD-EPI 2021: 75.4 ML/MIN/1.73

## 2023-09-28 PROCEDURE — A9577 INJ MULTIHANCE: HCPCS | Performed by: NURSE PRACTITIONER

## 2023-09-28 PROCEDURE — 74183 MRI ABD W/O CNTR FLWD CNTR: CPT

## 2023-09-28 PROCEDURE — 82565 ASSAY OF CREATININE: CPT

## 2023-09-28 PROCEDURE — 0 GADOBENATE DIMEGLUMINE 529 MG/ML SOLUTION: Performed by: NURSE PRACTITIONER

## 2023-09-28 RX ADMIN — GADOBENATE DIMEGLUMINE 20 ML: 529 INJECTION, SOLUTION INTRAVENOUS at 13:34

## 2023-09-28 NOTE — PATIENT INSTRUCTIONS
-Referral to pain management for neck pain  -Aquatics  -MRI Brain in 2 years  -Follow up as needed

## 2023-09-28 NOTE — PROGRESS NOTES
Chief Complaint  Follow-up (Discuss MRIs & EMG)    Subjective          Willard Lange who is a 63 y.o. year old male who presents to [facility] for follow up of neck pain and numbness in the hands. MRI Cervical Spine. History of meningioma. MRI Brain.     History of Present Illness  MRI Cervical Spine demonstrating multilevel spondylosis. His neck pain persists. He has been working with physical therapy, which is providing him some relief. However this is temporary and after a few hours he is back to baseline. The neck pain causes headaches.     He had his EMG with Dr. Cook, demonstrating a severe peripheral polyneuropathy. He has concerns of imbalance and difficulty walking. He is utilizing a cane. It is becoming more challenging for him to walk long distances. His PCP had ordered a power scooter for him in the past, however he switched insurances and this was denied.     He would like to work with aquatics therapy to help with his mobility.     He also had his MRI of the brain, demonstrating a small stable meningioma in the right cerebellum.    Interval History 5/4/23    Willard Lange who is a 62 y.o. year old male who presents to [facility] for neck pain and worsening hand numbness. History of meningioma.     History of Present Illness  Pt was last seen in our office for his low back, at which time Dr. Ford recommended spinal cord stimulator. He underwent spinal cord stimulator which has provided some improvement in his back and leg pain. He has significant numbness in the feet and imbalance due to diabetic peripheral neuropathy.     He is here today to discuss his neck pain. We last discussed his cervical spine in 2021, with MRI Cervical Spine at that time showing spondylosis without significant spinal canal or foraminal stenosis.      He is having concerns of worsening neck pain. No surgical recommendations at that  "time. Aquatics therapy has helped him in the past. He would like to resume this. He is having concerns of numbness and weakness in the hands and arms. Numbness in all the fingers to the wrists. He has trouble with  strength, dropping things frequently. He has notable atrophy at the FDI.      He also mentions concerns of worsening headaches. He is having severe headaches 2-3 days per week. He is history of meningioma. His last Brain MRI was two years ago.     Recent Interventions: See above      Review of Systems   Musculoskeletal:  Positive for neck pain and neck stiffness.   Neurological:  Positive for weakness and numbness.   All other systems reviewed and are negative.     Objective   Vital Signs:   /64 (BP Location: Left arm, Patient Position: Sitting, Cuff Size: Adult)   Pulse 77   Ht 182.9 cm (72\")   Wt 107 kg (236 lb 8 oz)   BMI 32.08 kg/m²       Physical Exam  Vitals reviewed.   Constitutional:       Appearance: Normal appearance.   Musculoskeletal:      Right shoulder: No tenderness. Normal range of motion.      Left shoulder: No tenderness. Normal range of motion.      Cervical back: Tenderness present. Pain with movement present. Decreased range of motion.   Neurological:      Mental Status: He is alert and oriented to person, place, and time.      Deep Tendon Reflexes:      Reflex Scores:       Tricep reflexes are 0 on the right side and 0 on the left side.       Bicep reflexes are 0 on the right side and 0 on the left side.       Brachioradialis reflexes are 0 on the right side and 0 on the left side.     Neurologic Exam     Mental Status   Oriented to person, place, and time.   Level of consciousness: alert    Motor Exam   Muscle bulk: decreased (FDI)  Overall muscle tone: normal    Strength   Strength 5/5 except as noted.   Right interossei: 4/5  Left interossei: 4/5    Sensory Exam   Right arm light touch: decreased from wrist  Left arm light touch: decreased from wrist    Gait, " Coordination, and Reflexes     Reflexes   Right brachioradialis: 0  Left brachioradialis: 0  Right biceps: 0  Left biceps: 0  Right triceps: 0  Left triceps: 0  Right Lawrence: absent  Left Lawrence: absentUnsteady gait      Result Review :       Data reviewed : Radiologic studies MRI Cervical Spine on 9/7/23 at [facility] personally reviewed. Multilevel degenerative changes, which result in varying degrees of spinal canal and foraminal narrowing. Most significant at C5/6 and C6/7, with mild spinal canal and severe bilateral foraminal stenosis. No cord signal change.        MRI Brain on 9/7/23  IMPRESSION:                 1. Stable extra-axial enhancing lesion along inferior right cerebellum, most compatible with   meningioma.  2. No acute intracranial process identified.  3. Findings suggestive of mild chronic small vessel ischemic disease.    EMG/NCV on 8/18/23 with Dr. Cook demonstrates a severe axonal peripheral polyneuropathy, likely attributed to his diabetes.     Assessment and Plan    Diagnoses and all orders for this visit:    1. Cervical spondylosis without myelopathy (Primary)  -     Ambulatory Referral to Pain Management  -     Ambulatory Referral to Physical Therapy Aquatics    2. Diabetic peripheral neuropathy  -     Ambulatory Referral to Physical Therapy Aquatics    3. Osteoarthritis of spine with radiculopathy, lumbosacral region  -     Ambulatory Referral to Physical Therapy Aquatics    4. History of lumbar laminectomy  -     Ambulatory Referral to Physical Therapy Aquatics    5. Spinal stenosis, lumbar region, with neurogenic claudication  -     Ambulatory Referral to Physical Therapy Aquatics    6. Imbalance  -     Ambulatory Referral to Physical Therapy Aquatics    7. Meningioma  -     MRI Brain Without Contrast; Future    Will refer to pain management for his neck pain. Could benefit from Cervical RFA.     Referral placed to aquatics therapy for balance and mobility training. He will discuss a power  scooter with his PCP.     MRI Brain demonstrates stable meningioma. Will plan to repeat MRI for routine follow up in 2 years.       Follow Up   Return if symptoms worsen or fail to improve.  Patient was given instructions and counseling regarding his condition or for health maintenance advice.       -Referral to pain management for neck pain  -Aquatics  -MRI Brain in 2 years  -Follow up as needed

## 2023-09-29 ENCOUNTER — OFFICE VISIT (OUTPATIENT)
Dept: DIABETES SERVICES | Facility: HOSPITAL | Age: 63
End: 2023-09-29
Payer: MEDICARE

## 2023-09-29 ENCOUNTER — TELEPHONE (OUTPATIENT)
Dept: DIABETES SERVICES | Facility: HOSPITAL | Age: 63
End: 2023-09-29
Payer: MEDICARE

## 2023-09-29 ENCOUNTER — TELEPHONE (OUTPATIENT)
Dept: NEUROSURGERY | Facility: CLINIC | Age: 63
End: 2023-09-29

## 2023-09-29 VITALS
SYSTOLIC BLOOD PRESSURE: 115 MMHG | WEIGHT: 238 LBS | BODY MASS INDEX: 32.23 KG/M2 | DIASTOLIC BLOOD PRESSURE: 76 MMHG | OXYGEN SATURATION: 98 % | HEART RATE: 106 BPM | HEIGHT: 72 IN

## 2023-09-29 DIAGNOSIS — E66.9 OBESITY (BMI 30-39.9): ICD-10-CM

## 2023-09-29 DIAGNOSIS — E11.65 TYPE 2 DIABETES MELLITUS WITH HYPERGLYCEMIA, UNSPECIFIED WHETHER LONG TERM INSULIN USE: Primary | ICD-10-CM

## 2023-09-29 DIAGNOSIS — E11.40 TYPE 2 DIABETES MELLITUS WITH DIABETIC NEUROPATHY, WITHOUT LONG-TERM CURRENT USE OF INSULIN: ICD-10-CM

## 2023-09-29 LAB — GLUCOSE BLDC GLUCOMTR-MCNC: 219 MG/DL (ref 70–99)

## 2023-09-29 PROCEDURE — G0463 HOSPITAL OUTPT CLINIC VISIT: HCPCS | Performed by: NURSE PRACTITIONER

## 2023-09-29 PROCEDURE — 82948 REAGENT STRIP/BLOOD GLUCOSE: CPT | Performed by: NURSE PRACTITIONER

## 2023-09-29 NOTE — PATIENT INSTRUCTIONS
Increase Jardiance to 25 mg once daily.  You can finish out your current prescription of Jardiance 10 mg by taking 2 tabs to equal 20 mg until you  your new prescription for the 25 mg.  Make sure to drink plenty of water with this medication to avoid dehydration and yeast infections.

## 2023-09-29 NOTE — TELEPHONE ENCOUNTER
CHHAYA JOHN Key: [id number] - [place] Case ID: [id number]  The request has been approved. The authorization is effective from 09/29/2023 to 09/28/2024, as long as the member is enrolled in their current health plan. The request was approved as submitted. A written notification letter will follow with additional details.

## 2023-09-29 NOTE — TELEPHONE ENCOUNTER
Caller: Willard Lange    Relationship to patient: Self    Best call back number: [phone]     Patient is needing:      PATIENT CALLED AND TRIED TO SCHED. PT, THEY SAID THEY COULDN'T SCHEDULE BECAUSE HE HAS MEDICAID.      ASKING IF THERE IS SOMEWHERE ELSE WITH A POOL THAT  CAN SEEN FOR PT.    PLEASE CALL TO [facility]

## 2023-09-29 NOTE — PROGRESS NOTES
Chief Complaint  Diabetes (FOLLOW UP MED MGT,CGM/)    Referred By: MARIUM Hood    Subjective          Willard Lange presents to [facility] for diabetes medication management    History of Present Illness    Visit type:  follow-up  Diabetes type:  Type 2  Current diabetes status/concerns/issues: last visit Jardiance 10 mg once daily was prescribed. States his blood sugar has come come down some but not as much as he would like. States his blood sugar is running high 120s.  Other health concerns: states he has been having some stomach issues. He is scheduled for a gastric emptying study on 10/24/23. States he was up all last night having diarrhea. He is seeing pain management for Cervical pain.  He admits his neuropathy in his hands are worsening so it is painful when he is trying to manually check his blood sugar.  Current Diabetes symptoms:    Polyuria: No   Polydipsia: Yes     Polyphagia: Yes     Blurred vision: No   Excessive fatigue: Yes    Known Diabetes complications:  Neuropathy: Numbness, Tingling, Burning, and Shooting Pain; Location: Feet, Legs, and Hands  Renal: None  Eyes: None; Location: N/A; Last Eye Exam:  last month ; Location:   Amputation/Wounds:  slow to heal  GI: Pancreatitis (History of)  Cardiovascular: Hypertension and Hyperlipidemia  ED: Patient Reported  Other: None  Hypoglycemia:  None reported at this time  Hypoglycemia Symptoms:  No hypoglycemia at this time  Current diabetes treatment:  glipizide 5mg qd , Jardiance 10mg qd   Blood glucose device:  Meter  Blood glucose monitoring frequency:  3  Blood glucose range/average:   120s  Glucose Source: Patient Reported  Dietary behavior:  Limits high carb/sweet foods, Avoids sugary drinks, Number of meals each day - 2; Number of snacks each day - 1  Activity/Exercise:   he is doing PT for weakness in his hands and legs    Past Medical History:   Diagnosis Date    Arthritis     Asthma     Benign  essential hypertension     Brain tumor 2021    Cancer     DDD (degenerative disc disease), lumbar     Diabetes mellitus type 2, noninsulin dependent     Diabetes type 2, controlled     GERD (gastroesophageal reflux disease)     Hepatitis C     HTN (hypertension)     Leg pain     Lumbago 02/15/2017    with low back pain    Lumbar disc herniation 02/15/2017    L4-4    Lumbar spinal stenosis 02/15/2017    L4/5    Muscle cramps     Neuropathy     Prostate cancer      Past Surgical History:   Procedure Laterality Date    APPENDECTOMY      BACK SURGERY  2017    COLONOSCOPY      Dr. Dorsey-Polyps    COLONOSCOPY N/A 2023    Procedure: COLONOSCOPY FOR SCREENING;  Surgeon: Kisha Dorsey MD;  Location: [facility] END[facility];  Service: Gastroenterology;  Laterality: N/A;  POOR PREP    CYSTOSCOPY W/ LASER LITHOTRIPSY      for kidney stones    ENDOSCOPY N/A 2023    Procedure: ESOPHAGOGASTRODUODENOSCOPY WITH BIOPSIES;  Surgeon: Kisha Dorsey MD;  Location: [facility] ENDOSCOPY;  Service: Gastroenterology;  Laterality: N/A;  RETAINED FOOD IN THE STOMACH, GASTRITIS    LAMINECTOMY  2017    L4-5    PROSTATE BIOPSY      transrectal    TONSILLECTOMY       Family History   Problem Relation Age of Onset    Heart disease Father     Lung cancer Sister     Diabetes type I Maternal Grandmother     Malig Hyperthermia Neg Hx      Social History     Socioeconomic History    Marital status:    Tobacco Use    Smoking status: Former     Types: Cigarettes     Start date: 1976     Quit date:      Years since quittin.7     Passive exposure: Past    Smokeless tobacco: Never   Vaping Use    Vaping Use: Never used   Substance and Sexual Activity    Alcohol use: Not Currently    Drug use: Never    Sexual activity: Defer     Allergies   Allergen Reactions    Formaldehyde Rash       Current Outpatient Medications:     albuterol sulfate  (90 Base) MCG/ACT inhaler, Inhale 2 puffs Every 6 (Six)  Hours As Needed., Disp: , Rfl:     atorvastatin (LIPITOR) 20 MG tablet, Take 1 tablet by mouth Every Night., Disp: , Rfl:     baclofen (LIORESAL) 10 MG tablet, Take 1 tablet by mouth 3 (Three) Times a Day., Disp: , Rfl:     Blood Glucose Monitoring Suppl (FreeStyle Lite) w/Device kit, See Admin Instructions., Disp: , Rfl:     colestipol (COLESTID) 1 g tablet, Take 1 tablet by mouth As Needed., Disp: , Rfl:     Continuous Blood Gluc  (FreeStyle Clarisa 2 [place]) device, Use 1 each Every 14 (Fourteen) Days., Disp: 1 each, Rfl: 0    Continuous Blood Gluc Sensor (FreeStyle Clarisa 2 Sensor) misc, Use 1 each Every 14 (Fourteen) Days., Disp: 2 each, Rfl: 5    dicyclomine (BENTYL) 20 MG tablet, TAKE 1 TABLET BY MOUTH FOUR TIMES DAILY AS NEEDED FOR UPSET STOMACH, Disp: , Rfl:     FREESTYLE LITE test strip, , Disp: , Rfl:     glipizide (GLUCOTROL XL) 5 MG ER tablet, Take 1 tablet by mouth Daily., Disp: , Rfl:     glucose blood test strip, FreeStyle Lite Strips  USE TO TEST BLOOD SUGAR FOUR TIMES DAILY AND AS NEEDED, Disp: , Rfl:     HYDROcodone-acetaminophen (NORCO) 7.5-325 MG per tablet, Take 1 tablet by mouth Every 8 (Eight) Hours As Needed., Disp: , Rfl:     Lancets (freestyle) lancets, , Disp: , Rfl:     losartan-hydrochlorothiazide (HYZAAR) 100-25 MG per tablet, Take 1 tablet by mouth Daily., Disp: , Rfl:     omeprazole (priLOSEC) 40 MG capsule, Take 1 capsule by mouth Daily., Disp: , Rfl:     polyethylene glycol (MIRALAX) 17 g packet, Take 17 g by mouth Daily., Disp: 5 packet, Rfl: 0    pregabalin (LYRICA) 200 MG capsule, Take 1 capsule by mouth Daily., Disp: , Rfl:     Advair Diskus 250-50 MCG/DOSE DISKUS, Inhale 1 puff 2 (Two) Times a Day. (Patient not taking: Reported on 9/28/2023), Disp: , Rfl:     empagliflozin (Jardiance) 25 MG tablet tablet, Take 1 tablet by mouth Daily for 180 days., Disp: 90 tablet, Rfl: 1    Objective     Vitals:    09/29/23 1401   BP: 115/76   BP Location: Right arm   Patient Position:  "Sitting   Cuff Size: Adult   Pulse: 106   SpO2: 98%   Weight: 108 kg (238 lb)   Height: 182.9 cm (72\")     Body mass index is 32.28 kg/m².    Physical Exam  Constitutional:       Appearance: Normal appearance. He is obese.      Comments: Obesity (BMI 30 - 39.9) Pt Current BMI = 32.28     HENT:      Head: Normocephalic and atraumatic.      Right Ear: External ear normal.      Left Ear: External ear normal.      Nose: Nose normal.   Eyes:      Extraocular Movements: Extraocular movements intact.      Conjunctiva/sclera: Conjunctivae normal.   Pulmonary:      Effort: Pulmonary effort is normal.   Musculoskeletal:         General: Normal range of motion.      Cervical back: Normal range of motion.   Skin:     General: Skin is warm and dry.   Neurological:      General: No focal deficit present.      Mental Status: He is alert and oriented to person, place, and time. Mental status is at baseline.   Psychiatric:         Mood and Affect: Mood normal.         Behavior: Behavior normal.         Thought Content: Thought content normal.         Judgment: Judgment normal.           Result Review :   The following data was reviewed by: MARIUM Powers on 09/29/2023:    Most Recent A1C          8/30/2023    13:19   HGBA1C Most Recent   Hemoglobin A1C 6.4        A1C Last 3 Results          8/30/2023    13:19   HGBA1C Last 3 Results   Hemoglobin A1C 6.4      A1c collected on 8/30/23  is 6.4%, indicating Controlled Type II diabetes.    Glucose   Date Value Ref Range Status   09/29/2023 219 (H) 70 - 99 mg/dL Final     Comment:     Serial Number: [id number]:  [id number]     Point of care glucose in the office today is  elevated at 219mg/dl    Creatinine   Date Value Ref Range Status   09/28/2023 1.10 mg/dL Final     Comment:     Serial Number: [id number]:  [id number]   08/12/2023 0.78 0.76 - 1.27 mg/dL Final   08/10/2023 0.71 (L) 0.76 - 1.27 mg/dL Final   07/23/2021 0.90 mg/dL Final     Comment:     Serial Number: " [id number]:  [id number]     eGFR   Date Value Ref Range Status   09/28/2023 75.4 >60.0 mL/min/1.73 Final   08/12/2023 100.2 >60.0 mL/min/1.73 Final     Labs collected on 9/28/23 show Stage II mild (GFR = 60-89mL/min)        Total Cholesterol   Date Value Ref Range Status   08/10/2023 168 0 - 200 mg/dL Final     Triglycerides   Date Value Ref Range Status   08/10/2023 127 0 - 150 mg/dL Final     HDL Cholesterol   Date Value Ref Range Status   08/10/2023 36 (L) 40 - 60 mg/dL Final     LDL Cholesterol    Date Value Ref Range Status   08/10/2023 109 (H) 0 - 100 mg/dL Final     Lipid panel collected on 8/10/23 shows Hyperlipidemia and low HDL            Assessment: Patient is here today for 1 month follow-up on his diabetes.  Last visit Jardiance 10 mg once daily was prescribed.  He states he has noticed an improvement in his blood sugars however he is still having some hyperglycemia at times.  Admits his glucose level averages in the 120s.  His A1c on 8/30/2023 was 6.4% indicating controlled type 2 diabetes.  States his A1c in the past was always in the 5% range and he would like to get back to that.  States he received his clarisa 2 sensors however his phone does not support the belle so he is going to need a reader.      Diagnoses and all orders for this visit:    1. Type 2 diabetes mellitus with hyperglycemia, unspecified whether long term insulin use (Primary)  -     Discontinue: Continuous Blood Gluc  (FreeStyle Clarisa 2 [place]) device; Use 1 each Every 14 (Fourteen) Days.  Dispense: 1 each; Refill: 0  -     Continuous Blood Gluc  (FreeStyle Clarisa 2 [place]) device; Use 1 each Every 14 (Fourteen) Days.  Dispense: 1 each; Refill: 0  -     empagliflozin (Jardiance) 25 MG tablet tablet; Take 1 tablet by mouth Daily for 180 days.  Dispense: 90 tablet; Refill: 1    2. Type 2 diabetes mellitus with diabetic neuropathy, without long-term current use of insulin    3. Obesity (BMI 30-39.9)    Other orders  -      POC Glucose        Plan: Increased Jardiance to 25 mg once daily.  Instructed patient he can finish his current prescription of 10 mg by taking 2 tablets to equal 20 mg until he picks up his new prescription for the 25 mg dose.  A reader was sent to his pharmacy for his brennen 2.  Instructed patient to pick the brennen up from the pharmacy and stop by the office and we will set this up for him.  Scheduled a 1 month follow-up.    The patient will monitor his blood glucose levels per CGM.  If he develops problematic hyperglycemia or hypoglycemia or adverse drug reactions, he will contact the office for further instructions.        Follow Up     Return in about 4 weeks (around 10/27/2023) for CGM Follow Up, Medication Mgmt.    Patient was given instructions and counseling regarding his condition or for health maintenance advice. Please see specific information pulled into the AVS if appropriate.     Zuri Helton, APRN  09/29/2023      Dictated Utilizing Dragon Dictation.  Please note that portions of this note were completed with a voice recognition program.  Part of this note may be an electronic transcription/translation of spoken language to printed text using the Dragon Dictation System.

## 2023-10-02 ENCOUNTER — TELEPHONE (OUTPATIENT)
Dept: GASTROENTEROLOGY | Facility: CLINIC | Age: 63
End: 2023-10-02
Payer: MEDICARE

## 2023-10-02 NOTE — TELEPHONE ENCOUNTER
----- Message from MARIUM Brito sent at 9/28/2023  4:43 PM EDT -----  Please call the patient and let him know his MRI looks good.  No active acute pancreatitis noted.  Thanks

## 2023-10-05 VITALS — BODY MASS INDEX: 31.92 KG/M2 | HEIGHT: 72 IN | WEIGHT: 235.67 LBS

## 2023-10-05 NOTE — PROGRESS NOTES
"  Willard Lange is a 63 y.o. male who presents to [facility] for nutrition consult r/t diagnosis of T2DM.  Willard Lange is referred by MARIUM Al.    Past Medical History:   Diagnosis Date    Arthritis     Asthma     Benign essential hypertension     Brain tumor 01/2021    Cancer     DDD (degenerative disc disease), lumbar     Diabetes mellitus type 2, noninsulin dependent     Diabetes type 2, controlled     GERD (gastroesophageal reflux disease)     Hepatitis C     HTN (hypertension)     Leg pain     Lumbago 02/15/2017    with low back pain    Lumbar disc herniation 02/15/2017    L4-4    Lumbar spinal stenosis 02/15/2017    L4/5    Muscle cramps     Neuropathy     Prostate cancer        Anthropometrics    182.9 cm (72\")  107 kg (235 lb 10.8 oz)  31.96 kg/m²    Diabetes History    Diabetes History  What type of diabetes do you have?: Type 2  Length of Diabetes Diagnosis: 10 + years  Current DM knowledge: good  Have you had diabetes education/teaching in the past?: yes  Do you test your blood sugar at home?: yes  Typical readings: pt states he checks 3x/day via CGM, readings usually low 100s    Education Preferences    Education Preferences  What areas of diabetes would you like to learn about?: diet information    Nutrition Information    Nutrition Information  Enter everything you can remember eating in the last 24 hours (1 day): breakfast/lunch- oatmeal/grits/egg sandwich; dinner- frozen meal; snacks- ice cream, cheddar cheese; beverages- water, Coke Zero  What is the biggest challenge you have with your diet?: Knowledge    Education Needs    DM Education Needs  Meter: Has own  Medication: Oral  Healthy Eating: RD consult, Reviewed meal plan, Basic meal plan provided  Motivation: Engaged  Teaching Method: Explanation, Discussion, Handouts  Patient Response: Verbalized understanding    DM Goals    DM Goals  Healthy Eating - Goal: Today  Being Active - Goal: Today  Taking " Medication - Goal: Today  Monitoring - Goal: Today  Contact Plan: Follow-up medical care      Medications    Current Outpatient Medications   Medication    Advair Diskus    albuterol sulfate HFA    atorvastatin    baclofen    FreeStyle Lite    colestipol    FreeStyle Clarisa 2 [place]    FreeStyle Clarisa 2 Sensor    dicyclomine    empagliflozin    FREESTYLE LITE    glipizide    glucose blood    HYDROcodone-acetaminophen    freestyle    losartan-hydrochlorothiazide    omeprazole    polyethylene glycol    pregabalin     Labs       Lab Results   Component Value Date    CHOL 168 08/10/2023    TRIG 127 08/10/2023    HDL 36 (L) 08/10/2023     (H) 08/10/2023     August 2023 A1c 6.4%    Nutrition counseling provided on carbohydrate counting, portion control, measuring and reading labels.  Discussed eating out and gave suggestions on controlling carbohydrate intake and making healthier food choices.     Meal Plan:     Total Carbohydrates per meal: 3-4 carb servings/meal, at least 3 meals/day  Lean protein with meals.  Limit added fats.  Snacks: 1 carbohydrate serving (</= 22 g) + 1 protein serving.     Daily exercise encouraged (as recommended by healthcare provider). Discussed the benefits of exercise in lowering blood glucose, blood pressure, cholesterol, stress and controlling body weight.     Advised to continue daily blood glucose monitoring to assist with understanding of factors affecting blood glucose and assist with management of diabetes.  Discussed and provided with target BG ranges.     Literature provided: Diabetes Nutrition Placemat, Choose Your Foods Booklet    Dietitian contact number provided.  Patient encouraged to call with questions or concerns.     Time spent with patient: 20 minutes    Rin Iraheta RDN, KATHARINE  09/06/2023

## 2023-10-24 ENCOUNTER — HOSPITAL ENCOUNTER (OUTPATIENT)
Dept: NUCLEAR MEDICINE | Facility: HOSPITAL | Age: 63
Discharge: HOME | End: 2023-10-24
Admitting: NURSE PRACTITIONER
Payer: MEDICARE

## 2023-10-24 DIAGNOSIS — R68.81 EARLY SATIETY: ICD-10-CM

## 2023-10-24 DIAGNOSIS — Z87.19 HISTORY OF PANCREATITIS: ICD-10-CM

## 2023-10-24 DIAGNOSIS — K21.9 GASTROESOPHAGEAL REFLUX DISEASE WITHOUT ESOPHAGITIS: ICD-10-CM

## 2023-10-24 DIAGNOSIS — K59.03 DRUG-INDUCED CONSTIPATION: ICD-10-CM

## 2023-10-24 PROCEDURE — 0 TECHNETIUM SULFUR COLLOID: Performed by: NURSE PRACTITIONER

## 2023-10-24 PROCEDURE — 78264 GASTRIC EMPTYING IMG STUDY: CPT

## 2023-10-24 PROCEDURE — A9541 TC99M SULFUR COLLOID: HCPCS | Performed by: NURSE PRACTITIONER

## 2023-10-24 RX ADMIN — TECHNETIUM TC 99M SULFUR COLLOID 1 DOSE: KIT at 07:52

## 2023-10-24 NOTE — TELEPHONE ENCOUNTER
----- Message from MARIUM Brito sent at 10/24/2023  1:11 PM EDT -----  His gastric emptying study was severely delayed at 64%.  Normal should be less than 10%.  Would suspect patient has gastroparesis.  Make sure he has office follow-up so we can discuss further.  Thanks

## 2023-10-24 NOTE — TELEPHONE ENCOUNTER
Patient notified of results.   Follow up appt scheduled. Pt added to wait list per pt request.   Pt needing prep sent for colonoscopy. Order pended.

## 2023-10-30 ENCOUNTER — OFFICE VISIT (OUTPATIENT)
Dept: PODIATRY | Facility: CLINIC | Age: 63
End: 2023-10-30
Payer: MEDICARE

## 2023-10-30 VITALS
HEIGHT: 72 IN | DIASTOLIC BLOOD PRESSURE: 78 MMHG | HEART RATE: 68 BPM | SYSTOLIC BLOOD PRESSURE: 124 MMHG | OXYGEN SATURATION: 96 % | WEIGHT: 237 LBS | TEMPERATURE: 98 F | BODY MASS INDEX: 32.1 KG/M2

## 2023-10-30 DIAGNOSIS — M79.672 FOOT PAIN, BILATERAL: ICD-10-CM

## 2023-10-30 DIAGNOSIS — G62.9 NEUROPATHY: ICD-10-CM

## 2023-10-30 DIAGNOSIS — E11.8 DIABETIC FOOT: ICD-10-CM

## 2023-10-30 DIAGNOSIS — E11.9 NON-INSULIN DEPENDENT TYPE 2 DIABETES MELLITUS: Primary | ICD-10-CM

## 2023-10-30 DIAGNOSIS — I73.9 PVD (PERIPHERAL VASCULAR DISEASE): ICD-10-CM

## 2023-10-30 DIAGNOSIS — M20.41 HAMMER TOES OF BOTH FEET: ICD-10-CM

## 2023-10-30 DIAGNOSIS — M79.671 FOOT PAIN, BILATERAL: ICD-10-CM

## 2023-10-30 DIAGNOSIS — L60.0 ONYCHOCRYPTOSIS: ICD-10-CM

## 2023-10-30 DIAGNOSIS — B35.1 ONYCHOMYCOSIS: ICD-10-CM

## 2023-10-30 DIAGNOSIS — M20.42 HAMMER TOES OF BOTH FEET: ICD-10-CM

## 2023-10-30 PROCEDURE — 1160F RVW MEDS BY RX/DR IN RCRD: CPT | Performed by: PODIATRIST

## 2023-10-30 PROCEDURE — 1159F MED LIST DOCD IN RCRD: CPT | Performed by: PODIATRIST

## 2023-10-30 PROCEDURE — G8404 LOW EXTEMITY NEUR EXAM DOCUM: HCPCS | Performed by: PODIATRIST

## 2023-10-30 PROCEDURE — 11721 DEBRIDE NAIL 6 OR MORE: CPT | Performed by: PODIATRIST

## 2023-10-30 PROCEDURE — 3078F DIAST BP <80 MM HG: CPT | Performed by: PODIATRIST

## 2023-10-30 PROCEDURE — 3074F SYST BP LT 130 MM HG: CPT | Performed by: PODIATRIST

## 2023-10-30 RX ORDER — TAMSULOSIN HYDROCHLORIDE 0.4 MG/1
1 CAPSULE ORAL EVERY 12 HOURS SCHEDULED
COMMUNITY
Start: 2023-10-18

## 2023-10-30 NOTE — PROGRESS NOTES
[facility] - PODIATRY    Today's Date: 10/30/23    Patient Name: Willard Lange  MRN: [id number]  CSN: [id number]  PCP: Josselyn Damon APRN, Last PCP Visit: 10/27/2023  Referring Provider: No ref. provider found    SUBJECTIVE     Chief Complaint   Patient presents with    Left Foot - Follow-up, Nail Problem    Right Foot - Follow-up, Nail Problem     HPI: Willard Lange, a 63 y.o.male, comes to clinic.    New, Established, New Problem:  established     Location:  Toenails    Duration:   Greater than five years    Onset:  Gradual    Nature:  sore with palpation.    Stable, worsening, improving:   Stable    Aggravating factors:  Pain with shoe gear and ambulation.    Previous Treatment:  debridement  __________________________________    Patient relates no medical changes since their last visit.    Patient states their most recent blood glucose readin.      Patient denies any fevers, chills, nausea, vomiting, shortness of breathe, nor any other constitutional signs nor symptoms.         Past Medical History:   Diagnosis Date    Arthritis     Asthma     Benign essential hypertension     Brain tumor 2021    Cancer     DDD (degenerative disc disease), lumbar     Diabetes mellitus type 2, noninsulin dependent     Diabetes type 2, controlled     GERD (gastroesophageal reflux disease)     Hepatitis C     HTN (hypertension)     Leg pain     Lumbago 02/15/2017    with low back pain    Lumbar disc herniation 02/15/2017    L4-4    Lumbar spinal stenosis 02/15/2017    L4/5    Muscle cramps     Neuropathy     Prostate cancer      Past Surgical History:   Procedure Laterality Date    APPENDECTOMY      BACK SURGERY  2017    COLONOSCOPY  2017    Dr. Dorsey-Polyps    COLONOSCOPY N/A 2023    Procedure: COLONOSCOPY FOR SCREENING;  Surgeon: Kisha Dorsey MD;  Location: [facility] ENDOSCOPY;  Service: Gastroenterology;  Laterality: N/A;  POOR PREP    CYSTOSCOPY W/ LASER LITHOTRIPSY      for kidney  stones    ENDOSCOPY N/A 2023    Procedure: ESOPHAGOGASTRODUODENOSCOPY WITH BIOPSIES;  Surgeon: Kisha Dorsey MD;  Location: [facility] ENDOSCOPY;  Service: Gastroenterology;  Laterality: N/A;  RETAINED FOOD IN THE STOMACH, GASTRITIS    LAMINECTOMY  2017    L4-5    PROSTATE BIOPSY      transrectal    TONSILLECTOMY       Family History   Problem Relation Age of Onset    Heart disease Father     Lung cancer Sister     Diabetes type I Maternal Grandmother     Malig Hyperthermia Neg Hx      Social History     Socioeconomic History    Marital status:    Tobacco Use    Smoking status: Former     Types: Cigarettes     Start date: 1976     Quit date:      Years since quittin.8     Passive exposure: Past    Smokeless tobacco: Never   Vaping Use    Vaping Use: Never used   Substance and Sexual Activity    Alcohol use: Not Currently    Drug use: Never    Sexual activity: Defer     Allergies   Allergen Reactions    Formaldehyde Rash     Current Outpatient Medications   Medication Sig Dispense Refill    tamsulosin (FLOMAX) 0.4 MG capsule 24 hr capsule Take 1 capsule by mouth Every 12 (Twelve) Hours.      Advair Diskus 250-50 MCG/DOSE DISKUS Inhale 1 puff 2 (Two) Times a Day. (Patient not taking: Reported on 2023)      albuterol sulfate  (90 Base) MCG/ACT inhaler Inhale 2 puffs Every 6 (Six) Hours As Needed.      atorvastatin (LIPITOR) 20 MG tablet Take 1 tablet by mouth Every Night.      baclofen (LIORESAL) 10 MG tablet Take 1 tablet by mouth 3 (Three) Times a Day.      Blood Glucose Monitoring Suppl (FreeStyle Lite) w/Device kit See Admin Instructions.      colestipol (COLESTID) 1 g tablet Take 1 tablet by mouth As Needed.      Continuous Blood Gluc  (FreeStyle Clarisa 2 Alum Bank) device Use 1 each Every 14 (Fourteen) Days. 1 each 0    Continuous Blood Gluc Sensor (FreeStyle Clarisa 2 Sensor) misc Use 1 each Every 14 (Fourteen) Days. 2 each 5    dicyclomine (BENTYL) 20 MG  tablet TAKE 1 TABLET BY MOUTH FOUR TIMES DAILY AS NEEDED FOR UPSET STOMACH      empagliflozin (Jardiance) 25 MG tablet tablet Take 1 tablet by mouth Daily for 180 days. 90 tablet 1    FREESTYLE LITE test strip       glipizide (GLUCOTROL XL) 5 MG ER tablet Take 1 tablet by mouth Daily.      glucose blood test strip FreeStyle Lite Strips   USE TO TEST BLOOD SUGAR FOUR TIMES DAILY AND AS NEEDED      HYDROcodone-acetaminophen (NORCO) 7.5-325 MG per tablet Take 1 tablet by mouth Every 8 (Eight) Hours As Needed.      Lancets (freestyle) lancets       losartan-hydrochlorothiazide (HYZAAR) 100-25 MG per tablet Take 1 tablet by mouth Daily.      omeprazole (priLOSEC) 40 MG capsule Take 1 capsule by mouth Daily.      polyethylene glycol (GoLYTELY) 236 g solution Starting at noon on day prior to procedure, drink 8 ounces every 30 minutes until all gone or stools are clear. May add flavor packet. 4000 mL 0    polyethylene glycol (MIRALAX) 17 g packet Take 17 g by mouth Daily. 5 packet 0    pregabalin (LYRICA) 200 MG capsule Take 1 capsule by mouth Daily.       No current facility-administered medications for this visit.     Review of Systems   Constitutional: Negative.    Skin:         Painful toenails   Neurological:  Positive for numbness.   All other systems reviewed and are negative.      OBJECTIVE     Vitals:    10/30/23 1327   BP: 124/78   Pulse: 68   Temp: 98 °F (36.7 °C)   SpO2: 96%       WBC   Date Value Ref Range Status   08/12/2023 5.93 3.40 - 10.80 10*3/mm3 Final     RBC   Date Value Ref Range Status   08/12/2023 4.58 4.14 - 5.80 10*6/mm3 Final     Hemoglobin   Date Value Ref Range Status   08/12/2023 13.3 13.0 - 17.7 g/dL Final     Hematocrit   Date Value Ref Range Status   08/12/2023 39.2 37.5 - 51.0 % Final     MCV   Date Value Ref Range Status   08/12/2023 85.6 79.0 - 97.0 fL Final     MCH   Date Value Ref Range Status   08/12/2023 29.0 26.6 - 33.0 pg Final     MCHC   Date Value Ref Range Status   08/12/2023  33.9 31.5 - 35.7 g/dL Final     RDW   Date Value Ref Range Status   08/12/2023 12.8 12.3 - 15.4 % Final     RDW-SD   Date Value Ref Range Status   08/12/2023 39.7 37.0 - 54.0 fl Final     MPV   Date Value Ref Range Status   08/12/2023 9.8 6.0 - 12.0 fL Final     Platelets   Date Value Ref Range Status   08/12/2023 247 140 - 450 10*3/mm3 Final     Neutrophil %   Date Value Ref Range Status   08/12/2023 58.8 42.7 - 76.0 % Final     Lymphocyte %   Date Value Ref Range Status   08/12/2023 19.7 19.6 - 45.3 % Final     Monocyte %   Date Value Ref Range Status   08/12/2023 13.8 (H) 5.0 - 12.0 % Final     Eosinophil %   Date Value Ref Range Status   08/12/2023 6.4 (H) 0.3 - 6.2 % Final     Basophil %   Date Value Ref Range Status   08/12/2023 0.8 0.0 - 1.5 % Final     Immature Grans %   Date Value Ref Range Status   08/12/2023 0.5 0.0 - 0.5 % Final     Neutrophils, Absolute   Date Value Ref Range Status   08/12/2023 3.48 1.70 - 7.00 10*3/mm3 Final     Lymphocytes, Absolute   Date Value Ref Range Status   08/12/2023 1.17 0.70 - 3.10 10*3/mm3 Final     Monocytes, Absolute   Date Value Ref Range Status   08/12/2023 0.82 0.10 - 0.90 10*3/mm3 Final     Eosinophils, Absolute   Date Value Ref Range Status   08/12/2023 0.38 0.00 - 0.40 10*3/mm3 Final     Basophils, Absolute   Date Value Ref Range Status   08/12/2023 0.05 0.00 - 0.20 10*3/mm3 Final     Immature Grans, Absolute   Date Value Ref Range Status   08/12/2023 0.03 0.00 - 0.05 10*3/mm3 Final     nRBC   Date Value Ref Range Status   08/12/2023 0.0 0.0 - 0.2 /100 WBC Final         Lab Results   Component Value Date    GLUCOSE 136 (H) 08/12/2023    CALCIUM 9.0 08/12/2023     08/12/2023    K 3.8 08/12/2023    CO2 26.1 08/12/2023     08/12/2023    BUN 10 08/12/2023    CREATININE 1.10 09/28/2023    BCR 12.8 08/12/2023    ANIONGAP 9.9 08/12/2023       Patient seen in no apparent distress.      PHYSICAL EXAM:     Foot/Ankle Exam    GENERAL  Diabetic foot exam performed     Appearance:  obese  Orientation:  AAOx3  Affect:  appropriate  Gait:  unimpaired  Assistance:  independent  Right shoe gear: casual shoe  Left shoe gear: casual shoe    VASCULAR     Right Foot Vascularity   Normal vascular exam    Dorsalis pedis:  Absent  Posterior tibial:  Absent  Skin temperature:  warm  Edema grading:  None  CFT:  4  Pedal hair growth:  Absent  Varicosities:  none     Left Foot Vascularity   Normal vascular exam    Dorsalis pedis:  Absent  Posterior tibial:  Absent  Skin temperature:  warm  Edema grading:  None  CFT:  4  Pedal hair growth:  Absent  Varicosities:  none     NEUROLOGIC     Right Foot Neurologic   Light touch sensation: absent  Vibratory sensation: absent  Hot/Cold sensation: absent  Protective Sensation using English-Oswaldo Monofilament:   Sites tested: 10     Left Foot Neurologic   Light touch sensation: absent  Vibratory sensation: absent  Hot/Cold sensation:  absent  Protective Sensation using English-Oswaldo Monofilament:   Sites tested: 10    MUSCULOSKELETAL     Right Foot Musculoskeletal   Hammertoe:  Second toe, third toe, fourth toe and fifth toe     Left Foot Musculoskeletal   Hammertoe:  Second toe, third toe, fourth toe and fifth toe    MUSCLE STRENGTH     Right Foot Muscle Strength   Foot dorsiflexion:  4  Foot plantar flexion:  4  Foot inversion:  4  Foot eversion:  4     Left Foot Muscle Strength   Foot dorsiflexion:  4  Foot plantar flexion:  4  Foot inversion:  4  Foot eversion:  4    RANGE OF MOTION     Right Foot Range of Motion   Foot and ankle ROM within normal limits       Left Foot Range of Motion   Foot and ankle ROM within normal limits      DERMATOLOGIC      Right Foot Dermatologic   Skin  Right foot skin is intact.   Nails  1.  Positive for elongated, onychomycosis, abnormal thickness, subungual debris and ingrown toenail.  2.  Positive for elongated, onychomycosis, abnormal thickness, subungual debris and ingrown toenail.  3.  Positive for elongated,  onychomycosis, abnormal thickness, subungual debris and ingrown toenail.  4.  Positive for elongated, onychomycosis, abnormal thickness, subungual debris and ingrown toenail.  5.  Positive for elongated, onychomycosis, abnormal thickness, subungual debris and ingrown toenail.  Nails comment:  Toenails 1, 2, 3, 4, and 5     Left Foot Dermatologic   Skin  Left foot skin is intact.   Nails comment:  Toenails 1, 2, 3, 4, and 5  Nails  1.  Positive for elongated, onychomycosis, abnormal thickness, subungual debris and ingrown toenail.  2.  Positive for elongated, onychomycosis, abnormal thickness, subungual debris and ingrown toenail.  3.  Positive for elongated, onychomycosis, abnormal thickness, subungual debris and ingrown toenail.  4.  Positive for elongated, onychomycosis, abnormally thick, subungual debris and ingrown toenail.  5.  Positive for elongated, onychomycosis, abnormally thick, subungual debris and ingrown toenail.    Diabetic Foot Exam Performed and Monofilament Test Performed    ASSESSMENT/PLAN     Diagnoses and all orders for this visit:    1. Non-insulin dependent type 2 diabetes mellitus (Primary)    2. Diabetic foot    3. Onychomycosis    4. Foot pain, bilateral    5. PVD (peripheral vascular disease)    6. Hammer toes of both feet    7. Onychocryptosis    8. Neuropathy    Comprehensive lower extremity examination and evaluation was performed.    Discussed findings and treatment plan including risks, benefits, and treatment options with patient in detail. Patient agreed with treatment plan.    Toenails 1 through 5 bilaterally were debrided in thickness and length and then smoothed with a Dremel Tool.  Tolerated the procedure well without complications.    Diabetic foot exam performed and documented this date, compliant with CQM required standards. Detail of findings as noted in physical exam.  Lower extremity Neurologic exam for diabetic patient performed and documented this date, compliant with  PQRS required standards. Detail of findings as noted in physical exam.  Advised patient importance of good routine lower extremity hygiene. Advised patient importance of evaluating for intact skin and pain free nail borders.  Advised patient to use mirror to evaluate plantar/ soles of feet for better visualization. Advised patient monitor and phone office to be seen if any cracking to skin, open lesions, painful nail borders or if nails become elongated prior to next visit. Advised patient importance of daily cleansing of lower extremities, followed by good skin cream to maintain normal hydration of skin. Also advised patient importance of close daily monitoring of blood sugar. Advised to regulate diet and medications to maintain control of blood sugar in optimal range. Contact primary care provider if difficulties maintaining blood sugar levels.  Advised Patient of presence of Diabetes Mellitus condition.  Advised Patient risk of progression and worsening or improvement, then return of condition.  Will monitor condition for any change in future. Treat with most appropriate treatment pending status of condition.  Counseled and advised patient extensively on nature and ramifications of diabetes. Standard instructions given to patient for good diabetic foot care and maintenance. Advised importance of careful monitoring to avoid break down and complications secondary to diabetes. Advised patient importance of strict maintenance of blood sugar control. Advised patient of possible ominous results from neglect of condition, i.e.: amputation/ loss of digits, feet and legs, or even death.  Patient states understands counseling, will monitor closely, continue good hygiene and routine diabetic foot care. Patient will contact office is questions or problems.      An After Visit Summary was printed and given to the patient at discharge, including (if requested) any available informative/educational handouts regarding diagnosis,  treatment, or medications. All questions were answered to patient/family satisfaction. Should symptoms fail to improve or worsen they agree to call or return to clinic or to go to the Emergency Department. Discussed the importance of following up with any needed screening tests/labs/specialist appointments and any requested follow-up recommended by me today. Importance of maintaining follow-up discussed and patient accepts that missed appointments can delay diagnosis and potentially lead to worsening of conditions.    Return in about 9 weeks (around 1/1/2024) for Toenail Care., or sooner if acute issues arise.    This document has been electronically signed by Sarkis Ureña DPM on October 30, 2023 13:43 EDT

## 2023-10-31 NOTE — PROGRESS NOTES
Chief Complaint  Diabetes (Follow up, clarisa with  )    Referred By: MARIUM Hood    Subjective          Willard Lange presents to [facility] for diabetes medication management    History of Present Illness    Visit type:  follow-up  Diabetes type:  Type 2  Current diabetes status/concerns/issues:  Reports last week his readings were higher. States last wk he had a gastric emptying study which was abnormal. It showed 64% of activity remaining within the stomach after 4 hours.  Reports he had 2 sensors go bad-admits he accidentally knocked them off. Reports he is tolerating the increased Jardiance without any side effects.   Other health concerns: he has to have a repeat colonoscopy next month due to poor prep.  Current Diabetes symptoms:    Polyuria: No   Polydipsia: No   Polyphagia: Yes     Blurred vision: No   Excessive fatigue: Yes    Known Diabetes complications:  Neuropathy: Numbness, Tingling, Burning, and Shooting Pain; Location: Feet, Legs, and Hands  Renal: None  Eyes: None; Location: N/A; Last Eye Exam:  last month ; Location:   Amputation/Wounds:  slow to heal  GI: Pancreatitis (History of)  Cardiovascular: Hypertension and Hyperlipidemia  ED: Patient Reported  Other: None  Hypoglycemia:  None reported at this time and 0% per CGM  Hypoglycemia Symptoms:  No hypoglycemia at this time  Current diabetes treatment:   glipizide 5mg qd , Jardiance 25mg qd    Blood glucose device:  Clarisa CGM  Blood glucose monitoring frequency:  Continuous per CGM  Blood glucose range/average:  The 14-day sensor report shows an average glucose of 128mg/dL, with 92% in target range ( mgdL), 8% in the high range (181-250 mg/dL), 0% in the very high range (>250 mg/dL), 0% in the low range (54-70 mg/dL) and 0% in the very low range (<54 mg/dL).   Glucose Source: Device Reviewed  Dietary behavior:  Limits high carb/sweet foods, Avoids sugary drinks, Number of meals each  day - 2; Number of snacks each day - 1  Activity/Exercise:   he is doing PT for weakness in his hands and legs       Past Medical History:   Diagnosis Date    Arthritis     Asthma     Benign essential hypertension     Brain tumor 2021    Cancer     DDD (degenerative disc disease), lumbar     Diabetes mellitus type 2, noninsulin dependent     Diabetes type 2, controlled     GERD (gastroesophageal reflux disease)     Hepatitis C     HTN (hypertension)     Leg pain     Lumbago 02/15/2017    with low back pain    Lumbar disc herniation 02/15/2017    L4-4    Lumbar spinal stenosis 02/15/2017    L4/5    Muscle cramps     Neuropathy     Prostate cancer      Past Surgical History:   Procedure Laterality Date    APPENDECTOMY      BACK SURGERY  2017    COLONOSCOPY      Dr. Dorsey-Polyps    COLONOSCOPY N/A 2023    Procedure: COLONOSCOPY FOR SCREENING;  Surgeon: Kisha Dorsey MD;  Location: [facility];  Service: Gastroenterology;  Laterality: N/A;  POOR PREP    CYSTOSCOPY W/ LASER LITHOTRIPSY      for kidney stones    ENDOSCOPY N/A 2023    Procedure: ESOPHAGOGASTRODUODENOSCOPY WITH BIOPSIES;  Surgeon: Kisha Dorsey MD;  Location: [facility] ENDOSCOPY;  Service: Gastroenterology;  Laterality: N/A;  RETAINED FOOD IN THE STOMACH, GASTRITIS    LAMINECTOMY  2017    L4-5    PROSTATE BIOPSY      transrectal    TONSILLECTOMY       Family History   Problem Relation Age of Onset    Heart disease Father     Lung cancer Sister     Diabetes type I Maternal Grandmother     Malig Hyperthermia Neg Hx      Social History     Socioeconomic History    Marital status:    Tobacco Use    Smoking status: Former     Types: Cigarettes     Start date: 1976     Quit date:      Years since quittin.8     Passive exposure: Past    Smokeless tobacco: Never   Vaping Use    Vaping Use: Never used   Substance and Sexual Activity    Alcohol use: Not Currently    Drug use: Never    Sexual  activity: Defer     Allergies   Allergen Reactions    Formaldehyde Rash       Current Outpatient Medications:     albuterol sulfate  (90 Base) MCG/ACT inhaler, Inhale 2 puffs Every 6 (Six) Hours As Needed., Disp: , Rfl:     atorvastatin (LIPITOR) 20 MG tablet, Take 1 tablet by mouth Every Night., Disp: , Rfl:     baclofen (LIORESAL) 10 MG tablet, Take 1 tablet by mouth 3 (Three) Times a Day., Disp: , Rfl:     Blood Glucose Monitoring Suppl (FreeStyle Lite) w/Device kit, See Admin Instructions., Disp: , Rfl:     colestipol (COLESTID) 1 g tablet, Take 1 tablet by mouth As Needed., Disp: , Rfl:     Continuous Blood Gluc  (FreeStyle Clarisa 2 Baker) device, Use 1 each Every 14 (Fourteen) Days., Disp: 1 each, Rfl: 0    Continuous Blood Gluc Sensor (FreeStyle Clarisa 2 Sensor) misc, Use 1 each Every 14 (Fourteen) Days., Disp: 2 each, Rfl: 5    dicyclomine (BENTYL) 20 MG tablet, TAKE 1 TABLET BY MOUTH FOUR TIMES DAILY AS NEEDED FOR UPSET STOMACH, Disp: , Rfl:     empagliflozin (Jardiance) 25 MG tablet tablet, Take 1 tablet by mouth Daily for 180 days., Disp: 90 tablet, Rfl: 1    FREESTYLE LITE test strip, , Disp: , Rfl:     glipizide (GLUCOTROL XL) 5 MG ER tablet, Take 1 tablet by mouth Daily for 180 days., Disp: 90 tablet, Rfl: 1    glucose blood test strip, FreeStyle Lite Strips  USE TO TEST BLOOD SUGAR FOUR TIMES DAILY AND AS NEEDED, Disp: , Rfl:     HYDROcodone-acetaminophen (NORCO) 7.5-325 MG per tablet, Take 1 tablet by mouth Every 8 (Eight) Hours As Needed., Disp: , Rfl:     Lancets (freestyle) lancets, , Disp: , Rfl:     losartan-hydrochlorothiazide (HYZAAR) 100-25 MG per tablet, Take 1 tablet by mouth Daily., Disp: , Rfl:     omeprazole (priLOSEC) 40 MG capsule, Take 1 capsule by mouth Daily., Disp: , Rfl:     polyethylene glycol (GoLYTELY) 236 g solution, Starting at noon on day prior to procedure, drink 8 ounces every 30 minutes until all gone or stools are clear. May add flavor packet., Disp: 4000  "mL, Rfl: 0    polyethylene glycol (MIRALAX) 17 g packet, Take 17 g by mouth Daily., Disp: 5 packet, Rfl: 0    pregabalin (LYRICA) 200 MG capsule, Take 1 capsule by mouth Daily., Disp: , Rfl:     tamsulosin (FLOMAX) 0.4 MG capsule 24 hr capsule, Take 1 capsule by mouth Every 12 (Twelve) Hours., Disp: , Rfl:     Advair Diskus 250-50 MCG/DOSE DISKUS, Inhale 1 puff 2 (Two) Times a Day. (Patient not taking: Reported on 11/2/2023), Disp: , Rfl:     Objective     Vitals:    11/02/23 1349   BP: 118/60   BP Location: Right arm   Patient Position: Sitting   Cuff Size: Adult   Pulse: 65   SpO2: 98%   Weight: 108 kg (237 lb 3.2 oz)   Height: 182.9 cm (72\")     Body mass index is 32.17 kg/m².    Physical Exam  Constitutional:       Appearance: Normal appearance. He is obese.      Comments: Obesity (BMI 30 - 39.9) Pt Current BMI = 32.17     HENT:      Head: Normocephalic and atraumatic.      Right Ear: External ear normal.      Left Ear: External ear normal.      Nose: Nose normal.   Eyes:      Extraocular Movements: Extraocular movements intact.      Conjunctiva/sclera: Conjunctivae normal.   Pulmonary:      Effort: Pulmonary effort is normal.   Musculoskeletal:         General: Normal range of motion.      Cervical back: Normal range of motion.   Skin:     General: Skin is warm and dry.   Neurological:      General: No focal deficit present.      Mental Status: He is alert and oriented to person, place, and time. Mental status is at baseline.   Psychiatric:         Mood and Affect: Mood normal.         Behavior: Behavior normal.         Thought Content: Thought content normal.         Judgment: Judgment normal.             Result Review :   The following data was reviewed by: MARIUM Powers on 11/02/2023:    Most Recent A1C          11/2/2023    14:01   HGBA1C Most Recent   Hemoglobin A1C 6        A1C Last 3 Results          8/30/2023    13:19 11/2/2023    14:01   HGBA1C Last 3 Results   Hemoglobin A1C 6.4  6  "     A1c collected in the office today is 6%, indicating Controlled Type II diabetes.  This result is down from the prior result of 6.4% collected on 8/30/23     Glucose   Date Value Ref Range Status   11/02/2023 160 (H) 70 - 99 mg/dL Final     Comment:     Serial Number: [id number]:  [id number]     Point of care glucose in the office today is within normal limits for nonfasting glucose    Creatinine   Date Value Ref Range Status   09/28/2023 1.10 mg/dL Final     Comment:     Serial Number: [id number]:  [id number]   08/12/2023 0.78 0.76 - 1.27 mg/dL Final   08/10/2023 0.71 (L) 0.76 - 1.27 mg/dL Final   07/23/2021 0.90 mg/dL Final     Comment:     Serial Number: [id number]:  [id number]     eGFR   Date Value Ref Range Status   09/28/2023 75.4 >60.0 mL/min/1.73 Final   08/12/2023 100.2 >60.0 mL/min/1.73 Final     Labs collected on 9/28/23 show Stage II mild (GFR = 60-89mL/min)        Total Cholesterol   Date Value Ref Range Status   08/10/2023 168 0 - 200 mg/dL Final     Triglycerides   Date Value Ref Range Status   08/10/2023 127 0 - 150 mg/dL Final     HDL Cholesterol   Date Value Ref Range Status   08/10/2023 36 (L) 40 - 60 mg/dL Final     LDL Cholesterol    Date Value Ref Range Status   08/10/2023 109 (H) 0 - 100 mg/dL Final     Lipid panel collected on 8/10/23 shows Hyperlipidemia and low HDL            Assessment: Pt is here today for a 1 month follow up on his diabetes. Last visit his Jardiance was increased to 25mg qd. Reports he is tolerating this medication well without any side effects. Reviewed CGM report with pt in the office today.   The 14-day sensor report shows an average glucose of 128mg/dL, with 92% in target range ( mgdL), 8% in the high range (181-250 mg/dL), 0% in the very high range (>250 mg/dL), 0% in the low range (54-70 mg/dL) and 0% in the very low range (<54 mg/dL). His A1c in the office is 6% which is down from his previous A1c of 6.4% in August. In addition, he had a Gastric  emptying study last wk. Reviewed gastric emptying study which showed 64% of activity remaining within the stomach after 4 hours.  Findings may be related to mechanical gastric outlet obstruction or gastroparesis. Lastly, he admits he has to have a repeat colonoscopy next wk due to poor prep.       Diagnoses and all orders for this visit:    1. Type 2 diabetes mellitus with hemoglobin A1c goal of less than 7.0% (Primary)    2. Type 2 diabetes mellitus with hyperglycemia, unspecified whether long term insulin use  -     POC Glycosylated Hemoglobin (Hb A1C)  -     glipizide (GLUCOTROL XL) 5 MG ER tablet; Take 1 tablet by mouth Daily for 180 days.  Dispense: 90 tablet; Refill: 1  -     Microalbumin / Creatinine Urine Ratio - Urine, Clean Catch; Future    3. Type 2 diabetes mellitus with diabetic neuropathy, without long-term current use of insulin    4. Obesity (BMI 30-39.9)    Other orders  -     POC Glucose        Plan: no changes were made to his plan of care in the office today. Scheduled a 3 month follow up and we will recheck his A1c at that time.     The patient will monitor his blood glucose levels per CGM.  If he develops problematic hyperglycemia or hypoglycemia or adverse drug reactions, he will contact the office for further instructions.        Follow Up     Return in about 3 months (around 2/2/2024) for CGM Follow Up, Medication Mgmt.    Patient was given instructions and counseling regarding his condition or for health maintenance advice. Please see specific information pulled into the AVS if appropriate.     Zuri Helton, APRN  11/02/2023      Dictated Utilizing Dragon Dictation.  Please note that portions of this note were completed with a voice recognition program.  Part of this note may be an electronic transcription/translation of spoken language to printed text using the Dragon Dictation System.

## 2023-11-02 ENCOUNTER — OFFICE VISIT (OUTPATIENT)
Dept: DIABETES SERVICES | Facility: HOSPITAL | Age: 63
End: 2023-11-02
Payer: MEDICARE

## 2023-11-02 VITALS
HEART RATE: 65 BPM | WEIGHT: 237.2 LBS | HEIGHT: 72 IN | BODY MASS INDEX: 32.13 KG/M2 | DIASTOLIC BLOOD PRESSURE: 60 MMHG | OXYGEN SATURATION: 98 % | SYSTOLIC BLOOD PRESSURE: 118 MMHG

## 2023-11-02 DIAGNOSIS — E11.9 TYPE 2 DIABETES MELLITUS WITH HEMOGLOBIN A1C GOAL OF LESS THAN 7.0%: Primary | ICD-10-CM

## 2023-11-02 DIAGNOSIS — E66.9 OBESITY (BMI 30-39.9): ICD-10-CM

## 2023-11-02 DIAGNOSIS — E11.65 TYPE 2 DIABETES MELLITUS WITH HYPERGLYCEMIA, UNSPECIFIED WHETHER LONG TERM INSULIN USE: ICD-10-CM

## 2023-11-02 DIAGNOSIS — E11.40 TYPE 2 DIABETES MELLITUS WITH DIABETIC NEUROPATHY, WITHOUT LONG-TERM CURRENT USE OF INSULIN: ICD-10-CM

## 2023-11-02 LAB
EXPIRATION DATE: ABNORMAL
GLUCOSE BLDC GLUCOMTR-MCNC: 160 MG/DL (ref 70–99)
HBA1C MFR BLD: 6 % (ref 4.5–5.7)
Lab: ABNORMAL

## 2023-11-02 PROCEDURE — G0463 HOSPITAL OUTPT CLINIC VISIT: HCPCS | Performed by: NURSE PRACTITIONER

## 2023-11-02 PROCEDURE — 82948 REAGENT STRIP/BLOOD GLUCOSE: CPT | Performed by: NURSE PRACTITIONER

## 2023-11-02 RX ORDER — GLIPIZIDE 5 MG/1
5 TABLET, FILM COATED, EXTENDED RELEASE ORAL DAILY
Qty: 90 TABLET | Refills: 1 | Status: SHIPPED | OUTPATIENT
Start: 2023-11-02 | End: 2024-04-30

## 2023-11-13 ENCOUNTER — TELEPHONE (OUTPATIENT)
Dept: GASTROENTEROLOGY | Facility: CLINIC | Age: 63
End: 2023-11-13
Payer: MEDICARE

## 2023-11-13 NOTE — TELEPHONE ENCOUNTER
Hub staff attempted to follow warm transfer process and was unsuccessful     Caller: CHHAYA JOHN    Relationship to patient: SELF    Best call back number: [phone]     Patient is needing: MR. JOHN CALLED INTO TODAY REGARDING HIS PREP FOR HIS COLONOSCOPY SCHEDULED ON 11/16/23. HE HAD DIFFICULTY WITH THE EMPTYING. WAS TOLD HE POSSIBLY SHOULD DO A 2 DAY PREP. PLEASE REVIEW AND ADVISE.    OK TO CALL BACK ANYTIME. OK TO LEAVE .

## 2023-11-14 NOTE — TELEPHONE ENCOUNTER
Patient called back and was notified of the providers advice and verbalized understanding. Patient is coming to get samples

## 2023-11-16 ENCOUNTER — TELEPHONE (OUTPATIENT)
Dept: GASTROENTEROLOGY | Facility: CLINIC | Age: 63
End: 2023-11-16
Payer: MEDICARE

## 2023-11-16 RX ORDER — POLYETHYLENE GLYCOL 3350 17 G/17G
17 POWDER, FOR SOLUTION ORAL DAILY
Qty: 3 PACKET | Refills: 0 | COMMUNITY
Start: 2023-11-16

## 2023-11-16 RX ORDER — POLYETHYLENE GLYCOL 3350 17 G/17G
17 POWDER, FOR SOLUTION ORAL DAILY
Qty: 2 PACKET | Refills: 0 | COMMUNITY
Start: 2023-11-16

## 2023-11-16 NOTE — TELEPHONE ENCOUNTER
Patient came and picked this miralax up yesterday and took it as advised. Patient called this morning and reported that he had to cancel his scope again this morning because he was still not cleaned out completely.

## 2023-11-17 NOTE — PROGRESS NOTES
Follow Up Office Visit      Encounter Date: 11/20/2023   Patient Name: Willard Lange  YOB: 1960   Medical Record Number: [id number]   Primary Diagnosis: Prostate cancer [C61]     Cancer Staging   Prostate cancer  Staging form: Prostate, AJCC 8th Edition  - Clinical stage from 10/24/2019: Stage I (cT1c, cN0, cM0, PSA: 5.6, Grade Group: 1) - Signed by Shi Cortez APRN on 11/17/2022    Radiation Completion Date:  2/28/2020     Chief Complaint:    Chief Complaint   Patient presents with    Follow-up    Prostate Cancer       Oncology/Hematology History   Prostate cancer   10/24/2019 Cancer Staged    Staging form: Prostate, AJCC 8th Edition  - Clinical stage from 10/24/2019: Stage I (cT1c, cN0, cM0, PSA: 5.6, Grade Group: 1) - Signed by Shi Cortez APRN on 11/17/2022 1/22/2020 - 8/28/2020 Radiation    Radiation OncologyTreatment Course:  Willard Lange received 7000 cGy in 28 fractions to prostate and seminal vesicles.      8/25/2021 Initial Diagnosis    Prostate cancer (HCC)         History of Present Illness: Willard Lange is a 63 y.o. male who returns to [facility] for routine follow-up. He did not have a PSA level drawn prior to today's visit. He reports feeling well overall and denies any significant change in urinary function since prior visit.  He has ongoing urgency, weak stream, occasional small-volume urge incontinence and postvoid dribbling. Nocturia x1. Denies dysuria or hematuria. He is taking Flomax twice a day. He followed up with Urology on 8/29/23; note reviewed. He has been prescribed Trimix injections for ED but has not yet picked up the prescription from the pharmacy due to cost. He has previously tried Cialis and Viagra with no benefit. He has chronic bowel movements fluctuating between constipation and diarrhea for which he takes Miralax and colestipol. He followed up with Vascular Surgery on 9/9/23; note reviewed. He underwent flexible sigmoidoscopy and  EGD with Dr. Dorsey on 9/14/23. Recommendation to repeat colonoscopy anytime within next 6 months because the bowel preparation was poor. EGD showed gastritis. He followed up with Neurosurgery on 9/28/23; note reviewed. They have referred him to Pain Management for his ongoing neck pain. He is currently going to Physical Therapy twice a week. He is also followed by Pain Management for chronic low back pain. He has a spinal cord stimulator which helps some with his back pain but he has significant numbness in his feet and imbalance due to diabetic peripheral neuropathy.  Recent Hb A1C on 11/2/23 is 6.      Subjective      Review of Systems: Review of Systems   Constitutional:  Positive for fatigue (9/10). Negative for appetite change and unexpected weight change (Intentional weight loss due to improved nutrition.).   HENT:  Negative for hearing loss, sore throat and trouble swallowing.    Eyes:  Positive for visual disturbance (Ongoing).   Respiratory:  Negative for cough, chest tightness, shortness of breath and wheezing.    Cardiovascular:  Negative for chest pain, palpitations and leg swelling.   Gastrointestinal:  Positive for constipation (Comes and goes, takes miralax as needed.  No bm since Friday.) and diarrhea (Comes and goes, takes colestipid as needed.). Negative for abdominal distention, abdominal pain, anal bleeding, blood in stool, nausea and vomiting.        Occasional indigestion.   Genitourinary:  Positive for difficulty urinating (Occasional hesitancy initiating stream.) and urgency (Ongoing). Negative for dysuria, frequency and hematuria.        Noc x1  Weak stream, stream starts and stops.  Occasional leakage     Musculoskeletal:  Positive for arthralgias (Chronic neck, knee and foot pain), back pain (Chronic) and gait problem (Walks with a cane.). Negative for joint swelling.   Skin:  Negative for color change and rash.   Neurological:  Positive for dizziness (Occasionally will lose balance and  fall, ongoing), weakness (In hands, r/t arthritis- worsening.  Goes to physical therapy twice a week.), numbness (In feet, states that the numbness is causing him to fall at times.  Hands and arms.) and headaches (Occasional, noted primarily at night.).   Psychiatric/Behavioral:  Positive for sleep disturbance (Trouble falling and staying asleep, takes trazadone with little relief.).      The following portions of the patient's history were reviewed and updated as appropriate: allergies, current medications, past family history, past medical history, past social history, past surgical history and problem list.    Medications:     Current Outpatient Medications:     albuterol sulfate  (90 Base) MCG/ACT inhaler, Inhale 2 puffs Every 6 (Six) Hours As Needed., Disp: , Rfl:     atorvastatin (LIPITOR) 20 MG tablet, Take 1 tablet by mouth Every Night., Disp: , Rfl:     baclofen (LIORESAL) 10 MG tablet, Take 1 tablet by mouth 3 (Three) Times a Day., Disp: , Rfl:     colestipol (COLESTID) 1 g tablet, Take 1 tablet by mouth As Needed., Disp: , Rfl:     dicyclomine (BENTYL) 20 MG tablet, TAKE 1 TABLET BY MOUTH FOUR TIMES DAILY AS NEEDED FOR UPSET STOMACH, Disp: , Rfl:     empagliflozin (Jardiance) 25 MG tablet tablet, Take 1 tablet by mouth Daily for 180 days., Disp: 90 tablet, Rfl: 1    glipizide (GLUCOTROL XL) 5 MG ER tablet, Take 1 tablet by mouth Daily for 180 days., Disp: 90 tablet, Rfl: 1    HYDROcodone-acetaminophen (NORCO) 7.5-325 MG per tablet, Take 1 tablet by mouth Every 8 (Eight) Hours As Needed., Disp: , Rfl:     losartan-hydrochlorothiazide (HYZAAR) 100-25 MG per tablet, Take 1 tablet by mouth Daily., Disp: , Rfl:     omeprazole (priLOSEC) 40 MG capsule, Take 1 capsule by mouth Daily., Disp: , Rfl:     pregabalin (LYRICA) 200 MG capsule, Take 1 capsule by mouth 3 times a day., Disp: , Rfl:     tamsulosin (FLOMAX) 0.4 MG capsule 24 hr capsule, Take 1 capsule by mouth Every 12 (Twelve) Hours., Disp: 30  capsule, Rfl: 5    TRAZODONE HCL PO, Take 1 tablet by mouth Every Night., Disp: , Rfl:     Blood Glucose Monitoring Suppl (FreeStyle Lite) w/Device kit, See Admin Instructions., Disp: , Rfl:     Continuous Blood Gluc  (FreeStyle Clarisa 2 Neola) device, Use 1 each Every 14 (Fourteen) Days., Disp: 1 each, Rfl: 0    Continuous Blood Gluc Sensor (FreeStyle Clarisa 2 Sensor) misc, Use 1 each Every 14 (Fourteen) Days., Disp: 2 each, Rfl: 5    FREESTYLE LITE test strip, , Disp: , Rfl:     glucose blood test strip, FreeStyle Lite Strips  USE TO TEST BLOOD SUGAR FOUR TIMES DAILY AND AS NEEDED, Disp: , Rfl:     Lancets (freestyle) lancets, , Disp: , Rfl:     polyethylene glycol (GoLYTELY) 236 g solution, Starting at noon on day prior to procedure, drink 8 ounces every 30 minutes until all gone or stools are clear. May add flavor packet., Disp: 4000 mL, Rfl: 0    polyethylene glycol (MIRALAX) 17 g packet, Take 17 g by mouth Daily., Disp: 5 packet, Rfl: 0    polyethylene glycol (MIRALAX) 17 g packet, Take 17 g by mouth Daily., Disp: 2 packet, Rfl: 0    polyethylene glycol (MIRALAX) 17 g packet, Take 17 g by mouth Daily., Disp: 3 packet, Rfl: 0    Allergies:   Allergies   Allergen Reactions    Formaldehyde Rash       Patient Smoking History:   Social History     Tobacco Use   Smoking Status Former    Packs/day: .75    Types: Cigarettes    Start date: 1976    Quit date:     Years since quittin.8    Passive exposure: Past   Smokeless Tobacco Never       Measures:  PHQ-9 Total Score: 15   Patient screened positive for depression based on a PHQ-9 score of 15 on 2023. Follow-up recommendations include: Elevated PHQ score reflective of acute illness, not depression.   Quality of Life: 90 - Limited Activity   ECOG score: 0  ECOG: (1) Restricted in physically strenuous activity, ambulatory and able to do work of light nature  Pain: (on a scale of 0-10)   Pain Score    23 1312   PainSc:   9   PainLoc:  Generalized     Willard Lange reports a pain score of 9.  Given his pain assessment as noted, treatment options were discussed and the following options were decided upon as a follow-up plan to address the patient's pain: continuation of current treatment plan for pain and use of non-medical modalities (ice, heat, stretching and/or behavior modifications). Followed by Pain Management.     Objective     Physical Exam:   Vital Signs:   Vitals:    11/20/23 1312   BP: 115/57   Pulse: 68   Resp: 20   Temp: 98.1 °F (36.7 °C)   TempSrc: Temporal   SpO2: 93%   Weight: 107 kg (235 lb 7.2 oz)   PainSc:   9   PainLoc: Generalized     Body mass index is 31.93 kg/m².   Wt Readings from Last 3 Encounters:   11/20/23 107 kg (235 lb 7.2 oz)   11/02/23 108 kg (237 lb 3.2 oz)   10/30/23 108 kg (237 lb)       Physical Exam  Vitals reviewed.   Constitutional:       General: He is not in acute distress.     Appearance: Normal appearance. He is normal weight. He is not ill-appearing.   HENT:      Head: Normocephalic and atraumatic.      Mouth/Throat:      Mouth: Mucous membranes are moist.      Pharynx: Oropharynx is clear. No oropharyngeal exudate or posterior oropharyngeal erythema.   Eyes:      Conjunctiva/sclera: Conjunctivae normal.      Pupils: Pupils are equal, round, and reactive to light.   Neck:      Comments: Decreased ROM 2/2 pain    Cardiovascular:      Rate and Rhythm: Normal rate and regular rhythm.      Pulses: Normal pulses.      Heart sounds: Normal heart sounds.   Pulmonary:      Effort: Pulmonary effort is normal. No respiratory distress.      Breath sounds: Normal breath sounds.   Musculoskeletal:      Comments: Decreased ROM RUE reportedly 2/2 prior rotator cuff injury   Skin:     General: Skin is warm and dry.   Neurological:      General: No focal deficit present.      Mental Status: He is alert and oriented to person, place, and time. Mental status is at baseline.      Gait: Gait abnormal (ambulates with cane).    Psychiatric:         Mood and Affect: Mood normal.         Behavior: Behavior normal.       Result Review: I independently reviewed the following data. I discussed and reviewed imaging with Dr. Buchanan. The pertinent findings are as above in the HPI.  -- FLEXIBLE SIGMOIDOSCOPY (09/14/2023 08:27)   -- UPPER GI ENDOSCOPY (09/14/2023 08:26)   -- NM Gastric Emptying (10/24/2023 12:08)   -- MRI abdomen w wo contrast mrcp (09/28/2023 13:40)   -- CT Abdomen Pelvis With Contrast (08/08/2023 17:13)   -- Doppler Arterial Multi Level Lower Extremity - Bilateral CAR (09/05/2023 14:24)   -- Duplex Carotid Ultrasound CAR (09/05/2023 13:40)   -- MRI Brain With & Without Contrast (09/07/2023 14:27)   -- MRI Cervical Spine Without Contrast (09/07/2023 13:59)     Pathology:   Lab Results   Component Value Date    CLININFO  09/14/2023     Diarrhea, unspecified type  Incontinence of feces with fecal urgency  Gastroesophageal reflux disease, unspecified whether esophagitis present  Adenomatous polyp of colon, unspecified part of colon      FINALDX  09/14/2023     Stomach, antrum, biopsy:   - Gastric antrum mucosa with mild chronic inactive gastritis   - Negative for Helicobacter pylori on routine H&E stain   - Negative for intestinal metaplasia, dysplasia and malignancy         Imaging: NM Gastric Emptying    Result Date: 10/24/2023    1. Abnormal nuclear medicine gastric emptying study with 64% of activity remaining within the stomach after 4 hours.  Findings may be related to mechanical gastric outlet obstruction or gastroparesis.     OLVIN JULIAN MD       Electronically Signed and Approved By: OLVIN JULIAN MD on 10/24/2023 at 12:33             MRI abdomen w wo contrast mrcp    Result Date: 9/28/2023    1. Decrease intrinsic T1 signal throughout the pancreas likely related to recent prior pancreatitis or chronic pancreatitis.  No evidence of residual acute inflammation or pseudocyst formation. 2. Normal caliber common bile duct  without evidence of choledocholithiasis.      NADEGE LEON MD       Electronically Signed and Approved By: NADEGE LEON MD on 9/28/2023 at 16:27             MRI Cervical Spine Without Contrast    Result Date: 9/7/2023    1. Degenerative changes. 2. Varying degrees of canal stenosis and neural foraminal narrowing with level by level analysis discussed above.  Overall there does not appear to be a significant interval change.     HÉCTOR JARAMILLO MD       Electronically Signed and Approved By: HÉCTOR JARAMILLO MD on 9/07/2023 at 17:06             MRI Brain With & Without Contrast    Result Date: 9/7/2023    1. Stable extra-axial enhancing lesion along inferior right cerebellum, most compatible with meningioma. 2. No acute intracranial process identified. 3. Findings suggestive of mild chronic small vessel ischemic disease.      JORY AMADOR MD       Electronically Signed and Approved By: JORY AMADOR MD on 9/07/2023 at 16:56               Labs:   WBC   Date Value Ref Range Status   08/12/2023 5.93 3.40 - 10.80 10*3/mm3 Final     Hemoglobin   Date Value Ref Range Status   08/12/2023 13.3 13.0 - 17.7 g/dL Final     Hematocrit   Date Value Ref Range Status   08/12/2023 39.2 37.5 - 51.0 % Final     Platelets   Date Value Ref Range Status   08/12/2023 247 140 - 450 10*3/mm3 Final     TSH   Date Value Ref Range Status   03/26/2020 1.460 0.270 - 4.200 m[iU]/L Final      PSA   Date Value Ref Range Status   11/20/2023 0.049 0.000 - 4.000 ng/mL Final   05/18/2023 0.065 0.000 - 4.000 ng/mL Final   11/17/2022 0.062 0.000 - 4.000 ng/mL Final   02/15/2022 0.086 0.000 - 4.000 ng/mL Final   08/25/2021 0.178 0.000 - 4.000 ng/mL Final   02/22/2021 0.20 0.00 - 4.00 ng/mL Final   11/17/2020 0.28 0.00 - 4.00 ng/mL Final   08/12/2020 0.71 0.00 - 4.00 ng/mL Final   03/26/2020 1.16 0.00 - 4.00 ng/mL Final   10/07/2019 5.64 (H) 0.00 - 4.00 ng/mL Final     Assessment / Plan      Impression: Willard Lange is a pleasant 63 y.o. male with  cT1c prostate cancer, Batool 3+3=6, ipsa 5.64.  Status post external beam radiotherapy, completed on 2/28/2020. He is doing reasonably well overall but does continue to experience chronic lower urinary tract outlet obstruction symptoms and erectile dysfunction. His urinary symptoms are essentially unchanged since prior visit. He is taking Flomax twice daily. His AUA Symptom Score today is 20 (score of 28 at last visit and score of 27 at consultation). His PSA on 5/18/23 is 0.065 ng/mL. Awaiting results of PSA level drawn today.      Erectile dysfunction: pertinent history likely contributing factors to his ED include chronic low back pain with spinal cord stimulator in place, PVD, and diabetes. He is currently prescribed Trimix injections for ED as directed by Urology but has not yet picked up the prescription from the pharmacy due to cost. He has previously tried Cialis and Viagra with no benefit. Today I explained that I do not recommend taking high-dose Flomax combined with Cialis or Viagra due to increased risk of potential side effects.     Cervical pain with upper extremity weakness and headaches: History of benign meningoma diagnosed in 12/2020 (no surgical intervention).  MRI of cervical spine on 9/7/23 demonstrates multilevel spondylosis. EMG with Dr. Cook demonstrating severe peripheral polyneuropathy. MRI of the brain on 9/7/23 demonstrates a small stable meningioma in the right cerebellum. Neurosurgery has referred him to Pain Management for his ongoing neck pain.      Diarrhea/fecal urgency and incontinence: currently taking colestipol. He underwent flexible sigmoidoscopy and EGD with Dr. Dorsey on 9/14/23. Recommendation to repeat colonoscopy anytime within next 6 months because the bowel preparation was poor. EGD showed gastritis. He was planned to undergo repeat colonoscopy last week but had to cancel due to not being cleared out after prep. He plans to contact their office today to  reschedule.      Former Smoker: quit smoking in 2005. Does not meet criteria for LDCT for lung cancer screening.    Chronic low back pain: followed by Pain Management for chronic low back pain. He has a spinal cord stimulator which helps some with his back pain but he has significant numbness in his feet and imbalance due to diabetic peripheral neuropathy.     PVD: followed by Vascular Surgery Dr. Solo for PVD and bilateral carotid artery stenosis.     Assessment/Plan:   Diagnoses and all orders for this visit:    1. Prostate cancer (Primary)  -     PSA Diagnostic; Future  -     tamsulosin (FLOMAX) 0.4 MG capsule 24 hr capsule; Take 1 capsule by mouth Every 12 (Twelve) Hours.  Dispense: 30 capsule; Refill: 5    2. Personal history of radiation therapy    3. Encounter for follow-up surveillance of prostate cancer  -     PSA Diagnostic    4. Benign localized prostatic hyperplasia with lower urinary tract symptoms (LUTS)  -     tamsulosin (FLOMAX) 0.4 MG capsule 24 hr capsule; Take 1 capsule by mouth Every 12 (Twelve) Hours.  Dispense: 30 capsule; Refill: 5    5. Erectile dysfunction, unspecified erectile dysfunction type    6. History of meningioma of the brain    7. Diabetic polyneuropathy associated with type 2 diabetes mellitus    8. PVD (peripheral vascular disease)    9. Personal history of prostate cancer  -     PSA Diagnostic       Follow Up:   Awaiting results of PSA level obtained today. Will contact him with results.  Return for follow-up in 6 months with PSA prior. Continue Flomax BID as prescribed.  Follow-up with Urology on 11/30/23.  Follow-up with Gastroenterology on 1/3/24.  Follow-up with MARIUM Al Diabetes Educator as scheduled.    Follow-up with Dr. Solo/Vascular Surgery as scheduled.     Follow-up with Neurosurgery as scheduled.      Return in about 6 months (around 5/20/2024) for Office Visit.  Willard Lange was encouraged to contact me in the interim with any questions or concerns  regarding his care.    ADDENDUM on 11/21/23: his PSA on 11/20/23 is 0.049 ng/mL, which is decreased from prior PSA of 0.065 ng/mL. Will repeat in 6 months.     MARIUM Singleton  Radiation Oncology  [facility]    This document has been signed by MARIUM Wu on November 21, 2023 07:47 EST

## 2023-11-20 ENCOUNTER — OFFICE VISIT (OUTPATIENT)
Dept: RADIATION ONCOLOGY | Facility: HOSPITAL | Age: 63
End: 2023-11-20
Payer: MEDICARE

## 2023-11-20 VITALS
TEMPERATURE: 98.1 F | BODY MASS INDEX: 31.93 KG/M2 | HEART RATE: 68 BPM | DIASTOLIC BLOOD PRESSURE: 57 MMHG | SYSTOLIC BLOOD PRESSURE: 115 MMHG | WEIGHT: 235.45 LBS | RESPIRATION RATE: 20 BRPM | OXYGEN SATURATION: 93 %

## 2023-11-20 DIAGNOSIS — Z85.46 PERSONAL HISTORY OF PROSTATE CANCER: ICD-10-CM

## 2023-11-20 DIAGNOSIS — Z85.46 ENCOUNTER FOR FOLLOW-UP SURVEILLANCE OF PROSTATE CANCER: ICD-10-CM

## 2023-11-20 DIAGNOSIS — C61 PROSTATE CANCER: Primary | ICD-10-CM

## 2023-11-20 DIAGNOSIS — Z08 ENCOUNTER FOR FOLLOW-UP SURVEILLANCE OF PROSTATE CANCER: ICD-10-CM

## 2023-11-20 DIAGNOSIS — E11.42 DIABETIC POLYNEUROPATHY ASSOCIATED WITH TYPE 2 DIABETES MELLITUS: ICD-10-CM

## 2023-11-20 DIAGNOSIS — N40.1 BENIGN LOCALIZED PROSTATIC HYPERPLASIA WITH LOWER URINARY TRACT SYMPTOMS (LUTS): ICD-10-CM

## 2023-11-20 DIAGNOSIS — I73.9 PVD (PERIPHERAL VASCULAR DISEASE): ICD-10-CM

## 2023-11-20 DIAGNOSIS — Z86.011 HISTORY OF MENINGIOMA OF THE BRAIN: ICD-10-CM

## 2023-11-20 DIAGNOSIS — Z92.3 PERSONAL HISTORY OF RADIATION THERAPY: ICD-10-CM

## 2023-11-20 DIAGNOSIS — N52.9 ERECTILE DYSFUNCTION, UNSPECIFIED ERECTILE DYSFUNCTION TYPE: ICD-10-CM

## 2023-11-20 LAB — PSA SERPL-MCNC: 0.05 NG/ML (ref 0–4)

## 2023-11-20 PROCEDURE — 84153 ASSAY OF PSA TOTAL: CPT | Performed by: NURSE PRACTITIONER

## 2023-11-20 PROCEDURE — 36415 COLL VENOUS BLD VENIPUNCTURE: CPT | Performed by: NURSE PRACTITIONER

## 2023-11-20 PROCEDURE — G0463 HOSPITAL OUTPT CLINIC VISIT: HCPCS | Performed by: NURSE PRACTITIONER

## 2023-11-20 RX ORDER — TAMSULOSIN HYDROCHLORIDE 0.4 MG/1
1 CAPSULE ORAL EVERY 12 HOURS SCHEDULED
Qty: 30 CAPSULE | Refills: 5 | Status: SHIPPED | OUTPATIENT
Start: 2023-11-20

## 2023-11-30 ENCOUNTER — TELEPHONE (OUTPATIENT)
Dept: UROLOGY | Facility: CLINIC | Age: 63
End: 2023-11-30

## 2023-11-30 NOTE — TELEPHONE ENCOUNTER
Provider: ALLA AMARO    Caller: Willard Lange     Relationship to Patient: SELF    Phone Number: [phone]     Reason for Call: APPT TODAY WAS RESCHEDULED, PT HAD A CONFLICTING APPT    PT HAS [facility] INSURANCE AND WILL BE CHANGING TO SOMETHING ELSE FOR NEXT YEAR

## 2023-12-12 DIAGNOSIS — E11.65 TYPE 2 DIABETES MELLITUS WITH HYPERGLYCEMIA, UNSPECIFIED WHETHER LONG TERM INSULIN USE: ICD-10-CM

## 2023-12-12 RX ORDER — GLIPIZIDE 5 MG/1
5 TABLET, FILM COATED, EXTENDED RELEASE ORAL DAILY
Qty: 90 TABLET | Refills: 1 | Status: SHIPPED | OUTPATIENT
Start: 2023-12-12 | End: 2024-06-09

## 2023-12-12 NOTE — TELEPHONE ENCOUNTER
Caller: Willard Lange    Relationship: Self    Best call back number:     [phone]       Requested Prescriptions:   Requested Prescriptions     Pending Prescriptions Disp Refills    glipizide (GLUCOTROL XL) 5 MG ER tablet 90 tablet 1     Sig: Take 1 tablet by mouth Daily for 180 days.        Pharmacy where request should be sent:  [id number] - [place], [place] - [address] AT [facility] - [phone]  - [phone]  [address] [facility] [address] Phone: [phone] Fax: [phone] Hours: Not open 24 hours     Last office visit with prescribing clinician: 11/2/2023   Last telemedicine visit with prescribing clinician: Visit date not found   Next office visit with prescribing clinician: Visit date not found     Additional details provided by patient: PT IS ASKING FOR A 90 DAY SUPPLY.     Does the patient have less than a 3 day supply:  [x] Yes  [] No    Would you like a call back once the refill request has been completed: [x] Yes [] No    If the office needs to give you a call back, can they leave a voicemail: [x] Yes [] No    Everardo Steen   12/12/23 15:04 EST

## 2024-01-03 ENCOUNTER — OFFICE VISIT (OUTPATIENT)
Dept: GASTROENTEROLOGY | Facility: CLINIC | Age: 64
End: 2024-01-03
Payer: MEDICARE

## 2024-01-03 VITALS
WEIGHT: 234 LBS | DIASTOLIC BLOOD PRESSURE: 63 MMHG | SYSTOLIC BLOOD PRESSURE: 121 MMHG | HEART RATE: 70 BPM | BODY MASS INDEX: 31.69 KG/M2 | HEIGHT: 72 IN

## 2024-01-03 DIAGNOSIS — K31.84 GASTROPARESIS: Primary | ICD-10-CM

## 2024-01-03 DIAGNOSIS — Z87.19 HISTORY OF PANCREATITIS: ICD-10-CM

## 2024-01-03 DIAGNOSIS — K59.03 DRUG-INDUCED CONSTIPATION: ICD-10-CM

## 2024-01-03 DIAGNOSIS — K21.9 GASTROESOPHAGEAL REFLUX DISEASE WITHOUT ESOPHAGITIS: ICD-10-CM

## 2024-01-03 PROCEDURE — 99214 OFFICE O/P EST MOD 30 MIN: CPT | Performed by: NURSE PRACTITIONER

## 2024-01-03 PROCEDURE — 1160F RVW MEDS BY RX/DR IN RCRD: CPT | Performed by: NURSE PRACTITIONER

## 2024-01-03 PROCEDURE — 3078F DIAST BP <80 MM HG: CPT | Performed by: NURSE PRACTITIONER

## 2024-01-03 PROCEDURE — 1159F MED LIST DOCD IN RCRD: CPT | Performed by: NURSE PRACTITIONER

## 2024-01-03 PROCEDURE — 3074F SYST BP LT 130 MM HG: CPT | Performed by: NURSE PRACTITIONER

## 2024-01-03 RX ORDER — POLYETHYLENE GLYCOL 3350 17 G/17G
17 POWDER, FOR SOLUTION ORAL DAILY
Qty: 30 PACKET | Refills: 11 | Status: SHIPPED | OUTPATIENT
Start: 2024-01-03

## 2024-01-03 NOTE — PROGRESS NOTES
Chief Complaint   Gastroparesis  and Constipation    History of Present Illness       Willard Lange is a 63 y.o. male who presents to [facility] for follow-up for gastroparesis.  He was last seen in the office by me on 9/21/2023.    He was hospitalized from 8/8-8/13/23 for acute pancreatitis. He had CT scan done that showed:     IMPRESSION:                 1. Findings suspicious for acute pancreatitis.  No definite peripancreatic collection or ductal   dilatation is seen at this time.  2. Calcific atherosclerosis with abdominal aortic aneurysm measuring up to 2.8 cm.  Additional   aneurysms are seen in the common iliac and internal iliac arteries.  3. No definite biliary ductal dilatation or acute gallbladder abnormality.  4. Hepatic steatosis       Dr. Dorsey was consulted and was thinking maybe this was caused by Trulicity but she was not definite.  He had been on trulicity x 6 months. Lipase was elevated. He did finish the antibiotics. He is better but still occasionally having the upper abd pain.       IgG4, ESTELLE and lipid panel were normal.  Stool studies were normal except for an elevated calprotectin.        He has a history of adenomatous and hyperplastic colon polyps.  Recall in 8 of 2023.     Benign Brain tumor in 2021. Is supposed to get surveillance on it. History of prostate cancer s/p radiation.      History of Hepatitis C.  Was treated by Dr. Chase. HCV RNA NOT DETECTED 2019     History of GERD--on omeprazole 40 mg daily from PCP.  Never had EGD.        He denies any dysphagia, N&V, rectal bleeding or melena. Good appetite. He has lost 30 lbs over the last several months. He admits he has been eating less to lose.      History of appendectomy     Denies any significant GI family history     IgG4 normal.  ESTELLE normal.  LDL slightly elevated.  Total cholesterol normal.  Lipase back down to normal.       He underwent EGD and colonoscopy with Dr. Dorsey on 9/14/2023.   EGD showed gastritis and a large amount of food in the stomach.  Normal esophagus.  Colonoscopy could not be completed due to stool in the entire colon.  Flexible sigmoidoscopy was instead done.  Preparation of the colon was not adequate.  No specimens collected.  Dr. Dorsey recommends repeat colonoscopy within the next 6 months.  Path was positive for mild chronic inactive gastritis.     He underwent a gastric emptying study on 10/24/2023 that did show gastric delay at 64% at the 4-hour winsome.    Still having issues with constipation and diarrhea. Using miralax PRN and colestid PRN. Ran out of the miralax. Still doing well with omeprazole. Denies any breakthrough reflux. He does have hx DM2. He tells me the trulicity caused the pancreatitis. Off trulicity now. HgbA1c is better. Has a new diabetic dr now.  Still needs to reschedule repeat colonoscopy due to poor prep.  Results       Result Review :       CMP          8/10/2023    13:35 8/12/2023    05:40 9/28/2023    13:31   CMP   Glucose 161  136     BUN 8  10     Creatinine 0.71  0.78  1.10    EGFR 103.1  100.2  75.4    Sodium 139  137     Potassium 4.1  3.8     Chloride 102  101     Calcium 9.2  9.0     Total Protein 7.2  6.9     Albumin 3.8  3.5     Globulin 3.4  3.4     Total Bilirubin 1.1  0.8     Alkaline Phosphatase 88  93     AST (SGOT) 21  68     ALT (SGPT) 20  85     Albumin/Globulin Ratio 1.1  1.0     BUN/Creatinine Ratio 11.3  12.8     Anion Gap 10.6  9.9       CBC          8/9/2023    05:59 8/10/2023    13:35 8/12/2023    05:40   CBC   WBC 12.73  7.89  5.93    RBC 4.64  4.64  4.58    Hemoglobin 13.6  13.5  13.3    Hematocrit 40.4  40.1  39.2    MCV 87.1  86.4  85.6    MCH 29.3  29.1  29.0    MCHC 33.7  33.7  33.9    RDW 13.4  13.2  12.8    Platelets 221  220  247      Lipid Panel          8/10/2023    13:35   Lipid Panel   Total Cholesterol 168    Triglycerides 127    HDL Cholesterol 36    VLDL Cholesterol 23    LDL Cholesterol  109    LDL/HDL Ratio  "2.96          Lipase   Lipase   Date Value Ref Range Status   08/12/2023 26 13 - 60 U/L Final     Amylase No results found for: \"AMYLASE\"            Past Medical History       Past Medical History:   Diagnosis Date    Arthritis     Asthma     Benign essential hypertension     Brain tumor 01/2021    Cancer     DDD (degenerative disc disease), lumbar     Diabetes mellitus type 2, noninsulin dependent     Diabetes type 2, controlled     GERD (gastroesophageal reflux disease)     Hepatitis C     HTN (hypertension)     Leg pain     Lumbago 02/15/2017    with low back pain    Lumbar disc herniation 02/15/2017    L4-4    Lumbar spinal stenosis 02/15/2017    L4/5    Muscle cramps     Neuropathy     Pancreatitis     Prostate cancer     Staph skin infection     States as a child.       Past Surgical History:   Procedure Laterality Date    APPENDECTOMY      BACK SURGERY  2017    COLONOSCOPY  2017    Dr. Dorsey-Polyps    COLONOSCOPY N/A 9/14/2023    Procedure: COLONOSCOPY FOR SCREENING;  Surgeon: Kisha Dorsey MD;  Location: [facility] ENDOSCOPY;  Service: Gastroenterology;  Laterality: N/A;  POOR PREP    CYSTOSCOPY W/ LASER LITHOTRIPSY      for kidney stones    ENDOSCOPY N/A 9/14/2023    Procedure: ESOPHAGOGASTRODUODENOSCOPY WITH BIOPSIES;  Surgeon: Kisha Dorsey MD;  Location: [facility] ENDOSCOPY;  Service: Gastroenterology;  Laterality: N/A;  RETAINED FOOD IN THE STOMACH, GASTRITIS    LAMINECTOMY  02/17/2017    L4-5    PROSTATE BIOPSY      transrectal    TONSILLECTOMY           Current Outpatient Medications:     albuterol sulfate  (90 Base) MCG/ACT inhaler, Inhale 2 puffs Every 6 (Six) Hours As Needed., Disp: , Rfl:     atorvastatin (LIPITOR) 20 MG tablet, Take 1 tablet by mouth Every Night., Disp: , Rfl:     baclofen (LIORESAL) 10 MG tablet, Take 1 tablet by mouth 3 (Three) Times a Day., Disp: , Rfl:     Blood Glucose Monitoring Suppl (FreeStyle Lite) w/Device kit, See Admin Instructions., Disp: , " Rfl:     colestipol (COLESTID) 1 g tablet, Take 1 tablet by mouth As Needed., Disp: , Rfl:     Continuous Blood Gluc  (FreeStyle Clarisa 2 [place]) device, Use 1 each Every 14 (Fourteen) Days., Disp: 1 each, Rfl: 0    Continuous Blood Gluc Sensor (FreeStyle Clarisa 2 Sensor) misc, Use 1 each Every 14 (Fourteen) Days., Disp: 2 each, Rfl: 5    dicyclomine (BENTYL) 20 MG tablet, TAKE 1 TABLET BY MOUTH FOUR TIMES DAILY AS NEEDED FOR UPSET STOMACH, Disp: , Rfl:     empagliflozin (Jardiance) 25 MG tablet tablet, Take 1 tablet by mouth Daily for 180 days., Disp: 90 tablet, Rfl: 1    FREESTYLE LITE test strip, , Disp: , Rfl:     glipizide (GLUCOTROL XL) 5 MG ER tablet, Take 1 tablet by mouth Daily for 180 days., Disp: 90 tablet, Rfl: 1    glucose blood test strip, FreeStyle Lite Strips  USE TO TEST BLOOD SUGAR FOUR TIMES DAILY AND AS NEEDED, Disp: , Rfl:     HYDROcodone-acetaminophen (NORCO) 7.5-325 MG per tablet, Take 1 tablet by mouth Every 8 (Eight) Hours As Needed., Disp: , Rfl:     Lancets (freestyle) lancets, , Disp: , Rfl:     losartan-hydrochlorothiazide (HYZAAR) 100-25 MG per tablet, Take 1 tablet by mouth Daily., Disp: , Rfl:     omeprazole (priLOSEC) 40 MG capsule, Take 1 capsule by mouth Daily., Disp: , Rfl:     polyethylene glycol (GoLYTELY) 236 g solution, Starting at noon on day prior to procedure, drink 8 ounces every 30 minutes until all gone or stools are clear. May add flavor packet., Disp: 4000 mL, Rfl: 0    polyethylene glycol (MIRALAX) 17 g packet, Take 17 g by mouth Daily., Disp: 2 packet, Rfl: 0    polyethylene glycol (MIRALAX) 17 g packet, Take 17 g by mouth Daily., Disp: 3 packet, Rfl: 0    polyethylene glycol (MIRALAX) 17 g packet, Take 17 g by mouth Daily., Disp: 30 packet, Rfl: 11    pregabalin (LYRICA) 200 MG capsule, Take 1 capsule by mouth 3 times a day., Disp: , Rfl:     tamsulosin (FLOMAX) 0.4 MG capsule 24 hr capsule, Take 1 capsule by mouth Every 12 (Twelve) Hours., Disp: 30 capsule,  "Rfl: 5    TRAZODONE HCL PO, Take 1 tablet by mouth Every Night., Disp: , Rfl:      Allergies   Allergen Reactions    Formaldehyde Rash       Family History   Problem Relation Age of Onset    Heart disease Father     Lung cancer Sister     Diabetes type I Maternal Grandmother     Malig Hyperthermia Neg Hx         Social History     Social History Narrative    Lives alone       Objective       Review of Systems   Constitutional:  Negative for appetite change, fatigue, fever, unexpected weight gain and unexpected weight loss.   HENT:  Negative for trouble swallowing.    Respiratory:  Negative for cough, choking, chest tightness, shortness of breath, wheezing and stridor.    Cardiovascular:  Negative for chest pain, palpitations and leg swelling.   Gastrointestinal:  Positive for abdominal distention, abdominal pain, constipation, diarrhea, nausea, GERD and indigestion. Negative for anal bleeding, blood in stool, rectal pain and vomiting.        Vital Signs:   /63 (BP Location: Left arm, Patient Position: Sitting, Cuff Size: Adult)   Pulse 70   Ht 182.9 cm (72\")   Wt 106 kg (234 lb)   BMI 31.74 kg/m²       Physical Exam  Constitutional:       General: He is not in acute distress.     Appearance: He is well-developed. He is not ill-appearing.   HENT:      Head: Normocephalic.   Eyes:      Pupils: Pupils are equal, round, and reactive to light.   Cardiovascular:      Rate and Rhythm: Normal rate and regular rhythm.      Heart sounds: Normal heart sounds.   Pulmonary:      Effort: Pulmonary effort is normal.      Breath sounds: Normal breath sounds.   Abdominal:      General: Bowel sounds are normal. There is distension.      Palpations: Abdomen is soft. There is no mass.      Tenderness: There is no abdominal tenderness. There is no guarding or rebound.      Hernia: No hernia is present.   Musculoskeletal:         General: Normal range of motion.   Skin:     General: Skin is warm and dry.   Neurological:      " Mental Status: He is alert and oriented to person, place, and time.   Psychiatric:         Speech: Speech normal.         Behavior: Behavior normal.         Judgment: Judgment normal.           Assessment & Plan          Assessment and Plan    Diagnoses and all orders for this visit:    1. Gastroparesis (Primary)    2. Gastroesophageal reflux disease without esophagitis    3. Drug-induced constipation  -     polyethylene glycol (MIRALAX) 17 g packet; Take 17 g by mouth Daily.  Dispense: 30 packet; Refill: 11    4. History of pancreatitis    Reviewed gastric emptying study results with him today.  They did show marked delay at 64% at the 4-hour winsome.  We did discuss gastroparesis today.  I will give him a handout on the gastroparesis diet.  Would like him to read over it and start making some dietary changes.  GERD seems well-controlled on omeprazole.  Still struggling with constipation and diarrhea.  Would like him to break the cycle and start taking MiraLAX every day.  Samples given today.  I did send in MiraLAX to his pharmacy.  He still needs to reschedule his colonoscopy given that the last one could not be finished due to poor prep.  But for now we need to get his bowels moving better and get a better handle on his gastroparesis before we do another colonoscopy.  Patient to call the office with any issues.  Patient to follow-up with me in 1 month.  Patient is agreeable to the plan.            Follow Up       Follow Up   Return in about 4 weeks (around 1/31/2024) for CONSTIPATION, GASTROPARESIS.  Patient was given instructions and counseling regarding his condition or for health maintenance advice. Please see specific information pulled into the AVS if appropriate.

## 2024-01-12 ENCOUNTER — OFFICE VISIT (OUTPATIENT)
Dept: UROLOGY | Facility: CLINIC | Age: 64
End: 2024-01-12
Payer: MEDICARE

## 2024-01-12 VITALS
WEIGHT: 230.6 LBS | RESPIRATION RATE: 16 BRPM | HEIGHT: 72 IN | HEART RATE: 74 BPM | BODY MASS INDEX: 31.23 KG/M2 | SYSTOLIC BLOOD PRESSURE: 111 MMHG | DIASTOLIC BLOOD PRESSURE: 57 MMHG

## 2024-01-12 DIAGNOSIS — N40.1 BENIGN PROSTATIC HYPERPLASIA WITH LOWER URINARY TRACT SYMPTOMS, SYMPTOM DETAILS UNSPECIFIED: ICD-10-CM

## 2024-01-12 DIAGNOSIS — C61 PROSTATE CANCER: ICD-10-CM

## 2024-01-12 DIAGNOSIS — N52.35 ERECTILE DYSFUNCTION FOLLOWING RADIATION THERAPY: Primary | ICD-10-CM

## 2024-01-12 NOTE — PROGRESS NOTES
Chief Complaint: trimix    Subjective         History of Present Illness  Willard Lange is a 63 y.o. male presents to [facility] to be seen for trimix teaching.    Patient started on trimix 15/1/10.    He has not been using the trimix.     He has not picked up prescription.    He is still on tamsulosin for BPH    PSA 11/2023 0.049    No bothersome urinary symptoms     Previous:    Patient has been seen by [facility] for for penile injection therapy this was working well for him however the program was too expensive and he could not afford it.    Patient demonstrated ability to successfully perform intracavernosal self injection x 1.   Provided trial dose up to 0.2 for which patient obtained greater than 50% erection.        Objective     Past Medical History:   Diagnosis Date    Arthritis     Asthma     Benign essential hypertension     Brain tumor 01/2021    Cancer     DDD (degenerative disc disease), lumbar     Diabetes mellitus type 2, noninsulin dependent     Diabetes type 2, controlled     GERD (gastroesophageal reflux disease)     Hepatitis C     HTN (hypertension)     Leg pain     Lumbago 02/15/2017    with low back pain    Lumbar disc herniation 02/15/2017    L4-4    Lumbar spinal stenosis 02/15/2017    L4/5    Muscle cramps     Neuropathy     Pancreatitis     Prostate cancer     Staph skin infection     States as a child.       Past Surgical History:   Procedure Laterality Date    APPENDECTOMY      BACK SURGERY  2017    COLONOSCOPY  2017    Dr. Dorsey-Polyps    COLONOSCOPY N/A 9/14/2023    Procedure: COLONOSCOPY FOR SCREENING;  Surgeon: Kisha Dorsey MD;  Location: [facility] ENDOSCOPY;  Service: Gastroenterology;  Laterality: N/A;  POOR PREP    CYSTOSCOPY W/ LASER LITHOTRIPSY      for kidney stones    ENDOSCOPY N/A 9/14/2023    Procedure: ESOPHAGOGASTRODUODENOSCOPY WITH BIOPSIES;  Surgeon: Kisha Dorsey MD;  Location: [facility] ENDOSCOPY;  Service:  Gastroenterology;  Laterality: N/A;  RETAINED FOOD IN THE STOMACH, GASTRITIS    LAMINECTOMY  02/17/2017    L4-5    PROSTATE BIOPSY      transrectal    TONSILLECTOMY           Current Outpatient Medications:     albuterol sulfate  (90 Base) MCG/ACT inhaler, Inhale 2 puffs Every 6 (Six) Hours As Needed., Disp: , Rfl:     atorvastatin (LIPITOR) 20 MG tablet, Take 1 tablet by mouth Every Night., Disp: , Rfl:     baclofen (LIORESAL) 10 MG tablet, Take 1 tablet by mouth 3 (Three) Times a Day., Disp: , Rfl:     Blood Glucose Monitoring Suppl (FreeStyle Lite) w/Device kit, See Admin Instructions., Disp: , Rfl:     colestipol (COLESTID) 1 g tablet, Take 1 tablet by mouth As Needed., Disp: , Rfl:     Continuous Blood Gluc  (FreeStyle Clarisa 2 [place]) device, Use 1 each Every 14 (Fourteen) Days., Disp: 1 each, Rfl: 0    Continuous Blood Gluc Sensor (FreeStyle Clarisa 2 Sensor) misc, Use 1 each Every 14 (Fourteen) Days., Disp: 2 each, Rfl: 5    dicyclomine (BENTYL) 20 MG tablet, TAKE 1 TABLET BY MOUTH FOUR TIMES DAILY AS NEEDED FOR UPSET STOMACH, Disp: , Rfl:     empagliflozin (Jardiance) 25 MG tablet tablet, Take 1 tablet by mouth Daily for 180 days., Disp: 90 tablet, Rfl: 1    FREESTYLE LITE test strip, , Disp: , Rfl:     glipizide (GLUCOTROL XL) 5 MG ER tablet, Take 1 tablet by mouth Daily for 180 days., Disp: 90 tablet, Rfl: 1    glucose blood test strip, FreeStyle Lite Strips  USE TO TEST BLOOD SUGAR FOUR TIMES DAILY AND AS NEEDED, Disp: , Rfl:     HYDROcodone-acetaminophen (NORCO) 7.5-325 MG per tablet, Take 1 tablet by mouth Every 8 (Eight) Hours As Needed., Disp: , Rfl:     Lancets (freestyle) lancets, , Disp: , Rfl:     losartan-hydrochlorothiazide (HYZAAR) 100-25 MG per tablet, Take 1 tablet by mouth Daily., Disp: , Rfl:     omeprazole (priLOSEC) 40 MG capsule, Take 1 capsule by mouth Daily., Disp: , Rfl:     polyethylene glycol (GoLYTELY) 236 g solution, Starting at noon on day prior to procedure, drink 8  "ounces every 30 minutes until all gone or stools are clear. May add flavor packet., Disp: 4000 mL, Rfl: 0    polyethylene glycol (MIRALAX) 17 g packet, Take 17 g by mouth Daily., Disp: 2 packet, Rfl: 0    polyethylene glycol (MIRALAX) 17 g packet, Take 17 g by mouth Daily., Disp: 3 packet, Rfl: 0    polyethylene glycol (MIRALAX) 17 g packet, Take 17 g by mouth Daily., Disp: 30 packet, Rfl: 11    pregabalin (LYRICA) 200 MG capsule, Take 1 capsule by mouth 3 times a day., Disp: , Rfl:     tamsulosin (FLOMAX) 0.4 MG capsule 24 hr capsule, Take 1 capsule by mouth Every 12 (Twelve) Hours., Disp: 30 capsule, Rfl: 5    TRAZODONE HCL PO, Take 1 tablet by mouth Every Night., Disp: , Rfl:     Allergies   Allergen Reactions    Formaldehyde Rash        Family History   Problem Relation Age of Onset    Heart disease Father     Lung cancer Sister     Diabetes type I Maternal Grandmother     Malig Hyperthermia Neg Hx        Social History     Socioeconomic History    Marital status:    Tobacco Use    Smoking status: Former     Packs/day: .75     Types: Cigarettes     Start date: 1976     Quit date:      Years since quittin.0     Passive exposure: Past    Smokeless tobacco: Never   Vaping Use    Vaping Use: Never used   Substance and Sexual Activity    Alcohol use: Not Currently    Drug use: Never    Sexual activity: Defer       Vital Signs:   /57   Pulse 74   Resp 16   Ht 182.9 cm (72\")   Wt 105 kg (230 lb 9.6 oz)   BMI 31.27 kg/m²      Physical Exam     Result Review :   The following data was reviewed by: MARIUM Ahumada on 2024:  Results for orders placed or performed in visit on 23   PSA Diagnostic    Specimen: Blood   Result Value Ref Range    PSA 0.049 0.000 - 4.000 ng/mL      PSA          2023    14:03 2023    14:12   PSA   PSA 0.065  0.049          Procedures        Assessment and Plan    Diagnoses and all orders for this visit:    1. Erectile dysfunction " following radiation therapy (Primary)    2. Prostate cancer    3. Benign prostatic hyperplasia with lower urinary tract symptoms, symptom details unspecified        We will re order trimix     We will f/u in 6 months or sooner if needed         I spent 10 minutes caring for Willard on this date of service. This time includes time spent by me in the following activities:reviewing tests, obtaining and/or reviewing a separately obtained history, performing a medically appropriate examination and/or evaluation , counseling and educating the patient/family/caregiver, ordering medications, tests, or procedures, and documenting information in the medical record  Follow Up   Return in about 6 months (around 7/12/2024) for f/u bph/ ed .  Patient was given instructions and counseling regarding his condition or for health maintenance advice. Please see specific information pulled into the AVS if appropriate.         This document has been electronically signed by MARIUM Ahumada  January 12, 2024 09:30 EST

## 2024-01-18 ENCOUNTER — OFFICE VISIT (OUTPATIENT)
Dept: PODIATRY | Facility: CLINIC | Age: 64
End: 2024-01-18
Payer: MEDICARE

## 2024-01-18 VITALS
BODY MASS INDEX: 31.42 KG/M2 | TEMPERATURE: 97.7 F | HEIGHT: 72 IN | HEART RATE: 71 BPM | OXYGEN SATURATION: 92 % | WEIGHT: 232 LBS | SYSTOLIC BLOOD PRESSURE: 116 MMHG | DIASTOLIC BLOOD PRESSURE: 72 MMHG

## 2024-01-18 DIAGNOSIS — M20.41 HAMMER TOES OF BOTH FEET: ICD-10-CM

## 2024-01-18 DIAGNOSIS — B35.1 ONYCHOMYCOSIS: ICD-10-CM

## 2024-01-18 DIAGNOSIS — E11.9 NON-INSULIN DEPENDENT TYPE 2 DIABETES MELLITUS: ICD-10-CM

## 2024-01-18 DIAGNOSIS — L60.0 ONYCHOCRYPTOSIS: ICD-10-CM

## 2024-01-18 DIAGNOSIS — E11.8 DIABETIC FOOT: ICD-10-CM

## 2024-01-18 DIAGNOSIS — M79.672 FOOT PAIN, BILATERAL: ICD-10-CM

## 2024-01-18 DIAGNOSIS — I73.9 PVD (PERIPHERAL VASCULAR DISEASE): Primary | ICD-10-CM

## 2024-01-18 DIAGNOSIS — M79.671 FOOT PAIN, BILATERAL: ICD-10-CM

## 2024-01-18 DIAGNOSIS — G62.9 NEUROPATHY: ICD-10-CM

## 2024-01-18 DIAGNOSIS — M20.42 HAMMER TOES OF BOTH FEET: ICD-10-CM

## 2024-01-18 NOTE — PROGRESS NOTES
[facility] - PODIATRY    Today's Date: 24    Patient Name: Willard Lange  MRN: [id number]  CSN: [id number]  PCP: Josselyn Damon APRN, Last PCP Visit: 10/27/2023  Referring Provider: No ref. provider found    SUBJECTIVE     Chief Complaint   Patient presents with    Left Foot - Follow-up, Nail Problem    Right Foot - Follow-up, Nail Problem     HPI: Willard Lange, a 63 y.o.male, comes to clinic.    New, Established, New Problem:  established     Location:  Toenails    Duration:   Greater than five years    Onset:  Gradual    Nature:  sore with palpation.    Stable, worsening, improving:   Stable    Aggravating factors:  Pain with shoe gear and ambulation.    Previous Treatment:  debridement  __________________________________    Medical changes:  none.    Patient states their most recent blood glucose readin.      Patient denies any fevers, chills, nausea, vomiting, shortness of breathe, nor any other constitutional signs nor symptoms.         Past Medical History:   Diagnosis Date    Arthritis     Asthma     Benign essential hypertension     Brain tumor 2021    Cancer     DDD (degenerative disc disease), lumbar     Diabetes mellitus type 2, noninsulin dependent     Diabetes type 2, controlled     GERD (gastroesophageal reflux disease)     Hepatitis C     HTN (hypertension)     Leg pain     Lumbago 02/15/2017    with low back pain    Lumbar disc herniation 02/15/2017    L4-4    Lumbar spinal stenosis 02/15/2017    L4/5    Muscle cramps     Neuropathy     Pancreatitis     Prostate cancer     Staph skin infection     States as a child.     Past Surgical History:   Procedure Laterality Date    APPENDECTOMY      BACK SURGERY  2017    COLONOSCOPY  2017    Dr. Dorsey-Polyps    COLONOSCOPY N/A 2023    Procedure: COLONOSCOPY FOR SCREENING;  Surgeon: Kisha Dorsey MD;  Location: [facility] ENDOSCOPY;  Service: Gastroenterology;  Laterality: N/A;  POOR PREP    CYSTOSCOPY W/ LASER  LITHOTRIPSY      for kidney stones    ENDOSCOPY N/A 2023    Procedure: ESOPHAGOGASTRODUODENOSCOPY WITH BIOPSIES;  Surgeon: Kisha Dorsey MD;  Location: [facility] ENDOSCOPY;  Service: Gastroenterology;  Laterality: N/A;  RETAINED FOOD IN THE STOMACH, GASTRITIS    LAMINECTOMY  2017    L4-5    PROSTATE BIOPSY      transrectal    TONSILLECTOMY       Family History   Problem Relation Age of Onset    Heart disease Father     Lung cancer Sister     Diabetes type I Maternal Grandmother     Malig Hyperthermia Neg Hx      Social History     Socioeconomic History    Marital status:    Tobacco Use    Smoking status: Former     Packs/day: .75     Types: Cigarettes     Start date: 1976     Quit date:      Years since quittin.0     Passive exposure: Past    Smokeless tobacco: Never   Vaping Use    Vaping Use: Never used   Substance and Sexual Activity    Alcohol use: Not Currently    Drug use: Never    Sexual activity: Defer     Allergies   Allergen Reactions    Formaldehyde Rash     Current Outpatient Medications   Medication Sig Dispense Refill    albuterol sulfate  (90 Base) MCG/ACT inhaler Inhale 2 puffs Every 6 (Six) Hours As Needed.      atorvastatin (LIPITOR) 20 MG tablet Take 1 tablet by mouth Every Night.      baclofen (LIORESAL) 10 MG tablet Take 1 tablet by mouth 3 (Three) Times a Day.      Blood Glucose Monitoring Suppl (FreeStyle Lite) w/Device kit See Admin Instructions.      colestipol (COLESTID) 1 g tablet Take 1 tablet by mouth As Needed.      Continuous Blood Gluc  (FreeStyle Clarisa 2 [place]) device Use 1 each Every 14 (Fourteen) Days. 1 each 0    Continuous Blood Gluc Sensor (FreeStyle Clarisa 2 Sensor) misc Use 1 each Every 14 (Fourteen) Days. 2 each 5    dicyclomine (BENTYL) 20 MG tablet TAKE 1 TABLET BY MOUTH FOUR TIMES DAILY AS NEEDED FOR UPSET STOMACH      empagliflozin (Jardiance) 25 MG tablet tablet Take 1 tablet by mouth Daily for 180 days. 90 tablet 1     FREESTYLE LITE test strip       glipizide (GLUCOTROL XL) 5 MG ER tablet Take 1 tablet by mouth Daily for 180 days. 90 tablet 1    glucose blood test strip FreeStyle Lite Strips   USE TO TEST BLOOD SUGAR FOUR TIMES DAILY AND AS NEEDED      HYDROcodone-acetaminophen (NORCO) 7.5-325 MG per tablet Take 1 tablet by mouth Every 8 (Eight) Hours As Needed.      Lancets (freestyle) lancets       losartan-hydrochlorothiazide (HYZAAR) 100-25 MG per tablet Take 1 tablet by mouth Daily.      omeprazole (priLOSEC) 40 MG capsule Take 1 capsule by mouth Daily.      polyethylene glycol (GoLYTELY) 236 g solution Starting at noon on day prior to procedure, drink 8 ounces every 30 minutes until all gone or stools are clear. May add flavor packet. 4000 mL 0    polyethylene glycol (MIRALAX) 17 g packet Take 17 g by mouth Daily. 2 packet 0    polyethylene glycol (MIRALAX) 17 g packet Take 17 g by mouth Daily. 3 packet 0    polyethylene glycol (MIRALAX) 17 g packet Take 17 g by mouth Daily. 30 packet 11    pregabalin (LYRICA) 200 MG capsule Take 1 capsule by mouth 3 times a day.      tamsulosin (FLOMAX) 0.4 MG capsule 24 hr capsule Take 1 capsule by mouth Every 12 (Twelve) Hours. 30 capsule 5    TRAZODONE HCL PO Take 1 tablet by mouth Every Night.       No current facility-administered medications for this visit.     Review of Systems   Constitutional: Negative.    Skin:         Painful toenails   Neurological:  Positive for numbness.   All other systems reviewed and are negative.      OBJECTIVE     Vitals:    01/18/24 0733   BP: 116/72   Pulse: 71   Temp: 97.7 °F (36.5 °C)   SpO2: 92%       WBC   Date Value Ref Range Status   08/12/2023 5.93 3.40 - 10.80 10*3/mm3 Final     RBC   Date Value Ref Range Status   08/12/2023 4.58 4.14 - 5.80 10*6/mm3 Final     Hemoglobin   Date Value Ref Range Status   08/12/2023 13.3 13.0 - 17.7 g/dL Final     Hematocrit   Date Value Ref Range Status   08/12/2023 39.2 37.5 - 51.0 % Final     MCV   Date  Value Ref Range Status   08/12/2023 85.6 79.0 - 97.0 fL Final     MCH   Date Value Ref Range Status   08/12/2023 29.0 26.6 - 33.0 pg Final     MCHC   Date Value Ref Range Status   08/12/2023 33.9 31.5 - 35.7 g/dL Final     RDW   Date Value Ref Range Status   08/12/2023 12.8 12.3 - 15.4 % Final     RDW-SD   Date Value Ref Range Status   08/12/2023 39.7 37.0 - 54.0 fl Final     MPV   Date Value Ref Range Status   08/12/2023 9.8 6.0 - 12.0 fL Final     Platelets   Date Value Ref Range Status   08/12/2023 247 140 - 450 10*3/mm3 Final     Neutrophil %   Date Value Ref Range Status   08/12/2023 58.8 42.7 - 76.0 % Final     Lymphocyte %   Date Value Ref Range Status   08/12/2023 19.7 19.6 - 45.3 % Final     Monocyte %   Date Value Ref Range Status   08/12/2023 13.8 (H) 5.0 - 12.0 % Final     Eosinophil %   Date Value Ref Range Status   08/12/2023 6.4 (H) 0.3 - 6.2 % Final     Basophil %   Date Value Ref Range Status   08/12/2023 0.8 0.0 - 1.5 % Final     Immature Grans %   Date Value Ref Range Status   08/12/2023 0.5 0.0 - 0.5 % Final     Neutrophils, Absolute   Date Value Ref Range Status   08/12/2023 3.48 1.70 - 7.00 10*3/mm3 Final     Lymphocytes, Absolute   Date Value Ref Range Status   08/12/2023 1.17 0.70 - 3.10 10*3/mm3 Final     Monocytes, Absolute   Date Value Ref Range Status   08/12/2023 0.82 0.10 - 0.90 10*3/mm3 Final     Eosinophils, Absolute   Date Value Ref Range Status   08/12/2023 0.38 0.00 - 0.40 10*3/mm3 Final     Basophils, Absolute   Date Value Ref Range Status   08/12/2023 0.05 0.00 - 0.20 10*3/mm3 Final     Immature Grans, Absolute   Date Value Ref Range Status   08/12/2023 0.03 0.00 - 0.05 10*3/mm3 Final     nRBC   Date Value Ref Range Status   08/12/2023 0.0 0.0 - 0.2 /100 WBC Final         Lab Results   Component Value Date    GLUCOSE 136 (H) 08/12/2023    CALCIUM 9.0 08/12/2023     08/12/2023    K 3.8 08/12/2023    CO2 26.1 08/12/2023     08/12/2023    BUN 10 08/12/2023    CREATININE  1.10 09/28/2023    BCR 12.8 08/12/2023    ANIONGAP 9.9 08/12/2023       Patient seen in no apparent distress.      PHYSICAL EXAM:     Foot/Ankle Exam    GENERAL  Diabetic foot exam performed    Appearance:  obese  Orientation:  AAOx3  Affect:  appropriate  Gait:  unimpaired  Assistance:  independent  Right shoe gear: casual shoe  Left shoe gear: casual shoe    VASCULAR     Right Foot Vascularity   Normal vascular exam    Dorsalis pedis:  Absent  Posterior tibial:  Absent  Skin temperature:  warm  Edema grading:  None  CFT:  4  Pedal hair growth:  Absent  Varicosities:  none     Left Foot Vascularity   Normal vascular exam    Dorsalis pedis:  Absent  Posterior tibial:  Absent  Skin temperature:  warm  Edema grading:  None  CFT:  4  Pedal hair growth:  Absent  Varicosities:  none     NEUROLOGIC     Right Foot Neurologic   Light touch sensation: absent  Vibratory sensation: absent  Hot/Cold sensation: absent  Protective Sensation using [place] Monofilament:   Sites tested: 10     Left Foot Neurologic   Light touch sensation: absent  Vibratory sensation: absent  Hot/Cold sensation:  absent  Protective Sensation using [place] Monofilament:   Sites tested: 10    MUSCULOSKELETAL     Right Foot Musculoskeletal   Hammertoe:  Second toe, third toe, fourth toe and fifth toe     Left Foot Musculoskeletal   Hammertoe:  Second toe, third toe, fourth toe and fifth toe    MUSCLE STRENGTH     Right Foot Muscle Strength   Foot dorsiflexion:  4  Foot plantar flexion:  4  Foot inversion:  4  Foot eversion:  4     Left Foot Muscle Strength   Foot dorsiflexion:  4  Foot plantar flexion:  4  Foot inversion:  4  Foot eversion:  4    RANGE OF MOTION     Right Foot Range of Motion   Foot and ankle ROM within normal limits       Left Foot Range of Motion   Foot and ankle ROM within normal limits      DERMATOLOGIC      Right Foot Dermatologic   Skin  Right foot skin is intact.   Nails  1.  Positive for elongated,  onychomycosis, abnormal thickness, subungual debris and ingrown toenail.  2.  Positive for elongated, onychomycosis, abnormal thickness, subungual debris and ingrown toenail.  3.  Positive for elongated, onychomycosis, abnormal thickness, subungual debris and ingrown toenail.  4.  Positive for elongated, onychomycosis, abnormal thickness, subungual debris and ingrown toenail.  5.  Positive for elongated, onychomycosis, abnormal thickness, subungual debris and ingrown toenail.  Nails comment:  Toenails 1, 2, 3, 4, and 5     Left Foot Dermatologic   Skin  Left foot skin is intact.   Nails comment:  Toenails 1, 2, 3, 4, and 5  Nails  1.  Positive for elongated, onychomycosis, abnormal thickness, subungual debris and ingrown toenail.  2.  Positive for elongated, onychomycosis, abnormal thickness, subungual debris and ingrown toenail.  3.  Positive for elongated, onychomycosis, abnormal thickness, subungual debris and ingrown toenail.  4.  Positive for elongated, onychomycosis, abnormally thick, subungual debris and ingrown toenail.  5.  Positive for elongated, onychomycosis, abnormally thick, subungual debris and ingrown toenail.    Diabetic Foot Exam Performed and Monofilament Test Performed    ASSESSMENT/PLAN     Diagnoses and all orders for this visit:    1. PVD (peripheral vascular disease) (Primary)    2. Non-insulin dependent type 2 diabetes mellitus    3. Diabetic foot    4. Hammer toes of both feet    5. Onychomycosis    6. Onychocryptosis    7. Foot pain, bilateral    8. Neuropathy    Comprehensive lower extremity examination and evaluation was performed.    Discussed findings and treatment plan including risks, benefits, and treatment options with patient in detail. Patient agreed with treatment plan.    Toenails 1 through 5 bilaterally were debrided in thickness and length and then smoothed with a Dremel Tool.  Tolerated the procedure well without complications.    Diabetic foot exam performed and documented  this date, compliant with CQM required standards. Detail of findings as noted in physical exam.  Lower extremity Neurologic exam for diabetic patient performed and documented this date, compliant with PQRS required standards. Detail of findings as noted in physical exam.  Advised patient importance of good routine lower extremity hygiene. Advised patient importance of evaluating for intact skin and pain free nail borders.  Advised patient to use mirror to evaluate plantar/ soles of feet for better visualization. Advised patient monitor and phone office to be seen if any cracking to skin, open lesions, painful nail borders or if nails become elongated prior to next visit. Advised patient importance of daily cleansing of lower extremities, followed by good skin cream to maintain normal hydration of skin. Also advised patient importance of close daily monitoring of blood sugar. Advised to regulate diet and medications to maintain control of blood sugar in optimal range. Contact primary care provider if difficulties maintaining blood sugar levels.  Advised Patient of presence of Diabetes Mellitus condition.  Advised Patient risk of progression and worsening or improvement, then return of condition.  Will monitor condition for any change in future. Treat with most appropriate treatment pending status of condition.  Counseled and advised patient extensively on nature and ramifications of diabetes. Standard instructions given to patient for good diabetic foot care and maintenance. Advised importance of careful monitoring to avoid break down and complications secondary to diabetes. Advised patient importance of strict maintenance of blood sugar control. Advised patient of possible ominous results from neglect of condition, i.e.: amputation/ loss of digits, feet and legs, or even death.  Patient states understands counseling, will monitor closely, continue good hygiene and routine diabetic foot care. Patient will contact  office is questions or problems.      An After Visit Summary was printed and given to the patient at discharge, including (if requested) any available informative/educational handouts regarding diagnosis, treatment, or medications. All questions were answered to patient/family satisfaction. Should symptoms fail to improve or worsen they agree to call or return to clinic or to go to the Emergency Department. Discussed the importance of following up with any needed screening tests/labs/specialist appointments and any requested follow-up recommended by me today. Importance of maintaining follow-up discussed and patient accepts that missed appointments can delay diagnosis and potentially lead to worsening of conditions.    Return in about 9 weeks (around 3/21/2024) for Toenail Care., or sooner if acute issues arise.    This document has been electronically signed by Sarkis Ureña DPM on January 18, 2024 08:00 EST

## 2024-01-29 ENCOUNTER — TELEPHONE (OUTPATIENT)
Dept: DIABETES SERVICES | Facility: HOSPITAL | Age: 64
End: 2024-01-29
Payer: MEDICARE

## 2024-01-29 DIAGNOSIS — E11.65 TYPE 2 DIABETES MELLITUS WITH HYPERGLYCEMIA, UNSPECIFIED WHETHER LONG TERM INSULIN USE: ICD-10-CM

## 2024-01-29 DIAGNOSIS — C61 PROSTATE CANCER: ICD-10-CM

## 2024-01-29 DIAGNOSIS — N40.1 BENIGN LOCALIZED PROSTATIC HYPERPLASIA WITH LOWER URINARY TRACT SYMPTOMS (LUTS): ICD-10-CM

## 2024-01-29 RX ORDER — TAMSULOSIN HYDROCHLORIDE 0.4 MG/1
1 CAPSULE ORAL EVERY 12 HOURS SCHEDULED
Qty: 180 CAPSULE | Refills: 1 | Status: SHIPPED | OUTPATIENT
Start: 2024-01-29

## 2024-01-29 NOTE — TELEPHONE ENCOUNTER
Caller: Willard Lange    Relationship: Self    Best call back number: [phone]    Requested Prescriptions:   Requested Prescriptions     Pending Prescriptions Disp Refills    Continuous Blood Gluc Sensor (FreeStyle Clarisa 2 Sensor) misc 2 each 5     Sig: Use 1 each Every 14 (Fourteen) Days.        Pharmacy where request should be sent:  [facility]    Last office visit with prescribing clinician: 11/2/2023   Last telemedicine visit with prescribing clinician: Visit date not found       Does the patient have less than a 3 day supply:  [x] Yes  [] No    Would you like a call back once the refill request has been completed: [] Yes [] No    If the office needs to give you a call back, can they leave a voicemail: [] Yes [] No    Dorinda Powell Rep   01/29/24 15:03 EST

## 2024-01-29 NOTE — TELEPHONE ENCOUNTER
Willard Lange (Key: [id number]  FreeStyle Clarisa 2 SensorCD)  Rx #: [id number]  PA Case: [id number], Status: Approved, Coverage Starts on: 1/1/2024 12:00:00 AM, Coverage Ends on: 1/29/2025 12:00:00 AM.

## 2024-02-06 ENCOUNTER — OFFICE VISIT (OUTPATIENT)
Dept: DIABETES SERVICES | Facility: HOSPITAL | Age: 64
End: 2024-02-06
Payer: MEDICARE

## 2024-02-06 VITALS
DIASTOLIC BLOOD PRESSURE: 60 MMHG | HEART RATE: 72 BPM | WEIGHT: 232 LBS | OXYGEN SATURATION: 96 % | BODY MASS INDEX: 31.46 KG/M2 | SYSTOLIC BLOOD PRESSURE: 124 MMHG

## 2024-02-06 DIAGNOSIS — E66.9 OBESITY (BMI 30-39.9): ICD-10-CM

## 2024-02-06 DIAGNOSIS — E11.40 TYPE 2 DIABETES MELLITUS WITH DIABETIC NEUROPATHY, WITHOUT LONG-TERM CURRENT USE OF INSULIN: ICD-10-CM

## 2024-02-06 DIAGNOSIS — E11.65 TYPE 2 DIABETES MELLITUS WITH HYPERGLYCEMIA, UNSPECIFIED WHETHER LONG TERM INSULIN USE: ICD-10-CM

## 2024-02-06 DIAGNOSIS — E11.9 TYPE 2 DIABETES MELLITUS WITH HEMOGLOBIN A1C GOAL OF LESS THAN 7.0%: Primary | ICD-10-CM

## 2024-02-06 LAB
EXPIRATION DATE: ABNORMAL
GLUCOSE BLDC GLUCOMTR-MCNC: 129 MG/DL (ref 70–99)
HBA1C MFR BLD: 6.3 % (ref 4.5–5.7)
Lab: ABNORMAL

## 2024-02-06 PROCEDURE — 3078F DIAST BP <80 MM HG: CPT | Performed by: NURSE PRACTITIONER

## 2024-02-06 PROCEDURE — 3074F SYST BP LT 130 MM HG: CPT | Performed by: NURSE PRACTITIONER

## 2024-02-06 PROCEDURE — 3044F HG A1C LEVEL LT 7.0%: CPT | Performed by: NURSE PRACTITIONER

## 2024-02-06 PROCEDURE — 99213 OFFICE O/P EST LOW 20 MIN: CPT | Performed by: NURSE PRACTITIONER

## 2024-02-06 PROCEDURE — 1159F MED LIST DOCD IN RCRD: CPT | Performed by: NURSE PRACTITIONER

## 2024-02-06 PROCEDURE — 82948 REAGENT STRIP/BLOOD GLUCOSE: CPT | Performed by: NURSE PRACTITIONER

## 2024-02-06 PROCEDURE — 95251 CONT GLUC MNTR ANALYSIS I&R: CPT | Performed by: NURSE PRACTITIONER

## 2024-02-06 PROCEDURE — G0463 HOSPITAL OUTPT CLINIC VISIT: HCPCS | Performed by: NURSE PRACTITIONER

## 2024-02-06 PROCEDURE — 1160F RVW MEDS BY RX/DR IN RCRD: CPT | Performed by: NURSE PRACTITIONER

## 2024-02-06 RX ORDER — NAPROXEN 250 MG/1
1 TABLET ORAL EVERY 12 HOURS SCHEDULED
COMMUNITY
Start: 2024-01-26

## 2024-02-06 NOTE — PROGRESS NOTES
Chief Complaint  Diabetes (Follow up, Clarisa with  )    Referred By: MARIUM Hood    Subjective          Willard Lange presents to [facility] for diabetes medication management    History of Present Illness    Visit type:  follow-up  Diabetes type:  Type 2  Current diabetes status/concerns/issues:  States his blood sugar has been running in goal. [facility] his highest blood sugar has been 250mg/dl but it was on a cheat day. [facility] once month he will get an ice cream to treat himself.   Other health concerns:  [facility] he just graduated from PT. [facility] he was getting PT for gait stability. He walks with a cane. [facility] he is going to [facility] to visit a friend and is considering moving there since he is alone here. He moved here is 2002 with his wife and she passed away almost 10 years ago. [facility] he has a son and daughter and they no longer live here. [facility] he had another colonoscopy but his prep was incomplete states he was told it was due to his gastroporesis from his diabetes   Current Diabetes symptoms:    Polyuria: No   Polydipsia: No   Polyphagia: Yes     Blurred vision: No   Excessive fatigue: Yes    Known Diabetes complications:  Neuropathy: Numbness, Tingling, Burning, and Shooting Pain; Location: Feet, Legs, and Hands  Renal: None  Eyes: None; Location: N/A; Last Eye Exam:  last month ; Location:   Amputation/Wounds:  slow to heal  GI: Pancreatitis (History of), gastroporesis  Cardiovascular: Hypertension and Hyperlipidemia  ED: Patient Reported  Other: None  Hypoglycemia:  None reported at this time and 0% per CGM  Hypoglycemia Symptoms:  No hypoglycemia at this time  Current diabetes treatment:   glipizide 5mg qd , Jardiance 25mg qd     Blood glucose device:  Clarisa CGM  Blood glucose monitoring frequency:  Continuous per CGM  Blood glucose range/average:  The 14-day sensor report shows an average glucose of 140 mg/dL, with 89% in target range  ( mgdL), 11% in the high range (181-250 mg/dL), 0% in the very high range (>250 mg/dL), 0% in the low range (54-70 mg/dL) and 0% in the very low range (<54 mg/dL).   Glucose Source: Device Reviewed  Dietary behavior:  Limits high carb/sweet foods, Avoids sugary drinks, Number of meals each day - 2; Number of snacks each day - 1  Activity/Exercise:   he is doing PT for weakness in his hands and legs  Past Medical History:   Diagnosis Date    Arthritis     Asthma     Benign essential hypertension     Brain tumor 01/2021    Cancer     DDD (degenerative disc disease), lumbar     Diabetes mellitus type 2, noninsulin dependent     Diabetes type 2, controlled     GERD (gastroesophageal reflux disease)     Hepatitis C     HTN (hypertension)     Leg pain     Lumbago 02/15/2017    with low back pain    Lumbar disc herniation 02/15/2017    L4-4    Lumbar spinal stenosis 02/15/2017    L4/5    Muscle cramps     Neuropathy     Pancreatitis     Prostate cancer     Staph skin infection     States as a child.     Past Surgical History:   Procedure Laterality Date    APPENDECTOMY      BACK SURGERY  2017    COLONOSCOPY  2017    Dr. Dorsey-Polyps    COLONOSCOPY N/A 9/14/2023    Procedure: COLONOSCOPY FOR SCREENING;  Surgeon: Kisha Dorsey MD;  Location: [facility] ENDOSCOPY;  Service: Gastroenterology;  Laterality: N/A;  POOR PREP    CYSTOSCOPY W/ LASER LITHOTRIPSY      for kidney stones    ENDOSCOPY N/A 9/14/2023    Procedure: ESOPHAGOGASTRODUODENOSCOPY WITH BIOPSIES;  Surgeon: Kisha Dorsey MD;  Location: [facility] ENDOSCOPY;  Service: Gastroenterology;  Laterality: N/A;  RETAINED FOOD IN THE STOMACH, GASTRITIS    LAMINECTOMY  02/17/2017    L4-5    PROSTATE BIOPSY      transrectal    TONSILLECTOMY       Family History   Problem Relation Age of Onset    Heart disease Father     Lung cancer Sister     Diabetes type I Maternal Grandmother     Malig Hyperthermia Neg Hx      Social History     Socioeconomic History     Marital status:    Tobacco Use    Smoking status: Former     Packs/day: .75     Types: Cigarettes     Start date: 1976     Quit date:      Years since quittin.1     Passive exposure: Past    Smokeless tobacco: Never   Vaping Use    Vaping Use: Never used   Substance and Sexual Activity    Alcohol use: Not Currently    Drug use: Never    Sexual activity: Defer     Allergies   Allergen Reactions    Formaldehyde Rash       Current Outpatient Medications:     albuterol sulfate  (90 Base) MCG/ACT inhaler, Inhale 2 puffs Every 6 (Six) Hours As Needed., Disp: , Rfl:     atorvastatin (LIPITOR) 20 MG tablet, Take 1 tablet by mouth Every Night., Disp: , Rfl:     baclofen (LIORESAL) 10 MG tablet, Take 1 tablet by mouth 3 (Three) Times a Day., Disp: , Rfl:     Blood Glucose Monitoring Suppl (FreeStyle Lite) w/Device kit, See Admin Instructions., Disp: , Rfl:     colestipol (COLESTID) 1 g tablet, Take 1 tablet by mouth As Needed., Disp: , Rfl:     Continuous Blood Gluc  (FreeStyle Clarisa 2 [place]) device, Use 1 each Every 14 (Fourteen) Days., Disp: 1 each, Rfl: 0    Continuous Blood Gluc Sensor (FreeStyle Clarisa 2 Sensor) misc, Use 1 each Every 14 (Fourteen) Days., Disp: 1 each, Rfl: 0    dicyclomine (BENTYL) 20 MG tablet, TAKE 1 TABLET BY MOUTH FOUR TIMES DAILY AS NEEDED FOR UPSET STOMACH, Disp: , Rfl:     empagliflozin (Jardiance) 25 MG tablet tablet, Take 1 tablet by mouth Daily for 180 days., Disp: 90 tablet, Rfl: 1    FREESTYLE LITE test strip, , Disp: , Rfl:     glipizide (GLUCOTROL XL) 5 MG ER tablet, Take 1 tablet by mouth Daily for 180 days., Disp: 90 tablet, Rfl: 1    glucose blood test strip, FreeStyle Lite Strips  USE TO TEST BLOOD SUGAR FOUR TIMES DAILY AND AS NEEDED, Disp: , Rfl:     HYDROcodone-acetaminophen (NORCO) 7.5-325 MG per tablet, Take 1 tablet by mouth Every 8 (Eight) Hours As Needed., Disp: , Rfl:     Lancets (freestyle) lancets, , Disp: , Rfl:      losartan-hydrochlorothiazide (HYZAAR) 100-25 MG per tablet, Take 1 tablet by mouth Daily., Disp: , Rfl:     naproxen (NAPROSYN) 250 MG tablet, Take 1 tablet by mouth Every 12 (Twelve) Hours., Disp: , Rfl:     omeprazole (priLOSEC) 40 MG capsule, Take 1 capsule by mouth Daily., Disp: , Rfl:     polyethylene glycol (GoLYTELY) 236 g solution, Starting at noon on day prior to procedure, drink 8 ounces every 30 minutes until all gone or stools are clear. May add flavor packet., Disp: 4000 mL, Rfl: 0    polyethylene glycol (MIRALAX) 17 g packet, Take 17 g by mouth Daily., Disp: 2 packet, Rfl: 0    polyethylene glycol (MIRALAX) 17 g packet, Take 17 g by mouth Daily., Disp: 3 packet, Rfl: 0    polyethylene glycol (MIRALAX) 17 g packet, Take 17 g by mouth Daily., Disp: 30 packet, Rfl: 11    polyethylene glycol (MiraLax) 17 g packet, Take 17 g by mouth Daily., Disp: 5 packet, Rfl: 0    pregabalin (LYRICA) 200 MG capsule, Take 1 capsule by mouth 3 times a day., Disp: , Rfl:     tamsulosin (FLOMAX) 0.4 MG capsule 24 hr capsule, Take 1 capsule by mouth Every 12 (Twelve) Hours., Disp: 180 capsule, Rfl: 1    TRAZODONE HCL PO, Take 1 tablet by mouth Every Night., Disp: , Rfl:     Objective     Vitals:    02/06/24 0734   BP: 124/60   BP Location: Left arm   Patient Position: Sitting   Cuff Size: Adult   Pulse: 72   SpO2: 96%   Weight: 105 kg (232 lb)     Body mass index is 31.46 kg/m².    Physical Exam  Constitutional:       Appearance: Normal appearance. He is obese.      Comments: Obesity (BMI 30 - 39.9) Pt Current BMI = 31.46     HENT:      Head: Normocephalic and atraumatic.      Right Ear: External ear normal.      Left Ear: External ear normal.      Nose: Nose normal.   Eyes:      Extraocular Movements: Extraocular movements intact.      Conjunctiva/sclera: Conjunctivae normal.   Pulmonary:      Effort: Pulmonary effort is normal.   Musculoskeletal:         General: Normal range of motion.      Cervical back: Normal range of  motion.   Skin:     General: Skin is warm and dry.   Neurological:      General: No focal deficit present.      Mental Status: He is alert and oriented to person, place, and time. Mental status is at baseline.   Psychiatric:         Mood and Affect: Mood normal.         Behavior: Behavior normal.         Thought Content: Thought content normal.         Judgment: Judgment normal.             Result Review :   The following data was reviewed by: MARIUM Powers on 02/06/2024:    Most Recent A1C          2/6/2024    07:47   HGBA1C Most Recent   Hemoglobin A1C 6.3        A1C Last 3 Results          8/30/2023    13:19 11/2/2023    14:01 2/6/2024    07:47   HGBA1C Last 3 Results   Hemoglobin A1C 6.4  6  6.3      A1c collected in the office today is 6.3%, indicating Controlled Type II diabetes.  This result is up from the prior result of 6% collected on 11/2/23.     Glucose   Date Value Ref Range Status   02/06/2024 129 (H) 70 - 99 mg/dL Final     Comment:     Serial Number: [id number]:  [id number]     Point of care glucose in the office today is within normal limits for nonfasting glucose    Creatinine   Date Value Ref Range Status   09/28/2023 1.10 mg/dL Final     Comment:     Serial Number: [id number]:  [id number]   08/12/2023 0.78 0.76 - 1.27 mg/dL Final   08/10/2023 0.71 (L) 0.76 - 1.27 mg/dL Final   07/23/2021 0.90 mg/dL Final     Comment:     Serial Number: [id number]:  [id number]     eGFR   Date Value Ref Range Status   09/28/2023 75.4 >60.0 mL/min/1.73 Final   08/12/2023 100.2 >60.0 mL/min/1.73 Final     Labs collected on 9/28/23 show Stage II mild (GFR = 60-89mL/min)        Total Cholesterol   Date Value Ref Range Status   08/10/2023 168 0 - 200 mg/dL Final     Triglycerides   Date Value Ref Range Status   08/10/2023 127 0 - 150 mg/dL Final     HDL Cholesterol   Date Value Ref Range Status   08/10/2023 36 (L) 40 - 60 mg/dL Final     LDL Cholesterol    Date Value Ref Range Status   08/10/2023 109  (H) 0 - 100 mg/dL Final     Lipid panel collected on 8/10/23 shows Hyperlipidemia and low HDL            Assessment: Pt is here today for  a 3 month follow up on his diabetes.  States his blood sugar has been within goal for the most part on his CGM however if he does eat a cheat meal his blood sugar will shoot up to 250 mg/dL.  States he normally treats himself once a month with an ice cream cone.  [facility] he is considering moving to [facility].  [facility] his wife passed away almost 10 years ago and his kids are no longer in [place] so is that he is considering moving to [place].  [facility] he has a friend that lives in [place] and he is going down next month to check out the area.  Reviewed CGM report with patient in the office today.The 14-day sensor report shows an average glucose of 140 mg/dL, with 89% in target range ( mgdL), 11% in the high range (181-250 mg/dL), 0% in the very high range (>250 mg/dL), 0% in the low range (54-70 mg/dL) and 0% in the very low range (<54 mg/dL).  His A1c in the office today is 6.3% which is slightly increased from his previous A1c of 6% in November however he is still in controlled status with his diabetes.      Diagnoses and all orders for this visit:    1. Type 2 diabetes mellitus with hemoglobin A1c goal of less than 7.0% (Primary)    2. Type 2 diabetes mellitus with hyperglycemia, unspecified whether long term insulin use  -     POC Glycosylated Hemoglobin (Hb A1C)  -     Microalbumin / Creatinine Urine Ratio - Urine, Clean Catch; Future  -     empagliflozin (Jardiance) 25 MG tablet tablet; Take 1 tablet by mouth Daily for 180 days.  Dispense: 90 tablet; Refill: 1  -     Continuous Blood Gluc Sensor (FreeStyle Clarisa 2 Sensor) misc; Use 1 each Every 14 (Fourteen) Days.  Dispense: 1 each; Refill: 0    3. Obesity (BMI 30-39.9)    4. Type 2 diabetes mellitus with diabetic neuropathy, without long-term current use of insulin    Other orders  -     POC  Glucose        Plan: No changes were made to his plan of care today.  Scheduled a 3-month follow-up and we will recheck his A1c at that time.    The patient will monitor his blood glucose levels per CGM.  If he develops problematic hyperglycemia or hypoglycemia or adverse drug reactions, he will contact the office for further instructions.        Follow Up     Return in about 3 months (around 5/6/2024) for CGM Follow Up, Medication Mgmt.    Patient was given instructions and counseling regarding his condition or for health maintenance advice. Please see specific information pulled into the AVS if appropriate.     Zuri Helton, MARIUM  02/06/2024      Dictated Utilizing Dragon Dictation.  Please note that portions of this note were completed with a voice recognition program.  Part of this note may be an electronic transcription/translation of spoken language to printed text using the Dragon Dictation System.

## 2024-03-05 NOTE — PROGRESS NOTES
Progress Note      Patient Name: Willard Lange   Patient ID: [id number]   Sex: Male   YOB: 1960    Primary Care Provider: Elizabeth CAUSEY   Referring Provider: Elizabeth CAUSEY    Visit Date: June 1, 2021    Provider: Sarkis Ureña DPM   Location: [facility]   Location Address: [address] [place] [address]  [id number]   Location Phone: [phone]          Chief Complaint  · Routine Foot Care Visit  · Diabetic Foot Evaluation      History Of Present Illness  Willard Lange complains of painful, elongated toenails which are thickened, yellowed, chalky, and cause pain with shoe gear and ambulation.   Willard Lange presents to the office today as a Follow-Up for a diabetic foot evaluation.   Patient reports that he is a diabetic currently controlling diabetes with oral medication      New, Established, New Problem:  est  Location:  Toenails  Duration:  Greater than five years  Onset:  Gradual  Nature:  sore with palpation.  Stable, worsening, improving:   stable  Aggravating factors:  Pain with shoe gear and ambulation.  Previous Treatment:  debridement    Patient denies any fevers, chills, nausea, vomiting, shortness of breathe, nor any other constitutional signs nor symptoms.    Patient relates no medical changes since their last visit.       Past Medical History  Arthritis; Asthma; Cancer; Degenerative Disc Disease ; Diabetes mellitus type 2, noninsulin dependent; Diabetes type 2, controlled; GERD; Hepatitis C; Hepatitis C; HTN (hypertension); Hypertension, Benign Essential; Leg pain; Lumbago; Lumbago/low back pain; Lumbar disc herniation, L4-5; Lumbar Spinal Stenosis; Muscle cramps; Neuropathy; Prostate cancer         Past Surgical History  Appendectomy; Back surgery; Colonoscopy; Laminectomy; Lithotripsy for kidney stones; Prostate biopsy, transrectal; Tonsilectomy         Medication List  atorvastatin 20 mg oral tablet; buspirone 7.5 mg oral tablet;  diclofenac sodium 75 mg oral tablet,delayed release (DR/EC); dicyclomine 20 mg oral tablet; Flomax 0.4 mg oral capsule; hydrocodone-acetaminophen 7.5-325 mg/15 mL oral solution; losartan 100 mg oral tablet; losartan-hydrochlorothiazide 100-25 mg oral tablet; Lyrica 200 mg oral capsule; metformin 500 mg oral tablet; Ozempic 0.25 mg or 0.5 mg(2 mg/1.5 mL) subcutaneous pen injector; Vitamin D2 1,250 mcg (50,000 unit) oral capsule         Allergy List  formaldehyde       Allergies Reconciled  Family Medical History  Family history of lung cancer; Family history of heart disease; Family history of diabetes mellitus type I         Social History  Alcohol (Current some day); lives alone; Tobacco (Former); Unemployed;          Review of Systems  · Constitutional  o Denies  o : fatigue, night sweats  · Eyes  o Denies  o : double vision, blurred vision  · HENT  o Denies  o : vertigo, recent head injury  · Cardiovascular  o Denies  o : chest pain, irregular heart beats  · Respiratory  o Denies  o : shortness of breath, productive cough  · Gastrointestinal  o Denies  o : nausea, vomiting  · Genitourinary  o Denies  o : dysuria, urinary retention  · Integument  o * See HPI  · Neurologic  o Admits  o : tingling or numbness  o Denies  o : altered mental status, seizures  · Musculoskeletal  o Denies  o : joint swelling, limitation of motion  · Endocrine  o Denies  o : cold intolerance, heat intolerance  · Heme-Lymph  o Denies  o : petechiae, lymph node enlargement or tenderness  · Allergic-Immunologic  o Denies  o : frequent illnesses      Vitals  Date Time BP Position Site L\R Cuff Size HR RR TEMP (F) WT  HT  BMI kg/m2 BSA m2 O2 Sat FR L/min FiO2        06/01/2021 02:19 /70 Sitting    73 - R  96.9 243lbs 16oz 6'   33.09 2.37 98 %            Physical Examination  · Constitutional  o Appearance  o : overweight, well developed, no obvious deformities present, appears to be chronically ill   · Respiratory  o Respiratory  Effort  o : No labored breathing. Good respiratory effort.   · Cardiovascular  o Peripheral Vascular System  o :   § Pedal Pulses  § : Pedal Pulses are 2+ and symmetrical  § Extremities  § : There is no edema of the lower extremities  · Musculoskeletal  o Extremeties/Joint  o : Lower extremity muscle strength and range of motion is equal and symmetrical bilaterally. The knees are noted to be in normal alignment. Ankle alignment and range of motion is normal and foot structure is normal.  · Left DM Foot Exam  o Sensation  o : Grafton-Oswaldo 5.07 monofilament absent to all assessed areas. Sharp/dull sensation is absent.  o Visual Inspection  o : Skin is noted to have normal texture and turgor, with no excrescences noted. The toenails are noted to be without disease  o Vascular  o : palpable dorsalis pedis and posterir tibialis pulses present, normal capillary refill  · Right DM Foot Exam  o Sensation  o : Grafton-Oswaldo 5.07 monofilament absent to all assessed areas. Sharp/dull sensation is absent.  o Visual Inspection  o : Skin is noted to have normal texture and turgor, with no excrescences noted. The toenails are noted to be without disease  o Vascular  o : palpable dorsalis pedis and posterir tibialis pulses present, normal capillary refill  · Toes  o Toes: Right Foot  o :   § Toenails  § : Toenails are hypertrophic, mycotic, dystrophic, brittle toenail(s) at nail 1, 2, 3, 4, 5 with onycholysis of the right foot. The 1st, 2nd, 3rd, 4th, 5th toenail(s) on the right have 2 mm in thickness with subungual detritus. There is an incurvated toenail at the distal border of the 1st, 2nd, 3rd, 4th, 5th toe  o Toes: Left Foot  o :   § Toenails  § : There are hypertrophic, mycotic, dystrophic, brittle toenail(s) at the 1, 2, 3, 4, 5 with onycholysis of the left foot. The 1st, 2nd, 3rd, 4th, 5th toenail(s) on the left have 2 mm in thickness with subungual detritus. There is an incurvated toenail at the distal border of the  1st, 2nd, 3rd, 4th, 5th toe  · Procedures  o Nail Debridement  o : Nail debridement is indicated for the following toenails:, left hallux, left 2nd toe, left 3rd toe, left 4th toe, left 5th toe, right hallux, right 2nd toe, right 3rd toe, right 4th toe, right 5th toe. The nail was debrided of excessive thickness to appropriate levels of comfort and contour using, nail nippers. The procedure was without complications          Assessment  · Diabetes mellitus type 2, noninsulin dependent       Type 2 diabetes mellitus without complications     250.00/E11.9  · Diabetic neuropathy       Type 2 diabetes mellitus with diabetic neuropathy, unspecified     250.60/E11.40  · Foot pain, bilateral       Pain in right foot     729.5/M79.671  Pain in left foot     729.5/M79.672  · Ingrowing nail     703.0/L60.0  · Tinea unguium     110.1/B35.1      Plan  · Orders  o Debridement of six or more nails (34581) - - 06/01/2021  o Diabetic Foot (Motor and Sensory) Exam Completed [facility] (, , 2028F) - - 06/01/2021  · Medications  o Medications have been Reconciled  o Transition of Care or Provider Policy  · Instructions  o Follow Up in 9 weeks  o I have discussed the findings of this evaluation with the patient. The discussion included a complete verbal explanation of any changes in the examination results, diagnosis, and the current treatment plan. A schedule for future care needs was explained. If any questions should arise after returning home, I have encouraged the patient to feel free to contact Dr. Ureña. The patient states understanding and agreement with this plan.   o Pt to monitor for problems and to contact Dr. Ureña for follow-up should such signs occur. Patient states understanding and agreement with this plan.   o Onychomycosis is present in 2-3% of the population with the most common source of contamination coming from the patient's own skin. Fifteen to 20 percent of people between the ages of 40 and 60 have  onychomycosis, 32 percent of 60- to [id number] have nail fungus and approximately 50 percent of those over 70 are afflicted. Seventy-five percent of the people who have this infection exhibit psychosocial concerns. These people face the dilemma of not being able to go to the swimming pool, public shower areas or even wear open-toed sandals. More important is the fact that 48 percent of the people with onychomycosis have pain. The pain is intense enough to have these patients miss 1.8 days of work on average over a six-month period. Traditional topical therapies used alone are generally ineffective for clearing the primary infection, and even oral therapy is associated with a high rate of initial treatment failure or recurrence. In many patients combination oral and topical therapy is the treatment of choice.   o I have recommended debridement of the devitalized or contaminated tissue. Debridement will relieve the pressure of the necrotic presence of on the nail and provides for a better cosmetic appearance. Debulking the nail does help in combination with other treatments in that it can decrease the fungal load of the nail itself.   o Diabetic foot exam performed and documented this date, compliant with CQM required standards. Detail of findings as noted in physical exam.Lower extremity Neuro exam for diabetic patient performed and documented this date, compliant with PQRS required standards. Detail of findings as noted in physical exam.Advised patient importance of good routine lower extremity hygiene. Advised patient importance of evaluating for intact skin and pain free nail borders. Advised patient to use mirror to evaluate plantar/ soles of feet for better visualization. Advised patient monitor and phone office to be seen if any cracking to skin, open lesions, painful nail borders or if nails become elongated prior to next visit. Advised patient importance of daily cleansing of lower extremities, followed by  good skin cream to maintain normal hydration of skin. Also advised patient importance of close daily monitoring of blood sugar. Advised to regulate diet and medications to maintain control of blood sugar in optimal range. Contact primary care provider if difficulties maintaining blood sugar levels.Advised Patient of presence of Diabetes Mellitus condition. Advised Patient risk of progression and worsening or improvement, then return of condition. Will monitor condition for any change in future. Treat with most appropriate treatment pending status of condition.Counseled and advised patient extensively on nature and ramifications of diabetes. Standard instructions given to patient for good diabetic foot care and maintenance. Advised importance of careful monitoring to avoid break down and complications secondary to diabetes. Advised patient importance of strict maintenance of blood sugar control. Advised patient of possible ominous results from neglect of condition,i.e.: amputation/ loss of digits, feet and legs, or even death.Patient states understands counseling, will monitor closely, continue good hygiene and routine diabetic foot care. Patient will contact office is questions or problems.   o Electronically Identified Patient Education Materials Provided Electronically  · Disposition  o Call or Return if symptoms worsen or persist.            Electronically Signed by: Sarkis Ureña DPM -Author on June 1, 2021 02:26:02 PM   Personal collateral

## 2024-04-09 ENCOUNTER — TELEPHONE (OUTPATIENT)
Dept: PODIATRY | Facility: CLINIC | Age: 64
End: 2024-04-09

## 2024-04-10 DIAGNOSIS — E11.65 TYPE 2 DIABETES MELLITUS WITH HYPERGLYCEMIA, UNSPECIFIED WHETHER LONG TERM INSULIN USE: ICD-10-CM

## 2024-04-10 NOTE — TELEPHONE ENCOUNTER
Caller: Willard Lange    Relationship: Self    Best call back number: [phone]    Requested Prescriptions:   Requested Prescriptions     Pending Prescriptions Disp Refills    Continuous Blood Gluc Sensor (FreeStyle Clarisa 2 Sensor) misc 1 each 0     Sig: Use 1 each Every 14 (Fourteen) Days.        Pharmacy where request should be sent: [id number] [place] [facility] [address] AT [facility] [phone] [facility] [phone]      Last office visit with prescribing clinician: 2/6/2024   Last telemedicine visit with prescribing clinician: Visit date not found   Next office visit with prescribing clinician: 5/7/2024     Additional details provided by patient:     Does the patient have less than a 3 day supply:  [x] Yes  [] No    Would you like a call back once the refill request has been completed: [] Yes [x] No    If the office needs to give you a call back, can they leave a voicemail: [] Yes [x] No    Dorinda Davidson Rep   04/10/24 11:39 EDT

## 2024-04-15 ENCOUNTER — OFFICE VISIT (OUTPATIENT)
Dept: PODIATRY | Facility: CLINIC | Age: 64
End: 2024-04-15
Payer: MEDICARE

## 2024-04-15 VITALS
WEIGHT: 235 LBS | BODY MASS INDEX: 31.83 KG/M2 | HEIGHT: 72 IN | OXYGEN SATURATION: 94 % | SYSTOLIC BLOOD PRESSURE: 167 MMHG | DIASTOLIC BLOOD PRESSURE: 90 MMHG | TEMPERATURE: 98.6 F | HEART RATE: 63 BPM

## 2024-04-15 DIAGNOSIS — I73.9 PVD (PERIPHERAL VASCULAR DISEASE): ICD-10-CM

## 2024-04-15 DIAGNOSIS — E11.9 NON-INSULIN DEPENDENT TYPE 2 DIABETES MELLITUS: ICD-10-CM

## 2024-04-15 DIAGNOSIS — M79.671 FOOT PAIN, BILATERAL: ICD-10-CM

## 2024-04-15 DIAGNOSIS — B35.1 ONYCHOMYCOSIS: ICD-10-CM

## 2024-04-15 DIAGNOSIS — G62.9 NEUROPATHY: Primary | ICD-10-CM

## 2024-04-15 DIAGNOSIS — E11.8 DIABETIC FOOT: ICD-10-CM

## 2024-04-15 DIAGNOSIS — M20.41 HAMMER TOES OF BOTH FEET: ICD-10-CM

## 2024-04-15 DIAGNOSIS — M20.42 HAMMER TOES OF BOTH FEET: ICD-10-CM

## 2024-04-15 DIAGNOSIS — R26.2 DIFFICULTY WALKING: ICD-10-CM

## 2024-04-15 DIAGNOSIS — M79.672 FOOT PAIN, BILATERAL: ICD-10-CM

## 2024-04-15 DIAGNOSIS — L60.0 ONYCHOCRYPTOSIS: ICD-10-CM

## 2024-04-15 PROCEDURE — 3080F DIAST BP >= 90 MM HG: CPT | Performed by: PODIATRIST

## 2024-04-15 PROCEDURE — 1160F RVW MEDS BY RX/DR IN RCRD: CPT | Performed by: PODIATRIST

## 2024-04-15 PROCEDURE — 1159F MED LIST DOCD IN RCRD: CPT | Performed by: PODIATRIST

## 2024-04-15 PROCEDURE — 3077F SYST BP >= 140 MM HG: CPT | Performed by: PODIATRIST

## 2024-04-15 PROCEDURE — 11721 DEBRIDE NAIL 6 OR MORE: CPT | Performed by: PODIATRIST

## 2024-04-15 NOTE — PROGRESS NOTES
[facility] - PODIATRY    Today's Date: 04/15/24    Patient Name: Willard Lange  MRN: [id number]  CSN: [id number]  PCP: Josselyn Damon APRN, Last PCP Visit: 10/27/2023  Referring Provider: No ref. provider found    SUBJECTIVE     Chief Complaint   Patient presents with    Left Foot - Follow-up, Nail Problem    Right Foot - Follow-up, Nail Problem     HPI: Willard Lange, a 63 y.o.male, comes to clinic.    New, Established, New Problem:  established     Location:  Toenails    Duration:   Greater than five years    Onset:  Gradual    Nature:  sore with palpation.    Stable, worsening, improving:   Stable    Aggravating factors:  Pain with shoe gear and ambulation.    Previous Treatment:  debridement  __________________________________    Medical changes:  no changes    Patient states their most recent blood glucose readin      Patient denies any fevers, chills, nausea, vomiting, shortness of breathe, nor any other constitutional signs nor symptoms.       I have reviewed/confirmed previously documented HPI with no changes.       Past Medical History:   Diagnosis Date    Arthritis     Asthma     Benign essential hypertension     Brain tumor 2021    Cancer     DDD (degenerative disc disease), lumbar     Diabetes mellitus type 2, noninsulin dependent     Diabetes type 2, controlled     GERD (gastroesophageal reflux disease)     Hepatitis C     HTN (hypertension)     Leg pain     Lumbago 02/15/2017    with low back pain    Lumbar disc herniation 02/15/2017    L4-4    Lumbar spinal stenosis 02/15/2017    L4/5    Muscle cramps     Neuropathy     Pancreatitis     Prostate cancer     Staph skin infection     States as a child.     Past Surgical History:   Procedure Laterality Date    APPENDECTOMY      BACK SURGERY  2017    COLONOSCOPY  2017    Dr. Dorsey-Polyps    COLONOSCOPY N/A 2023    Procedure: COLONOSCOPY FOR SCREENING;  Surgeon: Kisha Dorsey MD;  Location: [facility] ENDOSCOPY;   Service: Gastroenterology;  Laterality: N/A;  POOR PREP    CYSTOSCOPY W/ LASER LITHOTRIPSY      for kidney stones    ENDOSCOPY N/A 2023    Procedure: ESOPHAGOGASTRODUODENOSCOPY WITH BIOPSIES;  Surgeon: Kisha Dorsey MD;  Location: [facility] ENDOSCOPY;  Service: Gastroenterology;  Laterality: N/A;  RETAINED FOOD IN THE STOMACH, GASTRITIS    LAMINECTOMY  2017    L4-5    PROSTATE BIOPSY      transrectal    TONSILLECTOMY       Family History   Problem Relation Age of Onset    Heart disease Father     Lung cancer Sister     Diabetes type I Maternal Grandmother     Malig Hyperthermia Neg Hx      Social History     Socioeconomic History    Marital status:    Tobacco Use    Smoking status: Former     Current packs/day: 0.00     Average packs/day: 0.8 packs/day for 28.4 years (21.3 ttl pk-yrs)     Types: Cigarettes     Start date: 1976     Quit date:      Years since quittin.2     Passive exposure: Past    Smokeless tobacco: Never   Vaping Use    Vaping status: Never Used   Substance and Sexual Activity    Alcohol use: Not Currently    Drug use: Never    Sexual activity: Defer     Allergies   Allergen Reactions    Formaldehyde Rash     Current Outpatient Medications   Medication Sig Dispense Refill    albuterol sulfate  (90 Base) MCG/ACT inhaler Inhale 2 puffs Every 6 (Six) Hours As Needed.      atorvastatin (LIPITOR) 20 MG tablet Take 1 tablet by mouth Every Night.      baclofen (LIORESAL) 10 MG tablet Take 1 tablet by mouth 3 (Three) Times a Day.      Blood Glucose Monitoring Suppl (FreeStyle Lite) w/Device kit See Admin Instructions.      colestipol (COLESTID) 1 g tablet Take 1 tablet by mouth As Needed.      Continuous Blood Gluc  (FreeStyle Clarisa 2 [place]) device Use 1 each Every 14 (Fourteen) Days. 1 each 0    Continuous Blood Gluc Sensor (FreeStyle Clarisa 2 Sensor) misc Use 1 each Every 14 (Fourteen) Days. 1 each 0    dicyclomine (BENTYL) 20 MG tablet TAKE 1 TABLET  BY MOUTH FOUR TIMES DAILY AS NEEDED FOR UPSET STOMACH      empagliflozin (Jardiance) 25 MG tablet tablet Take 1 tablet by mouth Daily for 180 days. 90 tablet 1    FREESTYLE LITE test strip       glipizide (GLUCOTROL XL) 5 MG ER tablet Take 1 tablet by mouth Daily for 180 days. 90 tablet 1    glucose blood test strip FreeStyle Lite Strips   USE TO TEST BLOOD SUGAR FOUR TIMES DAILY AND AS NEEDED      HYDROcodone-acetaminophen (NORCO) 7.5-325 MG per tablet Take 1 tablet by mouth Every 8 (Eight) Hours As Needed.      Lancets (freestyle) lancets       losartan-hydrochlorothiazide (HYZAAR) 100-25 MG per tablet Take 1 tablet by mouth Daily.      naproxen (NAPROSYN) 250 MG tablet Take 1 tablet by mouth Every 12 (Twelve) Hours.      omeprazole (priLOSEC) 40 MG capsule Take 1 capsule by mouth Daily.      polyethylene glycol (GoLYTELY) 236 g solution Starting at noon on day prior to procedure, drink 8 ounces every 30 minutes until all gone or stools are clear. May add flavor packet. 4000 mL 0    polyethylene glycol (MIRALAX) 17 g packet Take 17 g by mouth Daily. 2 packet 0    polyethylene glycol (MIRALAX) 17 g packet Take 17 g by mouth Daily. 3 packet 0    polyethylene glycol (MIRALAX) 17 g packet Take 17 g by mouth Daily. 30 packet 11    polyethylene glycol (MiraLax) 17 g packet Take 17 g by mouth Daily. 5 packet 0    pregabalin (LYRICA) 200 MG capsule Take 1 capsule by mouth 3 times a day.      tamsulosin (FLOMAX) 0.4 MG capsule 24 hr capsule Take 1 capsule by mouth Every 12 (Twelve) Hours. 180 capsule 1    TRAZODONE HCL PO Take 1 tablet by mouth Every Night.       No current facility-administered medications for this visit.     Review of Systems   Constitutional: Negative.    Skin:         Painful toenails   Neurological:  Positive for numbness.   All other systems reviewed and are negative.      OBJECTIVE     Vitals:    04/15/24 1013   BP: 167/90   Pulse: 63   Temp: 98.6 °F (37 °C)   SpO2: 94%       WBC   Date Value Ref  Range Status   08/12/2023 5.93 3.40 - 10.80 10*3/mm3 Final     RBC   Date Value Ref Range Status   08/12/2023 4.58 4.14 - 5.80 10*6/mm3 Final     Hemoglobin   Date Value Ref Range Status   08/12/2023 13.3 13.0 - 17.7 g/dL Final     Hematocrit   Date Value Ref Range Status   08/12/2023 39.2 37.5 - 51.0 % Final     MCV   Date Value Ref Range Status   08/12/2023 85.6 79.0 - 97.0 fL Final     MCH   Date Value Ref Range Status   08/12/2023 29.0 26.6 - 33.0 pg Final     MCHC   Date Value Ref Range Status   08/12/2023 33.9 31.5 - 35.7 g/dL Final     RDW   Date Value Ref Range Status   08/12/2023 12.8 12.3 - 15.4 % Final     RDW-SD   Date Value Ref Range Status   08/12/2023 39.7 37.0 - 54.0 fl Final     MPV   Date Value Ref Range Status   08/12/2023 9.8 6.0 - 12.0 fL Final     Platelets   Date Value Ref Range Status   08/12/2023 247 140 - 450 10*3/mm3 Final     Neutrophil %   Date Value Ref Range Status   08/12/2023 58.8 42.7 - 76.0 % Final     Lymphocyte %   Date Value Ref Range Status   08/12/2023 19.7 19.6 - 45.3 % Final     Monocyte %   Date Value Ref Range Status   08/12/2023 13.8 (H) 5.0 - 12.0 % Final     Eosinophil %   Date Value Ref Range Status   08/12/2023 6.4 (H) 0.3 - 6.2 % Final     Basophil %   Date Value Ref Range Status   08/12/2023 0.8 0.0 - 1.5 % Final     Immature Grans %   Date Value Ref Range Status   08/12/2023 0.5 0.0 - 0.5 % Final     Neutrophils, Absolute   Date Value Ref Range Status   08/12/2023 3.48 1.70 - 7.00 10*3/mm3 Final     Lymphocytes, Absolute   Date Value Ref Range Status   08/12/2023 1.17 0.70 - 3.10 10*3/mm3 Final     Monocytes, Absolute   Date Value Ref Range Status   08/12/2023 0.82 0.10 - 0.90 10*3/mm3 Final     Eosinophils, Absolute   Date Value Ref Range Status   08/12/2023 0.38 0.00 - 0.40 10*3/mm3 Final     Basophils, Absolute   Date Value Ref Range Status   08/12/2023 0.05 0.00 - 0.20 10*3/mm3 Final     Immature Grans, Absolute   Date Value Ref Range Status   08/12/2023 0.03  0.00 - 0.05 10*3/mm3 Final     nRBC   Date Value Ref Range Status   08/12/2023 0.0 0.0 - 0.2 /100 WBC Final         Lab Results   Component Value Date    GLUCOSE 136 (H) 08/12/2023    CALCIUM 9.0 08/12/2023     08/12/2023    K 3.8 08/12/2023    CO2 26.1 08/12/2023     08/12/2023    BUN 10 08/12/2023    CREATININE 1.10 09/28/2023    BCR 12.8 08/12/2023    ANIONGAP 9.9 08/12/2023       Patient seen in no apparent distress.      PHYSICAL EXAM:     Foot/Ankle Exam    GENERAL  Appearance:  obese  Orientation:  AAOx3  Affect:  appropriate  Gait:  antalgic  Assistance:  cane use  Right shoe gear: casual shoe  Left shoe gear: casual shoe    VASCULAR     Right Foot Vascularity   Normal vascular exam    Dorsalis pedis:  Absent  Posterior tibial:  Absent  Skin temperature:  warm  Edema grading:  None  CFT:  4  Pedal hair growth:  Absent  Varicosities:  none     Left Foot Vascularity   Normal vascular exam    Dorsalis pedis:  Absent  Posterior tibial:  Absent  Skin temperature:  warm  Edema grading:  None  CFT:  4  Pedal hair growth:  Absent  Varicosities:  none     NEUROLOGIC     Right Foot Neurologic   Light touch sensation: absent  Vibratory sensation: absent  Hot/Cold sensation: absent  Protective Sensation using [place] Monofilament:   Sites tested: 10     Left Foot Neurologic   Light touch sensation: absent  Vibratory sensation: absent  Hot/Cold sensation:  absent  Protective Sensation using [place] Monofilament:   Sites tested: 10    MUSCULOSKELETAL     Right Foot Musculoskeletal   Hammertoe:  Second toe, third toe, fourth toe and fifth toe     Left Foot Musculoskeletal   Hammertoe:  Second toe, third toe, fourth toe and fifth toe    MUSCLE STRENGTH     Right Foot Muscle Strength   Foot dorsiflexion:  4  Foot plantar flexion:  4  Foot inversion:  4  Foot eversion:  4     Left Foot Muscle Strength   Foot dorsiflexion:  4  Foot plantar flexion:  4  Foot inversion:  4  Foot eversion:   4    RANGE OF MOTION     Right Foot Range of Motion   Foot and ankle ROM within normal limits       Left Foot Range of Motion   Foot and ankle ROM within normal limits      DERMATOLOGIC      Right Foot Dermatologic   Skin  Right foot skin is intact.   Nails  1.  Positive for elongated, onychomycosis, abnormal thickness, subungual debris and ingrown toenail.  2.  Positive for elongated, onychomycosis, abnormal thickness, subungual debris and ingrown toenail.  3.  Positive for elongated, onychomycosis, abnormal thickness, subungual debris and ingrown toenail.  4.  Positive for elongated, onychomycosis, abnormal thickness, subungual debris and ingrown toenail.  5.  Positive for elongated, onychomycosis, abnormal thickness, subungual debris and ingrown toenail.  Nails comment:  Toenails 1, 2, 3, 4, and 5     Left Foot Dermatologic   Skin  Left foot skin is intact.   Nails comment:  Toenails 1, 2, 3, 4, and 5  Nails  1.  Positive for elongated, onychomycosis, abnormal thickness, subungual debris and ingrown toenail.  2.  Positive for elongated, onychomycosis, abnormal thickness, subungual debris and ingrown toenail.  3.  Positive for elongated, onychomycosis, abnormal thickness, subungual debris and ingrown toenail.  4.  Positive for elongated, onychomycosis, abnormally thick, subungual debris and ingrown toenail.  5.  Positive for elongated, onychomycosis, abnormally thick, subungual debris and ingrown toenail.    I have reexamined the patient the results are consistent with the previously documented exam.        ASSESSMENT/PLAN     Diagnoses and all orders for this visit:    1. Neuropathy (Primary)    2. Hammer toes of both feet    3. Foot pain, bilateral    4. Diabetic foot    5. Non-insulin dependent type 2 diabetes mellitus    6. Onychomycosis    7. PVD (peripheral vascular disease)    8. Onychocryptosis    9. Difficulty walking    Comprehensive lower extremity examination and evaluation was performed.    Discussed  findings and treatment plan including risks, benefits, and treatment options with patient in detail. Patient agreed with treatment plan.    Toenails 1 through 5 bilaterally were debrided in thickness and length and then smoothed with a Dremel Tool.  Tolerated the procedure well without complications.    An After Visit Summary was printed and given to the patient at discharge, including (if requested) any available informative/educational handouts regarding diagnosis, treatment, or medications. All questions were answered to patient/family satisfaction. Should symptoms fail to improve or worsen they agree to call or return to clinic or to go to the Emergency Department. Discussed the importance of following up with any needed screening tests/labs/specialist appointments and any requested follow-up recommended by me today. Importance of maintaining follow-up discussed and patient accepts that missed appointments can delay diagnosis and potentially lead to worsening of conditions.    Return in about 9 weeks (around 6/17/2024) for Toenail Care., or sooner if acute issues arise.    I have reviewed the assessment and plan and verified the accuracy of it. No changes to assessment and plan since the information was documented. Sarkis Ureña DPM 04/15/24     I have dictated this note utilizing Dragon Dictation.  Please note that portions of this note were completed with a voice recognition program.  Part of this note may be an electronic transcription/translation of spoken language to printed text using the Dragon Dictation System.          This document has been electronically signed by Sarkis Ureña DPM on April 15, 2024 10:37 EDT

## 2024-07-10 DIAGNOSIS — C61 PROSTATE CANCER: ICD-10-CM

## 2024-07-10 DIAGNOSIS — N40.1 BENIGN LOCALIZED PROSTATIC HYPERPLASIA WITH LOWER URINARY TRACT SYMPTOMS (LUTS): ICD-10-CM

## 2024-07-10 DIAGNOSIS — E11.65 TYPE 2 DIABETES MELLITUS WITH HYPERGLYCEMIA, UNSPECIFIED WHETHER LONG TERM INSULIN USE: ICD-10-CM

## 2024-07-11 RX ORDER — EMPAGLIFLOZIN 25 MG/1
25 TABLET, FILM COATED ORAL DAILY
Qty: 90 TABLET | Refills: 1 | Status: SHIPPED | OUTPATIENT
Start: 2024-07-11

## 2024-07-16 RX ORDER — TAMSULOSIN HYDROCHLORIDE 0.4 MG/1
1 CAPSULE ORAL EVERY 12 HOURS
Qty: 60 CAPSULE | Refills: 1 | Status: SHIPPED | OUTPATIENT
Start: 2024-07-16

## 2024-08-05 ENCOUNTER — TELEPHONE (OUTPATIENT)
Dept: VASCULAR SURGERY | Facility: HOSPITAL | Age: 64
End: 2024-08-05
Payer: MEDICARE

## 2024-08-13 NOTE — TELEPHONE ENCOUNTER
LEFT MESSAGE TO CALL AND SCHEDULE HIS ANNUAL FOLLOW UP WITH CAROTID U/S, PEMA AND OFFICE VISIT WITH LISSETTE CAUSEY. TRANSFER TO OUR OFFICE TO SCHEDULE.

## 2024-08-27 ENCOUNTER — TELEPHONE (OUTPATIENT)
Dept: UROLOGY | Facility: CLINIC | Age: 64
End: 2024-08-27

## 2024-09-01 DIAGNOSIS — C61 PROSTATE CANCER: ICD-10-CM

## 2024-09-01 DIAGNOSIS — N40.1 BENIGN LOCALIZED PROSTATIC HYPERPLASIA WITH LOWER URINARY TRACT SYMPTOMS (LUTS): ICD-10-CM

## 2024-09-04 NOTE — TELEPHONE ENCOUNTER
3RD CALL - CALLED PT TO OFFER R/S OF NO SHOWED APPT 8/27 W/ ALLA    NO ANSWER, LMOM  NO ONE ELSE LISTED ON [facility]    THREE ATTEMPTS MADE TO OFFER R/S, ANYTHING ELSE TO DO?

## 2024-09-09 ENCOUNTER — TELEPHONE (OUTPATIENT)
Dept: RADIATION ONCOLOGY | Facility: HOSPITAL | Age: 64
End: 2024-09-09
Payer: MEDICARE

## 2024-11-30 RX ORDER — TAMSULOSIN HYDROCHLORIDE 0.4 MG/1
1 CAPSULE ORAL EVERY 12 HOURS
Qty: 180 CAPSULE | OUTPATIENT
Start: 2024-11-30

## 2025-01-20 ENCOUNTER — PRIOR AUTHORIZATION (OUTPATIENT)
Dept: DIABETES SERVICES | Facility: HOSPITAL | Age: 65
End: 2025-01-20
Payer: MEDICARE

## 2025-01-20 NOTE — TELEPHONE ENCOUNTER
UNABLE TO SUBMIT GLADIS 2 PA. INSURANCE TERMINATED. LVM FOR PATIENT TO RETURN CALL TO OBTAIN NEW INSURANCE INFORMATION.

## 2025-04-26 NOTE — TELEPHONE ENCOUNTER
CALLED PATIENT 2X AND EACH TIME SOMEONE PICKED UP AND HUNG UP.  WHEN PATIENT CALLS BACK, PLEASE SEE PREV NOTE.  
PATIENT WOULD LIKE TO SCHEDULE AN APPT TO BE SEEN FOR HIS LUMBAR.  LAST SEEN IN 2020 FOR HIS LUMBAR.  HE HAS NO RECENT IMAGING.  IS IT OKAY TO SCHEDULE WITH WILTON FRANKLIN    [phone]  
PT CALLED BACK AND ADVISED HIM OF PREV. MESSAGE-PT IS UNDERSTANDING AND WILL CALL WHEN HE COMPLETES HIS THERAPY FOR AN APPT.   
Pt needs to start and finish the physical therapy that has been ordered from another provider. When he is done with that he can call and scheduled a f/u apt   
Patient

## 2025-08-27 DIAGNOSIS — E11.65 TYPE 2 DIABETES MELLITUS WITH HYPERGLYCEMIA, UNSPECIFIED WHETHER LONG TERM INSULIN USE: ICD-10-CM

## 2025-08-28 RX ORDER — EMPAGLIFLOZIN 25 MG/1
25 TABLET, FILM COATED ORAL DAILY
Qty: 90 TABLET | Refills: 1 | OUTPATIENT
Start: 2025-08-28

## (undated) DEVICE — SOL IRRG H2O PL/BG 1000ML STRL

## (undated) DEVICE — LINER SURG CANSTR SXN S/RIGD 1500CC

## (undated) DEVICE — CONN JET HYDRA H20 AUXILIARY DISP

## (undated) DEVICE — Device: Brand: DEFENDO AIR/WATER/SUCTION AND BIOPSY VALVE

## (undated) DEVICE — Device

## (undated) DEVICE — SOLIDIFIER LIQLOC PLS 1500CC BT

## (undated) DEVICE — SINGLE-USE BIOPSY FORCEPS: Brand: RADIAL JAW 4

## (undated) DEVICE — BLCK/BITE BLOX WO/DENTL/RIM W/STRAP 54F